# Patient Record
Sex: FEMALE | Race: WHITE | NOT HISPANIC OR LATINO | Employment: OTHER | URBAN - METROPOLITAN AREA
[De-identification: names, ages, dates, MRNs, and addresses within clinical notes are randomized per-mention and may not be internally consistent; named-entity substitution may affect disease eponyms.]

---

## 2019-04-15 ENCOUNTER — OFFICE VISIT (OUTPATIENT)
Dept: URGENT CARE | Facility: CLINIC | Age: 59
End: 2019-04-15

## 2019-04-15 ENCOUNTER — TELEPHONE (OUTPATIENT)
Dept: SCHEDULING | Facility: IMAGING CENTER | Age: 59
End: 2019-04-15

## 2019-04-15 VITALS
SYSTOLIC BLOOD PRESSURE: 120 MMHG | RESPIRATION RATE: 12 BRPM | BODY MASS INDEX: 37.83 KG/M2 | WEIGHT: 235.4 LBS | HEART RATE: 70 BPM | OXYGEN SATURATION: 94 % | TEMPERATURE: 98.6 F | DIASTOLIC BLOOD PRESSURE: 80 MMHG | HEIGHT: 66 IN

## 2019-04-15 DIAGNOSIS — J06.9 URI WITH COUGH AND CONGESTION: ICD-10-CM

## 2019-04-15 DIAGNOSIS — J40 BRONCHITIS: ICD-10-CM

## 2019-04-15 DIAGNOSIS — J01.40 ACUTE NON-RECURRENT PANSINUSITIS: Primary | ICD-10-CM

## 2019-04-15 PROCEDURE — 99204 OFFICE O/P NEW MOD 45 MIN: CPT | Performed by: PHYSICIAN ASSISTANT

## 2019-04-15 RX ORDER — DOXYCYCLINE HYCLATE 100 MG
100 TABLET ORAL 2 TIMES DAILY
Qty: 14 TAB | Refills: 0 | Status: SHIPPED | OUTPATIENT
Start: 2019-04-15 | End: 2019-04-15 | Stop reason: SDUPTHER

## 2019-04-15 RX ORDER — METHYLPREDNISOLONE 4 MG/1
TABLET ORAL
Qty: 21 TAB | Refills: 0 | Status: SHIPPED | OUTPATIENT
Start: 2019-04-15 | End: 2019-05-08

## 2019-04-15 RX ORDER — DOXYCYCLINE HYCLATE 100 MG
100 TABLET ORAL 2 TIMES DAILY
Qty: 14 TAB | Refills: 0 | Status: SHIPPED | OUTPATIENT
Start: 2019-04-15 | End: 2019-04-22

## 2019-04-15 RX ORDER — PROMETHAZINE HYDROCHLORIDE AND CODEINE PHOSPHATE 6.25; 1 MG/5ML; MG/5ML
5 SYRUP ORAL 4 TIMES DAILY PRN
Qty: 120 ML | Refills: 0 | Status: SHIPPED | OUTPATIENT
Start: 2019-04-15 | End: 2019-04-22

## 2019-04-15 RX ORDER — METHYLPREDNISOLONE 4 MG/1
TABLET ORAL
Qty: 21 TAB | Refills: 0 | Status: SHIPPED | OUTPATIENT
Start: 2019-04-15 | End: 2019-04-15 | Stop reason: SDUPTHER

## 2019-04-15 NOTE — PROGRESS NOTES
Subjective:      Pt is a 58 y.o. female who presents with Cough (X 6weeks )            HPI  This is a new problem. PT presents to  clinic today complaining of sore throat, fevers, pressure in ears, cough, fatigue, runny nose, wheezing and SOB. PT denies CP, NVD, abdominal pain, joint pain. PT states these symptoms began around 6 weeks ago. PT states the pain is a 6/10 with coughing fits, aching in nature and worse at night. Pt has not taken any RX medications for this condition. The pt's medication list, problem list, and allergies have been evaluated and reviewed during today's visit.    PMH:  Negative per pt.      PSH:  Negative per pt.      Fam Hx:  the patient's family history is not pertinent to their current complaint      Soc HX:  Social History     Social History   • Marital status: Single     Spouse name: N/A   • Number of children: N/A   • Years of education: N/A     Occupational History   • Not on file.     Social History Main Topics   • Smoking status: Not on file   • Smokeless tobacco: Not on file   • Alcohol use Not on file   • Drug use: Unknown   • Sexual activity: Not on file     Other Topics Concern   • Not on file     Social History Narrative   • No narrative on file         Medications:    Current Outpatient Prescriptions:   •  ceFAZolin 50 mg/ml (ANCEF), Place  in both eyes every 3 hours., Disp: , Rfl:   •  promethazine-codeine (PHENERGAN-CODEINE) 6.25-10 MG/5ML Syrup, Take 5 mL by mouth 4 times a day as needed for up to 7 days., Disp: 120 mL, Rfl: 0  •  doxycycline (VIBRAMYCIN) 100 MG Tab, Take 1 Tab by mouth 2 times a day for 7 days., Disp: 14 Tab, Rfl: 0  •  MethylPREDNISolone (MEDROL DOSEPAK) 4 MG Tablet Therapy Pack, Use as directed, Disp: 21 Tab, Rfl: 0      Allergies:  Patient has no allergy information on record.    ROS  Review of Systems   Constitutional: Positive for malaise/fatigue. Negative for fever and diaphoresis.   HENT: Positive for congestion and sore throat. Negative for ear  "discharge, hearing loss, nosebleeds and tinnitus.    Eyes: Negative for blurred vision, double vision and photophobia.   Respiratory: Positive for cough, sputum production, shortness of breath and wheezing. Negative for hemoptysis.    Cardiovascular: Negative for chest pain and palpitations.   Gastrointestinal: Negative for nausea, vomiting, abdominal pain, diarrhea and constipation.   Genitourinary: Negative for dysuria and flank pain.   Musculoskeletal: Negative for joint pain and myalgias.   Skin: Negative for itching and rash.   Neurological:  Negative for dizziness, tingling and weakness.   Endo/Heme/Allergies: Does not bruise/bleed easily.   Psychiatric/Behavioral: Negative for depression. The patient is not nervous/anxious.             Objective:     /80 (BP Location: Left arm, Patient Position: Sitting, BP Cuff Size: Adult)   Pulse 70   Temp 37 °C (98.6 °F) (Temporal)   Resp 12   Ht 1.676 m (5' 6\")   Wt 106.8 kg (235 lb 6.4 oz)   SpO2 94%   BMI 37.99 kg/m²      Physical Exam       Physical Exam   Constitutional: PT is oriented to person, place, and time. PT appears well-developed and well-nourished. No distress.   HENT:   Head: Normocephalic and atraumatic.   Right Ear: Hearing, tympanic membrane, external ear and ear canal normal.   Left Ear: Hearing, tympanic membrane, external ear and ear canal normal.   Nose: Mucosal edema, rhinorrhea and sinus tenderness present. Right sinus exhibits frontal sinus tenderness. Left sinus exhibits frontal sinus tenderness.   Mouth/Throat: Uvula is midline. Mucous membranes are pale. Posterior oropharyngeal edema and posterior oropharyngeal erythema present. No oropharyngeal exudate.   Eyes: Conjunctivae normal and EOM are normal. Pupils are equal, round, and reactive to light. Right eye exhibits no discharge. Left eye exhibits no discharge.   Neck: Normal range of motion. Neck supple. No thyromegaly present.   Cardiovascular: Normal rate, regular rhythm, " normal heart sounds and intact distal pulses.  Exam reveals no gallop and no friction rub.    No murmur heard.  Pulmonary/Chest: Effort normal. No respiratory distress. PT has wheezes. PT has no rales. PT exhibits tenderness.   Abdominal: Soft. Bowel sounds are normal. PT exhibits no distension and no mass. There is no tenderness. There is no rebound and no guarding.   Musculoskeletal: Normal range of motion. PT exhibits no edema and no tenderness.   Lymphadenopathy:     PT has no cervical adenopathy.   Neurological: Pt is alert and oriented to person, place, and time. Pt has normal reflexes. No cranial nerve deficit.   Skin: Skin is warm and dry. No rash noted. No erythema.   Psychiatric: PT has a normal mood and affect. Pt behavior is normal. Judgment and thought content normal.        Assessment/Plan:     1. Acute non-recurrent pansinusitis    - doxycycline (VIBRAMYCIN) 100 MG Tab; Take 1 Tab by mouth 2 times a day for 7 days.  Dispense: 14 Tab; Refill: 0  - MethylPREDNISolone (MEDROL DOSEPAK) 4 MG Tablet Therapy Pack; Use as directed  Dispense: 21 Tab; Refill: 0    2. Bronchitis    - doxycycline (VIBRAMYCIN) 100 MG Tab; Take 1 Tab by mouth 2 times a day for 7 days.  Dispense: 14 Tab; Refill: 0  - MethylPREDNISolone (MEDROL DOSEPAK) 4 MG Tablet Therapy Pack; Use as directed  Dispense: 21 Tab; Refill: 0    3. URI with cough and congestion    - promethazine-codeine (PHENERGAN-CODEINE) 6.25-10 MG/5ML Syrup; Take 5 mL by mouth 4 times a day as needed for up to 7 days.  Dispense: 120 mL; Refill: 0  - MethylPREDNISolone (MEDROL DOSEPAK) 4 MG Tablet Therapy Pack; Use as directed  Dispense: 21 Tab; Refill: 0    Rest, fluids encouraged.  OTC decongestant for congestion/cough  AVS with medical info given.  Pt was in full understanding and agreement with the plan.  Differential diagnosis, natural history, supportive care, and indications for immediate follow-up discussed. All questions answered. Patient agrees with the plan  of care.  Follow-up as needed if symptoms worsen or fail to improve.

## 2019-05-08 ENCOUNTER — OFFICE VISIT (OUTPATIENT)
Dept: MEDICAL GROUP | Facility: MEDICAL CENTER | Age: 59
End: 2019-05-08
Payer: COMMERCIAL

## 2019-05-08 VITALS
OXYGEN SATURATION: 96 % | TEMPERATURE: 98.8 F | HEIGHT: 66 IN | WEIGHT: 234 LBS | SYSTOLIC BLOOD PRESSURE: 128 MMHG | HEART RATE: 67 BPM | BODY MASS INDEX: 37.61 KG/M2 | DIASTOLIC BLOOD PRESSURE: 78 MMHG

## 2019-05-08 DIAGNOSIS — Z87.891 HISTORY OF TOBACCO USE: ICD-10-CM

## 2019-05-08 DIAGNOSIS — Z12.31 ENCOUNTER FOR SCREENING MAMMOGRAM FOR MALIGNANT NEOPLASM OF BREAST: ICD-10-CM

## 2019-05-08 DIAGNOSIS — K21.9 GASTROESOPHAGEAL REFLUX DISEASE WITHOUT ESOPHAGITIS: ICD-10-CM

## 2019-05-08 DIAGNOSIS — Z23 NEED FOR VACCINATION: ICD-10-CM

## 2019-05-08 DIAGNOSIS — Z78.0 MENOPAUSE: ICD-10-CM

## 2019-05-08 DIAGNOSIS — Z00.00 PREVENTATIVE HEALTH CARE: ICD-10-CM

## 2019-05-08 DIAGNOSIS — C80.1 ADENOID CYSTIC CARCINOMA (HCC): ICD-10-CM

## 2019-05-08 DIAGNOSIS — H40.50X0 GLAUCOMA SECONDARY TO OTHER EYE DISORDER, UNSPECIFIED GLAUCOMA STAGE, UNSPECIFIED LATERALITY: ICD-10-CM

## 2019-05-08 PROBLEM — H40.9 GLAUCOMA: Status: ACTIVE | Noted: 2019-05-08

## 2019-05-08 PROCEDURE — 99204 OFFICE O/P NEW MOD 45 MIN: CPT | Mod: 25 | Performed by: INTERNAL MEDICINE

## 2019-05-08 RX ORDER — TRAZODONE HYDROCHLORIDE 50 MG/1
50 TABLET ORAL NIGHTLY PRN
Refills: 3 | COMMUNITY
Start: 2019-04-14 | End: 2019-10-22 | Stop reason: SDUPTHER

## 2019-05-08 RX ORDER — LATANOPROST 50 UG/ML
1 SOLUTION/ DROPS OPHTHALMIC NIGHTLY
COMMUNITY
End: 2020-08-29

## 2019-05-08 ASSESSMENT — PATIENT HEALTH QUESTIONNAIRE - PHQ9: CLINICAL INTERPRETATION OF PHQ2 SCORE: 0

## 2019-05-08 NOTE — LETTER
[facility]  Valeria Childs M.D.  [address]  [place] [address]  Fax: [phone]   Authorization for Release/Disclosure of   Protected Health Information   Name: MARIA TERESA HAJI : 1960 SSN: [id number]   Address: [address] [place] [address]  [place] [address] Phone:    [phone] (home)    I authorize the entity listed below to release/disclose the PHI below to:   [facility]/Valeria Childs M.D. and Valeria Childs M.D.   Provider or Entity Name:     Address   City, State, [facility]   Phone:      Fax:     Reason for request: continuity of care   Information to be released:    [  ] LAST COLONOSCOPY,  including any PATH REPORT and follow-up  [  ] LAST FIT/COLOGUARD RESULT [  ] LAST DEXA  [  ] LAST MAMMOGRAM  [  ] LAST PAP  [  ] LAST LABS [  ] RETINA EXAM REPORT  [  ] IMMUNIZATION RECORDS  [  ] Release all info      [  ] Check here and initial the line next to each item to release ALL health information INCLUDING  _____ Care and treatment for drug and / or alcohol abuse  _____ HIV testing, infection status, or AIDS  _____ Genetic Testing    DATES OF SERVICE OR TIME PERIOD TO BE DISCLOSED: _____________  I understand and acknowledge that:  * This Authorization may be revoked at any time by you in writing, except if your health information has already been used or disclosed.  * Your health information that will be used or disclosed as a result of you signing this authorization could be re-disclosed by the recipient. If this occurs, your re-disclosed health information may no longer be protected by State or Federal laws.  * You may refuse to sign this Authorization. Your refusal will not affect your ability to obtain treatment.  * This Authorization becomes effective upon signing and will  on (date) __________.      If no date is indicated, this Authorization will  one (1) year from the signature date.    Name: Maria Teresa Haji    Signature:   Date:     2019       PLEASE FAX REQUESTED  RECORDS BACK TO: [phone]

## 2019-05-08 NOTE — PROGRESS NOTES
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    HISTORY OF THE PRESENT ILLNESS: Patient is a 58 y.o. female. This pleasant patient is here today here to establish care.    Her primary complaint is cough for the past few months started in February.  She recently moved here from [facility] in January.  She does not feel that allergies are to blame.  She says her symptoms consist of chronic cough with some clear phlegm.  Mild sore throat due to cough.  No symptoms of the nose such as congestion or drainage.  No symptoms of the ears, eyes, dysphasia, fever, chills, night sweats, unintentional weight loss, dyspnea, wheeze, palpitations, leg edema, chest pain or any other associated symptoms.  She says she got 2 rounds of steroids, 2 rounds of antibiotics both helped only for about 2 days.  The most help she has got is from omeprazole and when she uses this medication her symptoms are relieved.  She has not known that she has had any prior GERD.  She does feel that her symptoms are worse with tomato based products.  We did extensively discuss GERD triggers today.  Colonoscopy is up-to-date she says she had in 2016 in [place], [place] which was normal and recommended to repeat in 5 years.  Records will be requested.  No lower GI symptoms such as diarrhea, constipation, change in bowel movements, abdominal pain, rectal bleeding.    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  She says she followed up with oncology, had a negative PET scan, was  "told that she needed no further follow-up.  Family history of skin cancer in both parents.  She does wish to follow-up with dermatology.  She will follow-up with dentist on her own.    Says she has had start of glaucoma, family history glaucoma, wishes have ophthalmology referral.    Would like the shingles vaccine, she says when she was in her 30s she was diagnosed with viral meningitis, and due to the trauma of the lumbar punctures it was thought that she developed a shingles infection, with persistent rash and symptoms for almost 15 years.  This finally resolved, she had the old zoster vaccine and would like the new vaccine.        Allergies: Patient has no known allergies.    Current Outpatient Prescriptions Ordered in [facility]   Medication Sig Dispense Refill   • OMEPRAZOLE PO Take  by mouth.     • latanoprost (XALATAN) 0.005 % Solution Place 1 Drop in both eyes every evening.     • Zoster Vac Recomb Adjuvanted (SHINGRIX) 50 MCG/0.5ML Recon Susp 0.5 mL by Intramuscular route Once for 1 dose. 0.5 mL 1   • traZODone (DESYREL) 50 MG Tab Take 50 mg by mouth at bedtime as needed.  3     No current [facility]-ordered facility-administered medications on file.        Past Medical History:   Diagnosis Date   • Cancer (HCC)     posterior scalp pt states \"adenoid cystic carcinoma\"       Past Surgical History:   Procedure Laterality Date   • ABDOMINAL EXPLORATION     • APPENDECTOMY     • OTHER      breast lift, tummy tuck   • OTHER      removal of fallopian tube for a cyst per patient.   • OTHER ORTHOPEDIC SURGERY      b/l knee replacements 2014, 2016   • TONSILLECTOMY         Social History   Substance Use Topics   • Smoking status: Former Smoker     Packs/day: 2.00     Years: 16.00     Quit date: 1993   • Smokeless tobacco: Never Used   • Alcohol use Yes      Comment: 1 glass q 2mo       Social History     Social History Narrative   • No narrative on file       Family History   Problem Relation Age of Onset   • Arthritis Mother  " "  • Cancer Mother         skin   • Cancer Father         skin, prostate, esophageal   • Arthritis Brother        ROS:     - Constitutional: Negative for fever, chills, unexpected weight change, night sweats    - Eyes:   Negative for blurry vision, eye pain, discharge    - ENT:  Negative for hearing changes, ear pain, ear discharge, rhinorrhea, sinus congestion, sore throat     - Respiratory: Negative for  dyspnea, wheezing, and crackles.      - Cardiovascular: Negative for chest pain, palpitations, orthopnea, and bilateral lower extremity edema.     - Gastrointestinal: See HPI  - Genitourinary: Negative for dysuria    - Musculoskeletal: Negative for myalgias, back pain, and joint pain.     - Skin: Negative for rash, itching, cyanotic skin color change.     - Neurological: Negative for migraines, numbness, ataxia, tremors, vertigo    - Endo:Negative for polyuria    - Hem/lymphatic: Negative for swollen glands    -Allergic/immun: Negative for allergic rhinitis    - Psychiatric/Behavioral: Negative for depression, suicidal/homicidal ideation and memory loss.      Exam: /78 (BP Location: Left arm, Patient Position: Sitting, BP Cuff Size: Adult)   Pulse 67   Temp 37.1 °C (98.8 °F) (Temporal)   Ht 1.676 m (5' 6\")   Wt 106.1 kg (234 lb)   SpO2 96%  Body mass index is 37.77 kg/m².    General: Normal appearing. No distress.  EYES: Conjunctiva clear lids without ptosis, pupils equal  EARS: Normal shape and contour   NOSE, THROAT: nasal mucosa benign. oropharynx is without erythema, edema or exudates.   Neck: Supple without LAD. Thyroid is not enlarged.  Pulmonary: Clear to ausculation.  Normal effort. No rales or wheezing.  Cardiovascular: Regular rate and rhythm without significant murmur.   Abdomen: Soft, nontender, nondistended. Normal bowel sounds.  Neurologic: Cranial nerves grossly nonfocal  Lymph: No cervical, supraclavicular nodes palpable  Skin: Warm and dry.  No obvious lesions.  Musculoskeletal: Normal " gait. No extremity cyanosis, clubbing, or edema.  Psych: Normal mood and affect. Alert and oriented x3. Judgment and insight is normal.      Assessment/Plan  1. Adenoid cystic carcinoma (HCC)  This is a very rare malignancy, we briefly reviewed this together in clinic, seems like she has had an usual presentation of this.  Advised her to tell her dentist about her history of this cancer so she can have routine screening.  - REFERRAL TO GASTROENTEROLOGY  - REFERRAL TO DERMATOLOGY    2. Preventative health care  - REFERRAL TO DERMATOLOGY  - CBC WITH DIFFERENTIAL; Future  - Comp Metabolic Panel; Future  - Lipid Profile; Future  - VITAMIN D,25 HYDROXY; Future  - REFERRAL TO GYNECOLOGY    3. Menopause  Asymptomatic.  States she is due for Pap smear would like to follow-up with a gynecologist as she has in the past.  - VITAMIN D,25 HYDROXY; Future  - REFERRAL TO GYNECOLOGY    4. 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  - REFERRAL TO OPHTHALMOLOGY    5. Gastroesophageal reflux disease without esophagitis  New issue  Patient indicates symptoms are controlled with omeprazole, recommend daily use.  Given this is new onset GERD at age 58, also with her history of tobacco use, adenoid cystic carcinoma and family history of esophageal cancer, I feel it is best that she have gastroenterology evaluation to consider if she would benefit from upper endoscopy.  - REFERRAL TO GASTROENTEROLOGY  -prior colo record requested    6. History of tobacco use  Screening test.  Asymptomatic.  - URINALYSIS; Future    7. Encounter for screening mammogram for malignant neoplasm of breast  Asymptomatic, she states she is due around July - [place]-SCREENING MAMMO BILAT W/TOMOSYNTHESIS W/CAD; Future    8. Adult BMI 37.0-37.9 kg/sq m  Offered to help patient with weight management, she knows I am available to discuss this at any time.  - Patient  identified as having weight management issue.  Appropriate orders and counseling given.    9. Need for vaccination  Shingles vaccine prescription given to patient.      Return to clinic 3 months follow-up interval labs and consults        Please note that this dictation was created using voice recognition software. I have made every reasonable attempt to correct obvious errors, but I expect that there are errors of grammar and possibly content that I did not discover before finalizing the note.

## 2019-05-31 ENCOUNTER — HOSPITAL ENCOUNTER (OUTPATIENT)
Dept: LAB | Facility: MEDICAL CENTER | Age: 59
End: 2019-05-31
Attending: INTERNAL MEDICINE
Payer: COMMERCIAL

## 2019-05-31 DIAGNOSIS — Z87.891 HISTORY OF TOBACCO USE: ICD-10-CM

## 2019-05-31 DIAGNOSIS — Z78.0 MENOPAUSE: ICD-10-CM

## 2019-05-31 DIAGNOSIS — Z00.00 PREVENTATIVE HEALTH CARE: ICD-10-CM

## 2019-05-31 LAB
25(OH)D3 SERPL-MCNC: 47 NG/ML (ref 30–100)
ALBUMIN SERPL BCP-MCNC: 4.7 G/DL (ref 3.2–4.9)
ALBUMIN/GLOB SERPL: 1.7 G/DL
ALP SERPL-CCNC: 53 U/L (ref 30–99)
ALT SERPL-CCNC: 39 U/L (ref 2–50)
ANION GAP SERPL CALC-SCNC: 8 MMOL/L (ref 0–11.9)
APPEARANCE UR: ABNORMAL
AST SERPL-CCNC: 27 U/L (ref 12–45)
BACTERIA #/AREA URNS HPF: NEGATIVE /HPF
BASOPHILS # BLD AUTO: 0.2 % (ref 0–1.8)
BASOPHILS # BLD: 0.01 K/UL (ref 0–0.12)
BILIRUB SERPL-MCNC: 0.6 MG/DL (ref 0.1–1.5)
BILIRUB UR QL STRIP.AUTO: NEGATIVE
BUN SERPL-MCNC: 15 MG/DL (ref 8–22)
CALCIUM SERPL-MCNC: 9.8 MG/DL (ref 8.5–10.5)
CHLORIDE SERPL-SCNC: 107 MMOL/L (ref 96–112)
CHOLEST SERPL-MCNC: 167 MG/DL (ref 100–199)
CO2 SERPL-SCNC: 25 MMOL/L (ref 20–33)
COLOR UR: YELLOW
CREAT SERPL-MCNC: 1 MG/DL (ref 0.5–1.4)
EOSINOPHIL # BLD AUTO: 0.08 K/UL (ref 0–0.51)
EOSINOPHIL NFR BLD: 1.4 % (ref 0–6.9)
EPI CELLS #/AREA URNS HPF: NORMAL /HPF
ERYTHROCYTE [DISTWIDTH] IN BLOOD BY AUTOMATED COUNT: 41.8 FL (ref 35.9–50)
FASTING STATUS PATIENT QL REPORTED: NORMAL
GLOBULIN SER CALC-MCNC: 2.7 G/DL (ref 1.9–3.5)
GLUCOSE SERPL-MCNC: 88 MG/DL (ref 65–99)
GLUCOSE UR STRIP.AUTO-MCNC: NEGATIVE MG/DL
HCT VFR BLD AUTO: 44.6 % (ref 37–47)
HDLC SERPL-MCNC: 38 MG/DL
HGB BLD-MCNC: 15.1 G/DL (ref 12–16)
IMM GRANULOCYTES # BLD AUTO: 0.02 K/UL (ref 0–0.11)
IMM GRANULOCYTES NFR BLD AUTO: 0.4 % (ref 0–0.9)
KETONES UR STRIP.AUTO-MCNC: ABNORMAL MG/DL
LDLC SERPL CALC-MCNC: 107 MG/DL
LEUKOCYTE ESTERASE UR QL STRIP.AUTO: ABNORMAL
LYMPHOCYTES # BLD AUTO: 1.88 K/UL (ref 1–4.8)
LYMPHOCYTES NFR BLD: 33.6 % (ref 22–41)
MCH RBC QN AUTO: 31.3 PG (ref 27–33)
MCHC RBC AUTO-ENTMCNC: 33.9 G/DL (ref 33.6–35)
MCV RBC AUTO: 92.5 FL (ref 81.4–97.8)
MICRO URNS: ABNORMAL
MONOCYTES # BLD AUTO: 0.44 K/UL (ref 0–0.85)
MONOCYTES NFR BLD AUTO: 7.9 % (ref 0–13.4)
NEUTROPHILS # BLD AUTO: 3.16 K/UL (ref 2–7.15)
NEUTROPHILS NFR BLD: 56.5 % (ref 44–72)
NITRITE UR QL STRIP.AUTO: NEGATIVE
NRBC # BLD AUTO: 0 K/UL
NRBC BLD-RTO: 0 /100 WBC
PH UR STRIP.AUTO: 5.5 [PH]
PLATELET # BLD AUTO: 238 K/UL (ref 164–446)
PMV BLD AUTO: 9.6 FL (ref 9–12.9)
POTASSIUM SERPL-SCNC: 4.4 MMOL/L (ref 3.6–5.5)
PROT SERPL-MCNC: 7.4 G/DL (ref 6–8.2)
PROT UR QL STRIP: NEGATIVE MG/DL
RBC # BLD AUTO: 4.82 M/UL (ref 4.2–5.4)
RBC # URNS HPF: NORMAL /HPF
RBC UR QL AUTO: NEGATIVE
SODIUM SERPL-SCNC: 140 MMOL/L (ref 135–145)
SP GR UR STRIP.AUTO: 1.03
TRIGL SERPL-MCNC: 111 MG/DL (ref 0–149)
UROBILINOGEN UR STRIP.AUTO-MCNC: 0.2 MG/DL
WBC # BLD AUTO: 5.6 K/UL (ref 4.8–10.8)
WBC #/AREA URNS HPF: NORMAL /HPF

## 2019-05-31 PROCEDURE — 81001 URINALYSIS AUTO W/SCOPE: CPT

## 2019-05-31 PROCEDURE — 80053 COMPREHEN METABOLIC PANEL: CPT

## 2019-05-31 PROCEDURE — 85025 COMPLETE CBC W/AUTO DIFF WBC: CPT

## 2019-05-31 PROCEDURE — 82306 VITAMIN D 25 HYDROXY: CPT

## 2019-05-31 PROCEDURE — 80061 LIPID PANEL: CPT

## 2019-05-31 PROCEDURE — 36415 COLL VENOUS BLD VENIPUNCTURE: CPT

## 2019-06-13 ENCOUNTER — HOSPITAL ENCOUNTER (OUTPATIENT)
Facility: MEDICAL CENTER | Age: 59
End: 2019-06-13
Attending: OBSTETRICS & GYNECOLOGY
Payer: COMMERCIAL

## 2019-06-13 ENCOUNTER — GYNECOLOGY VISIT (OUTPATIENT)
Dept: OBGYN | Facility: MEDICAL CENTER | Age: 59
End: 2019-06-13
Payer: COMMERCIAL

## 2019-06-13 VITALS
WEIGHT: 235 LBS | BODY MASS INDEX: 37.77 KG/M2 | SYSTOLIC BLOOD PRESSURE: 110 MMHG | HEIGHT: 66 IN | DIASTOLIC BLOOD PRESSURE: 80 MMHG

## 2019-06-13 DIAGNOSIS — Z01.419 WELL WOMAN EXAM: ICD-10-CM

## 2019-06-13 DIAGNOSIS — Z78.0 MENOPAUSE: ICD-10-CM

## 2019-06-13 PROCEDURE — 88175 CYTOPATH C/V AUTO FLUID REDO: CPT

## 2019-06-13 PROCEDURE — 99386 PREV VISIT NEW AGE 40-64: CPT | Performed by: OBSTETRICS & GYNECOLOGY

## 2019-06-13 PROCEDURE — 87624 HPV HI-RISK TYP POOLED RSLT: CPT

## 2019-06-13 NOTE — PROGRESS NOTES
"Chief complaint: Annual exam    Maria Teresa Haji is a 58 y.o.  female who presents for her Annual Gynecologic Exam      [facility] Comments: Pt presents for her annual well woman exam.   Pt has no complaints.  No LMP recorded (lmp unknown). Patient is postmenopausal.  Has never taken hormonal placement therapy.  Reports last menstrual period in .  No vaginal bleeding since    Mammogram 1 year ago per patient  Dexa scan never  Colonoscopy 3 years ago per patient.  Has a history of polyps.    Review of Systems:   Pertinent positives documented in HPI and all other systems reviewed & are negative    PGYN Hx: History of ovarian cyst with left salpingo-oophorectomy    All PMH, PSH, allergies, social history and FH reviewed and updated today:  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\"   • Glaucoma    • Hemorrhoids    • Hives    • Meningitis    • Pneumonia    • Sinusitis      Past Surgical History:   Procedure Laterality Date   • ABDOMINAL EXPLORATION     • APPENDECTOMY     • GYN SURGERY      removed ovary   • LUMPECTOMY     • OTHER      breast lift, tummy tuck   • OTHER      removal of fallopian tube for a cyst per patient.   • OTHER ORTHOPEDIC SURGERY      b/l knee replacements ,    • TONSILLECTOMY       Medications:   Current Outpatient Prescriptions Ordered in [facility]   Medication Sig Dispense Refill   • latanoprost (XALATAN) 0.005 % Solution Place 1 Drop in both eyes every evening.     • OMEPRAZOLE PO Take  by mouth.     • traZODone (DESYREL) 50 MG Tab Take 50 mg by mouth at bedtime as needed.  3     No current [facility]-ordered facility-administered medications on file.      Patient has no known allergies.  Social History     Social History   • Marital status: Single     Spouse name: N/A   • Number of children: N/A   • Years of education: N/A     Social " "History Main Topics   • Smoking status: Former Smoker     Packs/day: 2.00     Years: 16.00     Quit date: 1993   • Smokeless tobacco: Never Used   • Alcohol use Yes      Comment: 1 glass q 2mo   • Drug use: No   • Sexual activity: Yes     Partners: Male     Other Topics Concern   • Not on file     Social History Narrative   • No narrative on file     Family History   Problem Relation Age of Onset   • Arthritis Mother    • Cancer Mother         skin   • Anemia Mother    • Hypertension Mother    • Obesity Mother    • Other Mother         ulcer   • Cancer Father         skin, prostate, esophageal   • Hypertension Father    • Obesity Father    • Arthritis Brother    • Cancer Maternal Aunt         breast]          Objective:   Vital measurements:  /80 (BP Location: Left arm, Patient Position: Sitting)   Ht 1.676 m (5' 6\")   Wt 106.6 kg (235 lb)   Body mass index is 37.93 kg/m². (Goal BM I>18 <25)    Physical Exam   Nursing note and vitals reviewed.  Constitutional: She is oriented to person, place, and time. She appears well-developed and well-nourished. No distress.     HEENT:   Head: Normocephalic and atraumatic.   Right Ear: External ear normal.   Left Ear: External ear normal.   Nose: Nose normal.   Eyes: Conjunctivae and EOM are normal. Pupils are equal, round, and reactive to light. No scleral icterus.     Neck: Normal range of motion. Neck supple. No tracheal deviation present. No thyromegaly present. No cervical or supraclavicular lymphadenopathy.    Pulmonary/Chest: Effort normal and breath sounds normal. No respiratory distress. She has no wheezes. She has no rales. She exhibits no tenderness.     Cardiovascular: Regular, rate and rhythm. No edema.    Breast:  Symmetrical, normal consistency without masses., No dimpling or skin changes, Normal nipples without discharge, no axillary lymphadenopathy    Abdominal: Soft. Bowel sounds are normal. She exhibits no distension and no mass. No tenderness. She has " no rebound and no guarding.     Genitourinary:  Pelvic exam was performed with patient supine.  External genitalia with no abnormal pigmentation, labial fusion, rash, tenderness, lesion or injury to the labia bilaterally.  BUS normal  Vagina is pink and moist with no lesions, foul discharge, erythema, tenderness or bleeding. No foreign body around the vagina or signs of injury.   Cervix exhibits no motion tenderness, no discharge and no friability, no lesions.   Uterus is 7 cm not deviated, not enlarged, not fixed and not tender.  Right adnexa displays no mass, no tenderness and no fullness.  Left adnexa displays no mass, no tenderness and no fullness.     Musculoskeletal: Normal range of motion. Non tender. She exhibits no edema and no tenderness.     Lymphadenopathy: She has no cervical or supraclavicular adenopathy.     Neurological: She is alert and oriented to person, place, and time. She exhibits normal muscle tone.     Skin: Skin is warm and dry. No rash noted. She is not diaphoretic. No erythema. No pallor.     Psychiatric: She has a normal mood and affect. Her behavior is normal. Judgment and thought content normal.        Assessment:     1. Well woman exam     2. Menopause           Plan:   Pap and physical exam performed.  HPV testing also ordered.  Will call patient with results  Self breast awareness discussed.  Encouraged exercise and proper diet.  Mammograms annually. Patient given order for screening rachel mammogram.  See medications and orders placed in encounter report.  Weight bearing exercise daily to strengthen bones    All questions answered

## 2019-06-14 DIAGNOSIS — Z01.419 WELL WOMAN EXAM: ICD-10-CM

## 2019-06-16 LAB
CYTOLOGY REG CYTOL: NORMAL
HPV HR 12 DNA CVX QL NAA+PROBE: NEGATIVE
HPV16 DNA SPEC QL NAA+PROBE: NEGATIVE
HPV18 DNA SPEC QL NAA+PROBE: NEGATIVE
SPECIMEN SOURCE: NORMAL

## 2019-07-15 ENCOUNTER — HOSPITAL ENCOUNTER (OUTPATIENT)
Dept: RADIOLOGY | Facility: MEDICAL CENTER | Age: 59
End: 2019-07-15
Attending: INTERNAL MEDICINE
Payer: COMMERCIAL

## 2019-07-15 DIAGNOSIS — Z12.31 ENCOUNTER FOR SCREENING MAMMOGRAM FOR MALIGNANT NEOPLASM OF BREAST: ICD-10-CM

## 2019-07-15 PROCEDURE — 77063 BREAST TOMOSYNTHESIS BI: CPT

## 2019-07-16 DIAGNOSIS — R92.8 ABNORMAL FINDING ON BREAST IMAGING: ICD-10-CM

## 2019-07-17 ENCOUNTER — HOSPITAL ENCOUNTER (OUTPATIENT)
Dept: RADIOLOGY | Facility: MEDICAL CENTER | Age: 59
End: 2019-07-17

## 2019-07-22 ENCOUNTER — TELEPHONE (OUTPATIENT)
Dept: MEDICAL GROUP | Facility: MEDICAL CENTER | Age: 59
End: 2019-07-22

## 2019-07-22 ENCOUNTER — HOSPITAL ENCOUNTER (OUTPATIENT)
Dept: RADIOLOGY | Facility: MEDICAL CENTER | Age: 59
End: 2019-07-22
Attending: INTERNAL MEDICINE
Payer: COMMERCIAL

## 2019-07-22 DIAGNOSIS — R92.8 ABNORMAL FINDING ON BREAST IMAGING: ICD-10-CM

## 2019-07-22 DIAGNOSIS — R92.8 ABNORMAL MAMMOGRAM: ICD-10-CM

## 2019-07-22 PROCEDURE — 76642 ULTRASOUND BREAST LIMITED: CPT | Mod: RT

## 2019-07-22 NOTE — TELEPHONE ENCOUNTER
1. Caller Name: Nila      Call Back Number: [phone]      Patient approves a detailed voicemail message: N\A    Tech called and asked if  could co-sign an order, please advise. Did not state what the order is for.

## 2019-08-06 ENCOUNTER — APPOINTMENT (OUTPATIENT)
Dept: RADIOLOGY | Facility: MEDICAL CENTER | Age: 59
DRG: 872 | End: 2019-08-06
Payer: COMMERCIAL

## 2019-08-06 ENCOUNTER — APPOINTMENT (OUTPATIENT)
Dept: RADIOLOGY | Facility: MEDICAL CENTER | Age: 59
DRG: 872 | End: 2019-08-06
Attending: EMERGENCY MEDICINE
Payer: COMMERCIAL

## 2019-08-06 ENCOUNTER — HOSPITAL ENCOUNTER (INPATIENT)
Facility: MEDICAL CENTER | Age: 59
LOS: 2 days | DRG: 872 | End: 2019-08-08
Attending: EMERGENCY MEDICINE | Admitting: INTERNAL MEDICINE
Payer: COMMERCIAL

## 2019-08-06 DIAGNOSIS — E86.0 DEHYDRATION: ICD-10-CM

## 2019-08-06 DIAGNOSIS — R82.71 BACTERIURIA: ICD-10-CM

## 2019-08-06 DIAGNOSIS — M25.559 ARTHRALGIA OF HIP, UNSPECIFIED LATERALITY: ICD-10-CM

## 2019-08-06 DIAGNOSIS — R26.2 INABILITY TO AMBULATE DUE TO MULTIPLE JOINTS: ICD-10-CM

## 2019-08-06 DIAGNOSIS — M25.562 ARTHRALGIA OF BOTH KNEES: ICD-10-CM

## 2019-08-06 DIAGNOSIS — M25.561 ARTHRALGIA OF BOTH KNEES: ICD-10-CM

## 2019-08-06 DIAGNOSIS — R65.10 SIRS (SYSTEMIC INFLAMMATORY RESPONSE SYNDROME) (HCC): ICD-10-CM

## 2019-08-06 PROBLEM — N17.9 ACUTE KIDNEY INJURY (HCC): Status: ACTIVE | Noted: 2019-08-06

## 2019-08-06 PROBLEM — A41.9 SEPSIS (HCC): Status: ACTIVE | Noted: 2019-08-06

## 2019-08-06 PROBLEM — M25.569 KNEE PAIN: Status: ACTIVE | Noted: 2019-08-06

## 2019-08-06 PROBLEM — R73.9 HYPERGLYCEMIA: Status: ACTIVE | Noted: 2019-08-06

## 2019-08-06 LAB
ALBUMIN SERPL BCP-MCNC: 4.4 G/DL (ref 3.2–4.9)
ALBUMIN/GLOB SERPL: 1.5 G/DL
ALP SERPL-CCNC: 68 U/L (ref 30–99)
ALT SERPL-CCNC: 49 U/L (ref 2–50)
ANION GAP SERPL CALC-SCNC: 11 MMOL/L (ref 0–11.9)
APPEARANCE UR: CLEAR
AST SERPL-CCNC: 33 U/L (ref 12–45)
BACTERIA #/AREA URNS HPF: ABNORMAL /HPF
BASOPHILS # BLD AUTO: 0.2 % (ref 0–1.8)
BASOPHILS # BLD: 0.02 K/UL (ref 0–0.12)
BILIRUB SERPL-MCNC: 0.8 MG/DL (ref 0.1–1.5)
BILIRUB UR QL STRIP.AUTO: NEGATIVE
BUN SERPL-MCNC: 13 MG/DL (ref 8–22)
CALCIUM SERPL-MCNC: 9.5 MG/DL (ref 8.4–10.2)
CHLORIDE SERPL-SCNC: 104 MMOL/L (ref 96–112)
CO2 SERPL-SCNC: 22 MMOL/L (ref 20–33)
COLOR UR: YELLOW
CREAT SERPL-MCNC: 0.96 MG/DL (ref 0.5–1.4)
CRP SERPL HS-MCNC: 1.16 MG/DL (ref 0–0.75)
EOSINOPHIL # BLD AUTO: 0.05 K/UL (ref 0–0.51)
EOSINOPHIL NFR BLD: 0.4 % (ref 0–6.9)
EPI CELLS #/AREA URNS HPF: ABNORMAL /HPF
ERYTHROCYTE [DISTWIDTH] IN BLOOD BY AUTOMATED COUNT: 40.2 FL (ref 35.9–50)
ERYTHROCYTE [SEDIMENTATION RATE] IN BLOOD BY WESTERGREN METHOD: 3 MM/HOUR (ref 0–30)
GLOBULIN SER CALC-MCNC: 2.9 G/DL (ref 1.9–3.5)
GLUCOSE SERPL-MCNC: 103 MG/DL (ref 65–99)
GLUCOSE UR STRIP.AUTO-MCNC: NEGATIVE MG/DL
HCT VFR BLD AUTO: 44.7 % (ref 37–47)
HGB BLD-MCNC: 15.2 G/DL (ref 12–16)
IMM GRANULOCYTES # BLD AUTO: 0.05 K/UL (ref 0–0.11)
IMM GRANULOCYTES NFR BLD AUTO: 0.4 % (ref 0–0.9)
KETONES UR STRIP.AUTO-MCNC: NEGATIVE MG/DL
LACTATE BLD-SCNC: 1.6 MMOL/L (ref 0.5–2)
LEUKOCYTE ESTERASE UR QL STRIP.AUTO: ABNORMAL
LYMPHOCYTES # BLD AUTO: 1.86 K/UL (ref 1–4.8)
LYMPHOCYTES NFR BLD: 15.3 % (ref 22–41)
MCH RBC QN AUTO: 30.8 PG (ref 27–33)
MCHC RBC AUTO-ENTMCNC: 34 G/DL (ref 33.6–35)
MCV RBC AUTO: 90.7 FL (ref 81.4–97.8)
MICRO URNS: ABNORMAL
MONOCYTES # BLD AUTO: 0.63 K/UL (ref 0–0.85)
MONOCYTES NFR BLD AUTO: 5.2 % (ref 0–13.4)
NEUTROPHILS # BLD AUTO: 9.54 K/UL (ref 2–7.15)
NEUTROPHILS NFR BLD: 78.5 % (ref 44–72)
NITRITE UR QL STRIP.AUTO: NEGATIVE
NRBC # BLD AUTO: 0 K/UL
NRBC BLD-RTO: 0 /100 WBC
PH UR STRIP.AUTO: 5.5 [PH] (ref 5–8)
PLATELET # BLD AUTO: 226 K/UL (ref 164–446)
PMV BLD AUTO: 8.8 FL (ref 9–12.9)
POTASSIUM SERPL-SCNC: 3.9 MMOL/L (ref 3.6–5.5)
PROT SERPL-MCNC: 7.3 G/DL (ref 6–8.2)
PROT UR QL STRIP: NEGATIVE MG/DL
RBC # BLD AUTO: 4.93 M/UL (ref 4.2–5.4)
RBC # URNS HPF: ABNORMAL /HPF
RBC UR QL AUTO: NEGATIVE
S PYO AG THROAT QL: NORMAL
SIGNIFICANT IND 70042: NORMAL
SITE SITE: NORMAL
SODIUM SERPL-SCNC: 137 MMOL/L (ref 135–145)
SOURCE SOURCE: NORMAL
SP GR UR STRIP.AUTO: 1.02
TRANS CELLS URNS QL MICRO: ABNORMAL /HPF
WBC # BLD AUTO: 12.2 K/UL (ref 4.8–10.8)
WBC #/AREA URNS HPF: ABNORMAL /HPF

## 2019-08-06 PROCEDURE — 96375 TX/PRO/DX INJ NEW DRUG ADDON: CPT

## 2019-08-06 PROCEDURE — 83605 ASSAY OF LACTIC ACID: CPT

## 2019-08-06 PROCEDURE — A9270 NON-COVERED ITEM OR SERVICE: HCPCS | Performed by: EMERGENCY MEDICINE

## 2019-08-06 PROCEDURE — 770020 HCHG ROOM/CARE - TELE (206)

## 2019-08-06 PROCEDURE — 86140 C-REACTIVE PROTEIN: CPT

## 2019-08-06 PROCEDURE — 80053 COMPREHEN METABOLIC PANEL: CPT

## 2019-08-06 PROCEDURE — 87040 BLOOD CULTURE FOR BACTERIA: CPT

## 2019-08-06 PROCEDURE — 700111 HCHG RX REV CODE 636 W/ 250 OVERRIDE (IP): Performed by: EMERGENCY MEDICINE

## 2019-08-06 PROCEDURE — 700105 HCHG RX REV CODE 258: Performed by: EMERGENCY MEDICINE

## 2019-08-06 PROCEDURE — 87081 CULTURE SCREEN ONLY: CPT

## 2019-08-06 PROCEDURE — 87880 STREP A ASSAY W/OPTIC: CPT

## 2019-08-06 PROCEDURE — 700117 HCHG RX CONTRAST REV CODE 255: Performed by: EMERGENCY MEDICINE

## 2019-08-06 PROCEDURE — 87086 URINE CULTURE/COLONY COUNT: CPT

## 2019-08-06 PROCEDURE — 36415 COLL VENOUS BLD VENIPUNCTURE: CPT

## 2019-08-06 PROCEDURE — 700102 HCHG RX REV CODE 250 W/ 637 OVERRIDE(OP): Performed by: EMERGENCY MEDICINE

## 2019-08-06 PROCEDURE — 96365 THER/PROPH/DIAG IV INF INIT: CPT

## 2019-08-06 PROCEDURE — 85025 COMPLETE CBC W/AUTO DIFF WBC: CPT

## 2019-08-06 PROCEDURE — 85652 RBC SED RATE AUTOMATED: CPT

## 2019-08-06 PROCEDURE — 71045 X-RAY EXAM CHEST 1 VIEW: CPT

## 2019-08-06 PROCEDURE — 81001 URINALYSIS AUTO W/SCOPE: CPT

## 2019-08-06 PROCEDURE — 99223 1ST HOSP IP/OBS HIGH 75: CPT | Performed by: INTERNAL MEDICINE

## 2019-08-06 PROCEDURE — 74177 CT ABD & PELVIS W/CONTRAST: CPT

## 2019-08-06 PROCEDURE — 99285 EMERGENCY DEPT VISIT HI MDM: CPT

## 2019-08-06 RX ORDER — OMEPRAZOLE 20 MG/1
20 CAPSULE, DELAYED RELEASE ORAL DAILY
Status: DISCONTINUED | OUTPATIENT
Start: 2019-08-07 | End: 2019-08-08 | Stop reason: HOSPADM

## 2019-08-06 RX ORDER — ACETAMINOPHEN 500 MG
1000 TABLET ORAL ONCE
Status: COMPLETED | OUTPATIENT
Start: 2019-08-06 | End: 2019-08-06

## 2019-08-06 RX ORDER — ONDANSETRON 2 MG/ML
4 INJECTION INTRAMUSCULAR; INTRAVENOUS EVERY 4 HOURS PRN
Status: DISCONTINUED | OUTPATIENT
Start: 2019-08-06 | End: 2019-08-08 | Stop reason: HOSPADM

## 2019-08-06 RX ORDER — SODIUM CHLORIDE 9 MG/ML
1000 INJECTION, SOLUTION INTRAVENOUS
Status: DISCONTINUED | OUTPATIENT
Start: 2019-08-06 | End: 2019-08-08 | Stop reason: HOSPADM

## 2019-08-06 RX ORDER — ACETAMINOPHEN 325 MG/1
650 TABLET ORAL EVERY 6 HOURS PRN
Status: DISCONTINUED | OUTPATIENT
Start: 2019-08-06 | End: 2019-08-08 | Stop reason: HOSPADM

## 2019-08-06 RX ORDER — ONDANSETRON 2 MG/ML
4 INJECTION INTRAMUSCULAR; INTRAVENOUS ONCE
Status: COMPLETED | OUTPATIENT
Start: 2019-08-06 | End: 2019-08-06

## 2019-08-06 RX ORDER — PROMETHAZINE HYDROCHLORIDE 25 MG/1
12.5-25 SUPPOSITORY RECTAL EVERY 4 HOURS PRN
Status: DISCONTINUED | OUTPATIENT
Start: 2019-08-06 | End: 2019-08-08 | Stop reason: HOSPADM

## 2019-08-06 RX ORDER — SODIUM CHLORIDE 9 MG/ML
30 INJECTION, SOLUTION INTRAVENOUS
Status: COMPLETED | OUTPATIENT
Start: 2019-08-06 | End: 2019-08-06

## 2019-08-06 RX ORDER — SODIUM CHLORIDE 9 MG/ML
INJECTION, SOLUTION INTRAVENOUS CONTINUOUS
Status: DISCONTINUED | OUTPATIENT
Start: 2019-08-07 | End: 2019-08-08 | Stop reason: HOSPADM

## 2019-08-06 RX ORDER — ENALAPRILAT 1.25 MG/ML
1.25 INJECTION INTRAVENOUS EVERY 6 HOURS PRN
Status: DISCONTINUED | OUTPATIENT
Start: 2019-08-06 | End: 2019-08-08 | Stop reason: HOSPADM

## 2019-08-06 RX ORDER — ONDANSETRON 4 MG/1
4 TABLET, ORALLY DISINTEGRATING ORAL EVERY 4 HOURS PRN
Status: DISCONTINUED | OUTPATIENT
Start: 2019-08-06 | End: 2019-08-08 | Stop reason: HOSPADM

## 2019-08-06 RX ORDER — POLYETHYLENE GLYCOL 3350 17 G/17G
1 POWDER, FOR SOLUTION ORAL
Status: DISCONTINUED | OUTPATIENT
Start: 2019-08-06 | End: 2019-08-08 | Stop reason: HOSPADM

## 2019-08-06 RX ORDER — BISACODYL 10 MG
10 SUPPOSITORY, RECTAL RECTAL
Status: DISCONTINUED | OUTPATIENT
Start: 2019-08-06 | End: 2019-08-08 | Stop reason: HOSPADM

## 2019-08-06 RX ORDER — KETOROLAC TROMETHAMINE 30 MG/ML
15 INJECTION, SOLUTION INTRAMUSCULAR; INTRAVENOUS ONCE
Status: COMPLETED | OUTPATIENT
Start: 2019-08-06 | End: 2019-08-06

## 2019-08-06 RX ORDER — TRAZODONE HYDROCHLORIDE 50 MG/1
50 TABLET ORAL NIGHTLY PRN
Status: DISCONTINUED | OUTPATIENT
Start: 2019-08-06 | End: 2019-08-08 | Stop reason: HOSPADM

## 2019-08-06 RX ORDER — AMOXICILLIN 250 MG
2 CAPSULE ORAL 2 TIMES DAILY
Status: DISCONTINUED | OUTPATIENT
Start: 2019-08-07 | End: 2019-08-08 | Stop reason: HOSPADM

## 2019-08-06 RX ORDER — PROMETHAZINE HYDROCHLORIDE 25 MG/1
12.5-25 TABLET ORAL EVERY 4 HOURS PRN
Status: DISCONTINUED | OUTPATIENT
Start: 2019-08-06 | End: 2019-08-08 | Stop reason: HOSPADM

## 2019-08-06 RX ORDER — SODIUM CHLORIDE 9 MG/ML
30 INJECTION, SOLUTION INTRAVENOUS
Status: DISCONTINUED | OUTPATIENT
Start: 2019-08-06 | End: 2019-08-08 | Stop reason: HOSPADM

## 2019-08-06 RX ADMIN — ONDANSETRON 4 MG: 2 INJECTION INTRAMUSCULAR; INTRAVENOUS at 22:41

## 2019-08-06 RX ADMIN — SODIUM CHLORIDE 3264 ML: 9 INJECTION, SOLUTION INTRAVENOUS at 22:40

## 2019-08-06 RX ADMIN — IOHEXOL 100 ML: 350 INJECTION, SOLUTION INTRAVENOUS at 23:11

## 2019-08-06 RX ADMIN — KETOROLAC TROMETHAMINE 15 MG: 30 INJECTION, SOLUTION INTRAMUSCULAR at 22:40

## 2019-08-06 RX ADMIN — CEFTRIAXONE SODIUM 2 G: 2 INJECTION, POWDER, FOR SOLUTION INTRAMUSCULAR; INTRAVENOUS at 22:40

## 2019-08-06 RX ADMIN — ACETAMINOPHEN 1000 MG: 500 TABLET, FILM COATED ORAL at 22:39

## 2019-08-06 ASSESSMENT — LIFESTYLE VARIABLES
DO YOU DRINK ALCOHOL: NO
TOTAL SCORE: 0
HAVE PEOPLE ANNOYED YOU BY CRITICIZING YOUR DRINKING: NO
EVER FELT BAD OR GUILTY ABOUT YOUR DRINKING: NO
TOTAL SCORE: 0
TOTAL SCORE: 0
EVER HAD A DRINK FIRST THING IN THE MORNING TO STEADY YOUR NERVES TO GET RID OF A HANGOVER: NO
DOES PATIENT WANT TO STOP DRINKING: NO
HAVE YOU EVER FELT YOU SHOULD CUT DOWN ON YOUR DRINKING: NO
CONSUMPTION TOTAL: INCOMPLETE

## 2019-08-06 ASSESSMENT — ENCOUNTER SYMPTOMS
VOMITING: 0
DIZZINESS: 0
HEADACHES: 0
COUGH: 1
LOSS OF CONSCIOUSNESS: 0
WEAKNESS: 0
PALPITATIONS: 0
CONSTIPATION: 0
TINGLING: 0
STRIDOR: 0
SPUTUM PRODUCTION: 0
NAUSEA: 1
FEVER: 1
FALLS: 0
ABDOMINAL PAIN: 0
DIARRHEA: 0
SORE THROAT: 1
SHORTNESS OF BREATH: 0
MYALGIAS: 1
CHILLS: 1
DEPRESSION: 0

## 2019-08-07 PROBLEM — R91.1 PULMONARY NODULE: Status: ACTIVE | Noted: 2019-08-07

## 2019-08-07 LAB
ANION GAP SERPL CALC-SCNC: 7 MMOL/L (ref 0–11.9)
APPEARANCE UR: CLEAR
APTT PPP: 33.2 SEC (ref 24.7–36)
BASOPHILS # BLD AUTO: 0.1 % (ref 0–1.8)
BASOPHILS # BLD: 0.01 K/UL (ref 0–0.12)
BILIRUB UR QL STRIP.AUTO: NEGATIVE
BUN SERPL-MCNC: 10 MG/DL (ref 8–22)
CALCIUM SERPL-MCNC: 8.1 MG/DL (ref 8.4–10.2)
CHLORIDE SERPL-SCNC: 110 MMOL/L (ref 96–112)
CO2 SERPL-SCNC: 22 MMOL/L (ref 20–33)
COLOR UR: YELLOW
CREAT SERPL-MCNC: 0.91 MG/DL (ref 0.5–1.4)
EOSINOPHIL # BLD AUTO: 0.03 K/UL (ref 0–0.51)
EOSINOPHIL NFR BLD: 0.3 % (ref 0–6.9)
ERYTHROCYTE [DISTWIDTH] IN BLOOD BY AUTOMATED COUNT: 41.5 FL (ref 35.9–50)
ERYTHROCYTE [SEDIMENTATION RATE] IN BLOOD BY WESTERGREN METHOD: 2 MM/HOUR (ref 0–30)
GLUCOSE SERPL-MCNC: 112 MG/DL (ref 65–99)
GLUCOSE UR STRIP.AUTO-MCNC: NEGATIVE MG/DL
HCT VFR BLD AUTO: 39.2 % (ref 37–47)
HGB BLD-MCNC: 13.1 G/DL (ref 12–16)
IMM GRANULOCYTES # BLD AUTO: 0.03 K/UL (ref 0–0.11)
IMM GRANULOCYTES NFR BLD AUTO: 0.3 % (ref 0–0.9)
INR PPP: 1.2 (ref 0.87–1.13)
KETONES UR STRIP.AUTO-MCNC: NEGATIVE MG/DL
LACTATE BLD-SCNC: 1 MMOL/L (ref 0.5–2)
LACTATE BLD-SCNC: 1 MMOL/L (ref 0.5–2)
LACTATE BLD-SCNC: 1.2 MMOL/L (ref 0.5–2)
LEUKOCYTE ESTERASE UR QL STRIP.AUTO: NEGATIVE
LYMPHOCYTES # BLD AUTO: 1.66 K/UL (ref 1–4.8)
LYMPHOCYTES NFR BLD: 17.7 % (ref 22–41)
MCH RBC QN AUTO: 30.8 PG (ref 27–33)
MCHC RBC AUTO-ENTMCNC: 33.4 G/DL (ref 33.6–35)
MCV RBC AUTO: 92 FL (ref 81.4–97.8)
MICRO URNS: NORMAL
MONOCYTES # BLD AUTO: 0.79 K/UL (ref 0–0.85)
MONOCYTES NFR BLD AUTO: 8.4 % (ref 0–13.4)
NEUTROPHILS # BLD AUTO: 6.87 K/UL (ref 2–7.15)
NEUTROPHILS NFR BLD: 73.2 % (ref 44–72)
NITRITE UR QL STRIP.AUTO: NEGATIVE
NRBC # BLD AUTO: 0 K/UL
NRBC BLD-RTO: 0 /100 WBC
PH UR STRIP.AUTO: 5.5 [PH] (ref 5–8)
PLATELET # BLD AUTO: 186 K/UL (ref 164–446)
PMV BLD AUTO: 9.1 FL (ref 9–12.9)
POTASSIUM SERPL-SCNC: 3.6 MMOL/L (ref 3.6–5.5)
PROT UR QL STRIP: NEGATIVE MG/DL
PROTHROMBIN TIME: 15.4 SEC (ref 12–14.6)
RBC # BLD AUTO: 4.26 M/UL (ref 4.2–5.4)
RBC UR QL AUTO: NEGATIVE
SODIUM SERPL-SCNC: 139 MMOL/L (ref 135–145)
SP GR UR STRIP.AUTO: <=1.005
WBC # BLD AUTO: 9.4 K/UL (ref 4.8–10.8)

## 2019-08-07 PROCEDURE — 97161 PT EVAL LOW COMPLEX 20 MIN: CPT

## 2019-08-07 PROCEDURE — 85610 PROTHROMBIN TIME: CPT

## 2019-08-07 PROCEDURE — 85025 COMPLETE CBC W/AUTO DIFF WBC: CPT

## 2019-08-07 PROCEDURE — 83605 ASSAY OF LACTIC ACID: CPT | Mod: 91

## 2019-08-07 PROCEDURE — 700102 HCHG RX REV CODE 250 W/ 637 OVERRIDE(OP): Performed by: INTERNAL MEDICINE

## 2019-08-07 PROCEDURE — A9270 NON-COVERED ITEM OR SERVICE: HCPCS | Performed by: INTERNAL MEDICINE

## 2019-08-07 PROCEDURE — 700101 HCHG RX REV CODE 250: Performed by: HOSPITALIST

## 2019-08-07 PROCEDURE — 700105 HCHG RX REV CODE 258: Performed by: INTERNAL MEDICINE

## 2019-08-07 PROCEDURE — 99233 SBSQ HOSP IP/OBS HIGH 50: CPT | Performed by: HOSPITALIST

## 2019-08-07 PROCEDURE — 81003 URINALYSIS AUTO W/O SCOPE: CPT

## 2019-08-07 PROCEDURE — 97165 OT EVAL LOW COMPLEX 30 MIN: CPT

## 2019-08-07 PROCEDURE — 85652 RBC SED RATE AUTOMATED: CPT

## 2019-08-07 PROCEDURE — 700111 HCHG RX REV CODE 636 W/ 250 OVERRIDE (IP): Performed by: INTERNAL MEDICINE

## 2019-08-07 PROCEDURE — 80048 BASIC METABOLIC PNL TOTAL CA: CPT

## 2019-08-07 PROCEDURE — 85730 THROMBOPLASTIN TIME PARTIAL: CPT

## 2019-08-07 PROCEDURE — 770006 HCHG ROOM/CARE - MED/SURG/GYN SEMI*

## 2019-08-07 RX ORDER — LIDOCAINE 50 MG/G
2 PATCH TOPICAL EVERY 24 HOURS
Status: DISCONTINUED | OUTPATIENT
Start: 2019-08-07 | End: 2019-08-08 | Stop reason: HOSPADM

## 2019-08-07 RX ORDER — M-VIT,TX,IRON,MINS/CALC/FOLIC 27MG-0.4MG
1 TABLET ORAL EVERY EVENING
COMMUNITY

## 2019-08-07 RX ADMIN — SENNOSIDES,DOCUSATE SODIUM 2 TABLET: 8.6; 5 TABLET, FILM COATED ORAL at 17:11

## 2019-08-07 RX ADMIN — OMEPRAZOLE 20 MG: 20 CAPSULE, DELAYED RELEASE ORAL at 05:06

## 2019-08-07 RX ADMIN — ACETAMINOPHEN 650 MG: 325 TABLET, FILM COATED ORAL at 08:15

## 2019-08-07 RX ADMIN — ACETAMINOPHEN 650 MG: 325 TABLET, FILM COATED ORAL at 14:21

## 2019-08-07 RX ADMIN — ENOXAPARIN SODIUM 40 MG: 100 INJECTION SUBCUTANEOUS at 05:06

## 2019-08-07 RX ADMIN — SODIUM CHLORIDE: 9 INJECTION, SOLUTION INTRAVENOUS at 21:49

## 2019-08-07 RX ADMIN — LIDOCAINE 2 PATCH: 50 PATCH TOPICAL at 12:54

## 2019-08-07 RX ADMIN — SODIUM CHLORIDE: 9 INJECTION, SOLUTION INTRAVENOUS at 02:19

## 2019-08-07 RX ADMIN — SENNOSIDES,DOCUSATE SODIUM 2 TABLET: 8.6; 5 TABLET, FILM COATED ORAL at 05:06

## 2019-08-07 SDOH — HEALTH STABILITY: MENTAL HEALTH: HOW MANY STANDARD DRINKS CONTAINING ALCOHOL DO YOU HAVE ON A TYPICAL DAY?: 1 OR 2

## 2019-08-07 SDOH — HEALTH STABILITY: MENTAL HEALTH: HOW OFTEN DO YOU HAVE A DRINK CONTAINING ALCOHOL?: MONTHLY OR LESS

## 2019-08-07 ASSESSMENT — LIFESTYLE VARIABLES
EVER HAD A DRINK FIRST THING IN THE MORNING TO STEADY YOUR NERVES TO GET RID OF A HANGOVER: NO
TOTAL SCORE: 0
HAVE PEOPLE ANNOYED YOU BY CRITICIZING YOUR DRINKING: NO
ALCOHOL_USE: YES
HAVE PEOPLE ANNOYED YOU BY CRITICIZING YOUR DRINKING: NO
HOW MANY TIMES IN THE PAST YEAR HAVE YOU HAD 5 OR MORE DRINKS IN A DAY: 0
TOTAL SCORE: 0
ALCOHOL_USE: YES
CONSUMPTION TOTAL: NEGATIVE
EVER FELT BAD OR GUILTY ABOUT YOUR DRINKING: NO
EVER HAD A DRINK FIRST THING IN THE MORNING TO STEADY YOUR NERVES TO GET RID OF A HANGOVER: NO
TOTAL SCORE: 0
HAVE YOU EVER FELT YOU SHOULD CUT DOWN ON YOUR DRINKING: NO
HAVE YOU EVER FELT YOU SHOULD CUT DOWN ON YOUR DRINKING: NO
ON A TYPICAL DAY WHEN YOU DRINK ALCOHOL HOW MANY DRINKS DO YOU HAVE: 0
AVERAGE NUMBER OF DAYS PER WEEK YOU HAVE A DRINK CONTAINING ALCOHOL: 0

## 2019-08-07 ASSESSMENT — ENCOUNTER SYMPTOMS
SORE THROAT: 0
CHILLS: 0
FEVER: 0
COUGH: 0
DIZZINESS: 0
NECK PAIN: 0
HEADACHES: 0
TINGLING: 0
BACK PAIN: 0
SHORTNESS OF BREATH: 0
ABDOMINAL PAIN: 0
VOMITING: 0
PALPITATIONS: 0
INSOMNIA: 0
NAUSEA: 0
EYE PAIN: 0
BLURRED VISION: 0
DEPRESSION: 0

## 2019-08-07 ASSESSMENT — COGNITIVE AND FUNCTIONAL STATUS - GENERAL
TOILETING: A LITTLE
SUGGESTED CMS G CODE MODIFIER MOBILITY: CH
CLIMB 3 TO 5 STEPS WITH RAILING: A LITTLE
DAILY ACTIVITIY SCORE: 21
DAILY ACTIVITIY SCORE: 24
HELP NEEDED FOR BATHING: A LITTLE
DRESSING REGULAR LOWER BODY CLOTHING: A LITTLE
MOVING FROM LYING ON BACK TO SITTING ON SIDE OF FLAT BED: A LITTLE
SUGGESTED CMS G CODE MODIFIER DAILY ACTIVITY: CH
WALKING IN HOSPITAL ROOM: A LITTLE
MOBILITY SCORE: 21
SUGGESTED CMS G CODE MODIFIER DAILY ACTIVITY: CJ
MOBILITY SCORE: 24
SUGGESTED CMS G CODE MODIFIER MOBILITY: CJ

## 2019-08-07 ASSESSMENT — COPD QUESTIONNAIRES
HAVE YOU SMOKED AT LEAST 100 CIGARETTES IN YOUR ENTIRE LIFE: NO/DON'T KNOW
DO YOU EVER COUGH UP ANY MUCUS OR PHLEGM?: NO/ONLY WITH OCCASIONAL COLDS OR INFECTIONS
COPD SCREENING SCORE: 2
DURING THE PAST 4 WEEKS HOW MUCH DID YOU FEEL SHORT OF BREATH: NONE/LITTLE OF THE TIME
IN THE PAST 12 MONTHS DO YOU DO LESS THAN YOU USED TO BECAUSE OF YOUR BREATHING PROBLEMS: DISAGREE/UNSURE

## 2019-08-07 ASSESSMENT — PATIENT HEALTH QUESTIONNAIRE - PHQ9
SUM OF ALL RESPONSES TO PHQ9 QUESTIONS 1 AND 2: 0
1. LITTLE INTEREST OR PLEASURE IN DOING THINGS: NOT AT ALL
2. FEELING DOWN, DEPRESSED, IRRITABLE, OR HOPELESS: NOT AT ALL

## 2019-08-07 ASSESSMENT — ACTIVITIES OF DAILY LIVING (ADL): TOILETING: INDEPENDENT

## 2019-08-07 ASSESSMENT — GAIT ASSESSMENTS
GAIT LEVEL OF ASSIST: MINIMAL ASSIST
DEVIATION: ANTALGIC;STEP TO;DECREASED BASE OF SUPPORT
DISTANCE (FEET): 150
ASSISTIVE DEVICE: FRONT WHEEL WALKER

## 2019-08-07 NOTE — ASSESSMENT & PLAN NOTE
-Acute onset, bilateral knees  No physical exam findings.   -Think this is likely due to her infection causing myalgias and knee pain  -It is however causing weakness, obtain physical therapy  Check esr.

## 2019-08-07 NOTE — ASSESSMENT & PLAN NOTE
-This is severe sepsis with the following associated acute organ dysfunction(s): acute kidney failure.   -Unknown source, I suspect this to be viral  -I did personally review the chest x-ray, noted no acute cardiopulmonary process  -Considering the abrupt onset,continue IV Rocephin  -Sepsis order set  - IV fluids  -pending culture results  -Trend lactic acid  ?inflammatory as second time?- check esr

## 2019-08-07 NOTE — PROGRESS NOTES
Pt is resting comfortably in bed,  at bedside. C/o bilateral knee pain, cold pack given. Bed in low/locked position, call light within reach, will continue to monitor

## 2019-08-07 NOTE — PROGRESS NOTES
Pt awake in bed resting comfortably. No complaints at this time. Bed in low/locked position, call light within reach, will continue to monitor.

## 2019-08-07 NOTE — DISCHARGE PLANNING
Pt reports being independent with ADLs prior to admit. Pt has FWW and SPC at home. Pt reports her S/O can provide support on d/c. No SW needs at this time. Plan is for pt to d/c home.     Care Transition Team Assessment    Information Source  Information Given By: Patient  Who is responsible for making decisions for patient? : Patient    Readmission Evaluation  Is this a readmission?: Yes - unplanned readmission    Elopement Risk  Legal Hold: No  Ambulatory or Self Mobile in Wheelchair: Yes  Disoriented: No  Psychiatric Symptoms: None  History of Wandering: No  Elopement this Admit: No  Vocalizing Wanting to Leave: No  Displays Behaviors, Body Language Wanting to Leave: No-Not at Risk for Elopement    Interdisciplinary Discharge Planning  Lives with - Patient's Self Care Capacity: Significant Other  Patient or legal guardian wants to designate a caregiver (see row info): No  Housing / Facility: [address] [facility] [place] [facility]""  Prior Services: None    Discharge Preparedness  What is your plan after discharge?: Home with help  What are your discharge supports?: Other (comment)(significant other)  Prior Functional Level: Ambulatory, Independent with Activities of Daily Living, Independent with Medication Management    Functional Assesment  Prior Functional Level: Ambulatory, Independent with Activities of Daily Living, Independent with Medication Management    Finances  Financial Barriers to Discharge: No  Prescription Coverage: Yes    Vision / Hearing Impairment  Vision Impairment : Yes  Right Eye Vision: Wears Glasses  Left Eye Vision: Wears Glasses  Hearing Impairment : Yes  Hearing Impairment: Hearing Device(s) Available  Does Pt Need Special Equipment for the Hearing Impaired?: No         Advance Directive  Advance Directive?: None    Domestic Abuse  Have you ever been the victim of abuse or violence?: No  Physical Abuse or Sexual Abuse: No  Verbal Abuse or Emotional Abuse: No  Possible Abuse Reported to:: Not  Applicable    Psychological Assessment  History of Substance Abuse: None  History of Psychiatric Problems: No  Non-compliant with Treatment: No  Newly Diagnosed Illness: No    Discharge Risks or Barriers  Discharge risks or barriers?: No    Anticipated Discharge Information  Anticipated discharge disposition: Home  Discharge Address: ([address] [place] [id number])  Discharge Contact Phone Number: ([phone])

## 2019-08-07 NOTE — H&P
Hospital Medicine History & Physical Note    Date of Service  8/6/2019    Primary Care Physician  Valeria Childs M.D.    Consultants  None    Code Status  Full    Chief Complaint  Fever    History of Presenting Illness  59 y.o. female who presented 8/6/2019 with fever.  Patient states she woke up this morning in her usual state of health, then suddenly this afternoon she began having stiff knees that were very painful.  She described the pain as constant and severe.  She states she has had similar episode in the past when she was diagnosed with a viral illness.  She states then she developed a fever, sore throat, nausea as well as a nonproductive cough.  She denies any dysuria or sick contacts.  She states because of her pain she is requiring help to get around and requiring the use of a cane.  I did discuss the case including labs and imaging with the ER physician.    Review of Systems  Review of Systems   Constitutional: Positive for chills, fever and malaise/fatigue.   HENT: Positive for sore throat. Negative for congestion.    Respiratory: Positive for cough. Negative for sputum production, shortness of breath and stridor.    Cardiovascular: Negative for chest pain, palpitations and leg swelling.   Gastrointestinal: Positive for nausea. Negative for abdominal pain, constipation, diarrhea and vomiting.   Genitourinary: Negative for dysuria and urgency.   Musculoskeletal: Positive for joint pain and myalgias. Negative for falls.   Neurological: Negative for dizziness, tingling, loss of consciousness, weakness and headaches.   Psychiatric/Behavioral: Negative for depression and suicidal ideas.   All other systems reviewed and are negative.      Past Medical History   has a past medical history of Arthritis, Back pain, Blood transfusion without reported diagnosis, Bronchitis, Cancer (HCC), Glaucoma, Hemorrhoids, Hives, Meningitis, Pneumonia, and Sinusitis. She also has no past medical history of  Allergy.    Surgical History   has a past surgical history that includes other orthopedic surgery; appendectomy; abdominal exploration; other; tonsillectomy; other; lumpectomy; and gyn surgery.     Family History  family history includes Anemia in her mother; Arthritis in her brother and mother; Cancer in her father, maternal aunt, and mother; Hypertension in her father and mother; Obesity in her father and mother; Other in her mother.     Social History   reports that she quit smoking about 26 years ago. She has a 32.00 pack-year smoking history. She has never used smokeless tobacco. She reports that she drinks alcohol. She reports that she does not use drugs.    Allergies  No Known Allergies    Medications  Prior to Admission Medications   Prescriptions Last Dose Informant Patient Reported? Taking?   OMEPRAZOLE PO 8/5/2019 at Unknown time  Yes No   Sig: Take  by mouth.   latanoprost (XALATAN) 0.005 % Solution 8/5/2019 at Unknown time  Yes No   Sig: Place 1 Drop in both eyes every evening.   traZODone (DESYREL) 50 MG Tab 8/5/2019 at Unknown time  Yes No   Sig: Take 50 mg by mouth at bedtime as needed.      Facility-Administered Medications: None       Physical Exam  Temp:  [38 °C (100.4 °F)] 38 °C (100.4 °F)  Pulse:  [87-99] 87  BP: (140)/(90) 140/90  SpO2:  [96 %] 96 %    Physical Exam   Constitutional: She is oriented to person, place, and time. She appears well-developed. She is cooperative. No distress.   HENT:   Head: Normocephalic and atraumatic. Not macrocephalic and not microcephalic. Head is without raccoon's eyes and without Castro's sign.   Right Ear: External ear normal.   Left Ear: External ear normal.   Mouth/Throat: Mucous membranes are dry. No oropharyngeal exudate.   Eyes: Conjunctivae are normal. Right eye exhibits no discharge. Left eye exhibits no discharge. No scleral icterus.   Neck: Neck supple. No tracheal deviation present.   Cardiovascular: Normal rate, regular rhythm and intact distal  pulses. Exam reveals no gallop, no distant heart sounds and no friction rub.   Murmur heard.  Pulmonary/Chest: Effort normal. No accessory muscle usage or stridor. No tachypnea and no bradypnea. No respiratory distress. She has no decreased breath sounds. She has no wheezes. She has no rhonchi. She has no rales. She exhibits no tenderness.   Abdominal: Soft. Bowel sounds are normal. She exhibits no distension. There is no hepatosplenomegaly, splenomegaly or hepatomegaly. There is no tenderness. There is no rebound and no guarding.   Musculoskeletal: Normal range of motion. She exhibits no edema or tenderness.   Lymphadenopathy:     She has no cervical adenopathy.   Neurological: She is alert and oriented to person, place, and time. No cranial nerve deficit. She displays no seizure activity.   Skin: Skin is warm, dry and intact. No rash noted. She is not diaphoretic. No erythema. No pallor.   Psychiatric: She has a normal mood and affect. Her speech is normal and behavior is normal. Judgment and thought content normal. Cognition and memory are normal.   Nursing note and vitals reviewed.      Laboratory:  Recent Labs     08/06/19 2008   WBC 12.2*   RBC 4.93   HEMOGLOBIN 15.2   HEMATOCRIT 44.7   MCV 90.7   MCH 30.8   MCHC 34.0   RDW 40.2   PLATELETCT 226   MPV 8.8*     Recent Labs     08/06/19 2008   SODIUM 137   POTASSIUM 3.9   CHLORIDE 104   CO2 22   GLUCOSE 103*   BUN 13   CREATININE 0.96   CALCIUM 9.5     Recent Labs     08/06/19 2008   ALTSGPT 49   ASTSGOT 33   ALKPHOSPHAT 68   TBILIRUBIN 0.8   GLUCOSE 103*         No results for input(s): NTPROBNP in the last 72 hours.      No results for input(s): TROPONINT in the last 72 hours.    Urinalysis:    Recent Labs     08/06/19  2113   SPECGRAVITY 1.020   GLUCOSEUR Negative   KETONES Negative   NITRITE Negative   LEUKESTERAS Trace*   WBCURINE 2-5   RBCURINE 0-2   BACTERIA Few*   EPITHELCELL Moderate*        Imaging:  CT-ABDOMEN-PELVIS WITH   Final Result         1.   Hepatic steatosis and hepatomegaly.   2.  Prior cholecystectomy.   3.  No acute inflammatory abnormality is otherwise seen.   4.  Nonspecific 5 mm left lower lobe pulmonary nodule. See follow-up recommendations below.         Low Risk: No routine follow-up      High Risk: Optional CT at 12 months      Comments: Nodules less than 6 mm do not require routine follow-up, but certain patients at high risk with suspicious nodule morphology, upper lobe location, or both may warrant 12-month follow-up.      Low Risk - Minimal or absent history of smoking and of other known risk factors.      High Risk - History of smoking or of other known risk factors.      Note: These recommendations do not apply to lung cancer screening, patients with immunosuppression, or patients with known primary cancer.      Fleischner Society 2017 Guidelines for Management of Incidentally Detected Pulmonary Nodules in Adults      DX-CHEST-PORTABLE (1 VIEW)   Final Result      No acute cardiopulmonary abnormality.            Assessment/Plan:  I anticipate this patient will require at least two midnights for appropriate medical management, necessitating inpatient admission.    * Sepsis (HCC)- (present on admission)  Assessment & Plan  -This is severe sepsis with the following associated acute organ dysfunction(s): acute kidney failure.   -Unknown source, possibly viral  -I did personally review the chest x-ray, noted no acute cardiopulmonary process  -Considering the abrupt onset, I do think IV antibiotics are required, start IV Rocephin  -Sepsis order set has been initiated, patient will receive significant IV fluids  -Await culture results  -Trend lactic acid  -Patient is currently hemodynamically stable    Pulmonary nodule  Assessment & Plan  -Noted on CT scan  -Outpatient follow-up    Knee pain- (present on admission)  Assessment & Plan  -Acute onset, bilateral knees  -I do not think she has septic joints  -Think this is likely due to her  infection causing myalgias and knee pain  -It is however causing weakness, obtain physical therapy    Acute kidney injury (HCC)- (present on admission)  Assessment & Plan  -Due to dehydration and sepsis  -Start IV fluids  -Repeat BMP in the morning    Hyperglycemia- (present on admission)  Assessment & Plan  -Mild, likely due to sepsis  -No need for coverage at this time  -Continue to monitor    Gastroesophageal reflux disease without esophagitis- (present on admission)  Assessment & Plan  -Continue home omeprazole      VTE prophylaxis: Lovenox

## 2019-08-07 NOTE — PROGRESS NOTES
[facility] Medicine Daily Progress Note    Date of Service  8/7/2019    Chief Complaint  59 y.o. female admitted 8/6/2019 with fevers sore throat and a cough associated with myalgias.     Hospital Course    She was admitted with evidence of sepsis suspected of viral origin.       Interval Problem Update  She has been doing a little better over night. Still with joint pains and fatigue. No fevers so far. We discussed management plans at the bedside. I reviewed her imaging below.     ct  1.  Hepatic steatosis and hepatomegaly.   2.  Prior cholecystectomy.   3.  No acute inflammatory abnormality is otherwise seen.   4.  Nonspecific 5 mm left lower lobe pulmonary nodule. See follow-up recommendations below.         No acute cardiopulmonary abnormality.      Consultants/Specialty  none    Code Status  full    Disposition  Home once improved.     Review of Systems  Review of Systems   Constitutional: Positive for malaise/fatigue. Negative for chills and fever.   HENT: Negative for sore throat.    Eyes: Negative for blurred vision and pain.   Respiratory: Negative for cough and shortness of breath.    Cardiovascular: Negative for chest pain and palpitations.   Gastrointestinal: Negative for abdominal pain, nausea and vomiting.   Genitourinary: Negative for dysuria and urgency.   Musculoskeletal: Positive for joint pain. Negative for back pain and neck pain.   Skin: Negative for itching and rash.   Neurological: Negative for dizziness, tingling and headaches.   Psychiatric/Behavioral: Negative for depression. The patient does not have insomnia.    All other systems reviewed and are negative.       Physical Exam  Temp:  [35.9 °C (96.6 °F)-38 °C (100.4 °F)] 36.8 °C (98.2 °F)  Pulse:  [66-99] 66  Resp:  [16-18] 18  BP: (103-140)/(48-90) 107/48  SpO2:  [93 %-98 %] 95 %    Physical Exam   Constitutional: She is oriented to person, place, and time. She appears well-developed and well-nourished. No distress.   Patient seen and  examined  Discussed plan with DELANO JOHNSTONT:   Right Ear: External ear normal.   Left Ear: External ear normal.   Nose: Nose normal.   Eyes: Conjunctivae are normal. Right eye exhibits no discharge. Left eye exhibits no discharge.   Neck: No JVD present.   Cardiovascular: Regular rhythm and normal heart sounds.   No murmur heard.  Cap refill 2sec  Pulses 2+ throughout     Pulmonary/Chest: Effort normal and breath sounds normal. No stridor. No respiratory distress. She has no rales.   Abdominal: Soft. Bowel sounds are normal. She exhibits no distension. There is no tenderness.   Musculoskeletal: She exhibits no edema or tenderness.   Neurological: She is alert and oriented to person, place, and time.   Skin: Skin is warm and dry. She is not diaphoretic. No erythema.   Normal skin  Color.    Psychiatric: She has a normal mood and affect. Her behavior is normal.   Nursing note and vitals reviewed.      Fluids    Intake/Output Summary (Last 24 hours) at 8/7/2019 0900  Last data filed at 8/7/2019 0300  Gross per 24 hour   Intake 120 ml   Output 450 ml   Net -330 ml       Laboratory  Recent Labs     08/06/19 2008 08/07/19  0104   WBC 12.2* 9.4   RBC 4.93 4.26   HEMOGLOBIN 15.2 13.1   HEMATOCRIT 44.7 39.2   MCV 90.7 92.0   MCH 30.8 30.8   MCHC 34.0 33.4*   RDW 40.2 41.5   PLATELETCT 226 186   MPV 8.8* 9.1     Recent Labs     08/06/19 2008 08/07/19 0104   SODIUM 137 139   POTASSIUM 3.9 3.6   CHLORIDE 104 110   CO2 22 22   GLUCOSE 103* 112*   BUN 13 10   CREATININE 0.96 0.91   CALCIUM 9.5 8.1*     Recent Labs     08/07/19 0104   APTT 33.2   INR 1.20*               Imaging  CT-ABDOMEN-PELVIS WITH   Final Result         1.  Hepatic steatosis and hepatomegaly.   2.  Prior cholecystectomy.   3.  No acute inflammatory abnormality is otherwise seen.   4.  Nonspecific 5 mm left lower lobe pulmonary nodule. See follow-up recommendations below.         Low Risk: No routine follow-up      High Risk: Optional CT at 12 months       Comments: Nodules less than 6 mm do not require routine follow-up, but certain patients at high risk with suspicious nodule morphology, upper lobe location, or both may warrant 12-month follow-up.      Low Risk - Minimal or absent history of smoking and of other known risk factors.      High Risk - History of smoking or of other known risk factors.      Note: These recommendations do not apply to lung cancer screening, patients with immunosuppression, or patients with known primary cancer.      Fleischner Society 2017 Guidelines for Management of Incidentally Detected Pulmonary Nodules in Adults      DX-CHEST-PORTABLE (1 VIEW)   Final Result      No acute cardiopulmonary abnormality.           Assessment/Plan  * Sepsis (HCC)- (present on admission)  Assessment & Plan  -This is severe sepsis with the following associated acute organ dysfunction(s): acute kidney failure.   -Unknown source, I suspect this to be viral  -I did personally review the chest x-ray, noted no acute cardiopulmonary process  -Considering the abrupt onset,continue IV Rocephin  -Sepsis order set  - IV fluids  -pending culture results  -Trend lactic acid  ?inflammatory as second time?- check esr      Pulmonary nodule  Assessment & Plan  -Noted on CT scan  -Outpatient follow-up    Knee pain- (present on admission)  Assessment & Plan  -Acute onset, bilateral knees  No physical exam findings.   -Think this is likely due to her infection causing myalgias and knee pain  -It is however causing weakness, obtain physical therapy  Check esr.     Acute kidney injury (HCC)- (present on admission)  Assessment & Plan  -Due to sepsis  -continue IV fluids      Hyperglycemia- (present on admission)  Assessment & Plan  Suspect reactive. Will trend .    Gastroesophageal reflux disease without esophagitis- (present on admission)  Assessment & Plan  omeprazole       VTE prophylaxis: scd

## 2019-08-07 NOTE — PROGRESS NOTES
Report received from Virginia WICK. Pt resting in bed. No complaints at this time. Bed in low/locked position, call light within reach, will continue to monitor

## 2019-08-07 NOTE — ED TRIAGE NOTES
"Chief Complaint   Patient presents with   • Joint Pain     Bilateral lower extremities all joints, started around 12 today   • Sore Throat   • Nausea   • Fever     got a tempature of 100.7 at home     /90   Pulse 99   Temp 38 °C (100.4 °F) (Temporal)   Ht 1.676 m (5' 6\")   Wt 108.8 kg (239 lb 13.8 oz)   LMP  (LMP Unknown)   BMI 38.71 kg/m²     "

## 2019-08-07 NOTE — PROGRESS NOTES
Pt arrived to floor via gurneys, ambulated to the bathroom SBA, fluid bolus running, pt denies worsening pain, bed at lowest setting, pt educated to use call light, vss. Will continue to monitor.

## 2019-08-07 NOTE — THERAPY
"Physical Therapy Evaluation completed.   Bed Mobility:  Supine to Sit: Supervised  Transfers: Sit to Stand: Minimal Assist  Gait: Level Of Assist: Minimal Assist with Front-Wheel Walker       Plan of Care: Will benefit from Physical Therapy 3 times per week  Discharge Recommendations: Equipment: Front-Wheel Walker.    See \"Rehab Therapy-Acute\" Patient Summary Report for complete documentation.     Pt was recently admitted for sepsis and myalgias and pain in bilateral knees. Pt presented with impaired balance, impaired gait, weakness, pain, and dec activity tolerance. Pt demonstrated with impaired ROM in B knees with poor joint integrity. Pt was able to demonstrate SPV for bed mobility, Min A for sit<>stands, and Min A for ambulation with FWW use. Pt reported of dec pain with ambulation and mobilization/PROM of B knees. Pt educated on perfroming heel slides and ambulating with [facility] staff to prevent further deconditioning and assist in pain managment. Pt will continue to benefit from skilled PT while in house, anticipate pt to d/c home with family support once pain has been medically managed.   "

## 2019-08-07 NOTE — THERAPY
"Occupational Therapy Evaluation completed.   Functional Status:  Pt is a 60 y/o female, admit with increasing Bilateral knee pain and fever. Pt lives with her significant other, who is retired and can assist with care after d/c. PLOF- I and activr. Pt presents with c/o pain with ADLS and functional mobility, is at the Supervised level for UB self care and functional transfers, Min A for LB , due to pain and difficulty with reaching feet. Pt will benefit from Acute OT services as she is not yet at baseline.   Plan of Care: Will benefit from Occupational Therapy 3 times per week  Discharge Recommendations:  Equipment: Will Continue to Assess for Equipment Needs. Post-acute therapy Discharge to home with outpatient or home health for additional skilled therapy services.    See \"Rehab Therapy-Acute\" Patient Summary Report for complete documentation.    "

## 2019-08-07 NOTE — CARE PLAN
Problem: Safety  Goal: Will remain free from injury  Outcome: PROGRESSING AS EXPECTED  Instructed patinet on use of call light, call for assistance when ambulating, frequent checks, offer toileting on a regular basis.    Problem: Knowledge Deficit  Goal: Knowledge of disease process/condition, treatment plan, diagnostic tests, and medications will improve  Outcome: PROGRESSING AS EXPECTED   Discussed plan of care with patient, answered questions best to my ability.

## 2019-08-08 ENCOUNTER — PATIENT OUTREACH (OUTPATIENT)
Dept: HEALTH INFORMATION MANAGEMENT | Facility: OTHER | Age: 59
End: 2019-08-08

## 2019-08-08 VITALS
OXYGEN SATURATION: 91 % | RESPIRATION RATE: 18 BRPM | HEART RATE: 61 BPM | HEIGHT: 66 IN | TEMPERATURE: 97.8 F | WEIGHT: 240.28 LBS | DIASTOLIC BLOOD PRESSURE: 53 MMHG | SYSTOLIC BLOOD PRESSURE: 119 MMHG | BODY MASS INDEX: 38.62 KG/M2

## 2019-08-08 LAB
ALBUMIN SERPL BCP-MCNC: 3.1 G/DL (ref 3.2–4.9)
ALBUMIN/GLOB SERPL: 1.3 G/DL
ALP SERPL-CCNC: 46 U/L (ref 30–99)
ALT SERPL-CCNC: 41 U/L (ref 2–50)
ANION GAP SERPL CALC-SCNC: 4 MMOL/L (ref 0–11.9)
AST SERPL-CCNC: 31 U/L (ref 12–45)
BASOPHILS # BLD AUTO: 0.2 % (ref 0–1.8)
BASOPHILS # BLD: 0.01 K/UL (ref 0–0.12)
BILIRUB SERPL-MCNC: 0.8 MG/DL (ref 0.1–1.5)
BUN SERPL-MCNC: 11 MG/DL (ref 8–22)
CALCIUM SERPL-MCNC: 8.5 MG/DL (ref 8.4–10.2)
CHLORIDE SERPL-SCNC: 111 MMOL/L (ref 96–112)
CO2 SERPL-SCNC: 23 MMOL/L (ref 20–33)
CREAT SERPL-MCNC: 0.9 MG/DL (ref 0.5–1.4)
EOSINOPHIL # BLD AUTO: 0.13 K/UL (ref 0–0.51)
EOSINOPHIL NFR BLD: 2.2 % (ref 0–6.9)
ERYTHROCYTE [DISTWIDTH] IN BLOOD BY AUTOMATED COUNT: 43.8 FL (ref 35.9–50)
GLOBULIN SER CALC-MCNC: 2.4 G/DL (ref 1.9–3.5)
GLUCOSE SERPL-MCNC: 103 MG/DL (ref 65–99)
HCT VFR BLD AUTO: 38.1 % (ref 37–47)
HGB BLD-MCNC: 12.4 G/DL (ref 12–16)
IMM GRANULOCYTES # BLD AUTO: 0.02 K/UL (ref 0–0.11)
IMM GRANULOCYTES NFR BLD AUTO: 0.3 % (ref 0–0.9)
LYMPHOCYTES # BLD AUTO: 1.57 K/UL (ref 1–4.8)
LYMPHOCYTES NFR BLD: 27.2 % (ref 22–41)
MCH RBC QN AUTO: 30.9 PG (ref 27–33)
MCHC RBC AUTO-ENTMCNC: 32.5 G/DL (ref 33.6–35)
MCV RBC AUTO: 95 FL (ref 81.4–97.8)
MONOCYTES # BLD AUTO: 0.49 K/UL (ref 0–0.85)
MONOCYTES NFR BLD AUTO: 8.5 % (ref 0–13.4)
NEUTROPHILS # BLD AUTO: 3.56 K/UL (ref 2–7.15)
NEUTROPHILS NFR BLD: 61.6 % (ref 44–72)
NRBC # BLD AUTO: 0 K/UL
NRBC BLD-RTO: 0 /100 WBC
PLATELET # BLD AUTO: 158 K/UL (ref 164–446)
PMV BLD AUTO: 9.3 FL (ref 9–12.9)
POTASSIUM SERPL-SCNC: 4.1 MMOL/L (ref 3.6–5.5)
PROT SERPL-MCNC: 5.5 G/DL (ref 6–8.2)
RBC # BLD AUTO: 4.01 M/UL (ref 4.2–5.4)
SODIUM SERPL-SCNC: 138 MMOL/L (ref 135–145)
WBC # BLD AUTO: 5.8 K/UL (ref 4.8–10.8)

## 2019-08-08 PROCEDURE — 80053 COMPREHEN METABOLIC PANEL: CPT

## 2019-08-08 PROCEDURE — 700111 HCHG RX REV CODE 636 W/ 250 OVERRIDE (IP): Performed by: INTERNAL MEDICINE

## 2019-08-08 PROCEDURE — 700102 HCHG RX REV CODE 250 W/ 637 OVERRIDE(OP): Performed by: INTERNAL MEDICINE

## 2019-08-08 PROCEDURE — A9270 NON-COVERED ITEM OR SERVICE: HCPCS | Performed by: INTERNAL MEDICINE

## 2019-08-08 PROCEDURE — 700105 HCHG RX REV CODE 258: Performed by: INTERNAL MEDICINE

## 2019-08-08 PROCEDURE — 99239 HOSP IP/OBS DSCHRG MGMT >30: CPT | Performed by: HOSPITALIST

## 2019-08-08 PROCEDURE — 85025 COMPLETE CBC W/AUTO DIFF WBC: CPT

## 2019-08-08 RX ADMIN — OMEPRAZOLE 20 MG: 20 CAPSULE, DELAYED RELEASE ORAL at 05:52

## 2019-08-08 RX ADMIN — SODIUM CHLORIDE: 9 INJECTION, SOLUTION INTRAVENOUS at 07:23

## 2019-08-08 RX ADMIN — ENOXAPARIN SODIUM 40 MG: 100 INJECTION SUBCUTANEOUS at 05:52

## 2019-08-08 NOTE — PROGRESS NOTES
"Assessment complete, medicated per MAR. Discussed POC and pain, allowed time to ask questions. Pt resting in bed, RR even and unlabored. Pt educated to call for assistance. Declines needs at this time. Safety precautions in place. Pt up to bathroom and back to bed, IV infusing.    2200 pt ambulated hallway 400 ft, SBA. Pt states that the \"stiffness\" in her knees is better while ambulating.    2345 pt resting in bed, eyes closed, RR even and unlabored.    0545 pt c/o urinary frequency and intermittent burning. States she thinks she may have a bladder infection. Pt back to bed after using the bathroom. Medicated per MAR. No needs at this time.  "

## 2019-08-08 NOTE — PROGRESS NOTES
Discharge order written. IV removed, patient tolerated well. Tele removed and returned to monitor tech. Belongings fathered. Pt stated that all personal belongings are in possession. AVS printed, reviewed and copy signed and placed on the chart. Patient has no further questions. Discharge in satisfactory condition home with . Pt off unit via walking, escorted by  and declined staff escort.

## 2019-08-08 NOTE — DISCHARGE SUMMARY
Discharge Summary    CHIEF COMPLAINT ON ADMISSION  Chief Complaint   Patient presents with   • Joint Pain     Bilateral lower extremities all joints, started around 12 today   • Sore Throat   • Nausea   • Fever     got a tempature of 100.7 at home       Reason for Admission  Knee Pain, Sore Throat, Fever     Admission Date  8/6/2019    CODE STATUS  Prior    HPI & HOSPITAL COURSE  This is a 59 y.o. female here with myalgias and joint pains.    She was admitted with evidence of sepsis suspected of viral origin.  She was treated with sepsis protocols, IV fluids and supportive care. She improved without complication. A sed rate was done to evaluate for possible rheumatologic etiologies as she had an episode of this in the past. This was normal at 3. She will be discharged home and follow up with her PCP. The remainder of her workup was normal.    Therefore, she is discharged in good and stable condition to home with close outpatient follow-up.    The patient met 2-midnight criteria for an inpatient stay at the time of discharge.    Discharge Date  8/8/2019    FOLLOW UP ITEMS POST DISCHARGE  none    DISCHARGE DIAGNOSES  Principal Problem:    Sepsis (HCC) POA: Yes  Active Problems:    Gastroesophageal reflux disease without esophagitis POA: Yes    Hyperglycemia POA: Yes    Acute kidney injury (HCC) POA: Yes    Knee pain POA: Yes    Pulmonary nodule POA: Unknown  Resolved Problems:    * No resolved hospital problems. *      FOLLOW UP  Future Appointments   Date Time Provider Department Center   8/13/2019  9:30 AM Zo Dhaliwal M.D. [facility]   8/15/2019 11:20 AM Valeria Childs M.D. [facility]     Valeria Childs M.D.  [address] [address]  [phone]            MEDICATIONS ON DISCHARGE     Medication List      CONTINUE taking these medications      Instructions   OMEPRAZOLE PO   Take  by mouth.     therapeutic multivitamin-minerals Tabs   Take 1 Tab by mouth every  day.  Dose:  1 Tab     traZODone 50 MG Tabs  Commonly known as:  DESYREL   Take 50 mg by mouth at bedtime as needed.  Dose:  50 mg     XALATAN 0.005 % Soln  Generic drug:  latanoprost   Place 1 Drop in both eyes every evening.  Dose:  1 Drop            Allergies  No Known Allergies    DIET  No orders of the defined types were placed in this encounter.      ACTIVITY  As tolerated.  Weight bearing as tolerated    CONSULTATIONS  none    PROCEDURES  none    LABORATORY  Lab Results   Component Value Date    SODIUM 138 08/08/2019    POTASSIUM 4.1 08/08/2019    CHLORIDE 111 08/08/2019    CO2 23 08/08/2019    GLUCOSE 103 (H) 08/08/2019    BUN 11 08/08/2019    CREATININE 0.90 08/08/2019        Lab Results   Component Value Date    WBC 5.8 08/08/2019    HEMOGLOBIN 12.4 08/08/2019    HEMATOCRIT 38.1 08/08/2019    PLATELETCT 158 (L) 08/08/2019        Total time of the discharge process exceeds 34 minutes.

## 2019-08-08 NOTE — PROGRESS NOTES
Report received from Paula WICK. Pt resting comfortably in bed, no complaints at this time. Bed low/locked/alarmed, call light within reach, will continue to monitor.

## 2019-08-08 NOTE — DISCHARGE INSTRUCTIONS
Discharge Instructions per Darrel Doyle M.D.    Please return to the ER with any concern or worsening symptoms.     DIET: regular    ACTIVITY: as tolerated    DIAGNOSIS: viral sepsis.     Return to ER if fevers or any other return of symptoms.     Discharge Instructions    Discharged to home by car with relative. Discharged via wheelchair, hospital escort: Refused.  Special equipment needed: Not Applicable    Be sure to schedule a follow-up appointment with your primary care doctor or any specialists as instructed.     Discharge Plan:   Influenza Vaccine Indication: Indicated: Not available from distributor/    I understand that a diet low in cholesterol, fat, and sodium is recommended for good health. Unless I have been given specific instructions below for another diet, I accept this instruction as my diet prescription.   Other diet: Advance as tolerated    Special Instructions: None    · Is patient discharged on Warfarin / Coumadin?   No     Depression / Suicide Risk    As you are discharged from this [facility] facility, it is important to learn how to keep safe from harming yourself.    Recognize the warning signs:  · Abrupt changes in personality, positive or negative- including increase in energy   · Giving away possessions  · Change in eating patterns- significant weight changes-  positive or negative  · Change in sleeping patterns- unable to sleep or sleeping all the time   · Unwillingness or inability to communicate  · Depression  · Unusual sadness, discouragement and loneliness  · Talk of wanting to die  · Neglect of personal appearance   · Rebelliousness- reckless behavior  · Withdrawal from people/activities they love  · Confusion- inability to concentrate     If you or a loved one observes any of these behaviors or has concerns about self-harm, here's what you can do:  · Talk about it- your feelings and reasons for harming yourself  · Remove any means that you might use to hurt  yourself (examples: pills, rope, extension cords, firearm)  · Get professional help from the community (Mental Health, Substance Abuse, psychological counseling)  · Do not be alone:Call your Safe Contact- someone whom you trust who will be there for you.  · Call your local CRISIS HOTLINE 870-8164 or 473-645-1966  · Call your local Children's Mobile Crisis Response Team [place] (873) 720-6376 or www.[employer]  · Call the toll free National Suicide Prevention Hotlines   · National Suicide Prevention Lifeline 358-057-JLFS (3905)  · [employer] Line Network 800-SUICIDE (946-0866)      Knee Pain  Knee pain is a very common symptom and can have many causes. Knee pain often goes away when you follow your health care provider's instructions for relieving pain and discomfort at home. However, knee pain can develop into a condition that needs treatment. Some conditions may include:  · Arthritis caused by wear and tear (osteoarthritis).  · Arthritis caused by swelling and irritation (rheumatoid arthritis or gout).  · A cyst or growth in your knee.  · An infection in your knee joint.  · An injury that will not heal.  · Damage, swelling, or irritation of the tissues that support your knee (torn ligaments or tendinitis).  If your knee pain continues, additional tests may be ordered to diagnose your condition. Tests may include X-rays or other imaging studies of your knee. You may also need to have fluid removed from your knee. Treatment for ongoing knee pain depends on the cause, but treatment may include:  · Medicines to relieve pain or swelling.  · Steroid injections in your knee.  · Physical therapy.  · Surgery.  HOME CARE INSTRUCTIONS  · Take medicines only as directed by your health care provider.  · Rest your knee and keep it raised (elevated) while you are resting.  · Do not do things that cause or worsen pain.  · Avoid high-impact activities or exercises, such as running, jumping rope, or doing jumping  jacks.  · Apply ice to the knee area:  ¨ Put ice in a plastic bag.  ¨ Place a towel between your skin and the bag.  ¨ Leave the ice on for 20 minutes, 2-3 times a day.  · Ask your health care provider if you should wear an elastic knee support.  · Keep a pillow under your knee when you sleep.  · Lose weight if you are overweight. Extra weight can put pressure on your knee.  · Do not use any tobacco products, including cigarettes, chewing tobacco, or electronic cigarettes. If you need help quitting, ask your health care provider. Smoking may slow the healing of any bone and joint problems that you may have.  SEEK MEDICAL CARE IF:  · Your knee pain continues, changes, or gets worse.  · You have a fever along with knee pain.  · Your knee frederic or locks up.  · Your knee becomes more swollen.  SEEK IMMEDIATE MEDICAL CARE IF:   · Your knee joint feels hot to the touch.  · You have chest pain or trouble breathing.  This information is not intended to replace advice given to you by your health care provider. Make sure you discuss any questions you have with your health care provider.  Document Released: 10/14/2008 Document Revised: 01/08/2016 Document Reviewed: 08/03/2015  [employer] Interactive Patient Education © 2017 [employer]    Sepsis, Adult  Sepsis is a serious infection of your blood or tissues that affects your whole body. The infection that causes sepsis may be bacterial, viral, fungal, or parasitic. Sepsis may be life threatening. Sepsis can cause your blood pressure to drop. This may result in shock. Shock causes your central nervous system and your organs to stop working correctly.  What increases the risk?  Sepsis can happen in anyone, but it is more likely to happen in people who have weakened immune systems.  What are the signs or symptoms?  Symptoms of sepsis can include:  · Fever or low body temperature (hypothermia).  · Rapid breathing (hyperventilation).  · Chills.  · Rapid heartbeat  (tachycardia).  · Confusion or light-headedness.  · Trouble breathing.  · Urinating much less than usual.  · Cool, clammy skin or red, flushed skin.  · Other problems with the heart, kidneys, or brain.  How is this diagnosed?  Your health care provider will likely do tests to look for an infection, to see if the infection has spread to your blood, and to see how serious your condition is. Tests can include:  · Blood tests, including cultures of your blood.  · Cultures of other fluids from your body, such as:  ¨ Urine.  ¨ Pus from wounds.  ¨ Mucus coughed up from your lungs.  · Urine tests other than cultures.  · X-ray exams or other imaging tests.  How is this treated?  Treatment will begin with elimination of the source of infection. If your sepsis is likely caused by a bacterial or fungal infection, you will be given antibiotic or antifungal medicines.  You may also receive:  · Oxygen.  · Fluids through an IV tube.  · Medicines to increase your blood pressure.  · A machine to clean your blood (dialysis) if your kidneys fail.  · A machine to help you breathe if your lungs fail.  Get help right away if:  You get an infection or develop any of the signs and symptoms of sepsis after surgery or a hospitalization.  This information is not intended to replace advice given to you by your health care provider. Make sure you discuss any questions you have with your health care provider.  Document Released: 09/15/2004 Document Revised: 05/25/2017 Document Reviewed: 08/25/2014  [employer] Interactive Patient Education © 2017 [employer]

## 2019-08-08 NOTE — CARE PLAN
Problem: Safety  Goal: Will remain free from falls  Outcome: PROGRESSING AS EXPECTED  Intervention: Implement fall precautions  Flowsheets (Taken 8/7/2019 2048)  Bed Alarm: Patient Educated Regarding Fall Risk and Need for Bed Alarm, Understands and Continues to Refuse  Environmental Precautions: Treaded Slipper Socks on Patient; Personal Belongings, Wastebasket, Call Bell etc. in Easy Reach; Transferred to Stronger Side; Report Given to Other Health Care Providers Regarding Fall Risk; Bed in Low Position; Communication Sign for Patients & Families; Mobility Assessed & Appropriate Sign Placed  IV Pole on Same Side of Bed as Bathroom: Yes  Bedrails: Bedrails Closest to Bathroom Down  Note:   Pt educated on fall risk. FWW at bedside for assistance ambulating.     Problem: Infection  Goal: Will remain free from infection  Outcome: PROGRESSING AS EXPECTED  Intervention: Implement standard precautions and perform hand washing before and after patient contact  Note:   Standard precautions used at every encounter.

## 2019-08-08 NOTE — CARE PLAN
Problem: Communication  Goal: The ability to communicate needs accurately and effectively will improve  Outcome: PROGRESSING AS EXPECTED  Pt aware of POC. Pt effectively communicates needs and understands importance of communication.     Problem: Venous Thromboembolism (VTW)/Deep Vein Thrombosis (DVT) Prevention:  Goal: Patient will participate in Venous Thrombosis (VTE)/Deep Vein Thrombosis (DVT)Prevention Measures  Outcome: PROGRESSING AS EXPECTED   Pt aware of POC. Pt effectively understands importance of VTE prophylaxis. Pt ambulates frequently with standby assist     Problem: Safety  Goal: Will remain free from injury  Outcome: PROGRESSING AS EXPECTED  Goal: Will remain free from falls  Outcome: PROGRESSING AS EXPECTED  Pt aware of POC. Pt understands importance of safety and fall prevention. Pt calls appropriately.

## 2019-08-09 LAB
BACTERIA UR CULT: NORMAL
S PYO SPEC QL CULT: NORMAL
SIGNIFICANT IND 70042: NORMAL
SIGNIFICANT IND 70042: NORMAL
SITE SITE: NORMAL
SITE SITE: NORMAL
SOURCE SOURCE: NORMAL
SOURCE SOURCE: NORMAL

## 2019-08-11 LAB
BACTERIA BLD CULT: NORMAL
BACTERIA BLD CULT: NORMAL
SIGNIFICANT IND 70042: NORMAL
SIGNIFICANT IND 70042: NORMAL
SITE SITE: NORMAL
SITE SITE: NORMAL
SOURCE SOURCE: NORMAL
SOURCE SOURCE: NORMAL

## 2019-08-13 ENCOUNTER — OFFICE VISIT (OUTPATIENT)
Dept: DERMATOLOGY | Facility: IMAGING CENTER | Age: 59
End: 2019-08-13
Payer: COMMERCIAL

## 2019-08-13 VITALS — BODY MASS INDEX: 36.16 KG/M2 | HEIGHT: 66 IN | WEIGHT: 225 LBS | TEMPERATURE: 97.4 F

## 2019-08-13 DIAGNOSIS — D22.9 MULTIPLE NEVI: ICD-10-CM

## 2019-08-13 DIAGNOSIS — L40.4 GUTTATE PSORIASIS: ICD-10-CM

## 2019-08-13 DIAGNOSIS — L82.1 SEBORRHEIC KERATOSES: ICD-10-CM

## 2019-08-13 DIAGNOSIS — Z85.828 HISTORY OF SKIN CANCER: ICD-10-CM

## 2019-08-13 PROCEDURE — 99203 OFFICE O/P NEW LOW 30 MIN: CPT | Performed by: DERMATOLOGY

## 2019-08-13 ASSESSMENT — ENCOUNTER SYMPTOMS
CHILLS: 0
FEVER: 0

## 2019-08-13 NOTE — PROGRESS NOTES
"Dermatology New Patient Visit    Chief Complaint   Patient presents with   • Establish Care       Subjective:     HPI:   Maria Teresa Haji is a 59 y.o. 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 no     History of skin cancer: Yes, Details: above - had PET done when was dx, no other findings  History of blistering/severe sunburns:Yes, Details: during youth  Family history of skin cancer:Yes, Details: mother , father Type unknown   Family history of atypical moles:No      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\"   • Glaucoma    • Hemorrhoids    • Hives    • Meningitis    • Pneumonia    • Sinusitis        Current Outpatient Medications on File Prior to Visit   Medication Sig Dispense Refill   • therapeutic multivitamin-minerals (THERAGRAN-M) Tab Take 1 Tab by mouth every day.     • OMEPRAZOLE PO Take  by mouth.     • latanoprost (XALATAN) 0.005 % Solution Place 1 Drop in both eyes every evening.     • traZODone (DESYREL) 50 MG Tab Take 50 mg by mouth at bedtime as needed.  3     No current facility-administered medications on file prior to visit.        No Known Allergies    Family History   Problem Relation Age of Onset   • Arthritis Mother    • Cancer Mother         skin   • Anemia Mother    • Hypertension Mother    • Obesity Mother    • Other Mother         ulcer   • Cancer Father         skin, prostate, esophageal   • Hypertension Father    • Obesity Father    • " Arthritis Brother    • Cancer Maternal Aunt         breast]       Social History     Socioeconomic History   • Marital status: Single     Spouse name: Not on file   • Number of children: Not on file   • Years of education: Not on file   • Highest education level: Not on file   Occupational History   • Not on file   Social Needs   • Financial resource strain: Not on file   • Food insecurity:     Worry: Not on file     Inability: Not on file   • Transportation needs:     Medical: Not on file     Non-medical: Not on file   Tobacco Use   • Smoking status: Former Smoker     Packs/day: 2.00     Years: 16.00     Pack years: 32.00     Last attempt to quit:      Years since quittin.6   • Smokeless tobacco: Never Used   Substance and Sexual Activity   • Alcohol use: Yes     Frequency: Monthly or less     Drinks per session: 1 or 2     Comment: 1 glass q 2mo   • Drug use: Yes     Types: Inhaled     Comment: Marijuana   • Sexual activity: Yes     Partners: Male   Lifestyle   • Physical activity:     Days per week: Not on file     Minutes per session: Not on file   • Stress: Not on file   Relationships   • Social connections:     Talks on phone: Not on file     Gets together: Not on file     Attends Muslim service: Not on file     Active member of club or organization: Not on file     Attends meetings of clubs or organizations: Not on file     Relationship status: Not on file   • Intimate partner violence:     Fear of current or ex partner: Not on file     Emotionally abused: Not on file     Physically abused: Not on file     Forced sexual activity: Not on file   Other Topics Concern   • Not on file   Social History Narrative   • Not on file       Review of Systems   Constitutional: Positive for malaise/fatigue. Negative for chills and fever.   Skin: Positive for itching and rash.        Objective:     A full mucocutaneous exam was completed including: scalp, hair, ears, face, eyelids, conjunctiva, lips,  "gums/tongue/oropharynx, neck, chest breasts, abdomen, back, left and right upper extremities (including hands/digits and fingernails), left and right lower extremities (including feet/toes, toenails), buttocks, excluding external genitalia (patient refusal) with the following pertinent findings listed below. Remaining above-listed examined areas within normal limits / negative for rashes or lesions.    Temp 36.3 °C (97.4 °F)   Ht 1.676 m (5' 6\")   Wt 102.1 kg (225 lb)     Physical Exam   Constitutional: She is oriented to person, place, and time and well-developed, well-nourished, and in no distress.   HENT:   Head: Normocephalic and atraumatic.       Right Ear: External ear normal.   Left Ear: External ear normal.   Nose: Nose normal.   Mouth/Throat: Oropharynx is clear and moist.   Eyes: Conjunctivae and lids are normal.   Neck: Normal range of motion. Neck supple.   Cardiovascular: Intact distal pulses.   Pulmonary/Chest: Effort normal.   Lymphadenopathy:        Head (right side): No submental, no submandibular, no preauricular, no posterior auricular and no occipital adenopathy present.        Head (left side): No submental, no submandibular, no preauricular, no posterior auricular and no occipital adenopathy present.        Right cervical: No superficial cervical adenopathy present.       Left cervical: No superficial cervical adenopathy present.     She has no axillary adenopathy.   Neurological: She is alert and oriented to person, place, and time.   Skin: Skin is warm and dry.        Psychiatric: Mood and affect normal.   Vitals reviewed.      DATA: none applicable to review    Assessment and Plan:     1. Multiple nevi  - Benign-appearing nature of lesions discussed. Advised to return to clinic for any new or concerning changes.  - ABCDE's of melanoma discussed    2. Seborrheic keratoses  - Benign-appearing nature of lesions discussed. Advised to return to clinic for any new or concerning changes.    3. " Guttate psoriasis - mild, <1% BSA, ?possibly triggered by recent hospitalization  - fluocinonide (LIDEX) 0.05 % Cream; Apply 1 Application to affected area(s) 2 times a day. To areas on the body (legs) as needed  Dispense: 30 g; Refill: 1  - side effects of topical steroids discussed, including minimal systemic absorption, skin thinning, appearance of stretch marks (striae), easy bruising, telangiectasias, and possible increased hair growth     4. History of adenoid cystic carcinoma  Skin cancer education  - discussed importance of sun protective clothing, eyewear  - discussed importance of daily use of broad spectrum sunscreen with SPF 30 or greater, as well as need for reapplication ~every 2 hours when exposed to UVR  - discussed importance of regular self-exams, ideally once per month, every 12 months exams in clinic  - MARIA A's of melanoma discussed  - patient to bring any new or concerning lesions to my attention      Followup: Return in about 1 year (around 8/13/2020).    Zo Dhaliwal M.D.

## 2019-08-15 ENCOUNTER — OFFICE VISIT (OUTPATIENT)
Dept: MEDICAL GROUP | Facility: MEDICAL CENTER | Age: 59
End: 2019-08-15
Payer: COMMERCIAL

## 2019-08-15 VITALS
SYSTOLIC BLOOD PRESSURE: 118 MMHG | BODY MASS INDEX: 37.28 KG/M2 | WEIGHT: 232 LBS | DIASTOLIC BLOOD PRESSURE: 74 MMHG | OXYGEN SATURATION: 94 % | TEMPERATURE: 98.5 F | HEART RATE: 67 BPM | RESPIRATION RATE: 14 BRPM | HEIGHT: 66 IN

## 2019-08-15 DIAGNOSIS — D69.6 THROMBOCYTOPENIA (HCC): ICD-10-CM

## 2019-08-15 DIAGNOSIS — M25.569 CHRONIC KNEE PAIN, UNSPECIFIED LATERALITY: ICD-10-CM

## 2019-08-15 DIAGNOSIS — G89.29 CHRONIC KNEE PAIN, UNSPECIFIED LATERALITY: ICD-10-CM

## 2019-08-15 DIAGNOSIS — F41.9 ANXIETY: ICD-10-CM

## 2019-08-15 PROBLEM — N17.9 ACUTE KIDNEY INJURY (HCC): Status: RESOLVED | Noted: 2019-08-06 | Resolved: 2019-08-15

## 2019-08-15 PROBLEM — A41.9 SEPSIS (HCC): Status: RESOLVED | Noted: 2019-08-06 | Resolved: 2019-08-15

## 2019-08-15 PROCEDURE — 99214 OFFICE O/P EST MOD 30 MIN: CPT | Performed by: INTERNAL MEDICINE

## 2019-08-15 RX ORDER — PHENTERMINE HYDROCHLORIDE 37.5 MG/1
TABLET ORAL
Refills: 1 | COMMUNITY
Start: 2019-06-13 | End: 2019-08-15

## 2019-08-15 RX ORDER — SUCRALFATE 1 G/1
TABLET ORAL
Refills: 2 | COMMUNITY
Start: 2019-06-17 | End: 2019-08-15

## 2019-08-15 RX ORDER — OMEPRAZOLE 40 MG/1
40 CAPSULE, DELAYED RELEASE ORAL PRN
Refills: 3 | COMMUNITY
Start: 2019-07-22 | End: 2020-12-08 | Stop reason: SDUPTHER

## 2019-08-15 RX ORDER — FLUOXETINE HYDROCHLORIDE 20 MG/1
20 CAPSULE ORAL DAILY
Qty: 30 CAP | Refills: 3 | Status: SHIPPED | OUTPATIENT
Start: 2019-08-15 | End: 2019-12-16 | Stop reason: SDUPTHER

## 2019-08-15 RX ORDER — PANTOPRAZOLE SODIUM 20 MG/1
TABLET, DELAYED RELEASE ORAL
Refills: 3 | COMMUNITY
Start: 2019-06-17 | End: 2019-08-15

## 2019-08-15 NOTE — PROGRESS NOTES
"CC:  Diagnoses of Chronic knee pain, unspecified laterality, Thrombocytopenia (HCC), and Anxiety were pertinent to this visit.    HISTORY OF THE PRESENT ILLNESS: Patient is a 59 y.o. female. This pleasant patient is here today to f/u.    Recently admitted earlier this month for knee pain, sore throat and fever.  Her symptoms were thought to be viral in origin per the discharge summary and she was treated with fluids and other supportive care.  Labs did show that her sed rate was normal around 2, CRP was minimally elevated at 1.16.  CBC significant for very mild thrombocytopenia 158.  CMP showed overall normal electrolytes, renal function, liver enzymes.      She indicates today to me that her symptoms came on very suddenly, end of the day she was in so much pain with the swelling in her joints that she had a use a cane.  It took a few days for the swelling to go down the resolved.  She has history of bilateral knee replacement in 2014 and 2017.  Hx living in [place].  She had similar episode when she had swelling and pain, suddenly last December but it was not associated fever at that time.  Usually the swelling and pain is in her knees and this time was also in her hips.      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  Patient has \"oil\" spots on nails or other concerning changes in her nails.  Occasionally her fingers have swelling and difficult to make a fist but no sausage digits.  Her paternal grandfather had some sort of rheumatologic disorder the family called rheumatoid, he was in a wheelchair starting at age 60.    Testing in the hospital, CAT scan did show signs of fatty liver disease and very small left lower lobe 5 mm nodule.  Patient would like interval testing in 1 year of the nodule for surveillance.  She will work on decreasing BMI to improve liver imaging.    Since last visit gynecology note briefly reviewed from June, she had normal Pap smear with no HPV.    She did see GI since last " "visit and indicates that they told her just to remain on omeprazole, this record was requested today.    Since last visit her cough did resolve.    She says she has generalized anxiety, wishes to be on a daily medication that is not addicting.  Her friends and family have told her that she gets to easily anxious and patient agrees.    Allergies: Patient has no known allergies.    Current Outpatient Medications Ordered in Epic   Medication Sig Dispense Refill   • omeprazole (PRILOSEC) 40 MG delayed-release capsule TK ONE C PO  D  3   • FLUoxetine (PROZAC) 20 MG Cap Take 1 Cap by mouth every day. 30 Cap 3   • fluocinonide (LIDEX) 0.05 % Cream Apply 1 Application to affected area(s) 2 times a day. To areas on the body (legs) as needed 30 g 1   • therapeutic multivitamin-minerals (THERAGRAN-M) Tab Take 1 Tab by mouth every day.     • latanoprost (XALATAN) 0.005 % Solution Place 1 Drop in both eyes every evening.     • traZODone (DESYREL) 50 MG Tab Take 50 mg by mouth at bedtime as needed.  3     No current [facility]-ordered facility-administered medications on file.        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   • OTHER ORTHOPEDIC SURGERY      b/l knee replacements 2014, 2016   • TONSILLECTOMY         Social History     Tobacco Use   • Smoking status: Former Smoker     Packs/day: 2.00     Years: 16.00     Pack years: 32.00     Last attempt to quit: 1993     Years " "since quittin.6   • Smokeless tobacco: Never Used   Substance Use Topics   • Alcohol use: Yes     Frequency: Monthly or less     Drinks per session: 1 or 2     Comment: 1 glass q 2mo   • Drug use: Yes     Types: Inhaled     Comment: Marijuana       Social History     Social History Narrative   • Not on file       Family History   Problem Relation Age of Onset   • Arthritis Mother    • Cancer Mother         skin   • Anemia Mother    • Hypertension Mother    • Obesity Mother    • Other Mother         ulcer   • Cancer Father         skin, prostate, esophageal   • Hypertension Father    • Obesity Father    • Arthritis Brother    • Cancer Maternal Aunt         breast]       ROS:     - Constitutional: Negative for current fever     - Eyes:   No acute vision change    - Respiratory: Negative for cough    - Cardiovascular: Negative for chest pain    - Gastrointestinal: Negative for heartburn, nausea, vomiting, abdominal pain, hematochezia, melena, diarrhea, constipation, and greasy/foul-smelling stools.     - Genitourinary: Negative for dysuria    - Musculoskeletal:see hpi    - Skin: see hpi    - Neurological: Negative for vertigo    - Endo:Negative for polyuria    - Hem/lymphatic: Negative for easy bruising, blood clots, lymphedema, swollen glands    -Allergic/immun: Negative for allergic rhinitis    - Psychiatric/Behavioral: see hpi     Exam: /74 (BP Location: Right arm, Patient Position: Sitting, BP Cuff Size: Adult)   Pulse 67   Temp 36.9 °C (98.5 °F) (Temporal)   Resp 14   Ht 1.676 m (5' 6\")   Wt 105.2 kg (232 lb)   SpO2 94%  Body mass index is 37.45 kg/m².    General: Normal appearing. No distress.  EYES: Conjunctiva clear lids without ptosis, pupils equal  EARS: Normal shape and contour   Pulmonary: Clear to ausculation.  Normal effort. No rales or wheezing.  Cardiovascular: Regular rate and rhythm without significant murmur.   Abdomen: Soft, nontender, nondistended. Normal bowel sounds.  Neurologic: " Cranial nerves grossly nonfocal  Skin: Warm and dry.  No obvious lesions.  Right upper lateral thigh about 1 cm macular rash with some scale, no erythema nodosum appreciated  Musculoskeletal: Normal gait. No extremity cyanosis, clubbing, or edema.  Psych: Normal mood and affect. Alert and oriented x3. Judgment and insight is normal.      Assessment/Plan  1. Chronic knee pain, unspecified laterality  Expand rheumatologic work-up.  Patient will find original orthopedic notes from her knee replacement, there is potential to do heavy metal testing as sometimes people can be allergic to the metal component which can cause pain/swelling and systemic symptoms including fever.  Potential to test the metal in her blood that her joint replacement is comprised of.  Alternatively, discussed w/the patient joints can certainly swell and cause pain with excess salt in diet, recommend 2 g salt limit per day and also other dietary measures to minimize inflammation.  - TSH WITH REFLEX TO FT4; Future  - MICHAEL REFLEXIVE PROFILE; Future  - CCP; Future  - RHEUMATOID ARTHRITIS FACTOR; Future  - URIC ACID; Future  - HLA-B27; Future    2. Thrombocytopenia (HCC)  Very minimal thrombocytopenia in the setting of possible viral illness, plan to recheck.  - CBC WITH DIFFERENTIAL; Future  - VITAMIN B12; Future  - TSH WITH REFLEX TO FT4; Future    3. Anxiety  New issue, patient prefers pharmacologic management  - FLUoxetine (PROZAC) 20 MG Cap; Take 1 Cap by mouth every day.  Dispense: 30 Cap; Refill: 3      4 months or sooner if needed    breast u/s 6mo jan 2020; ct lung 8/2020    Please note that this dictation was created using voice recognition software. I have made every reasonable attempt to correct obvious errors, but I expect that there are errors of grammar and possibly content that I did not discover before finalizing the note.

## 2019-08-15 NOTE — LETTER
[facility]  Valeria Childs M.D.  [address]  [place] [address]  Fax: [phone]   Authorization for Release/Disclosure of   Protected Health Information   Name: MARIA TERESA HAJI : 1960 SSN: [id number]   Address: [address] [place] [address]  [place] [address] Phone:    [phone] (home)    I authorize the entity listed below to release/disclose the PHI below to:   [facility]/Valeria Childs M.D. and Valeria Childs M.D.   Provider or Entity Name:     Address   City, State, [facility]   Phone:      Fax:     Reason for request: continuity of care   Information to be released:    [  ] LAST COLONOSCOPY,  including any PATH REPORT and follow-up  [  ] LAST FIT/COLOGUARD RESULT [  ] LAST DEXA  [  ] LAST MAMMOGRAM  [  ] LAST PAP  [  ] LAST LABS [  ] RETINA EXAM REPORT  [  ] IMMUNIZATION RECORDS  [  ] Release all info      [  ] Check here and initial the line next to each item to release ALL health information INCLUDING  _____ Care and treatment for drug and / or alcohol abuse  _____ HIV testing, infection status, or AIDS  _____ Genetic Testing    DATES OF SERVICE OR TIME PERIOD TO BE DISCLOSED: _____________  I understand and acknowledge that:  * This Authorization may be revoked at any time by you in writing, except if your health information has already been used or disclosed.  * Your health information that will be used or disclosed as a result of you signing this authorization could be re-disclosed by the recipient. If this occurs, your re-disclosed health information may no longer be protected by State or Federal laws.  * You may refuse to sign this Authorization. Your refusal will not affect your ability to obtain treatment.  * This Authorization becomes effective upon signing and will  on (date) __________.      If no date is indicated, this Authorization will  one (1) year from the signature date.    Name: Maria Teresa Haji    Signature:   Date:     8/15/2019       PLEASE FAX REQUESTED  RECORDS BACK TO: [phone]

## 2019-08-23 ENCOUNTER — HOSPITAL ENCOUNTER (OUTPATIENT)
Dept: LAB | Facility: MEDICAL CENTER | Age: 59
End: 2019-08-23
Attending: INTERNAL MEDICINE
Payer: COMMERCIAL

## 2019-08-23 DIAGNOSIS — G89.29 CHRONIC KNEE PAIN, UNSPECIFIED LATERALITY: ICD-10-CM

## 2019-08-23 DIAGNOSIS — M25.569 CHRONIC KNEE PAIN, UNSPECIFIED LATERALITY: ICD-10-CM

## 2019-08-23 DIAGNOSIS — D69.6 THROMBOCYTOPENIA (HCC): ICD-10-CM

## 2019-08-23 LAB
BASOPHILS # BLD AUTO: 0.2 % (ref 0–1.8)
BASOPHILS # BLD: 0.01 K/UL (ref 0–0.12)
EOSINOPHIL # BLD AUTO: 0.11 K/UL (ref 0–0.51)
EOSINOPHIL NFR BLD: 2.6 % (ref 0–6.9)
ERYTHROCYTE [DISTWIDTH] IN BLOOD BY AUTOMATED COUNT: 42.1 FL (ref 35.9–50)
HCT VFR BLD AUTO: 44.9 % (ref 37–47)
HGB BLD-MCNC: 14.4 G/DL (ref 12–16)
IMM GRANULOCYTES # BLD AUTO: 0.01 K/UL (ref 0–0.11)
IMM GRANULOCYTES NFR BLD AUTO: 0.2 % (ref 0–0.9)
LYMPHOCYTES # BLD AUTO: 1.38 K/UL (ref 1–4.8)
LYMPHOCYTES NFR BLD: 32.2 % (ref 22–41)
MCH RBC QN AUTO: 30.1 PG (ref 27–33)
MCHC RBC AUTO-ENTMCNC: 32.1 G/DL (ref 33.6–35)
MCV RBC AUTO: 93.7 FL (ref 81.4–97.8)
MONOCYTES # BLD AUTO: 0.34 K/UL (ref 0–0.85)
MONOCYTES NFR BLD AUTO: 7.9 % (ref 0–13.4)
NEUTROPHILS # BLD AUTO: 2.43 K/UL (ref 2–7.15)
NEUTROPHILS NFR BLD: 56.9 % (ref 44–72)
NRBC # BLD AUTO: 0 K/UL
NRBC BLD-RTO: 0 /100 WBC
PLATELET # BLD AUTO: 262 K/UL (ref 164–446)
PMV BLD AUTO: 9.3 FL (ref 9–12.9)
RBC # BLD AUTO: 4.79 M/UL (ref 4.2–5.4)
RHEUMATOID FACT SER IA-ACNC: <10 IU/ML (ref 0–14)
TSH SERPL DL<=0.005 MIU/L-ACNC: 1.2 UIU/ML (ref 0.38–5.33)
URATE SERPL-MCNC: 6.1 MG/DL (ref 1.9–8.2)
VIT B12 SERPL-MCNC: 1107 PG/ML (ref 211–911)
WBC # BLD AUTO: 4.3 K/UL (ref 4.8–10.8)

## 2019-08-23 PROCEDURE — 86200 CCP ANTIBODY: CPT

## 2019-08-23 PROCEDURE — 36415 COLL VENOUS BLD VENIPUNCTURE: CPT

## 2019-08-23 PROCEDURE — 84443 ASSAY THYROID STIM HORMONE: CPT

## 2019-08-23 PROCEDURE — 84550 ASSAY OF BLOOD/URIC ACID: CPT

## 2019-08-23 PROCEDURE — 85025 COMPLETE CBC W/AUTO DIFF WBC: CPT

## 2019-08-23 PROCEDURE — 82607 VITAMIN B-12: CPT

## 2019-08-23 PROCEDURE — 86431 RHEUMATOID FACTOR QUANT: CPT

## 2019-08-23 PROCEDURE — 86038 ANTINUCLEAR ANTIBODIES: CPT

## 2019-08-23 PROCEDURE — 86812 HLA TYPING A B OR C: CPT

## 2019-08-24 LAB
HLA-B27 QL FC: POSITIVE
NUCLEAR IGG SER QL IA: NORMAL

## 2019-08-28 DIAGNOSIS — Z15.89 HLA B27 (HLA B27 POSITIVE): ICD-10-CM

## 2019-08-29 LAB — CCP IGG SERPL-ACNC: 7 UNITS (ref 0–19)

## 2019-09-12 DIAGNOSIS — Z15.89 HLA B27 (HLA B27 POSITIVE): ICD-10-CM

## 2019-09-30 ENCOUNTER — TELEPHONE (OUTPATIENT)
Dept: RHEUMATOLOGY | Facility: MEDICAL CENTER | Age: 59
End: 2019-09-30

## 2019-09-30 NOTE — TELEPHONE ENCOUNTER
Received a call from patient's PCP requesting consult on this patient with possible psoriatic arthritis    Jay Jay or Kapil please schedule I believe I have some open slots the week of October 7 i.e. I think October 8 and October 10 have some new patient open slots.

## 2019-09-30 NOTE — TELEPHONE ENCOUNTER
L/M for pt to call me to schedule NP Consult. (Need 1hr for appt.) If 10/8 or 10/10 still available can schedule there per MD.LB

## 2019-10-08 ENCOUNTER — HOSPITAL ENCOUNTER (OUTPATIENT)
Dept: RADIOLOGY | Facility: MEDICAL CENTER | Age: 59
End: 2019-10-08
Attending: INTERNAL MEDICINE
Payer: COMMERCIAL

## 2019-10-08 ENCOUNTER — TELEPHONE (OUTPATIENT)
Dept: RHEUMATOLOGY | Facility: MEDICAL CENTER | Age: 59
End: 2019-10-08

## 2019-10-08 ENCOUNTER — OFFICE VISIT (OUTPATIENT)
Dept: RHEUMATOLOGY | Facility: MEDICAL CENTER | Age: 59
End: 2019-10-08
Payer: COMMERCIAL

## 2019-10-08 VITALS
HEIGHT: 66 IN | TEMPERATURE: 97.8 F | DIASTOLIC BLOOD PRESSURE: 80 MMHG | OXYGEN SATURATION: 94 % | HEART RATE: 72 BPM | RESPIRATION RATE: 12 BRPM | SYSTOLIC BLOOD PRESSURE: 120 MMHG | BODY MASS INDEX: 38.16 KG/M2 | WEIGHT: 237.44 LBS

## 2019-10-08 DIAGNOSIS — M54.50 CHRONIC BILATERAL LOW BACK PAIN, UNSPECIFIED WHETHER SCIATICA PRESENT: ICD-10-CM

## 2019-10-08 DIAGNOSIS — R73.9 HYPERGLYCEMIA: ICD-10-CM

## 2019-10-08 DIAGNOSIS — Z15.89 HLA B27 (HLA B27 POSITIVE): ICD-10-CM

## 2019-10-08 DIAGNOSIS — L40.9 PSORIASIS: ICD-10-CM

## 2019-10-08 DIAGNOSIS — G89.29 CHRONIC BILATERAL LOW BACK PAIN, UNSPECIFIED WHETHER SCIATICA PRESENT: ICD-10-CM

## 2019-10-08 DIAGNOSIS — M25.50 POLYARTHRALGIA: ICD-10-CM

## 2019-10-08 DIAGNOSIS — C80.1 ADENOID CYSTIC CARCINOMA (HCC): ICD-10-CM

## 2019-10-08 PROCEDURE — 72202 X-RAY EXAM SI JOINTS 3/> VWS: CPT

## 2019-10-08 PROCEDURE — 99205 OFFICE O/P NEW HI 60 MIN: CPT | Performed by: INTERNAL MEDICINE

## 2019-10-08 PROCEDURE — 73521 X-RAY EXAM HIPS BI 2 VIEWS: CPT

## 2019-10-08 PROCEDURE — 72100 X-RAY EXAM L-S SPINE 2/3 VWS: CPT

## 2019-10-08 RX ORDER — CHOLECALCIFEROL (VITAMIN D3) 125 MCG
500 CAPSULE ORAL DAILY
COMMUNITY
End: 2020-08-29

## 2019-10-08 NOTE — PROGRESS NOTES
Chief Complaint-joint pain    Chief Complaint   Patient presents with   • New Patient     Maria Teresa Haji is a 59 y.o. female here as a new Rheumatology Consult referred by Valeria Childs M.D.  HPI:   This is a 59 year old female here for evaluation of positive HLA B27, referred by Dr. Childs, patient states that was recently hospitalized for an episode where sudden onset pain in legs where couldn't walk, PCP did blood tests and found to have positive HLA B27. Patient states similar occurrence 2018 in [place] with sudden onset leg pain, was hospitalized, found to have elevated WBC given abx and improved after 48 hrs, then reoccurrence 2019 as stated above.  Per patient report no x-rays have been done.  Patient also with psoriasis since early s, no iritis/uveitis, no bladder inflammation.  Positive hx of achilles inflammation right since age 39 years old s/p eval by podiatrist treatd with orthotics and physical therapy.  No rashed on palms hands or soles feet.  Patient with PSA on thighs, face, scalp.    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     • omeprazole (PRILOSEC) 40 MG delayed-release capsule TK ONE C PO  D  3   • fluocinonide (LIDEX) 0.05 % Cream Apply 1 Application to affected area(s) 2 times a day. To areas on the body (legs) as needed 30 g 1   • therapeutic multivitamin-minerals (THERAGRAN-M) Tab Take 1 Tab by  mouth every day.     • latanoprost (XALATAN) 0.005 % Solution Place 1 Drop in both eyes every evening.     • traZODone (DESYREL) 50 MG Tab Take 50 mg by mouth at bedtime as needed.  3   • FLUoxetine (PROZAC) 20 MG Cap Take 1 Cap by mouth every day. (Patient not taking: Reported on 10/8/2019) 30 Cap 3     No current facility-administered medications for this visit.      She  has a past medical history of Arthritis, Back pain, Blood transfusion without reported diagnosis, Bronchitis, Cancer (HCC), Glaucoma, Hemorrhoids, Hives, Meningitis, Pneumonia, and Sinusitis. She also has no past medical history of Allergy.  She  has a past surgical history that includes other orthopedic surgery; appendectomy; abdominal exploration; other; tonsillectomy; other; lumpectomy; gyn surgery; and knee arthroplasty total (Bilateral, , ).  Family History   Problem Relation Age of Onset   • Arthritis Mother    • Cancer Mother         skin   • Anemia Mother    • Hypertension Mother    • Obesity Mother    • Other Mother         ulcer   • Cancer Father         skin, prostate, esophageal   • Hypertension Father    • Obesity Father    • Arthritis Brother    • Cancer Maternal Aunt         breast]     Family Status   Relation Name Status   • Mo  (Not Specified)   • Fa  (Not Specified)   • Bro  (Not Specified)   • MAunt  (Not Specified)     Social History     Tobacco Use   • Smoking status: Former Smoker     Packs/day: 2.00     Years: 16.00     Pack years: 32.00     Last attempt to quit:      Years since quittin.7   • Smokeless tobacco: Never Used   Substance Use Topics   • Alcohol use: Yes     Frequency: Monthly or less     Drinks per session: 1 or 2     Comment: 1 glass q 2mo   • Drug use: Yes     Types: Inhaled     Comment: Marijuana     Social History     Social History Narrative   • Not on file       ROS   Other than what is mentioned in HPI or physical exam, there is no history of headaches, double vision or blurred vision.  "No temporal tenderness or jaw claudication.  No trouble swallowing difficulties or sore throats.  No chest complaints including chest pain, cough or sputum production. No GI complaints including nausea, vomiting, change in bowel habits, or past peptic ulcer disease. No history of blood in the stools. No urinary complaints including dysuria or frequency. No history of alopecia, photosensitivity, oral ulcerations, Raynaud's phenomena.       Objective:     /80   Pulse 72   Temp 36.6 °C (97.8 °F) (Temporal)   Resp 12   Ht 1.676 m (5' 6\")   Wt 107.7 kg (237 lb 7 oz)   SpO2 94%  Body mass index is 38.32 kg/m².  Physical Exam:    Constitutional: Alert and oriented X3, no distress.Skin: Warm, dry, good turgor, patient with some psoriatic plaques on the lateral aspect bilateral thighs eye: Equal, round and reactive, conjunctiva clear, lids normal EOM intactENMT: Lips without lesions, good dentition, no oropharyngeal ulcers, moist buccal mucosa, pinna without deformityNeck: Trachea midline, no masses, no thyromegaly.Lymph:  No cervical lymphadenopathy, no axillary lymphadenopathy, no supraclavicular lymphadenopathyRespiratory: Unlabored respiratory effort, lungs clear to auscultation, no wheezes, no ronchi.Cardiovascular: Normal S1, S2, no murmur, no edema.Abdomen: Soft, non-tender, no masses, no hepatosplenomegaly, central obesity.Psych: Alert and oriented x3, normal affect and mood.Neuro Cranial nerves 2-12 are grossly intact, no loss of sensation LEExt:no joint laxity noted in bilateral arms, no joint laxity noted in bilateral legs, positive tenderness to palpation on the insertion site of the right Achilles onto the heel, hands without any ham dactylitis no sausage digits no flexion contractures no nodules no tophi, shoulders full range of motion without limitations, occiput to wall 0 cm, Schoebers 10 to 17 cm, gait without antalgia and without foot drop    Lab Results   Component Value Date/Time    SODIUM " 138 08/08/2019 02:40 AM    POTASSIUM 4.1 08/08/2019 02:40 AM    CHLORIDE 111 08/08/2019 02:40 AM    CO2 23 08/08/2019 02:40 AM    GLUCOSE 103 (H) 08/08/2019 02:40 AM    BUN 11 08/08/2019 02:40 AM    CREATININE 0.90 08/08/2019 02:40 AM      Lab Results   Component Value Date/Time    WBC 4.3 (L) 08/23/2019 10:48 AM    RBC 4.79 08/23/2019 10:48 AM    HEMOGLOBIN 14.4 08/23/2019 10:48 AM    HEMATOCRIT 44.9 08/23/2019 10:48 AM    MCV 93.7 08/23/2019 10:48 AM    MCH 30.1 08/23/2019 10:48 AM    MCHC 32.1 (L) 08/23/2019 10:48 AM    MPV 9.3 08/23/2019 10:48 AM    NEUTSPOLYS 56.90 08/23/2019 10:48 AM    LYMPHOCYTES 32.20 08/23/2019 10:48 AM    MONOCYTES 7.90 08/23/2019 10:48 AM    EOSINOPHILS 2.60 08/23/2019 10:48 AM    BASOPHILS 0.20 08/23/2019 10:48 AM      Lab Results   Component Value Date/Time    CALCIUM 8.5 08/08/2019 02:40 AM    ASTSGOT 31 08/08/2019 02:40 AM    ALTSGPT 41 08/08/2019 02:40 AM    ALKPHOSPHAT 46 08/08/2019 02:40 AM    TBILIRUBIN 0.8 08/08/2019 02:40 AM    ALBUMIN 3.1 (L) 08/08/2019 02:40 AM    TOTPROTEIN 5.5 (L) 08/08/2019 02:40 AM     Lab Results   Component Value Date/Time    URICACID 6.1 08/23/2019 10:48 AM    RHEUMFACTN <10 08/23/2019 10:48 AM    CCPANTIBODY 7 08/23/2019 10:48 AM    ANTINUCAB None Detected 08/23/2019 10:48 AM     Lab Results   Component Value Date/Time    SEDRATEWES 2 08/07/2019 01:04 AM    CREACTPROT 1.16 (H) 08/06/2019 08:08 PM     Lab Results   Component Value Date/Time    COLORURINE Yellow 08/07/2019 02:23 AM    SPECGRAVITY <=1.005 08/07/2019 02:23 AM    PHURINE 5.5 08/07/2019 02:23 AM    GLUCOSEUR Negative 08/07/2019 02:23 AM    KETONES Negative 08/07/2019 02:23 AM    PROTEINURIN Negative 08/07/2019 02:23 AM     Lab Results   Component Value Date/Time    UQUU31ZBDB Positive (A) 08/23/2019 10:48 AM     Lab Results   Component Value Date/Time    25HYDROXY 47 05/31/2019 02:02 PM     Lab Results   Component Value Date/Time    TSHULTRASEN 1.200 08/23/2019 10:48 AM       Assessment and  Plan:  1. Psoriasis  And probably psoriatic spondyloarthropathy with history in light of probable enthesitis in light of the Achilles tendon inflammation, we will do more extensive work-up specifically x-rays of sacroiliac joints, hips and low back.  If x-rays unhelpful then consider MRI of low back/sacroiliac joints    2. HLA B27 (HLA B27 positive)  Possibly indicative of psoriatic spondyloarthropathy, will do x-rays as stated above, NSAIDs for now, if indicated sacroiliitis then options would include sulfasalazine, possible biologic such as consent takes, or Humira.  - DX-LUMBAR SPINE-2 OR 3 VIEWS; Future  - DX-SACROILIAC JOINTS 3+; Future  - DX-HIP-BILATERAL-WITH PELVIS-2 VIEWS; Future    3. Polyarthralgia  Probably combination of DJD as well, will evaluate x-rays as stated above    4. Chronic bilateral low back pain, unspecified whether sciatica present    5. 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  Followup: Return in about 1 week (around 10/15/2019). or sooner prn after x-rays have been done  Patient was seen 80 minutes face-to-face (excluding time for procedures)  of which more than 50% the time was spent counseling the patient regarding  rheumatological conditions and care. Therapy was discussed in detail.  Thank you for this referral.    Please note that this dictation was created using voice recognition software. I have made every reasonable attempt to correct obvious errors, but I expect that there are errors of grammar and possibly content that I did not discover before finalizing the note.

## 2019-10-08 NOTE — ASSESSMENT & PLAN NOTE
59 year old female here for evaluation of positive HLA B27, patient states that had episode where sudden onset pain in legs where couldn't walk, PCP did blood tests and found to have positive HLA B27. [place], found to have elevated WBC given abx and improved after 48 hrs, then reoccurrence 2019.  Patient also with psoriasis since early 's, no iritis/uveitis, no bladder infglammation.  Positive hx of achilles inflammation right since age 39 years old s/p eval by podiatrist treatd with orthotics and physical therapy.  No rashed on palms hands or soles feet.  Patient with PSA on thighs, face, scalp.  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

## 2019-10-08 NOTE — TELEPHONE ENCOUNTER
Please notify patient that we received the results of her x-rays and it does not show evidence of that spondyloarthropathy arthritis, the x-rays only show wear and tear getting older arthritis so far, however I am going to order an MRI of her sacroiliac joints just to make sure    MRI ordered for patient

## 2019-10-15 ENCOUNTER — APPOINTMENT (OUTPATIENT)
Dept: RADIOLOGY | Facility: MEDICAL CENTER | Age: 59
End: 2019-10-15
Attending: INTERNAL MEDICINE
Payer: COMMERCIAL

## 2019-10-15 DIAGNOSIS — Z15.89 HLA B27 (HLA B27 POSITIVE): ICD-10-CM

## 2019-10-15 DIAGNOSIS — G89.29 CHRONIC BILATERAL LOW BACK PAIN, UNSPECIFIED WHETHER SCIATICA PRESENT: ICD-10-CM

## 2019-10-15 DIAGNOSIS — M54.50 CHRONIC BILATERAL LOW BACK PAIN, UNSPECIFIED WHETHER SCIATICA PRESENT: ICD-10-CM

## 2019-10-15 DIAGNOSIS — L40.9 PSORIASIS: ICD-10-CM

## 2019-10-15 PROCEDURE — 72195 MRI PELVIS W/O DYE: CPT

## 2019-10-16 ENCOUNTER — OFFICE VISIT (OUTPATIENT)
Dept: RHEUMATOLOGY | Facility: MEDICAL CENTER | Age: 59
End: 2019-10-16
Payer: COMMERCIAL

## 2019-10-16 VITALS
TEMPERATURE: 97.4 F | WEIGHT: 237.66 LBS | BODY MASS INDEX: 38.36 KG/M2 | SYSTOLIC BLOOD PRESSURE: 114 MMHG | OXYGEN SATURATION: 95 % | HEART RATE: 73 BPM | DIASTOLIC BLOOD PRESSURE: 72 MMHG | RESPIRATION RATE: 16 BRPM

## 2019-10-16 DIAGNOSIS — M54.50 BILATERAL LOW BACK PAIN WITHOUT SCIATICA, UNSPECIFIED CHRONICITY: ICD-10-CM

## 2019-10-16 DIAGNOSIS — Z15.89 HLA B27 (HLA B27 POSITIVE): ICD-10-CM

## 2019-10-16 DIAGNOSIS — L40.9 PSORIASIS: ICD-10-CM

## 2019-10-16 DIAGNOSIS — Z79.1 NSAID LONG-TERM USE: ICD-10-CM

## 2019-10-16 DIAGNOSIS — M81.0 OSTEOPOROSIS WITHOUT CURRENT PATHOLOGICAL FRACTURE, UNSPECIFIED OSTEOPOROSIS TYPE: ICD-10-CM

## 2019-10-16 PROCEDURE — 99214 OFFICE O/P EST MOD 30 MIN: CPT | Performed by: INTERNAL MEDICINE

## 2019-10-16 RX ORDER — CELECOXIB 200 MG/1
CAPSULE ORAL
Qty: 180 CAP | Refills: 0 | Status: SHIPPED | OUTPATIENT
Start: 2019-10-16 | End: 2019-10-21

## 2019-10-16 ASSESSMENT — PAIN SCALES - GENERAL: PAINLEVEL: 5=MODERATE PAIN

## 2019-10-16 NOTE — PROGRESS NOTES
Chief Complaint- joint pain     Subjective:   Maria Teresa Haji is a 59 y.o. female here today for follow up of rheumatological issues    This is the second visit for this patient who was referred to this clinic for evaluation of low back pain and a positive HLA B2 7.  Patient also with psoriasis not last visit we are wondering if patient had a psoriatic spondyloarthropathy.  Since last visit patient underwent a number of x-rays including an MRI of sacroiliac joints, patient is here to follow-up with her .  Patient has not had a flare of low back pain since last visit.  Patient does take NSAIDs as needed normally Aleve or Naprosyn but also has issues with gastritis with this particular NSAIDs.      Patient also with psoriasis since early , no iritis/uveitis, no bladder inflammation.  Positive hx of achilles inflammation right since age 39 years old s/p eval by podiatrist treatd with orthotics and physical therapy.  No rashed on palms hands or soles feet.  Patient with PSA on thighs, face, scalp.     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 , hyperglycemia, history of GERD     Mother-90 years old with HTN  Father-HTN, passed away from esophageal cancer and prostate cancer  Brother with back problems, s/p fusion     Soc Hx pos tob quit 30 years ago, ETOH wine q week; positive blood transusions with      Uric acid 6.1 nl 2019  RF neg 2019  CCP neg 2019  HLA B27 pos 2019  MICHAEL neg 2019  MRI pelvis 10/2019-no evidence of sacroiliitis  Hip/pelvic x-rays 10/2019-DJD, enthesophytes greater trochanteric proximal femur, calcifications adjacent to the greater trochanter  LS spine x-ray 10/2019-multilevel DJD and DDD, no evidence of longitudinal ligament calcifications    Addendum 10/30/2019  DEXA 10/30/2019 T scores 2.4, 0.2  FRAX 10/30/2019 major osteoporotic fracture risk 11.9%, hip fracture risk 0.2%    Current medicines (including changes today)  Current Outpatient  Medications   Medication Sig Dispense Refill   • celecoxib (CELEBREX) 200 MG Cap 1 tab po qday to bid prn joint pain 180 Cap 0   • cyanocobalamin (VITAMIN B-12) 500 MCG Tab Take 500 mcg by mouth every day.     • omeprazole (PRILOSEC) 40 MG delayed-release capsule TK ONE C PO  D  3   • fluocinonide (LIDEX) 0.05 % Cream Apply 1 Application to affected area(s) 2 times a day. To areas on the body (legs) as needed 30 g 1   • therapeutic multivitamin-minerals (THERAGRAN-M) Tab Take 1 Tab by mouth every day.     • latanoprost (XALATAN) 0.005 % Solution Place 1 Drop in both eyes every evening.     • traZODone (DESYREL) 50 MG Tab Take 50 mg by mouth at bedtime as needed.  3   • FLUoxetine (PROZAC) 20 MG Cap Take 1 Cap by mouth every day. (Patient not taking: Reported on 10/8/2019) 30 Cap 3     No current facility-administered medications for this visit.      She  has a past medical history of Arthritis, Back pain, Blood transfusion without reported diagnosis, Bronchitis, Cancer (HCC), Glaucoma, Hemorrhoids, Hives, Meningitis, Pneumonia, and Sinusitis. She also has no past medical history of Allergy.    ROS   Other than what is mentioned in HPI or physical exam, there is no history of headaches, double vision or blurred vision. No temporal tenderness or jaw claudication. No trouble swallowing difficulties or sore throats.  No chest complaints including chest pain, cough or sputum production. No GI complaints including nausea, vomiting, change in bowel habits, or past peptic ulcer disease. No history of blood in the stools. No urinary complaints including dysuria or frequency. No history of alopecia, photosensitivity, oral ulcerations, Raynaud's phenomena.       Objective:     /72   Pulse 73   Temp 36.3 °C (97.4 °F) (Temporal)   Resp 16   Wt 107.8 kg (237 lb 10.5 oz)   SpO2 95%  Body mass index is 38.36 kg/m².   Physical Exam:    Constitutional: Alert and oriented X3, patient is talkative with good eye contact.Skin:  Warm, dry, good turgor, no rashes in visible areas do not see any psoriatic lesions today.Eye: Equal, round and reactive, conjunctiva clear, lids normal EOM intactENMT: Lips without lesions, good dentition, no oropharyngeal ulcers, moist buccal mucosa, pinna without deformityNeck: Trachea midline, no masses, no thyromegaly.Lymph:  No cervical lymphadenopathy, no axillary lymphadenopathy, no supraclavicular lymphadenopathyRespiratory: Unlabored respiratory effort, lungs clear to auscultation, no wheezes, no ronchi.Cardiovascular: Normal S1, S2, no murmur, no edema.Abdomen: Soft, non-tender, no masses, no hepatosplenomegaly.Psych: Alert and oriented x3, normal affect and mood.Neuro: Cranial nerves 2-12 are grossly intact, no loss of sensation LEExt:no joint laxity noted in bilateral arms, no joint laxity noted in bilateral legs, hands without any dactylitis no sausage digits no flexion contractures no nodules no tophi, occiput to wall 0 cm Schoebers 10 to 17 cm, toes without splay toes without crossover toes, no Achilles inflammation noted today    Lab Results   Component Value Date/Time    SODIUM 138 08/08/2019 02:40 AM    POTASSIUM 4.1 08/08/2019 02:40 AM    CHLORIDE 111 08/08/2019 02:40 AM    CO2 23 08/08/2019 02:40 AM    GLUCOSE 103 (H) 08/08/2019 02:40 AM    BUN 11 08/08/2019 02:40 AM    CREATININE 0.90 08/08/2019 02:40 AM      Lab Results   Component Value Date/Time    WBC 4.3 (L) 08/23/2019 10:48 AM    RBC 4.79 08/23/2019 10:48 AM    HEMOGLOBIN 14.4 08/23/2019 10:48 AM    HEMATOCRIT 44.9 08/23/2019 10:48 AM    MCV 93.7 08/23/2019 10:48 AM    MCH 30.1 08/23/2019 10:48 AM    MCHC 32.1 (L) 08/23/2019 10:48 AM    MPV 9.3 08/23/2019 10:48 AM    NEUTSPOLYS 56.90 08/23/2019 10:48 AM    LYMPHOCYTES 32.20 08/23/2019 10:48 AM    MONOCYTES 7.90 08/23/2019 10:48 AM    EOSINOPHILS 2.60 08/23/2019 10:48 AM    BASOPHILS 0.20 08/23/2019 10:48 AM      Lab Results   Component Value Date/Time    CALCIUM 8.5 08/08/2019 02:40 AM     ASTSGOT 31 08/08/2019 02:40 AM    ALTSGPT 41 08/08/2019 02:40 AM    ALKPHOSPHAT 46 08/08/2019 02:40 AM    TBILIRUBIN 0.8 08/08/2019 02:40 AM    ALBUMIN 3.1 (L) 08/08/2019 02:40 AM    TOTPROTEIN 5.5 (L) 08/08/2019 02:40 AM     Lab Results   Component Value Date/Time    URICACID 6.1 08/23/2019 10:48 AM    RHEUMFACTN <10 08/23/2019 10:48 AM    CCPANTIBODY 7 08/23/2019 10:48 AM    ANTINUCAB None Detected 08/23/2019 10:48 AM     Lab Results   Component Value Date/Time    SEDRATEWES 2 08/07/2019 01:04 AM     Lab Results   Component Value Date/Time    [id number] Positive (A) 08/23/2019 10:48 AM     Results for orders placed during the hospital encounter of 10/08/19   DX-SACROILIAC JOINTS 3+    Impression Mildly sclerotic SI joints.     Assessment and Plan:     1. Psoriasis  Patient followed by dermatology currently has topical medication  - DS-BONE DENSITY STUDY (DEXA); Future  - celecoxib (CELEBREX) 200 MG Cap; 1 tab po qday to bid prn joint pain  Dispense: 180 Cap; Refill: 0  - CBC WITH DIFFERENTIAL; Future  - Comp Metabolic Panel; Future  - VITAMIN D,25 HYDROXY; Future    2. HLA B27 (HLA B27 positive)  With hip x-rays showing possible enthesytes, continue to monitor, will do Celebrex 200 mg p.o. twice daily PRN, no evidence of ham psoriatic spondyloarthropathy with negative SI joints on MRI  - DS-BONE DENSITY STUDY (DEXA); Future  - celecoxib (CELEBREX) 200 MG Cap; 1 tab po qday to bid prn joint pain  Dispense: 180 Cap; Refill: 0    3. Bilateral low back pain without sciatica, unspecified chronicity  Low back x-rays indicate DJD, recommend patient lose weight core muscle strengthening  - celecoxib (CELEBREX) 200 MG Cap; 1 tab po qday to bid prn joint pain  Dispense: 180 Cap; Refill: 0    4. Osteoporosis without current pathological fracture, unspecified osteoporosis type  Last DEXA 8 years ago schedule DEXA as patient is at risk for osteoporosis with a strong family history and her mother having osteoporosis    continue calcium citrate 1200 mg by mouth daily and vitamin D about 2000 units by mouth daily and magnesium 200 mg by mouth daily  - DS-BONE DENSITY STUDY (DEXA); Future  - celecoxib (CELEBREX) 200 MG Cap; 1 tab po qday to bid prn joint pain  Dispense: 180 Cap; Refill: 0    5. NSAID long-term use  Do trial of Celebrex 200 mg p.o. twice daily as needed  Patient will need monitoring labs every 6 months next labs due February 2020  - CBC WITH DIFFERENTIAL; Future  - Comp Metabolic Panel; Future  - VITAMIN D,25 HYDROXY; Future    Followup: Return in about 4 months (around 2/16/2020). or sooner prn follow-up labs and status of enthesopathies    Maria Teresa Haji  was seen 30 minutes face-to-face of which more than 50% of the time was spent counseling the patient (excluding time for procedures)  regarding  rheumatological condition and care. Therapy was discussed in detail.      Please note that this dictation was created using voice recognition software. I have made every reasonable attempt to correct obvious errors, but I expect that there are errors of grammar and possibly content that I did not discover before finalizing the note.

## 2019-10-21 ENCOUNTER — TELEPHONE (OUTPATIENT)
Dept: RHEUMATOLOGY | Facility: MEDICAL CENTER | Age: 59
End: 2019-10-21

## 2019-10-21 DIAGNOSIS — M54.50 BILATERAL LOW BACK PAIN WITHOUT SCIATICA, UNSPECIFIED CHRONICITY: ICD-10-CM

## 2019-10-21 RX ORDER — MELOXICAM 15 MG/1
TABLET ORAL
Qty: 90 TAB | Refills: 0 | Status: SHIPPED | OUTPATIENT
Start: 2019-10-21 | End: 2020-02-20 | Stop reason: SDUPTHER

## 2019-10-22 ENCOUNTER — OFFICE VISIT (OUTPATIENT)
Dept: MEDICAL GROUP | Facility: MEDICAL CENTER | Age: 59
End: 2019-10-22
Payer: COMMERCIAL

## 2019-10-22 VITALS
RESPIRATION RATE: 12 BRPM | BODY MASS INDEX: 38.09 KG/M2 | TEMPERATURE: 98.5 F | SYSTOLIC BLOOD PRESSURE: 100 MMHG | WEIGHT: 237 LBS | HEART RATE: 78 BPM | HEIGHT: 66 IN | DIASTOLIC BLOOD PRESSURE: 74 MMHG | OXYGEN SATURATION: 92 %

## 2019-10-22 DIAGNOSIS — G47.09 OTHER INSOMNIA: ICD-10-CM

## 2019-10-22 DIAGNOSIS — R09.81 NASAL CONGESTION: ICD-10-CM

## 2019-10-22 DIAGNOSIS — J01.00 ACUTE NON-RECURRENT MAXILLARY SINUSITIS: ICD-10-CM

## 2019-10-22 PROCEDURE — 99213 OFFICE O/P EST LOW 20 MIN: CPT | Performed by: INTERNAL MEDICINE

## 2019-10-22 RX ORDER — AZELASTINE 1 MG/ML
1 SPRAY, METERED NASAL 2 TIMES DAILY
Qty: 30 ML | Refills: 3 | Status: SHIPPED | OUTPATIENT
Start: 2019-10-22 | End: 2020-01-31

## 2019-10-22 RX ORDER — TRAZODONE HYDROCHLORIDE 50 MG/1
50 TABLET ORAL NIGHTLY PRN
Qty: 90 TAB | Refills: 3 | Status: SHIPPED | OUTPATIENT
Start: 2019-10-22 | End: 2020-08-29

## 2019-10-22 RX ORDER — AMOXICILLIN AND CLAVULANATE POTASSIUM 875; 125 MG/1; MG/1
1 TABLET, FILM COATED ORAL 2 TIMES DAILY
Qty: 10 TAB | Refills: 0 | Status: SHIPPED | OUTPATIENT
Start: 2019-10-22 | End: 2019-10-27

## 2019-10-22 RX ORDER — CETIRIZINE HYDROCHLORIDE 10 MG/1
10 TABLET ORAL DAILY
Qty: 30 TAB | Refills: 3 | Status: SHIPPED | OUTPATIENT
Start: 2019-10-22 | End: 2020-07-14

## 2019-10-22 NOTE — PROGRESS NOTES
CC:  Diagnoses of Acute non-recurrent maxillary sinusitis, Other insomnia, and Nasal congestion were pertinent to this visit.    HISTORY OF THE PRESENT ILLNESS: Patient is a 59 y.o. female. This pleasant patient is here today for acute concern.    Symptoms started last Saturday she developed left ear pressure and discomfort but not pain.  Does have very mild discomfort in her mastoid region.  No severe headache, no fever/chills/night sweats/vertigo.  Does have some decreased hearing described as an echo sensation when she speaks.  Some humming.  Since then has progressed to left maxillary sinus pain.  Symptoms progressed despite being on Benadryl and Afrin regularly.  She knows not to use Afrin for more than 3 days or she can have rebound nasal congestion.  With the nasal congestion, again she has pain in her left sinus maxillary region, no purulent drainage.  Some slight shortness of breath but no wheeze, does have a cough which is nonproductive.  No other associated symptoms.    Rheumatology note from 10/16/2019 briefly reviewed, seen for positive HLA-B27.  Have expanded imaging, prescribed NSAID and currently pending DEXA scan and follow-up.    She has not started the fluoxetine yet, she will let me know how she does on this medication in the future.  She had no SI or HI, she indicates her mood is overall stable.    Allergies: Patient has no known allergies.    Current Outpatient Medications Ordered in Epic   Medication Sig Dispense Refill   • amoxicillin-clavulanate (AUGMENTIN) 875-125 MG Tab Take 1 Tab by mouth 2 times a day for 5 days. 10 Tab 0   • traZODone (DESYREL) 50 MG Tab Take 1 Tab by mouth at bedtime as needed. 90 Tab 3   • azelastine (ASTELIN) 137 MCG/SPRAY nasal spray [place] 1 Spray in nose 2 times a day. 30 mL 3   • cetirizine (ZYRTEC) 10 MG Tab Take 1 Tab by mouth every day. 30 Tab 3   • meloxicam (MOBIC) 15 MG tablet 1 tab p.o. daily with food as needed joint pain 90 Tab 0   • cyanocobalamin  "(VITAMIN B-12) 500 MCG Tab Take 500 mcg by mouth every day.     • omeprazole (PRILOSEC) 40 MG delayed-release capsule TK ONE C PO  D  3   • FLUoxetine (PROZAC) 20 MG Cap Take 1 Cap by mouth every day. (Patient not taking: Reported on 10/8/2019) 30 Cap 3   • fluocinonide (LIDEX) 0.05 % Cream Apply 1 Application to affected area(s) 2 times a day. To areas on the body (legs) as needed 30 g 1   • therapeutic multivitamin-minerals (THERAGRAN-M) Tab Take 1 Tab by mouth every day.     • latanoprost (XALATAN) 0.005 % Solution Place 1 Drop in both eyes every evening.       No current Epic-ordered facility-administered medications on file.        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\"   • Glaucoma    • Hemorrhoids    • Hives    • Meningitis    • Pneumonia    • Sinusitis        Past Surgical History:   Procedure Laterality Date   • ABDOMINAL EXPLORATION     • APPENDECTOMY     • GYN SURGERY      removed ovary   • KNEE ARTHROPLASTY TOTAL Bilateral ,    • LUMPECTOMY     • OTHER      breast lift, tummy tuck   • OTHER      removal of fallopian tube for a cyst per patient.   • OTHER ORTHOPEDIC SURGERY      b/l knee replacements ,    • TONSILLECTOMY         Social History     Tobacco Use   • Smoking status: Former Smoker     Packs/day: 2.00     Years: 16.00     Pack years: 32.00     Last attempt to quit:      Years since quittin.8   • Smokeless tobacco: Never Used   Substance Use Topics   • Alcohol use: Yes     Frequency: Monthly or less     Drinks per session: 1 or 2     Comment: 1 glass q 2mo   • Drug use: Yes     Types: Inhaled     Comment: Marijuana       Social History     Social History Narrative   • Not on file       Family History   Problem Relation Age of Onset   • Arthritis Mother    • Cancer Mother         skin   • Anemia Mother  " "  • Hypertension Mother    • Obesity Mother    • Other Mother         ulcer   • Cancer Father         skin, prostate, esophageal   • Hypertension Father    • Obesity Father    • Arthritis Brother    • Cancer Maternal Aunt         breast]       ROS:   Negative except as per HPI    Exam: /74 (BP Location: Right arm, Patient Position: Sitting, BP Cuff Size: Adult)   Pulse 78   Temp 36.9 °C (98.5 °F) (Temporal)   Resp 12   Ht 1.676 m (5' 6\")   Wt 107.5 kg (237 lb)   SpO2 92%  Body mass index is 38.25 kg/m².    General: Normal appearing. No distress.  EYES: Conjunctiva clear lids without ptosis, pupils equal  EARS: Normal shape and contour.  The left tympanic membrane is minimally retracted but not appreciate any erythema or drainage.  Left maxillary sinuses tender to palpation but no swelling or erythema.  NOSE, THROAT: nasal mucosa benign no active drainage appreciated. oropharynx is without erythema, edema or exudates.   Neck: Supple without LAD. Thyroid is not enlarged.  Pulmonary: Clear to ausculation.  Normal effort. No rales or wheezing.  Cardiovascular: Regular rate and rhythm without significant murmur.   Abdomen: Soft, nontender, nondistended. Normal bowel sounds.  Neurologic: Cranial nerves grossly nonfocal  Lymph: No cervical, supraclavicular nodes palpable  Skin: Warm and dry.  No obvious lesions.  Musculoskeletal: Normal gait. No extremity cyanosis, clubbing, or edema.  Psych: Normal mood and affect. Alert and oriented x3. Judgment and insight is normal.        Assessment/Plan  1. Acute non-recurrent maxillary sinusitis  Since symptoms are progressing and now left maxillary sinus tenderness, prefer to prescribe antibiotics.  Patient to let me know if this does not resolve her symptoms.  - amoxicillin-clavulanate (AUGMENTIN) 875-125 MG Tab; Take 1 Tab by mouth 2 times a day for 5 days.  Dispense: 10 Tab; Refill: 0    2. Other insomnia  Chronic, stable condition, reasonable to continue " trazodone.  - traZODone (DESYREL) 50 MG Tab; Take 1 Tab by mouth at bedtime as needed.  Dispense: 90 Tab; Refill: 3    3. Nasal congestion  She will stop the Afrin.  Change Benadryl to Zyrtec as this will be less sedating during the day.  - azelastine (ASTELIN) 137 MCG/SPRAY nasal spray; Spray 1 Spray in nose 2 times a day.  Dispense: 30 mL; Refill: 3  - cetirizine (ZYRTEC) 10 MG Tab; Take 1 Tab by mouth every day.  Dispense: 30 Tab; Refill: 3      Return to clinic as planned        Please note that this dictation was created using voice recognition software. I have made every reasonable attempt to correct obvious errors, but I expect that there are errors of grammar and possibly content that I did not discover before finalizing the note.

## 2019-10-22 NOTE — TELEPHONE ENCOUNTER
Please notify patient that her insurance will not pay for Celebrex will do try meloxicam instead of meloxicam 15 mg p.o. daily called into patient's pharmacy [facility]

## 2019-10-30 ENCOUNTER — HOSPITAL ENCOUNTER (OUTPATIENT)
Dept: RADIOLOGY | Facility: MEDICAL CENTER | Age: 59
End: 2019-10-30
Attending: INTERNAL MEDICINE
Payer: COMMERCIAL

## 2019-10-30 DIAGNOSIS — M81.0 OSTEOPOROSIS WITHOUT CURRENT PATHOLOGICAL FRACTURE, UNSPECIFIED OSTEOPOROSIS TYPE: ICD-10-CM

## 2019-10-30 DIAGNOSIS — Z15.89 HLA B27 (HLA B27 POSITIVE): ICD-10-CM

## 2019-10-30 DIAGNOSIS — L40.9 PSORIASIS: ICD-10-CM

## 2019-10-30 PROCEDURE — 77080 DXA BONE DENSITY AXIAL: CPT

## 2019-12-16 ENCOUNTER — OFFICE VISIT (OUTPATIENT)
Dept: MEDICAL GROUP | Facility: MEDICAL CENTER | Age: 59
End: 2019-12-16
Payer: COMMERCIAL

## 2019-12-16 VITALS
BODY MASS INDEX: 38.15 KG/M2 | RESPIRATION RATE: 16 BRPM | SYSTOLIC BLOOD PRESSURE: 118 MMHG | HEART RATE: 66 BPM | TEMPERATURE: 98.2 F | OXYGEN SATURATION: 96 % | WEIGHT: 237.4 LBS | HEIGHT: 66 IN | DIASTOLIC BLOOD PRESSURE: 66 MMHG

## 2019-12-16 DIAGNOSIS — Z23 NEED FOR VACCINATION: ICD-10-CM

## 2019-12-16 DIAGNOSIS — C80.1 ADENOID CYSTIC CARCINOMA (HCC): ICD-10-CM

## 2019-12-16 DIAGNOSIS — Z11.59 ENCOUNTER FOR HEPATITIS C SCREENING TEST FOR LOW RISK PATIENT: ICD-10-CM

## 2019-12-16 DIAGNOSIS — R92.8 ABNORMAL MAMMOGRAM OF RIGHT BREAST: ICD-10-CM

## 2019-12-16 DIAGNOSIS — F41.9 ANXIETY: ICD-10-CM

## 2019-12-16 DIAGNOSIS — R16.0 HEPATOMEGALY: ICD-10-CM

## 2019-12-16 PROCEDURE — 99214 OFFICE O/P EST MOD 30 MIN: CPT | Mod: 25 | Performed by: INTERNAL MEDICINE

## 2019-12-16 PROCEDURE — 90715 TDAP VACCINE 7 YRS/> IM: CPT | Performed by: INTERNAL MEDICINE

## 2019-12-16 PROCEDURE — 90471 IMMUNIZATION ADMIN: CPT | Performed by: INTERNAL MEDICINE

## 2019-12-16 RX ORDER — FLUOXETINE HYDROCHLORIDE 20 MG/1
20 CAPSULE ORAL DAILY
Qty: 90 CAP | Refills: 3 | Status: SHIPPED | OUTPATIENT
Start: 2019-12-16 | End: 2020-06-17

## 2019-12-16 RX ORDER — SPIRONOLACTONE 50 MG/1
50 TABLET, FILM COATED ORAL DAILY
COMMUNITY
Start: 2019-11-25 | End: 2020-01-31

## 2019-12-16 NOTE — LETTER
Count includes the [facility]  Valeria Childs M.D.  [address]  [place] [address]  Fax: [phone]   Authorization for Release/Disclosure of   Protected Health Information   Name: MARIA TERESA HAJI : 1960 SSN: [id number]   Address: [address] [place] [address]  [place] [address] Phone:    [phone] (home)    I authorize the entity listed below to release/disclose the PHI below to:   Count includes the [facility]/Valeria Childs M.D. and Valeria Childs M.D.   Provider or Entity Name:     Address   City, State, [facility]   Phone:      Fax:     Reason for request: continuity of care   Information to be released:    [  ] LAST COLONOSCOPY,  including any PATH REPORT and follow-up  [  ] LAST FIT/COLOGUARD RESULT [  ] LAST DEXA  [  ] LAST MAMMOGRAM  [  ] LAST PAP  [  ] LAST LABS [  ] RETINA EXAM REPORT  [  ] IMMUNIZATION RECORDS  [  ] Release all info      [  ] Check here and initial the line next to each item to release ALL health information INCLUDING  _____ Care and treatment for drug and / or alcohol abuse  _____ HIV testing, infection status, or AIDS  _____ Genetic Testing    DATES OF SERVICE OR TIME PERIOD TO BE DISCLOSED: _____________  I understand and acknowledge that:  * This Authorization may be revoked at any time by you in writing, except if your health information has already been used or disclosed.  * Your health information that will be used or disclosed as a result of you signing this authorization could be re-disclosed by the recipient. If this occurs, your re-disclosed health information may no longer be protected by State or Federal laws.  * You may refuse to sign this Authorization. Your refusal will not affect your ability to obtain treatment.  * This Authorization becomes effective upon signing and will  on (date) __________.      If no date is indicated, this Authorization will  one (1) year from the signature date.    Name: Maria Teresa Haji    Signature:   Date:     2019       PLEASE FAX REQUESTED  RECORDS BACK TO: [phone]

## 2019-12-16 NOTE — PROGRESS NOTES
"CC:  Diagnoses of Need for vaccination, Anxiety, Abnormal mammogram of right breast, Encounter for hepatitis C screening test for low risk patient, Hepatomegaly, BMI 38.0-38.9,adult, and Adenoid cystic carcinoma (HCC) were pertinent to this visit.    HISTORY OF THE PRESENT ILLNESS: Patient is a 59 y.o. female. This pleasant patient is here today to f/u.    She indicates her only new concern is some pain in the left occipital region, seems to be in the bone, pain with pressure which has been ongoing for a couple years but now seems more constant and more severe.  She is particularly concerned because she has a history of adenoid cystic carcinoma that was diagnosed in 2013.  In 2013 she has small painful lump in her right parietal region, this was later biopsied which confirmed the diagnosis.  Excised x2 with clean margins and negative PET scan.  She was followed by oncology but because she had no signs of recurrence she was discharged.  She is primarily concerned that she may have had recurrence.    Breast ultrasound 7/22/2019 was again reviewed w/patient there was a finding of probable benign complex cyst with follow-up imaging recommended 6 months which is currently pending in January.  Patient denies any change in breast symptoms.    Since last appointment she has started the Prozac and she indicates this works \"great \"she feels that her mood is back to normal.  She rarely takes meloxicam for as needed pain.  She is aware that the combination of SSRI and NSAID can potentially increase risk of GI bleeding.  She denies any epigastric pain, tarry or bloody stools, or change in bowel movements.  She tells me her colonoscopy is definitely up-to-date, last done in [place]--requested today for review.    Since last appointment she had DEXA scan 10/30/2019 which overall looked fine.  Lumbar T score 2.4, Z score 2.5, the hip T score is 0.2 and Z score at 0.2.  She is advised to continue vitamins, exercise regularly, light " "weights.  BMI of 38 and CT scan 8/6/2019 showing hepatic steatosis and hepatomegaly she is interested in the weight loss clinic.  She admits to eating healthy but excess food.  She is aware that if the hepatic steatosis and hepatomegaly persist that this can over time turn into a end-stage liver disease.    Labs from 8/7/2019 did show normal potassium 4.1 spironolactone for hypertension, platelet minimally depressed 158.  She currently has these labs pending as ordered by her rheumatologist.    Allergies: Patient has no known allergies.    Current Outpatient Medications Ordered in Epic   Medication Sig Dispense Refill   • FLUoxetine (PROZAC) 20 MG Cap Take 1 Cap by mouth every day. 90 Cap 3   • traZODone (DESYREL) 50 MG Tab Take 1 Tab by mouth at bedtime as needed. 90 Tab 3   • azelastine (ASTELIN) 137 MCG/SPRAY nasal spray [place] 1 Spray in nose 2 times a day. 30 mL 3   • cetirizine (ZYRTEC) 10 MG Tab Take 1 Tab by mouth every day. 30 Tab 3   • meloxicam (MOBIC) 15 MG tablet 1 tab p.o. daily with food as needed joint pain 90 Tab 0   • cyanocobalamin (VITAMIN B-12) 500 MCG Tab Take 500 mcg by mouth every day.     • omeprazole (PRILOSEC) 40 MG delayed-release capsule TK ONE C PO  D  3   • fluocinonide (LIDEX) 0.05 % Cream Apply 1 Application to affected area(s) 2 times a day. To areas on the body (legs) as needed 30 g 1   • therapeutic multivitamin-minerals (THERAGRAN-M) Tab Take 1 Tab by mouth every day.     • latanoprost (XALATAN) 0.005 % Solution Place 1 Drop in both eyes every evening.     • spironolactone (ALDACTONE) 50 MG Tab Take 50 mg by mouth every day.       No current [facility]-ordered facility-administered medications on file.        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\"   • Glaucoma    • Hemorrhoids    • Hives    • Meningitis    • Pneumonia  "   • Sinusitis        Past Surgical History:   Procedure Laterality Date   • ABDOMINAL EXPLORATION     • APPENDECTOMY     • GYN SURGERY      removed ovary   • KNEE ARTHROPLASTY TOTAL Bilateral ,    • LUMPECTOMY     • OTHER      breast lift, tummy tuck   • OTHER      removal of fallopian tube for a cyst per patient.   • OTHER ORTHOPEDIC SURGERY      b/l knee replacements ,    • TONSILLECTOMY         Social History     Tobacco Use   • Smoking status: Former Smoker     Packs/day: 2.00     Years: 16.00     Pack years: 32.00     Last attempt to quit:      Years since quittin.9   • Smokeless tobacco: Never Used   Substance Use Topics   • Alcohol use: Yes     Frequency: Monthly or less     Drinks per session: 1 or 2     Comment: 1 glass q 2mo   • Drug use: Yes     Types: Inhaled     Comment: Marijuana       Social History     Patient does not qualify to have social determinant information on file (likely too young).   Social History Narrative   • Not on file       Family History   Problem Relation Age of Onset   • Arthritis Mother    • Cancer Mother         skin   • Anemia Mother    • Hypertension Mother    • Obesity Mother    • Other Mother         ulcer   • Cancer Father         skin, prostate, esophageal   • Hypertension Father    • Obesity Father    • Arthritis Brother    • Cancer Maternal Aunt         breast]       ROS:     - Constitutional: Negative for fever, chills    - Eyes:   Negative for blurry vision, eye pain, discharge    - ENT:  Negative for hearing changes, ear pain, ear discharge, rhinorrhea, sinus congestion, sore throat     - Respiratory: Negative for cough, sputum production, chest congestion, dyspnea, wheezing, and crackles.      - Cardiovascular: Negative for chest pain, palpitations, orthopnea, and bilateral lower extremity edema.     - Gastrointestinal: Negative for heartburn, nausea, vomiting, abdominal pain, hematochezia, melena, diarrhea, constipation, and  "greasy/foul-smelling stools.     - Genitourinary: Negative for dysuria    - Musculoskeletal: Pain near left posterior septal bone region    - Skin: Negative for rash, itching, cyanotic skin color change.     - Neurological: Negative for new headaches,, vertigo    - Endo:Negative for polyuria, heat/cold intolerance, excessive thirst    - Hem/lymphatic: Negative for     -Allergic/immun: Negative for allergic rhinitis    - Psychiatric/Behavioral: Negative for depression, suicidal/homicidal ideation and memory loss.      Exam: /66 (BP Location: Left arm, Patient Position: Sitting, BP Cuff Size: Adult)   Pulse 66   Temp 36.8 °C (98.2 °F) (Temporal)   Resp 16   Ht 1.676 m (5' 6\")   Wt 107.7 kg (237 lb 6.4 oz)   SpO2 96%  Body mass index is 38.32 kg/m².    General: Normal appearing. No distress.  EYES: Conjunctiva clear lids without ptosis, pupils equal  EARS: Normal shape and contour   Head: Exam of the region of her pain concern left occipital knob region there is a indentation in the bony region but no focal mass, examination of the scalp was normal as far as no rashes or papules.  Pulmonary: Clear to ausculation.  Normal effort. No rales or wheezing.  Cardiovascular: Regular rate and rhythm without significant murmur.   Abdomen: Soft, nontender, nondistended. Normal bowel sounds.  Neurologic: Cranial nerves grossly nonfocal  Skin: Warm and dry.  No obvious lesions.  Musculoskeletal: Normal gait. No extremity cyanosis, clubbing, or edema.  Psych: Normal mood and affect. Alert and oriented x3. Judgment and insight is normal.    Assessment/Plan  1. Need for vaccination  - TDAP VACCINE =>6YO IM    2. Anxiety  Mood is now controlled on fluoxetine, this is a stable chronic issue.  - FLUoxetine (PROZAC) 20 MG Cap; Take 1 Cap by mouth every day.  Dispense: 90 Cap; Refill: 3    3. Abnormal mammogram of right breast  Pending interval imaging to assess likely benign mass.  Patient reports no palpable change.  " Stable.  - US-BREAST LIMITED-RIGHT; Future    4. Encounter for hepatitis C screening test for low risk patient  Patient is agreeable as preventive health guideline for screening.  - HEP C VIRUS ANTIBODY; Future    5. Hepatomegaly  Recommended decrease BMI through diet and exercise.    6. BMI 38.0-38.9,adult  See #5 above  - REFERRAL TO [facility] IMPROVEMENT PROGRAMS (HIP) Services Requested: Physician Medical Weight Management Program; Reason for Referral? BMI>30; Reason for Visit: Medical Condition Requiring Nutrition Counseling; Other: hepatomegaly, etc    7. Adenoid cystic carcinoma (HCC)  Unclear if she would benefit from imaging, with her history of this very rare malignancy, despite full excision and negative PET scan, we both agree that she may benefit from oncology to evaluate and determine if imaging is required.  - REFERRAL TO HEMATOLOGY ONCOLOGY Referral to? [facility] Hem/Onc    [place] report requested    Return to clinic 4 months or sooner if needed        Please note that this dictation was created using voice recognition software. I have made every reasonable attempt to correct obvious errors, but I expect that there are errors of grammar and possibly content that I did not discover before finalizing the note.

## 2020-01-22 ENCOUNTER — HOSPITAL ENCOUNTER (OUTPATIENT)
Dept: RADIOLOGY | Facility: MEDICAL CENTER | Age: 60
End: 2020-01-22
Attending: INTERNAL MEDICINE
Payer: COMMERCIAL

## 2020-01-22 DIAGNOSIS — R92.8 ABNORMAL FINDING ON BREAST IMAGING: ICD-10-CM

## 2020-01-22 DIAGNOSIS — R92.8 ABNORMAL MAMMOGRAM OF RIGHT BREAST: ICD-10-CM

## 2020-01-22 PROCEDURE — 76642 ULTRASOUND BREAST LIMITED: CPT | Mod: RT

## 2020-01-31 ENCOUNTER — OFFICE VISIT (OUTPATIENT)
Dept: DERMATOLOGY | Facility: IMAGING CENTER | Age: 60
End: 2020-01-31
Payer: COMMERCIAL

## 2020-01-31 VITALS — WEIGHT: 180 LBS | TEMPERATURE: 97.9 F | BODY MASS INDEX: 28.93 KG/M2 | HEIGHT: 66 IN

## 2020-01-31 DIAGNOSIS — L81.4 LENTIGO: ICD-10-CM

## 2020-01-31 DIAGNOSIS — R20.8 DYSESTHESIA: ICD-10-CM

## 2020-01-31 DIAGNOSIS — L82.1 SEBORRHEIC KERATOSIS: ICD-10-CM

## 2020-01-31 DIAGNOSIS — Z85.828 HX OF NONMELANOMA SKIN CANCER: ICD-10-CM

## 2020-01-31 PROCEDURE — 99213 OFFICE O/P EST LOW 20 MIN: CPT | Performed by: DERMATOLOGY

## 2020-01-31 NOTE — PROGRESS NOTES
CC: Referral from [facility] w/ adenoid cystic carcinoma.    Subjective:  Previously seen patient here for eval of scalp.      HPI/location: scalp   Time present: 1 year, painful with touch - similar to previous adenoid cystic carcinoma  Painful lesion: Yes  Itching lesion: No  Enlarging lesion: Yes  Anything make it better or worse? no    History of skin cancer: Yes, Details: adenoid cyctic carcinoma 2013  History of precancers/actinic keratoses: no  History of biopsies:Yes, Details: back of scalp  History of blistering/severe sunburns:Yes, Details: teenage years  Family history of skin cancer:Yes, Details: parents  Family history of atypical moles:No    ROS: no fevers/chills. No itch.    DermPMH: no skin cancer/melanoma  No problem-specific Assessment & Plan notes found for this encounter.    Relevant PMH:mult med comorbidities  Social:    PE: Gen:WDWN female in NAD.  Skin: face/eyes/lips/scalp/neck examined with findings as noted:   -tender spot on left post occiput scalp without superficial skin findings   -tan macules and waxy papules on left lateral face    A/P: dysethesia, left post scalp:  -cannot exclude metastatic adenoid cystic carcinoma  -PET-CT scheduled 2/7/2020, will f/u Onc 4 days later  -scheduled for scalp  punch biopsy 2/20 vs 2/21    Hx of skin cancer:  -cont'd sunprotection and skin cancer surveillance  -Q 6mo-annual exam recommended; f/u suspicious lesions PRN    Lentigos/SKs, face:  -b/r  -counseled re: sunprotection    F/u [facility] fall 2020/PRN    I have reviewed medications relevant to my specialty.

## 2020-02-07 ENCOUNTER — HOSPITAL ENCOUNTER (OUTPATIENT)
Dept: RADIOLOGY | Facility: MEDICAL CENTER | Age: 60
End: 2020-02-07
Attending: INTERNAL MEDICINE
Payer: COMMERCIAL

## 2020-02-07 DIAGNOSIS — C07 MALIGNANT NEOPLASM OF PAROTID GLAND (HCC): ICD-10-CM

## 2020-02-07 PROCEDURE — A9552 F18 FDG: HCPCS

## 2020-02-14 ENCOUNTER — HOSPITAL ENCOUNTER (OUTPATIENT)
Dept: LAB | Facility: MEDICAL CENTER | Age: 60
End: 2020-02-14
Attending: INTERNAL MEDICINE
Payer: COMMERCIAL

## 2020-02-14 DIAGNOSIS — Z79.1 NSAID LONG-TERM USE: ICD-10-CM

## 2020-02-14 DIAGNOSIS — L40.9 PSORIASIS: ICD-10-CM

## 2020-02-14 LAB
25(OH)D3 SERPL-MCNC: 55 NG/ML (ref 30–100)
ALBUMIN SERPL BCP-MCNC: 4.6 G/DL (ref 3.2–4.9)
ALBUMIN/GLOB SERPL: 1.3 G/DL
ALP SERPL-CCNC: 52 U/L (ref 30–99)
ALT SERPL-CCNC: 37 U/L (ref 2–50)
ANION GAP SERPL CALC-SCNC: 8 MMOL/L (ref 0–11.9)
AST SERPL-CCNC: 28 U/L (ref 12–45)
BASOPHILS # BLD AUTO: 0.3 % (ref 0–1.8)
BASOPHILS # BLD: 0.01 K/UL (ref 0–0.12)
BILIRUB SERPL-MCNC: 0.6 MG/DL (ref 0.1–1.5)
BUN SERPL-MCNC: 21 MG/DL (ref 8–22)
CALCIUM SERPL-MCNC: 10 MG/DL (ref 8.5–10.5)
CHLORIDE SERPL-SCNC: 106 MMOL/L (ref 96–112)
CO2 SERPL-SCNC: 25 MMOL/L (ref 20–33)
CREAT SERPL-MCNC: 0.91 MG/DL (ref 0.5–1.4)
EOSINOPHIL # BLD AUTO: 0.12 K/UL (ref 0–0.51)
EOSINOPHIL NFR BLD: 3.1 % (ref 0–6.9)
ERYTHROCYTE [DISTWIDTH] IN BLOOD BY AUTOMATED COUNT: 42.8 FL (ref 35.9–50)
GLOBULIN SER CALC-MCNC: 3.5 G/DL (ref 1.9–3.5)
GLUCOSE SERPL-MCNC: 113 MG/DL (ref 65–99)
HCT VFR BLD AUTO: 46.3 % (ref 37–47)
HGB BLD-MCNC: 15.8 G/DL (ref 12–16)
IMM GRANULOCYTES # BLD AUTO: 0 K/UL (ref 0–0.11)
IMM GRANULOCYTES NFR BLD AUTO: 0 % (ref 0–0.9)
LYMPHOCYTES # BLD AUTO: 1.4 K/UL (ref 1–4.8)
LYMPHOCYTES NFR BLD: 35.7 % (ref 22–41)
MCH RBC QN AUTO: 31.8 PG (ref 27–33)
MCHC RBC AUTO-ENTMCNC: 34.1 G/DL (ref 33.6–35)
MCV RBC AUTO: 93.2 FL (ref 81.4–97.8)
MONOCYTES # BLD AUTO: 0.28 K/UL (ref 0–0.85)
MONOCYTES NFR BLD AUTO: 7.1 % (ref 0–13.4)
NEUTROPHILS # BLD AUTO: 2.11 K/UL (ref 2–7.15)
NEUTROPHILS NFR BLD: 53.8 % (ref 44–72)
NRBC # BLD AUTO: 0 K/UL
NRBC BLD-RTO: 0 /100 WBC
PLATELET # BLD AUTO: 225 K/UL (ref 164–446)
PMV BLD AUTO: 9.4 FL (ref 9–12.9)
POTASSIUM SERPL-SCNC: 4.2 MMOL/L (ref 3.6–5.5)
PROT SERPL-MCNC: 8.1 G/DL (ref 6–8.2)
RBC # BLD AUTO: 4.97 M/UL (ref 4.2–5.4)
SODIUM SERPL-SCNC: 139 MMOL/L (ref 135–145)
WBC # BLD AUTO: 3.9 K/UL (ref 4.8–10.8)

## 2020-02-14 PROCEDURE — 36415 COLL VENOUS BLD VENIPUNCTURE: CPT

## 2020-02-14 PROCEDURE — 80053 COMPREHEN METABOLIC PANEL: CPT

## 2020-02-14 PROCEDURE — 85025 COMPLETE CBC W/AUTO DIFF WBC: CPT

## 2020-02-14 PROCEDURE — 82306 VITAMIN D 25 HYDROXY: CPT

## 2020-02-20 ENCOUNTER — OFFICE VISIT (OUTPATIENT)
Dept: RHEUMATOLOGY | Facility: MEDICAL CENTER | Age: 60
End: 2020-02-20
Payer: COMMERCIAL

## 2020-02-20 VITALS
RESPIRATION RATE: 14 BRPM | BODY MASS INDEX: 37.61 KG/M2 | HEART RATE: 62 BPM | SYSTOLIC BLOOD PRESSURE: 140 MMHG | WEIGHT: 233 LBS | OXYGEN SATURATION: 96 % | DIASTOLIC BLOOD PRESSURE: 80 MMHG | TEMPERATURE: 98.2 F

## 2020-02-20 DIAGNOSIS — Z79.1 NSAID LONG-TERM USE: ICD-10-CM

## 2020-02-20 DIAGNOSIS — L40.9 PSORIASIS: ICD-10-CM

## 2020-02-20 DIAGNOSIS — Z15.89 HLA B27 (HLA B27 POSITIVE): ICD-10-CM

## 2020-02-20 DIAGNOSIS — M54.50 BILATERAL LOW BACK PAIN WITHOUT SCIATICA, UNSPECIFIED CHRONICITY: ICD-10-CM

## 2020-02-20 DIAGNOSIS — M77.12 LATERAL EPICONDYLITIS OF LEFT ELBOW: ICD-10-CM

## 2020-02-20 DIAGNOSIS — R73.9 HYPERGLYCEMIA: ICD-10-CM

## 2020-02-20 DIAGNOSIS — M77.9 ENTHESITIS: ICD-10-CM

## 2020-02-20 DIAGNOSIS — M85.80 OSTEOPENIA, UNSPECIFIED LOCATION: ICD-10-CM

## 2020-02-20 PROCEDURE — 20551 NJX 1 TENDON ORIGIN/INSJ: CPT | Mod: LT | Performed by: INTERNAL MEDICINE

## 2020-02-20 PROCEDURE — 99214 OFFICE O/P EST MOD 30 MIN: CPT | Mod: 25 | Performed by: INTERNAL MEDICINE

## 2020-02-20 RX ORDER — MELOXICAM 15 MG/1
TABLET ORAL
Qty: 90 TAB | Refills: 1 | Status: SHIPPED | OUTPATIENT
Start: 2020-02-20 | End: 2020-08-29

## 2020-02-20 RX ORDER — METHYLPREDNISOLONE ACETATE 40 MG/ML
10 INJECTION, SUSPENSION INTRA-ARTICULAR; INTRALESIONAL; INTRAMUSCULAR; SOFT TISSUE ONCE
Status: COMPLETED | OUTPATIENT
Start: 2020-02-20 | End: 2020-02-20

## 2020-02-20 RX ADMIN — METHYLPREDNISOLONE ACETATE 10 MG: 40 INJECTION, SUSPENSION INTRA-ARTICULAR; INTRALESIONAL; INTRAMUSCULAR; SOFT TISSUE at 11:38

## 2020-02-20 NOTE — PROGRESS NOTES
Chief Complaint- joint pain     Subjective:   Maria Teresa Haji is a 59 y.o. female here today for follow up of rheumatological issues    This is a follow-up visit for this patient who was referred to this clinic for evaluation of low back pain and positive HLA B2 7.  Patient also with psoriasis, work-up indicates enthesitis but without evidence of ankylosing spondylitis.  Patient currently on NSAID meloxicam with benefit, patient states she is doing well except for an exacerbation of her left lateral epicondylitis.  Patient wonders about getting a corticosteroid injection today.  Patient denies any rashes on palms of hands or soles of feet, there is a history of an Achilles inflammation on the right side since about age of 39, patient uses orthotics and physical therapy.  Patient denies any iritis uveitis or urethritis.    Patient also psoriasis uses topical ointment for such.  Patient also with intermittent hyperglycemia.     Bilateral TKA      Uric acid 6.1 nl 8/2019  RF neg 8/2019  CCP neg 8/2019  HLA B27 pos 8/2019  MICHAEL neg 8/2019  DEXA 10/30/2019 T scores 2.4, 0.2  FRAX 10/30/2019 major osteoporotic fracture risk 11.9%, hip fracture risk 0.2%   MRI pelvis 10/2019-no evidence of sacroiliitis  Hip/pelvic x-rays 10/2019-DJD, enthesophytes greater trochanteric proximal femur, calcifications adjacent to the greater trochanter  LS spine x-ray 10/2019-multilevel DJD and DDD, no evidence of longitudinal ligament calcifications       Current medicines (including changes today)  Current Outpatient Medications   Medication Sig Dispense Refill   • meloxicam (MOBIC) 15 MG tablet 1 tab p.o. daily with food as needed joint pain 90 Tab 1   • traZODone (DESYREL) 50 MG Tab Take 1 Tab by mouth at bedtime as needed. 90 Tab 3   • cetirizine (ZYRTEC) 10 MG Tab Take 1 Tab by mouth every day. 30 Tab 3   • cyanocobalamin (VITAMIN B-12) 500 MCG Tab Take 500 mcg by mouth every day.     • omeprazole (PRILOSEC) 40 MG delayed-release  capsule TK ONE C PO  D  3   • fluocinonide (LIDEX) 0.05 % Cream Apply 1 Application to affected area(s) 2 times a day. To areas on the body (legs) as needed 30 g 1   • therapeutic multivitamin-minerals (THERAGRAN-M) Tab Take 1 Tab by mouth every day.     • latanoprost (XALATAN) 0.005 % Solution Place 1 Drop in both eyes every evening.     • FLUoxetine (PROZAC) 20 MG Cap Take 1 Cap by mouth every day. (Patient not taking: Reported on 2/20/2020) 90 Cap 3     No current facility-administered medications for this visit.      She  has a past medical history of Arthritis, Back pain, Blood transfusion without reported diagnosis, Bronchitis, Cancer (HCC), Glaucoma, Hemorrhoids, Hives, Meningitis, Pneumonia, and Sinusitis. She also has no past medical history of Allergy.    ROS   Other than what is mentioned in HPI or physical exam, there is no history of headaches, double vision or blurred vision. No temporal tenderness or jaw claudication. No trouble swallowing difficulties or sore throats.  No chest complaints including chest pain, cough or sputum production. No GI complaints including nausea, vomiting, change in bowel habits, or past peptic ulcer disease. No history of blood in the stools. No urinary complaints including dysuria or frequency. No history of alopecia, photosensitivity, oral ulcerations, Raynaud's phenomena.       Objective:     /80   Pulse 62   Temp 36.8 °C (98.2 °F) (Temporal)   Resp 14   Wt 105.7 kg (233 lb)   SpO2 96%  Body mass index is 37.61 kg/m².   Physical Exam:    Constitutional: Alert and oriented X3, patient is talkative with good eye contact.Skin: Warm, dry, good turgor, no rashes in visible areas, no evidence of psoriasis today.Eye: Equal, round and reactive, conjunctiva clear, lids normal EOM intactENMT: Lips without lesions, good dentition, no oropharyngeal ulcers, moist buccal mucosa, pinna without deformityNeck: Trachea midline, no masses, no thyromegaly.Lymph:  No cervical  lymphadenopathy, no axillary lymphadenopathy, no supraclavicular lymphadenopathyRespiratory: Unlabored respiratory effort, lungs clear to auscultation, no wheezes, no ronchi.Cardiovascular: Normal S1, S2, no murmur, no edema.Abdomen: Soft, non-tender, no masses, no hepatosplenomegaly, positive central obesity.Psych: Alert and oriented x3, normal affect and mood.Neuro: Cranial nerves 2-12 are grossly intact, no loss of sensation LEExt:no joint laxity noted in bilateral arms, no joint laxity noted in bilateral legs, no sausage digits no dactylitis, there is tenderness palpation on the left lateral epicondyle but no joint effusions, no flexion contractures no nodules no tophi, no Achilles inflammation today, no crossover toes no splay toes    Lab Results   Component Value Date/Time    SODIUM 139 02/14/2020 08:15 AM    POTASSIUM 4.2 02/14/2020 08:15 AM    CHLORIDE 106 02/14/2020 08:15 AM    CO2 25 02/14/2020 08:15 AM    GLUCOSE 113 (H) 02/14/2020 08:15 AM    BUN 21 02/14/2020 08:15 AM    CREATININE 0.91 02/14/2020 08:15 AM      Lab Results   Component Value Date/Time    WBC 3.9 (L) 02/14/2020 08:15 AM    RBC 4.97 02/14/2020 08:15 AM    HEMOGLOBIN 15.8 02/14/2020 08:15 AM    HEMATOCRIT 46.3 02/14/2020 08:15 AM    MCV 93.2 02/14/2020 08:15 AM    MCH 31.8 02/14/2020 08:15 AM    MCHC 34.1 02/14/2020 08:15 AM    MPV 9.4 02/14/2020 08:15 AM    NEUTSPOLYS 53.80 02/14/2020 08:15 AM    LYMPHOCYTES 35.70 02/14/2020 08:15 AM    MONOCYTES 7.10 02/14/2020 08:15 AM    EOSINOPHILS 3.10 02/14/2020 08:15 AM    BASOPHILS 0.30 02/14/2020 08:15 AM      Lab Results   Component Value Date/Time    CALCIUM 10.0 02/14/2020 08:15 AM    ASTSGOT 28 02/14/2020 08:15 AM    ALTSGPT 37 02/14/2020 08:15 AM    ALKPHOSPHAT 52 02/14/2020 08:15 AM    TBILIRUBIN 0.6 02/14/2020 08:15 AM    ALBUMIN 4.6 02/14/2020 08:15 AM    TOTPROTEIN 8.1 02/14/2020 08:15 AM     Lab Results   Component Value Date/Time    URICACID 6.1 08/23/2019 10:48 AM    RHEUMFACTN <10  08/23/2019 10:48 AM    CCPANTIBODY 7 08/23/2019 10:48 AM    ANTINUCAB None Detected 08/23/2019 10:48 AM     Lab Results   Component Value Date/Time    SEDRATEWES 2 08/07/2019 01:04 AM     Lab Results   Component Value Date/Time    BRPI29TQIJ Positive (A) 08/23/2019 10:48 AM     Results for orders placed during the hospital encounter of 10/30/19   DS-BONE DENSITY STUDY (DEXA)    Impression According to the World Health Organization classification, bone mineral density of this patient is within normal limits in the lumbar spine and proximal left femur.        10-year Probability of Fracture:  Major Osteoporotic     11.9%  Hip     0.2%  Population      [place] ()    Based on left femur neck BMD        INTERPRETING LOCATION:  [address] [place] [address], [id number]     Results for orders placed during the hospital encounter of 10/08/19   DX-SACROILIAC JOINTS 3+    Impression Mildly sclerotic SI joints.     Assessment and Plan:     1. Enthesitis  Managed with NSAIDs meloxicam 15 mg p.o. daily as needed, today patient is having a bit of a flare in the lateral condyle-itis, today we will do a left elbow lateral epicondyle corticosteroid injection.  - Comp Metabolic Panel; Future  - CBC WITH DIFFERENTIAL; Future  - Sed Rate; Future  - meloxicam (MOBIC) 15 MG tablet; 1 tab p.o. daily with food as needed joint pain  Dispense: 90 Tab; Refill: 1  - methylPREDNISolone acetate (DEPO-MEDROL) injection 10 mg    2. Psoriasis  Managed with topical treatment with benefit    3. HLA B27 (HLA B27 positive)  Thus far no evidence of overt spondyloarthropathy, with negative MRI of SI joints, will continue to monitor    4. Lateral epicondylitis of left elbow  Today we will do a left elbow lateral epicondyle corticosteroid injection  - methylPREDNISolone acetate (DEPO-MEDROL) injection 10 mg    5. Osteopenia, unspecified location  Last DEXA October 2019 Next DEXA October 2021   continue calcium citrate 1200 mg by mouth daily and vitamin  D about 2000 units by mouth daily and magnesium 200 mg by mouth daily    6. NSAID long-term use  On meloxicam as needed, will need labs every 6 months next labs due August 2020, labs ordered for patient  - Comp Metabolic Panel; Future  - CBC WITH DIFFERENTIAL; Future  - Sed Rate; Future  - meloxicam (MOBIC) 15 MG tablet; 1 tab p.o. daily with food as needed joint pain  Dispense: 90 Tab; Refill: 1    7. Hyperglycemia  High blood sugar can exacerbate inflammatory state of patient's arthritis, discussed with patient the need to monitor and control blood sugars, patient states understanding    8. Bilateral low back pain without sciatica, unspecified chronicity  X-rays indicate multilevel DJD and DDD no evidence of longitudinal ligament calcifications  - meloxicam (MOBIC) 15 MG tablet; 1 tab p.o. daily with food as needed joint pain  Dispense: 90 Tab; Refill: 1    Procedure: Left elbow lateral epicondyle corticosteroid injection    The procedure was explained to the patient in detail including potential damage to area being injected including risk of avascular necrosis, all questions were answered, verbal consent to do procedure was obtained. Risks and benefits were reviewed with patient and patient states understanding including risks of steroid injections including bleeding, infections, subcutaneous lipolysis and/or hypopigmentation at site of injection, and risk of avascular necrosis. Verbal consent was again obtained. The patient was positioned appropriately. Anatomical landmarks were identified.    Left elbow lateral epicondyles was prepped with betadine x 3, local anesthetic with ethyl chloride and 1% Xylocaine lot #[id number], Exp April 2023 and using sterile technique,  injected 10 mg of Depomedrol Lot #[id number], Exp 12/2020 using lateral approach    EBL 0  The patient tolerated the procedure well, was observed in the office and there were no complications     Patient was asked to rest left elbow for 3 days, patient  states understanding and states will comply.        Followup: Return in about 6 months (around 8/20/2020). or sooner mahamed Haji  was seen 30 minutes face-to-face of which more than 50% of the time was spent counseling the patient (excluding time for procedures)  regarding  rheumatological condition and care. Therapy was discussed in detail.      Please note that this dictation was created using voice recognition software. I have made every reasonable attempt to correct obvious errors, but I expect that there are errors of grammar and possibly content that I did not discover before finalizing the note.

## 2020-02-21 ENCOUNTER — OFFICE VISIT (OUTPATIENT)
Dept: DERMATOLOGY | Facility: IMAGING CENTER | Age: 60
End: 2020-02-21
Payer: COMMERCIAL

## 2020-02-21 DIAGNOSIS — R20.8 DYSESTHESIA: ICD-10-CM

## 2020-02-21 PROCEDURE — 11104 PUNCH BX SKIN SINGLE LESION: CPT | Performed by: DERMATOLOGY

## 2020-02-21 NOTE — PROGRESS NOTES
CC: biopsy    Subjective:  Previously seen patient here for follow up, to discuss results PET skull and biopsy of scalp.    Reports site on back of left scalp - persistently symptomatic since 1 year ago.      History of skin cancer: Yes, Details: adenoid cyctic carcinoma 2013  History of precancers/actinic keratoses: no  History of biopsies:Yes, Details: back of scalp  History of blistering/severe sunburns:Yes, Details: teenage years  Family history of skin cancer:Yes, Details: parents  Family history of atypical moles:No    ROS: no fevers/chills. No itch.    DermPMH: no skin cancer/melanoma  No problem-specific Assessment & Plan notes found for this encounter.    Relevant PMH:mult med comorbidities  Social: former smoker    PE: Gen:WDWN female in NAD.  Skin: face/eyes/lips/scalp/neck examined with findings as noted:   -approx 05.-1cm? tender spot on left post occiput scalp without superficial skin findings     PETCT - reviewed and found to be without evidence of activity on posterior scalp site, per report    A/P: dysethesia, left post scalp:  -cannot exclude metastatic adenoid cystic carcinoma  Neoplasm NOS:   -consent for bx, including R/B/A. Cleaned with EtOH, anesthesia with lidocaine 1% + epinephrine,4mm punch bx, 4-0 Prolene to close  -vaseline/bandage and wound care reviewed  -s/r in 1-2 weeks    F/u [facility] fall 2020/PRN    I have reviewed medications relevant to my specialty.

## 2020-03-02 ENCOUNTER — NON-PROVIDER VISIT (OUTPATIENT)
Dept: DERMATOLOGY | Facility: IMAGING CENTER | Age: 60
End: 2020-03-02
Payer: COMMERCIAL

## 2020-03-02 NOTE — NON-PROVIDER
Maria Teresa Haji is a 59 y.o. female here for a Non-Provider Visit for Suture Removal.    Sutures were placed by Dr. Mauricio on date: 2/21/20  Skin is healed: Yes  Provider notified if skin is not healed, or if there is redness, heat, pain, or drainage from incision: N\A  Sutures removed.   Mastisol and steristips are placed: No    Advised to use emollient (vaseline, aquaphor, etc.) as needed, avoid peroxide and antibiotic ointment to reduce irritation.     Path report has been reviewed by provider.  Path report has reviewed with patient.

## 2020-04-14 ENCOUNTER — APPOINTMENT (OUTPATIENT)
Dept: MEDICAL GROUP | Facility: MEDICAL CENTER | Age: 60
End: 2020-04-14
Payer: COMMERCIAL

## 2020-06-17 ENCOUNTER — OFFICE VISIT (OUTPATIENT)
Dept: MEDICAL GROUP | Facility: MEDICAL CENTER | Age: 60
End: 2020-06-17
Payer: COMMERCIAL

## 2020-06-17 VITALS
BODY MASS INDEX: 37.63 KG/M2 | HEART RATE: 72 BPM | DIASTOLIC BLOOD PRESSURE: 72 MMHG | WEIGHT: 234.13 LBS | SYSTOLIC BLOOD PRESSURE: 130 MMHG | HEIGHT: 66 IN | TEMPERATURE: 98.6 F | OXYGEN SATURATION: 93 % | RESPIRATION RATE: 16 BRPM

## 2020-06-17 DIAGNOSIS — M43.6 NECK STIFFNESS: ICD-10-CM

## 2020-06-17 DIAGNOSIS — Z12.31 ENCOUNTER FOR SCREENING MAMMOGRAM FOR MALIGNANT NEOPLASM OF BREAST: ICD-10-CM

## 2020-06-17 DIAGNOSIS — R73.01 IMPAIRED FASTING GLUCOSE: ICD-10-CM

## 2020-06-17 DIAGNOSIS — Z11.59 ENCOUNTER FOR HEPATITIS C SCREENING TEST FOR LOW RISK PATIENT: ICD-10-CM

## 2020-06-17 DIAGNOSIS — Z00.00 PREVENTATIVE HEALTH CARE: ICD-10-CM

## 2020-06-17 DIAGNOSIS — C80.1 ADENOID CYSTIC CARCINOMA (HCC): ICD-10-CM

## 2020-06-17 DIAGNOSIS — R51.9 PERSISTENT HEADACHES: ICD-10-CM

## 2020-06-17 PROCEDURE — 99214 OFFICE O/P EST MOD 30 MIN: CPT | Performed by: INTERNAL MEDICINE

## 2020-06-17 RX ORDER — SPIRONOLACTONE 50 MG/1
50 TABLET, FILM COATED ORAL PRN
COMMUNITY
Start: 2020-05-18 | End: 2021-04-14

## 2020-06-17 RX ORDER — ACYCLOVIR 800 MG/1
TABLET ORAL
COMMUNITY
Start: 2020-06-12 | End: 2020-08-29

## 2020-06-17 RX ORDER — NITROFURANTOIN 25; 75 MG/1; MG/1
CAPSULE ORAL
COMMUNITY
Start: 2020-06-12 | End: 2020-08-29

## 2020-06-17 ASSESSMENT — PATIENT HEALTH QUESTIONNAIRE - PHQ9: CLINICAL INTERPRETATION OF PHQ2 SCORE: 0

## 2020-06-17 ASSESSMENT — FIBROSIS 4 INDEX: FIB4 SCORE: 1.23

## 2020-06-18 NOTE — PROGRESS NOTES
CC:  Diagnoses of Adenoid cystic carcinoma (HCC), Persistent headaches, Neck stiffness, Impaired fasting glucose, Preventative health care, Encounter for hepatitis C screening test for low risk patient, and Encounter for screening mammogram for malignant neoplasm of breast were pertinent to this visit.    HISTORY OF THE PRESENT ILLNESS: Patient is a 59 y.o. female. This pleasant patient is here today for follow-up.    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  Dermatology recently evaluated, did a biopsy 2/21/20 near her concern over the scalp left posterior region, and the microscopic description was benign epidermis.  She has also since followed up with oncology team, had a negative PET scan.  The patient states she still was concerned because over the area of left posterior scalp she has extreme pain 10 out of 10 to touch that is described as throbbing in quality and sometimes radiates down her neck and towards her left eye.  States she has chronic hearing loss and has hearing aids but has noticed that over the past year or so she has had progressive worsening of hearing loss in left ear.  Last week she tells me she was treated for urinary infection and also herpes simplex virus--these symptoms all resolved. She tells me her daughter, who is a practitioner, helped her with the treatment and because she had neck stiffness her dtr was concerned about meningitis.  The neck stiffness is primarily on the left side, and chronic/intermittent.  She does admit that possibly she has cervical musculoskeletal discomfort and she is willing to see physical therapy.  She can easily move her chin to her chest without any meningeal signs.   She denies any focal arm weakness.  No loss of bowel bladder, no gait changes, no confusion. Noted she is no longer on Prozac and she  "says her mood is \"good. \"     Allergies: Patient has no known allergies.    Current Outpatient Medications Ordered in Epic   Medication Sig Dispense Refill   • acyclovir (ZOVIRAX) 800 MG Tab TK 1 T PO  5 X A DAY FOR 7 DAYS     • nitrofurantoin (MACROBID) 100 MG Cap      • spironolactone (ALDACTONE) 50 MG Tab Take  by mouth. Take 1 tablet by mouth two times a day     • meloxicam (MOBIC) 15 MG tablet 1 tab p.o. daily with food as needed joint pain 90 Tab 1   • traZODone (DESYREL) 50 MG Tab Take 1 Tab by mouth at bedtime as needed. 90 Tab 3   • cetirizine (ZYRTEC) 10 MG Tab Take 1 Tab by mouth every day. 30 Tab 3   • cyanocobalamin (VITAMIN B-12) 500 MCG Tab Take 500 mcg by mouth every day.     • omeprazole (PRILOSEC) 40 MG delayed-release capsule TK ONE C PO  D  3   • fluocinonide (LIDEX) 0.05 % Cream Apply 1 Application to affected area(s) 2 times a day. To areas on the body (legs) as needed 30 g 1   • therapeutic multivitamin-minerals (THERAGRAN-M) Tab Take 1 Tab by mouth every day.     • latanoprost (XALATAN) 0.005 % Solution Place 1 Drop in both eyes every evening.       No current Epic-ordered facility-administered medications on file.        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   • OTHER ORTHOPEDIC SURGERY      b/l knee replacements 2014, 2016   • TONSILLECTOMY         Social History     Tobacco Use   • Smoking status: Former Smoker     Packs/day: 2.00 " "    Years: 16.00     Pack years: 32.00     Last attempt to quit:      Years since quittin.4   • Smokeless tobacco: Never Used   Substance Use Topics   • Alcohol use: Yes     Frequency: Monthly or less     Drinks per session: 1 or 2     Comment: 1 glass q 2mo   • Drug use: Yes     Types: Inhaled     Comment: Marijuana       Social History     Social History Narrative   • Not on file       Family History   Problem Relation Age of Onset   • Arthritis Mother    • Cancer Mother         skin   • Anemia Mother    • Hypertension Mother    • Obesity Mother    • Other Mother         ulcer   • Cancer Father         skin, prostate, esophageal   • Hypertension Father    • Obesity Father    • Arthritis Brother    • Cancer Maternal Aunt         breast]       ROS:     - Constitutional: Negative for fever, chills     - Eyes:   Negative for blurry vision, eye pain, discharge    - ENT:  Negative for  sore throat     - Respiratory: Negative for cough, sputum production, chest congestion, dyspnea, wheezing, and crackles.      - Cardiovascular: Negative for chest pain, palpitations, orthopnea, and bilateral lower extremity edema.     - Gastrointestinal: Negative for abdominal pain    - Genitourinary: Negative for dysuria    - Musculoskeletal: see hpi    - Skin: lump L posterior scalp just biopsied 20    - Neurological: Negative for  vertigo    - Endo:Negative for polyuria, heat/cold intolerance, excessive thirst    - Hem/lymphatic: Negative for swollen glands    -Allergic/immun: Negative for allergic rhinitis    - Psychiatric/Behavioral: Negative for depression, suicidal/homicidal ideation and memory loss.      Exam: /72 (BP Location: Right arm, Patient Position: Sitting, BP Cuff Size: Adult long)   Pulse 72   Temp 37 °C (98.6 °F) (Temporal)   Resp 16   Ht 1.676 m (5' 6\")   Wt 106.2 kg (234 lb 2.1 oz)   SpO2 93%  Body mass index is 37.79 kg/m².    General: Normal appearing. No distress.  EYES: Conjunctiva clear " lids without ptosis, pupils equal  EARS: Normal shape and contour   Pulmonary: Clear to ausculation.  Normal effort. No rales or wheezing.  Cardiovascular: Regular rate and rhythm without significant murmur.   Abdomen: Soft, nontender, nondistended. Normal bowel sounds.  Neurologic: Cranial nerves grossly nonfocal  Skin: Warm and dry.  No obvious lesions. Region of scalp concern, skin colored papule approx 3mm  Musculoskeletal: Normal gait. No extremity cyanosis, clubbing, or edema.  Psych: Normal mood and affect. Alert and oriented x3. Judgment and insight is normal.        Assessment/Plan  1. Adenoid cystic carcinoma (HCC)  No evidence of recurrence, recent negative biopsy 2/21/2020 and also negative PET scan 2/7/2020.  Unclear why she has severe pain in location left posterior scalp over a papule, that triggers pain down her neck into her eye.  Discussed with patient could treat with medication such as TCA drug class.  Patient feels that there is something potentially is undiagnosed, she wishes to see neurology and this is reasonable.  - REFERRAL TO NEUROLOGY    2. Persistent headaches  #1 above  - REFERRAL TO NEUROLOGY    3. Neck stiffness  Overall the neck stiffness sounds like it is a musculoskeletal discomfort we will start with physical therapy.  Meningitis is not suspected.  - REFERRAL TO PHYSICAL THERAPY Reason for Therapy: Eval/Treat/Report    4. Impaired fasting glucose  Chronic, stable plan to reassess.  - CBC WITH DIFFERENTIAL; Future  - Comp Metabolic Panel; Future  - HEMOGLOBIN A1C; Future    5. Preventative health care  - CBC WITH DIFFERENTIAL; Future  - Comp Metabolic Panel; Future  - HEMOGLOBIN A1C; Future  - Lipid Profile; Future    6. Encounter for hepatitis C screening test for low risk patient  Patient agreeable as preventive health guideline given birth cohort.  - HEP C VIRUS ANTIBODY; Future    7. Encounter for screening mammogram for malignant neoplasm of breast  Patient will follow-up with  screening.  - MA-SCREENING MAMMO BILAT W/TOMOSYNTHESIS W/CAD; Future      return to clinic in 3 months or sooner if needed        Please note that this dictation was created using voice recognition software. I have made every reasonable attempt to correct obvious errors, but I expect that there are errors of grammar and possibly content that I did not discover before finalizing the note.

## 2020-06-27 ENCOUNTER — HOSPITAL ENCOUNTER (OUTPATIENT)
Facility: MEDICAL CENTER | Age: 60
End: 2020-06-27
Attending: FAMILY MEDICINE
Payer: COMMERCIAL

## 2020-06-27 ENCOUNTER — OFFICE VISIT (OUTPATIENT)
Dept: URGENT CARE | Facility: CLINIC | Age: 60
End: 2020-06-27
Payer: COMMERCIAL

## 2020-06-27 VITALS
WEIGHT: 234 LBS | HEIGHT: 66 IN | RESPIRATION RATE: 16 BRPM | BODY MASS INDEX: 37.61 KG/M2 | DIASTOLIC BLOOD PRESSURE: 82 MMHG | SYSTOLIC BLOOD PRESSURE: 130 MMHG | HEART RATE: 86 BPM | TEMPERATURE: 97.2 F | OXYGEN SATURATION: 97 %

## 2020-06-27 DIAGNOSIS — N30.00 ACUTE CYSTITIS WITHOUT HEMATURIA: Primary | ICD-10-CM

## 2020-06-27 LAB
APPEARANCE UR: CLEAR
BILIRUB UR STRIP-MCNC: NEGATIVE MG/DL
COLOR UR AUTO: YELLOW
GLUCOSE UR STRIP.AUTO-MCNC: NEGATIVE MG/DL
KETONES UR STRIP.AUTO-MCNC: NEGATIVE MG/DL
LEUKOCYTE ESTERASE UR QL STRIP.AUTO: NORMAL
NITRITE UR QL STRIP.AUTO: NEGATIVE
PH UR STRIP.AUTO: 7 [PH] (ref 5–8)
PROT UR QL STRIP: NEGATIVE MG/DL
RBC UR QL AUTO: NORMAL
SP GR UR STRIP.AUTO: 1.01
UROBILINOGEN UR STRIP-MCNC: 0.2 MG/DL

## 2020-06-27 PROCEDURE — 87086 URINE CULTURE/COLONY COUNT: CPT

## 2020-06-27 PROCEDURE — 81002 URINALYSIS NONAUTO W/O SCOPE: CPT | Performed by: FAMILY MEDICINE

## 2020-06-27 PROCEDURE — 87077 CULTURE AEROBIC IDENTIFY: CPT

## 2020-06-27 PROCEDURE — 87186 SC STD MICRODIL/AGAR DIL: CPT

## 2020-06-27 PROCEDURE — 99213 OFFICE O/P EST LOW 20 MIN: CPT | Performed by: FAMILY MEDICINE

## 2020-06-27 RX ORDER — CEPHALEXIN 500 MG/1
500 CAPSULE ORAL 2 TIMES DAILY
Qty: 10 CAP | Refills: 0 | Status: SHIPPED | OUTPATIENT
Start: 2020-06-27 | End: 2020-08-29

## 2020-06-27 RX ORDER — PHENAZOPYRIDINE HYDROCHLORIDE 100 MG/1
100 TABLET, FILM COATED ORAL 3 TIMES DAILY PRN
Qty: 6 TAB | Refills: 0 | Status: SHIPPED | OUTPATIENT
Start: 2020-06-27 | End: 2020-08-29

## 2020-06-27 ASSESSMENT — ENCOUNTER SYMPTOMS
VOMITING: 0
SWEATS: 0
CHILLS: 0
FLANK PAIN: 0
NAUSEA: 0

## 2020-06-27 ASSESSMENT — FIBROSIS 4 INDEX: FIB4 SCORE: 1.23

## 2020-06-28 DIAGNOSIS — N30.00 ACUTE CYSTITIS WITHOUT HEMATURIA: ICD-10-CM

## 2020-06-28 NOTE — PATIENT INSTRUCTIONS
Urinary Frequency, Adult  Urinary frequency means urinating more often than usual. You may urinate every 1-2 hours even though you drink a normal amount of fluid and do not have a bladder infection or condition. Although you urinate more often than normal, the total amount of urine produced in a day is normal.  With urinary frequency, you may have an urgent need to urinate often. The stress and anxiety of needing to find a bathroom quickly can make this urge worse. This condition may go away on its own or you may need treatment at home. Home treatment may include bladder training, exercises, taking medicines, or making changes to your diet.  Follow these instructions at home:  Bladder health    · Keep a bladder diary if told by your health care provider. Keep track of:  ? What you eat and drink.  ? How often you urinate.  ? How much you urinate.  · Follow a bladder training program if told by your health care provider. This may include:  ? Learning to delay going to the bathroom.  ? Double urinating (voiding). This helps if you are not completely emptying your bladder.  ? Scheduled voiding.  · Do Kegel exercises as told by your health care provider. Kegel exercises strengthen the muscles that help control urination, which may help the condition.  Eating and drinking  · If told by your health care provider, make diet changes, such as:  ? Avoiding caffeine.  ? Drinking fewer fluids, especially alcohol.  ? Not drinking in the evening.  ? Avoiding foods or drinks that may irritate the bladder. These include coffee, tea, soda, artificial sweeteners, citrus, tomato-based foods, and chocolate.  ? Eating foods that help prevent or ease constipation. Constipation can make this condition worse. Your health care provider may recommend that you:  § Drink enough fluid to keep your urine pale yellow.  § Take over-the-counter or prescription medicines.  § Eat foods that are high in fiber, such as beans, whole grains, and fresh  fruits and vegetables.  § Limit foods that are high in fat and processed sugars, such as fried or sweet foods.  General instructions  · Take over-the-counter and prescription medicines only as told by your health care provider.  · Keep all follow-up visits as told by your health care provider. This is important.  Contact a health care provider if:  · You start urinating more often.  · You feel pain or irritation when you urinate.  · You notice blood in your urine.  · Your urine looks cloudy.  · You develop a fever.  · You begin vomiting.  Get help right away if:  · You are unable to urinate.  Summary  · Urinary frequency means urinating more often than usual. With urinary frequency, you may urinate every 1-2 hours even though you drink a normal amount of fluid and do not have a bladder infection or other bladder condition.  · Your health care provider may recommend that you keep a bladder diary, follow a bladder training program, or make dietary changes.  · If told by your health care provider, do Kegel exercises to strengthen the muscles that help control urination.  · Take over-the-counter and prescription medicines only as told by your health care provider.  · Contact a health care provider if your symptoms do not improve or get worse.  This information is not intended to replace advice given to you by your health care provider. Make sure you discuss any questions you have with your health care provider.  Document Released: 10/14/2010 Document Revised: 06/27/2019 Document Reviewed: 06/27/2019  Elsevier Patient Education © 2020 [employer]

## 2020-06-28 NOTE — PROGRESS NOTES
"Subjective:      Maria Teresa Haji is a 59 y.o. female who presents with UTI (x1 day, reocurring UTI)            Dysuria    This is a new problem. The current episode started yesterday. The problem occurs every urination. The problem has been unchanged. The quality of the pain is described as burning. The pain is at a severity of 1/10. The pain is mild. There has been no fever. She is not sexually active. There is no history of pyelonephritis. Associated symptoms include frequency and urgency. Pertinent negatives include no chills, discharge, flank pain, hematuria, hesitancy, nausea, possible pregnancy, sweats or vomiting. She has tried nothing for the symptoms. The treatment provided no relief. There is no history of catheterization, kidney stones, recurrent UTIs, a single kidney, urinary stasis or a urological procedure.       Review of Systems   Constitutional: Negative for chills.   HENT: Negative.    Gastrointestinal: Negative for nausea and vomiting.   Genitourinary: Positive for dysuria, frequency and urgency. Negative for flank pain, hematuria and hesitancy.   All other systems reviewed and are negative.         Objective:     /82 (Patient Position: Sitting)   Pulse 86   Temp 36.2 °C (97.2 °F) (Temporal)   Resp 16   Ht 1.676 m (5' 6\")   Wt 106.1 kg (234 lb)   LMP  (LMP Unknown)   SpO2 97%   BMI 37.77 kg/m²      Physical Exam  Vitals signs reviewed.   Constitutional:       General: She is not in acute distress.     Appearance: Normal appearance. She is normal weight. She is not ill-appearing, toxic-appearing or diaphoretic.   HENT:      Head: Normocephalic and atraumatic.      Right Ear: Tympanic membrane normal.      Left Ear: Tympanic membrane normal.      Nose: Nose normal.      Mouth/Throat:      Mouth: Mucous membranes are moist.      Pharynx: No oropharyngeal exudate or posterior oropharyngeal erythema.   Eyes:      Extraocular Movements: Extraocular movements intact.   Neck:      " Musculoskeletal: Normal range of motion.   Cardiovascular:      Rate and Rhythm: Normal rate.      Pulses: Normal pulses.   Pulmonary:      Effort: Pulmonary effort is normal.   Abdominal:      General: Abdomen is flat.      Tenderness: There is no right CVA tenderness, left CVA tenderness or rebound.   Musculoskeletal: Normal range of motion.   Skin:     General: Skin is warm.      Capillary Refill: Capillary refill takes less than 2 seconds.      Coloration: Skin is not jaundiced.   Neurological:      General: No focal deficit present.      Mental Status: She is alert.   Psychiatric:         Mood and Affect: Mood normal.                 Assessment/Plan:       1. Acute cystitis without hematuria  1. Acute cystitis without hematuria  POCT Urinalysis       - POCT Urinalysis: showed + LE   -We will have the patient use Keflex since 2 weeks ago she was using Macrobid and the medication helped however she still has the same symptoms including dysuria, urgency and frequency  -Since patient had UTI 2 weeks ago we will send the urine for culture.   -I also advised patient to have another official urine done since she had hematuria after her symptoms are resolved and she can follow-up with that with primary care

## 2020-06-30 LAB
BACTERIA UR CULT: ABNORMAL
BACTERIA UR CULT: ABNORMAL
SIGNIFICANT IND 70042: ABNORMAL
SITE SITE: ABNORMAL
SOURCE SOURCE: ABNORMAL

## 2020-07-06 ENCOUNTER — OFFICE VISIT (OUTPATIENT)
Dept: MEDICAL GROUP | Facility: MEDICAL CENTER | Age: 60
End: 2020-07-06
Payer: COMMERCIAL

## 2020-07-06 ENCOUNTER — HOSPITAL ENCOUNTER (OUTPATIENT)
Dept: LAB | Facility: MEDICAL CENTER | Age: 60
End: 2020-07-06
Attending: INTERNAL MEDICINE
Payer: COMMERCIAL

## 2020-07-06 ENCOUNTER — HOSPITAL ENCOUNTER (OUTPATIENT)
Facility: MEDICAL CENTER | Age: 60
End: 2020-07-06
Attending: INTERNAL MEDICINE
Payer: COMMERCIAL

## 2020-07-06 VITALS
BODY MASS INDEX: 37.63 KG/M2 | HEART RATE: 71 BPM | RESPIRATION RATE: 16 BRPM | DIASTOLIC BLOOD PRESSURE: 82 MMHG | TEMPERATURE: 98.1 F | SYSTOLIC BLOOD PRESSURE: 116 MMHG | OXYGEN SATURATION: 95 % | HEIGHT: 66 IN | WEIGHT: 234.13 LBS

## 2020-07-06 DIAGNOSIS — R73.01 IMPAIRED FASTING GLUCOSE: ICD-10-CM

## 2020-07-06 DIAGNOSIS — R30.0 DYSURIA: ICD-10-CM

## 2020-07-06 DIAGNOSIS — M25.522 CHRONIC ELBOW PAIN, LEFT: ICD-10-CM

## 2020-07-06 DIAGNOSIS — G89.29 CHRONIC ELBOW PAIN, LEFT: ICD-10-CM

## 2020-07-06 DIAGNOSIS — Z11.59 ENCOUNTER FOR HEPATITIS C SCREENING TEST FOR LOW RISK PATIENT: ICD-10-CM

## 2020-07-06 DIAGNOSIS — R91.1 PULMONARY NODULE: ICD-10-CM

## 2020-07-06 DIAGNOSIS — M54.2 NECK PAIN: ICD-10-CM

## 2020-07-06 DIAGNOSIS — Z00.00 PREVENTATIVE HEALTH CARE: ICD-10-CM

## 2020-07-06 LAB
ALBUMIN SERPL BCP-MCNC: 4.7 G/DL (ref 3.2–4.9)
ALBUMIN/GLOB SERPL: 1.5 G/DL
ALP SERPL-CCNC: 62 U/L (ref 30–99)
ALT SERPL-CCNC: 42 U/L (ref 2–50)
ANION GAP SERPL CALC-SCNC: 14 MMOL/L (ref 7–16)
APPEARANCE UR: CLEAR
AST SERPL-CCNC: 35 U/L (ref 12–45)
BASOPHILS # BLD AUTO: 0.3 % (ref 0–1.8)
BASOPHILS # BLD: 0.02 K/UL (ref 0–0.12)
BILIRUB SERPL-MCNC: 0.3 MG/DL (ref 0.1–1.5)
BILIRUB UR STRIP-MCNC: NORMAL MG/DL
BUN SERPL-MCNC: 14 MG/DL (ref 8–22)
CALCIUM SERPL-MCNC: 9.9 MG/DL (ref 8.4–10.2)
CHLORIDE SERPL-SCNC: 102 MMOL/L (ref 96–112)
CHOLEST SERPL-MCNC: 180 MG/DL (ref 100–199)
CO2 SERPL-SCNC: 22 MMOL/L (ref 20–33)
COLOR UR AUTO: YELLOW
CREAT SERPL-MCNC: 0.84 MG/DL (ref 0.5–1.4)
EOSINOPHIL # BLD AUTO: 0.14 K/UL (ref 0–0.51)
EOSINOPHIL NFR BLD: 2.2 % (ref 0–6.9)
ERYTHROCYTE [DISTWIDTH] IN BLOOD BY AUTOMATED COUNT: 40.7 FL (ref 35.9–50)
EST. AVERAGE GLUCOSE BLD GHB EST-MCNC: 117 MG/DL
FASTING STATUS PATIENT QL REPORTED: NORMAL
GLOBULIN SER CALC-MCNC: 3.2 G/DL (ref 1.9–3.5)
GLUCOSE SERPL-MCNC: 91 MG/DL (ref 65–99)
GLUCOSE UR STRIP.AUTO-MCNC: NORMAL MG/DL
HBA1C MFR BLD: 5.7 % (ref 0–5.6)
HCT VFR BLD AUTO: 46.2 % (ref 37–47)
HDLC SERPL-MCNC: 36 MG/DL
HGB BLD-MCNC: 15.6 G/DL (ref 12–16)
IMM GRANULOCYTES # BLD AUTO: 0.03 K/UL (ref 0–0.11)
IMM GRANULOCYTES NFR BLD AUTO: 0.5 % (ref 0–0.9)
KETONES UR STRIP.AUTO-MCNC: NORMAL MG/DL
LDLC SERPL CALC-MCNC: 106 MG/DL
LEUKOCYTE ESTERASE UR QL STRIP.AUTO: NORMAL
LYMPHOCYTES # BLD AUTO: 1.99 K/UL (ref 1–4.8)
LYMPHOCYTES NFR BLD: 31.8 % (ref 22–41)
MCH RBC QN AUTO: 31 PG (ref 27–33)
MCHC RBC AUTO-ENTMCNC: 33.8 G/DL (ref 33.6–35)
MCV RBC AUTO: 91.8 FL (ref 81.4–97.8)
MONOCYTES # BLD AUTO: 0.39 K/UL (ref 0–0.85)
MONOCYTES NFR BLD AUTO: 6.2 % (ref 0–13.4)
NEUTROPHILS # BLD AUTO: 3.69 K/UL (ref 2–7.15)
NEUTROPHILS NFR BLD: 59 % (ref 44–72)
NITRITE UR QL STRIP.AUTO: NORMAL
NRBC # BLD AUTO: 0 K/UL
NRBC BLD-RTO: 0 /100 WBC
PH UR STRIP.AUTO: 7 [PH] (ref 5–8)
PLATELET # BLD AUTO: 254 K/UL (ref 164–446)
PMV BLD AUTO: 9.2 FL (ref 9–12.9)
POTASSIUM SERPL-SCNC: 4.6 MMOL/L (ref 3.6–5.5)
PROT SERPL-MCNC: 7.9 G/DL (ref 6–8.2)
PROT UR QL STRIP: NORMAL MG/DL
RBC # BLD AUTO: 5.03 M/UL (ref 4.2–5.4)
RBC UR QL AUTO: NORMAL
SODIUM SERPL-SCNC: 138 MMOL/L (ref 135–145)
SP GR UR STRIP.AUTO: 1.02
TRIGL SERPL-MCNC: 190 MG/DL (ref 0–149)
UROBILINOGEN UR STRIP-MCNC: 0.2 MG/DL
WBC # BLD AUTO: 6.3 K/UL (ref 4.8–10.8)

## 2020-07-06 PROCEDURE — 81002 URINALYSIS NONAUTO W/O SCOPE: CPT | Performed by: INTERNAL MEDICINE

## 2020-07-06 PROCEDURE — 99214 OFFICE O/P EST MOD 30 MIN: CPT | Performed by: INTERNAL MEDICINE

## 2020-07-06 PROCEDURE — 86803 HEPATITIS C AB TEST: CPT

## 2020-07-06 PROCEDURE — 85025 COMPLETE CBC W/AUTO DIFF WBC: CPT

## 2020-07-06 PROCEDURE — 80053 COMPREHEN METABOLIC PANEL: CPT

## 2020-07-06 PROCEDURE — 83036 HEMOGLOBIN GLYCOSYLATED A1C: CPT

## 2020-07-06 PROCEDURE — 80061 LIPID PANEL: CPT

## 2020-07-06 PROCEDURE — 87086 URINE CULTURE/COLONY COUNT: CPT

## 2020-07-06 PROCEDURE — 36415 COLL VENOUS BLD VENIPUNCTURE: CPT

## 2020-07-06 ASSESSMENT — FIBROSIS 4 INDEX: FIB4 SCORE: 1.23

## 2020-07-06 NOTE — PROGRESS NOTES
"CC:  Diagnoses of Dysuria and Chronic elbow pain, left were pertinent to this visit.    HISTORY OF THE PRESENT ILLNESS: Patient is a 59 y.o. female. This pleasant patient is here today for routine follow-up.    One of her main concerns is recurrent urinary infections this year.  She had one last August and 2 this past month.  This past month she was treated nitrofurantoin.  For second UTI this month Keflex which was later changed to ciprofloxacin.  She completed 3 days of ciprofloxacin last Sunday.  Does have good water intake.  Good genital hygiene.  Does say that she has developed some dysuria again but no other associated symptoms such as fever, flank pain, hematuria, etc.  She is not sure if she is developing another UTI but would like urinalysis.  She does admit to vaginal dryness and does require K-Y jelly for sexual intimacy--no recent intercourse for months.    The physical therapy with [facility] is working really well, her neck is starting to feel a lot better.  She no longer feels she needs a neurology referral.    History of recurrent left tennis elbow.  No weakness.  She did physical therapy many years ago and this resolved her symptoms for a long time.  Roughly 5 months ago she had a cortisone injection to this region by her rheumatologist and symptoms did not return.  She wants to restart physical therapy.    History of pulmonary nodule noted incidentally on abdominal imaging from 8/2019 described as \"nonspecific 5 mm left lower lobe pulmonary nodule.  \"Radiologist indicated that even for high risk patients optional CT at 12 months.  She subsequently did have a CT PET scan ordered by her oncologist and report from 2/7/2020 indicates at this nodule was not seen and no FDG uptake.  Patient denies any pulmonary symptoms or constitutional symptoms.  Together we decided that we did not need interval CT lung to follow-up on this very small nodule.    Allergies: Patient has no known allergies.    Current " "Outpatient Medications Ordered in Epic   Medication Sig Dispense Refill   • cephALEXin (KEFLEX) 500 MG Cap Take 1 Cap by mouth 2 times a day. 10 Cap 0   • phenazopyridine (PYRIDIUM) 100 MG Tab Take 1 Tab by mouth 3 times a day as needed for Severe Pain. 6 Tab 0   • acyclovir (ZOVIRAX) 800 MG Tab TK 1 T PO  5 X A DAY FOR 7 DAYS     • nitrofurantoin (MACROBID) 100 MG Cap      • spironolactone (ALDACTONE) 50 MG Tab Take  by mouth. Take 1 tablet by mouth two times a day     • meloxicam (MOBIC) 15 MG tablet 1 tab p.o. daily with food as needed joint pain 90 Tab 1   • traZODone (DESYREL) 50 MG Tab Take 1 Tab by mouth at bedtime as needed. 90 Tab 3   • cyanocobalamin (VITAMIN B-12) 500 MCG Tab Take 500 mcg by mouth every day.     • omeprazole (PRILOSEC) 40 MG delayed-release capsule TK ONE C PO  D  3   • fluocinonide (LIDEX) 0.05 % Cream Apply 1 Application to affected area(s) 2 times a day. To areas on the body (legs) as needed 30 g 1   • therapeutic multivitamin-minerals (THERAGRAN-M) Tab Take 1 Tab by mouth every day.     • latanoprost (XALATAN) 0.005 % Solution Place 1 Drop in both eyes every evening.     • cetirizine (ZYRTEC) 10 MG Tab Take 1 Tab by mouth every day. (Patient not taking: Reported on 6/27/2020) 30 Tab 3     No current Epic-ordered facility-administered medications on file.        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   • OTHER ORTHOPEDIC SURGERY  " "    b/l knee replacements ,    • TONSILLECTOMY         Social History     Tobacco Use   • Smoking status: Former Smoker     Packs/day: 2.00     Years: 16.00     Pack years: 32.00     Last attempt to quit:      Years since quittin.5   • Smokeless tobacco: Never Used   Substance Use Topics   • Alcohol use: Yes     Frequency: Monthly or less     Drinks per session: 1 or 2     Comment: 1 glass q 2mo   • Drug use: Yes     Types: Inhaled     Comment: Marijuana       Social History     Social History Narrative   • Not on file       Family History   Problem Relation Age of Onset   • Arthritis Mother    • Cancer Mother         skin   • Anemia Mother    • Hypertension Mother    • Obesity Mother    • Other Mother         ulcer   • Cancer Father         skin, prostate, esophageal   • Hypertension Father    • Obesity Father    • Arthritis Brother    • Cancer Maternal Aunt         breast]       ROS:     - Constitutional: Negative for fever, chills     - Eyes:   Negative for eye pain, discharge    - ENT:  Negative for sore throat     - Respiratory: Negative for cough, sputum production, chest congestion, dyspnea, wheezing, and crackles.      - Cardiovascular: Negative for chest pain, palpitations, orthopnea, and bilateral lower extremity edema.     - Gastrointestinal: Negative for abdominal pain    - Genitourinary: see hpi    - Musculoskeletal: see hpi    - Skin: Negative for rash, itching, cyanotic skin color change.     - Neurological: Negative for  vertigo    - Endo:Negative for polyuria, heat/cold intolerance, excessive thirst    - Hem/lymphatic: Negative for swollen glands    -Allergic/immun: Negative for allergic rhinitis    - Psychiatric/Behavioral: Negative for depression, suicidal/homicidal ideation and memory loss.      Exam: /82 (BP Location: Right arm, Patient Position: Sitting, BP Cuff Size: Adult)   Pulse 71   Temp 36.7 °C (98.1 °F) (Temporal)   Resp 16   Ht 1.676 m (5' 6\")   Wt 106.2 kg " (234 lb 2.1 oz)   SpO2 95%  Body mass index is 37.79 kg/m².    General: Normal appearing. No distress.  EYES: Conjunctiva clear lids without ptosis, pupils equal  EARS: Normal shape and contour   Pulmonary: Clear to ausculation.  Normal effort. No rales or wheezing.  Cardiovascular: Regular rate and rhythm without significant murmur.   Abdomen: Soft, nontender, nondistended. Normal bowel sounds.  Neurologic: Cranial nerves grossly nonfocal  Skin: Warm and dry.  No obvious lesions.  Musculoskeletal: Normal gait. No extremity cyanosis, clubbing, or edema.  Left arm she does have pain in her lateral elbow with wrist flexion.  Psych: Normal mood and affect. Alert and oriented x3. Judgment and insight is normal.        Assessment/Plan  1. Dysuria  Reasonable to recheck for urinary infection, she would like to wait for culture before any empiric treatment.  Advised if she continues to have frequent UTIs, that she may benefit from vaginal estrogen treatment.  Otherwise recommend conservative management this time, consider supplements with d-mannose, cranberry and vitamin C, continue to hydrate, etc.  - POCT Urinalysis  - URINE CULTURE(NEW); Future    2. Chronic elbow pain, left  New issue to me, very reasonable to start physical therapy as may be a mechanical contribution to her recurrent epicondylitis.  - REFERRAL TO PHYSICAL THERAPY Reason for Therapy: Eval/Treat/Report    3. Pulmonary nodule  Together we decided that since she had negative 6-month interval PET/CT from her [facility] pulmonology will that likely additional imaging is not warranted.  We will monitor for any symptomatic change.  Stable at this time.    4. Neck pain  Chronic, stable and significantly improved after undergoing physical therapy [facility] which she is continuing.  She no longer feels she needs to see neurology.      Return to clinic 3 months or sooner if needed          Please note that this dictation was created using voice recognition  software. I have made every reasonable attempt to correct obvious errors, but I expect that there are errors of grammar and possibly content that I did not discover before finalizing the note.

## 2020-07-07 DIAGNOSIS — R30.0 DYSURIA: ICD-10-CM

## 2020-07-07 LAB — HCV AB SER QL: NORMAL

## 2020-07-10 ENCOUNTER — HOSPITAL ENCOUNTER (OUTPATIENT)
Dept: RADIOLOGY | Facility: MEDICAL CENTER | Age: 60
End: 2020-07-10
Attending: INTERNAL MEDICINE
Payer: COMMERCIAL

## 2020-07-10 DIAGNOSIS — R92.8 ABNORMAL FINDINGS ON DIAGNOSTIC IMAGING OF BREAST: ICD-10-CM

## 2020-07-10 LAB
BACTERIA UR CULT: NORMAL
SIGNIFICANT IND 70042: NORMAL
SITE SITE: NORMAL
SOURCE SOURCE: NORMAL

## 2020-07-10 PROCEDURE — 76642 ULTRASOUND BREAST LIMITED: CPT | Mod: RT

## 2020-07-10 PROCEDURE — G0279 TOMOSYNTHESIS, MAMMO: HCPCS

## 2020-07-14 ENCOUNTER — OFFICE VISIT (OUTPATIENT)
Dept: NEUROLOGY | Facility: MEDICAL CENTER | Age: 60
End: 2020-07-14
Payer: COMMERCIAL

## 2020-07-14 VITALS
RESPIRATION RATE: 16 BRPM | BODY MASS INDEX: 37.84 KG/M2 | OXYGEN SATURATION: 94 % | WEIGHT: 235.45 LBS | DIASTOLIC BLOOD PRESSURE: 78 MMHG | HEART RATE: 85 BPM | HEIGHT: 66 IN | SYSTOLIC BLOOD PRESSURE: 120 MMHG | TEMPERATURE: 97.8 F

## 2020-07-14 DIAGNOSIS — C80.1 ADENOID CYSTIC CARCINOMA (HCC): ICD-10-CM

## 2020-07-14 DIAGNOSIS — R51.9 HEADACHE DISORDER: ICD-10-CM

## 2020-07-14 PROCEDURE — 99203 OFFICE O/P NEW LOW 30 MIN: CPT | Performed by: NURSE PRACTITIONER

## 2020-07-14 ASSESSMENT — ENCOUNTER SYMPTOMS
BLURRED VISION: 0
NECK PAIN: 1
FEVER: 0
CHILLS: 0
MYALGIAS: 1
DOUBLE VISION: 0
HEADACHES: 1
SENSORY CHANGE: 1
NAUSEA: 0
FOCAL WEAKNESS: 1
HEARTBURN: 0
DEPRESSION: 0
NERVOUS/ANXIOUS: 0
BRUISES/BLEEDS EASILY: 0
COUGH: 0

## 2020-07-14 ASSESSMENT — FIBROSIS 4 INDEX: FIB4 SCORE: 1.28

## 2020-07-14 NOTE — PROGRESS NOTES
"  Subjective:    HPI  Maria Teresa Haji is a 59 y.o. 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  Had PET scan and Bx recently, no recurrence.       She describes the pain as starting at the base of her skull on the left and radiating down the left arm.   She feels numbness in her fingers.  She is taking Celebrex and started physical therapy about 1 week ago.      Review of Systems   Constitutional: Negative for chills and fever.   HENT: Positive for hearing loss.    Eyes: Negative for blurred vision and double vision.   Respiratory: Negative for cough.    Cardiovascular: Negative for chest pain.   Gastrointestinal: Negative for heartburn and nausea.   Genitourinary: Negative for dysuria.   Musculoskeletal: Positive for myalgias and neck pain.   Skin: Negative for rash.   Neurological: Positive for sensory change, focal weakness and headaches.   Endo/Heme/Allergies: Does not bruise/bleed easily.   Psychiatric/Behavioral: Negative for depression. The patient is not nervous/anxious.        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\"   • Glaucoma    • Hemorrhoids    • Hives    • Meningitis    • Pneumonia    • Sinusitis      Current Outpatient Medications on File Prior to Visit   Medication Sig Dispense Refill   • phenazopyridine (PYRIDIUM) 100 MG Tab Take 1 Tab by mouth 3 times a day as needed for Severe Pain. 6 Tab 0   • acyclovir (ZOVIRAX) 800 MG Tab TK 1 T PO  5 X A DAY FOR 7 DAYS     • nitrofurantoin (MACROBID) 100 MG Cap      • spironolactone (ALDACTONE) 50 MG Tab Take  by mouth. Take 1 tablet by mouth two times a day     • meloxicam (MOBIC) 15 MG tablet 1 tab p.o. daily with food as needed joint pain 90 Tab 1   • traZODone (DESYREL) 50 MG Tab Take 1 Tab by mouth at bedtime as needed. 90 Tab 3   • " "cyanocobalamin (VITAMIN B-12) 500 MCG Tab Take 500 mcg by mouth every day.     • omeprazole (PRILOSEC) 40 MG delayed-release capsule TK ONE C PO  D  3   • fluocinonide (LIDEX) 0.05 % Cream Apply 1 Application to affected area(s) 2 times a day. To areas on the body (legs) as needed 30 g 1   • therapeutic multivitamin-minerals (THERAGRAN-M) Tab Take 1 Tab by mouth every day.     • latanoprost (XALATAN) 0.005 % Solution Place 1 Drop in both eyes every evening.     • cephALEXin (KEFLEX) 500 MG Cap Take 1 Cap by mouth 2 times a day. (Patient not taking: Reported on 7/14/2020) 10 Cap 0     No current facility-administered medications on file prior to visit.           Objective:     Encounter Vitals  Standard Vitals  Vitals  Blood Pressure: 120/78  Temperature: 36.6 °C (97.8 °F)  Temp src: Temporal  Pulse: 85  Respiration: 16  Pulse Oximetry: 94 %  Height: 167.6 cm (5' 6\")  Weight: 106.8 kg (235 lb 7.2 oz)  Encounter Vitals  Temperature: 36.6 °C (97.8 °F)  Temp src: Temporal  Blood Pressure: 120/78  Pulse: 85  Respiration: 16  Pulse Oximetry: 94 %  Weight: 106.8 kg (235 lb 7.2 oz)  Height: 167.6 cm (5' 6\")  BMI (Calculated): 38                Physical Exam      General:  Alert, no apparent distress,  Psych:   mood and affect WNL  Eyes:      No papilledema noted on exam  Neuro:   Oriented X 4, speech fluent, naming and memory intact                 cranial nerves II-XII intact                 Strength 5/5 BUE/BLE,  Except for abductors of left digits, 4/5                  Decreased sensation to PP left 3rd, 4th and 5th digits palmar surface                 No limb ataxia with finger to nose and heel to shin                 Ambulates with steady gait.                Biceps,brachioradialis, tricep, patellar and ankle reflex all 2+    Cardiovascular:    S1S2, no abnormal rhythm auscultated, no peripheral edema  Neck:                     No carotid bruits noted, tenderness noted along left trapezius, left rhomboid, tenderness " at base of left skull.  Pulmonary:            Respirations easy, lungs clear to auscultation all fields.    Skin:                     No obvious rashes.         Assessment/Plan:       1. Adenoid cystic carcinoma (HCC)    No recurrence.    2. Headache disorder     Likely musculoskeletal,continue therapy for at least another 3 weeks, if no relief, MRI of C-spine, if unrevealing, will consider MRI of the brain.    Follow up if pain is not relieved by physical therapy

## 2020-08-18 ENCOUNTER — APPOINTMENT (OUTPATIENT)
Dept: RHEUMATOLOGY | Facility: MEDICAL CENTER | Age: 60
End: 2020-08-18
Payer: COMMERCIAL

## 2020-08-28 ENCOUNTER — OFFICE VISIT (OUTPATIENT)
Dept: URGENT CARE | Facility: PHYSICIAN GROUP | Age: 60
End: 2020-08-28
Payer: COMMERCIAL

## 2020-08-28 ENCOUNTER — HOSPITAL ENCOUNTER (OUTPATIENT)
Facility: MEDICAL CENTER | Age: 60
End: 2020-08-28
Attending: NURSE PRACTITIONER
Payer: COMMERCIAL

## 2020-08-28 VITALS
RESPIRATION RATE: 18 BRPM | WEIGHT: 230 LBS | HEART RATE: 71 BPM | BODY MASS INDEX: 36.96 KG/M2 | HEIGHT: 66 IN | TEMPERATURE: 98 F | OXYGEN SATURATION: 97 % | SYSTOLIC BLOOD PRESSURE: 132 MMHG | DIASTOLIC BLOOD PRESSURE: 84 MMHG

## 2020-08-28 DIAGNOSIS — R30.0 DYSURIA: ICD-10-CM

## 2020-08-28 DIAGNOSIS — R51.9 HEADACHE, UNSPECIFIED HEADACHE TYPE: ICD-10-CM

## 2020-08-28 LAB
APPEARANCE UR: CLEAR
BILIRUB UR STRIP-MCNC: NORMAL MG/DL
COLOR UR AUTO: NORMAL
GLUCOSE UR STRIP.AUTO-MCNC: NORMAL MG/DL
KETONES UR STRIP.AUTO-MCNC: NORMAL MG/DL
LEUKOCYTE ESTERASE UR QL STRIP.AUTO: NORMAL
NITRITE UR QL STRIP.AUTO: NORMAL
PH UR STRIP.AUTO: 6.5 [PH] (ref 5–8)
PROT UR QL STRIP: NORMAL MG/DL
RBC UR QL AUTO: NORMAL
SP GR UR STRIP.AUTO: 1.01
UROBILINOGEN UR STRIP-MCNC: NORMAL MG/DL

## 2020-08-28 PROCEDURE — 87086 URINE CULTURE/COLONY COUNT: CPT

## 2020-08-28 PROCEDURE — 81002 URINALYSIS NONAUTO W/O SCOPE: CPT | Performed by: NURSE PRACTITIONER

## 2020-08-28 PROCEDURE — 99214 OFFICE O/P EST MOD 30 MIN: CPT | Performed by: NURSE PRACTITIONER

## 2020-08-28 RX ORDER — SULFAMETHOXAZOLE AND TRIMETHOPRIM 800; 160 MG/1; MG/1
1 TABLET ORAL 2 TIMES DAILY
Qty: 10 TAB | Refills: 0 | Status: SHIPPED | OUTPATIENT
Start: 2020-08-28 | End: 2020-08-29

## 2020-08-28 RX ORDER — KETOROLAC TROMETHAMINE 30 MG/ML
60 INJECTION, SOLUTION INTRAMUSCULAR; INTRAVENOUS ONCE
Status: COMPLETED | OUTPATIENT
Start: 2020-08-28 | End: 2020-08-28

## 2020-08-28 RX ADMIN — KETOROLAC TROMETHAMINE 60 MG: 30 INJECTION, SOLUTION INTRAMUSCULAR; INTRAVENOUS at 17:52

## 2020-08-28 ASSESSMENT — ENCOUNTER SYMPTOMS
FEVER: 0
FLANK PAIN: 0
CHILLS: 0

## 2020-08-28 ASSESSMENT — FIBROSIS 4 INDEX: FIB4 SCORE: 1.3

## 2020-08-29 ENCOUNTER — APPOINTMENT (OUTPATIENT)
Dept: RADIOLOGY | Facility: MEDICAL CENTER | Age: 60
DRG: 097 | End: 2020-08-29
Attending: EMERGENCY MEDICINE
Payer: COMMERCIAL

## 2020-08-29 ENCOUNTER — APPOINTMENT (OUTPATIENT)
Dept: RADIOLOGY | Facility: MEDICAL CENTER | Age: 60
DRG: 097 | End: 2020-08-29
Attending: HOSPITALIST
Payer: COMMERCIAL

## 2020-08-29 ENCOUNTER — HOSPITAL ENCOUNTER (INPATIENT)
Facility: MEDICAL CENTER | Age: 60
LOS: 2 days | DRG: 097 | End: 2020-08-31
Attending: EMERGENCY MEDICINE | Admitting: HOSPITALIST
Payer: COMMERCIAL

## 2020-08-29 DIAGNOSIS — R29.1 NUCHAL RIGIDITY: ICD-10-CM

## 2020-08-29 DIAGNOSIS — R09.02 HYPOXIA: ICD-10-CM

## 2020-08-29 PROBLEM — N39.0 UTI (URINARY TRACT INFECTION): Status: ACTIVE | Noted: 2020-08-29

## 2020-08-29 PROBLEM — J96.01 ACUTE RESPIRATORY FAILURE WITH HYPOXIA (HCC): Status: ACTIVE | Noted: 2020-08-29

## 2020-08-29 PROBLEM — D69.6 THROMBOCYTOPENIA (HCC): Status: RESOLVED | Noted: 2019-08-15 | Resolved: 2020-08-29

## 2020-08-29 LAB
ALBUMIN SERPL BCP-MCNC: 4.6 G/DL (ref 3.2–4.9)
ALBUMIN/GLOB SERPL: 1.5 G/DL
ALP SERPL-CCNC: 56 U/L (ref 30–99)
ALT SERPL-CCNC: 46 U/L (ref 2–50)
ANION GAP SERPL CALC-SCNC: 11 MMOL/L (ref 7–16)
APPEARANCE UR: CLEAR
AST SERPL-CCNC: 30 U/L (ref 12–45)
BASOPHILS # BLD AUTO: 0.1 % (ref 0–1.8)
BASOPHILS # BLD: 0.01 K/UL (ref 0–0.12)
BILIRUB SERPL-MCNC: 0.4 MG/DL (ref 0.1–1.5)
BILIRUB UR QL STRIP.AUTO: NEGATIVE
BUN SERPL-MCNC: 16 MG/DL (ref 8–22)
BURR CELLS/RBC NFR CSF MANUAL: 0 %
CALCIUM SERPL-MCNC: 9.4 MG/DL (ref 8.4–10.2)
CHLORIDE SERPL-SCNC: 103 MMOL/L (ref 96–112)
CLARITY CSF: CLEAR
CO2 SERPL-SCNC: 20 MMOL/L (ref 20–33)
COLOR CSF: COLORLESS
COLOR SPUN CSF: COLORLESS
COLOR UR: YELLOW
COVID ORDER STATUS COVID19: NORMAL
CREAT SERPL-MCNC: 1.11 MG/DL (ref 0.5–1.4)
EKG IMPRESSION: NORMAL
EOSINOPHIL # BLD AUTO: 0.1 K/UL (ref 0–0.51)
EOSINOPHIL NFR BLD: 1.4 % (ref 0–6.9)
ERYTHROCYTE [DISTWIDTH] IN BLOOD BY AUTOMATED COUNT: 40.4 FL (ref 35.9–50)
GLOBULIN SER CALC-MCNC: 3.1 G/DL (ref 1.9–3.5)
GLUCOSE CSF-MCNC: 54 MG/DL (ref 40–80)
GLUCOSE SERPL-MCNC: 105 MG/DL (ref 65–99)
GLUCOSE UR STRIP.AUTO-MCNC: NEGATIVE MG/DL
GRAM STN SPEC: NORMAL
HCT VFR BLD AUTO: 44.6 % (ref 37–47)
HGB BLD-MCNC: 15.4 G/DL (ref 12–16)
IMM GRANULOCYTES # BLD AUTO: 0.02 K/UL (ref 0–0.11)
IMM GRANULOCYTES NFR BLD AUTO: 0.3 % (ref 0–0.9)
KETONES UR STRIP.AUTO-MCNC: NEGATIVE MG/DL
LEUKOCYTE ESTERASE UR QL STRIP.AUTO: NEGATIVE
LIPASE SERPL-CCNC: 48 U/L (ref 7–58)
LYMPHOCYTES # BLD AUTO: 1.46 K/UL (ref 1–4.8)
LYMPHOCYTES NFR BLD: 20.5 % (ref 22–41)
LYMPHOCYTES NFR CSF: 73 %
MCH RBC QN AUTO: 31.3 PG (ref 27–33)
MCHC RBC AUTO-ENTMCNC: 34.5 G/DL (ref 33.6–35)
MCV RBC AUTO: 90.7 FL (ref 81.4–97.8)
MICRO URNS: NORMAL
MONOCYTES # BLD AUTO: 0.42 K/UL (ref 0–0.85)
MONOCYTES NFR BLD AUTO: 5.9 % (ref 0–13.4)
MONONUC CELLS NFR CSF: 23 %
NEUTROPHILS # BLD AUTO: 5.1 K/UL (ref 2–7.15)
NEUTROPHILS NFR BLD: 71.8 % (ref 44–72)
NEUTROPHILS NFR CSF: 4 %
NITRITE UR QL STRIP.AUTO: NEGATIVE
NRBC # BLD AUTO: 0 K/UL
NRBC BLD-RTO: 0 /100 WBC
PH UR STRIP.AUTO: 6 [PH] (ref 5–8)
PLATELET # BLD AUTO: 232 K/UL (ref 164–446)
PMV BLD AUTO: 8.9 FL (ref 9–12.9)
POTASSIUM SERPL-SCNC: 4.5 MMOL/L (ref 3.6–5.5)
PROT CSF-MCNC: 87 MG/DL (ref 15–45)
PROT SERPL-MCNC: 7.7 G/DL (ref 6–8.2)
PROT UR QL STRIP: NEGATIVE MG/DL
RBC # BLD AUTO: 4.92 M/UL (ref 4.2–5.4)
RBC # CSF: 7 CELLS/UL
RBC UR QL AUTO: NEGATIVE
SARS-COV-2 RNA RESP QL NAA+PROBE: NOTDETECTED
SIGNIFICANT IND 70042: NORMAL
SITE SITE: NORMAL
SODIUM SERPL-SCNC: 134 MMOL/L (ref 135–145)
SOURCE SOURCE: NORMAL
SP GR UR STRIP.AUTO: 1.01
SPECIMEN SOURCE: NORMAL
SPECIMEN VOL CSF: 9.5 ML
TROPONIN T SERPL-MCNC: 7 NG/L (ref 6–19)
TROPONIN T SERPL-MCNC: <6 NG/L (ref 6–19)
TUBE # CSF: 4
TUBE # CSF: 4
WBC # BLD AUTO: 7.1 K/UL (ref 4.8–10.8)
WBC # CSF: 167 CELLS/UL (ref 0–10)

## 2020-08-29 PROCEDURE — 83605 ASSAY OF LACTIC ACID: CPT

## 2020-08-29 PROCEDURE — 87205 SMEAR GRAM STAIN: CPT

## 2020-08-29 PROCEDURE — 87086 URINE CULTURE/COLONY COUNT: CPT

## 2020-08-29 PROCEDURE — 85025 COMPLETE CBC W/AUTO DIFF WBC: CPT

## 2020-08-29 PROCEDURE — 700117 HCHG RX CONTRAST REV CODE 255: Performed by: EMERGENCY MEDICINE

## 2020-08-29 PROCEDURE — 96376 TX/PRO/DX INJ SAME DRUG ADON: CPT

## 2020-08-29 PROCEDURE — 700105 HCHG RX REV CODE 258: Performed by: HOSPITALIST

## 2020-08-29 PROCEDURE — 99223 1ST HOSP IP/OBS HIGH 75: CPT | Performed by: HOSPITALIST

## 2020-08-29 PROCEDURE — 96375 TX/PRO/DX INJ NEW DRUG ADDON: CPT

## 2020-08-29 PROCEDURE — 80053 COMPREHEN METABOLIC PANEL: CPT

## 2020-08-29 PROCEDURE — 83690 ASSAY OF LIPASE: CPT

## 2020-08-29 PROCEDURE — 82438 ASSAY OTHER FLUID CHLORIDES: CPT

## 2020-08-29 PROCEDURE — 81003 URINALYSIS AUTO W/O SCOPE: CPT

## 2020-08-29 PROCEDURE — 96374 THER/PROPH/DIAG INJ IV PUSH: CPT

## 2020-08-29 PROCEDURE — 71275 CT ANGIOGRAPHY CHEST: CPT

## 2020-08-29 PROCEDURE — 70450 CT HEAD/BRAIN W/O DYE: CPT

## 2020-08-29 PROCEDURE — 87040 BLOOD CULTURE FOR BACTERIA: CPT

## 2020-08-29 PROCEDURE — 009U3ZX DRAINAGE OF SPINAL CANAL, PERCUTANEOUS APPROACH, DIAGNOSTIC: ICD-10-PCS | Performed by: HOSPITALIST

## 2020-08-29 PROCEDURE — 99285 EMERGENCY DEPT VISIT HI MDM: CPT

## 2020-08-29 PROCEDURE — 82945 GLUCOSE OTHER FLUID: CPT

## 2020-08-29 PROCEDURE — 84157 ASSAY OF PROTEIN OTHER: CPT

## 2020-08-29 PROCEDURE — C9803 HOPD COVID-19 SPEC COLLECT: HCPCS | Performed by: EMERGENCY MEDICINE

## 2020-08-29 PROCEDURE — 84484 ASSAY OF TROPONIN QUANT: CPT

## 2020-08-29 PROCEDURE — 87070 CULTURE OTHR SPECIMN AEROBIC: CPT

## 2020-08-29 PROCEDURE — 700111 HCHG RX REV CODE 636 W/ 250 OVERRIDE (IP): Performed by: HOSPITALIST

## 2020-08-29 PROCEDURE — 62270 DX LMBR SPI PNXR: CPT

## 2020-08-29 PROCEDURE — 93005 ELECTROCARDIOGRAM TRACING: CPT | Performed by: EMERGENCY MEDICINE

## 2020-08-29 PROCEDURE — 770020 HCHG ROOM/CARE - TELE (206)

## 2020-08-29 PROCEDURE — 89051 BODY FLUID CELL COUNT: CPT

## 2020-08-29 PROCEDURE — 700111 HCHG RX REV CODE 636 W/ 250 OVERRIDE (IP): Performed by: EMERGENCY MEDICINE

## 2020-08-29 PROCEDURE — 700111 HCHG RX REV CODE 636 W/ 250 OVERRIDE (IP)

## 2020-08-29 PROCEDURE — U0003 INFECTIOUS AGENT DETECTION BY NUCLEIC ACID (DNA OR RNA); SEVERE ACUTE RESPIRATORY SYNDROME CORONAVIRUS 2 (SARS-COV-2) (CORONAVIRUS DISEASE [COVID-19]), AMPLIFIED PROBE TECHNIQUE, MAKING USE OF HIGH THROUGHPUT TECHNOLOGIES AS DESCRIBED BY CMS-2020-01-R: HCPCS

## 2020-08-29 RX ORDER — OXYCODONE HYDROCHLORIDE 5 MG/1
2.5 TABLET ORAL
Status: DISCONTINUED | OUTPATIENT
Start: 2020-08-29 | End: 2020-08-31 | Stop reason: HOSPADM

## 2020-08-29 RX ORDER — OXYCODONE HYDROCHLORIDE 5 MG/1
5 TABLET ORAL
Status: DISCONTINUED | OUTPATIENT
Start: 2020-08-29 | End: 2020-08-31 | Stop reason: HOSPADM

## 2020-08-29 RX ORDER — MELOXICAM 7.5 MG/1
15 TABLET ORAL
Status: DISCONTINUED | OUTPATIENT
Start: 2020-08-30 | End: 2020-08-31 | Stop reason: HOSPADM

## 2020-08-29 RX ORDER — LABETALOL HYDROCHLORIDE 5 MG/ML
10 INJECTION, SOLUTION INTRAVENOUS EVERY 4 HOURS PRN
Status: DISCONTINUED | OUTPATIENT
Start: 2020-08-29 | End: 2020-08-31 | Stop reason: HOSPADM

## 2020-08-29 RX ORDER — PROMETHAZINE HYDROCHLORIDE 25 MG/1
12.5-25 TABLET ORAL EVERY 4 HOURS PRN
Status: DISCONTINUED | OUTPATIENT
Start: 2020-08-29 | End: 2020-08-31 | Stop reason: HOSPADM

## 2020-08-29 RX ORDER — MORPHINE SULFATE 4 MG/ML
4 INJECTION, SOLUTION INTRAMUSCULAR; INTRAVENOUS ONCE
Status: COMPLETED | OUTPATIENT
Start: 2020-08-29 | End: 2020-08-29

## 2020-08-29 RX ORDER — LIDOCAINE HYDROCHLORIDE 20 MG/ML
INJECTION, SOLUTION INFILTRATION; PERINEURAL
Status: COMPLETED
Start: 2020-08-29 | End: 2020-08-29

## 2020-08-29 RX ORDER — MORPHINE SULFATE 4 MG/ML
2 INJECTION, SOLUTION INTRAMUSCULAR; INTRAVENOUS
Status: DISCONTINUED | OUTPATIENT
Start: 2020-08-29 | End: 2020-08-31 | Stop reason: HOSPADM

## 2020-08-29 RX ORDER — ONDANSETRON 4 MG/1
4 TABLET, ORALLY DISINTEGRATING ORAL EVERY 4 HOURS PRN
Status: DISCONTINUED | OUTPATIENT
Start: 2020-08-29 | End: 2020-08-31 | Stop reason: HOSPADM

## 2020-08-29 RX ORDER — SODIUM CHLORIDE 9 MG/ML
INJECTION, SOLUTION INTRAVENOUS CONTINUOUS
Status: ACTIVE | OUTPATIENT
Start: 2020-08-29 | End: 2020-08-30

## 2020-08-29 RX ORDER — ONDANSETRON 2 MG/ML
4 INJECTION INTRAMUSCULAR; INTRAVENOUS ONCE
Status: COMPLETED | OUTPATIENT
Start: 2020-08-29 | End: 2020-08-29

## 2020-08-29 RX ORDER — PROCHLORPERAZINE EDISYLATE 5 MG/ML
5-10 INJECTION INTRAMUSCULAR; INTRAVENOUS EVERY 4 HOURS PRN
Status: DISCONTINUED | OUTPATIENT
Start: 2020-08-29 | End: 2020-08-31 | Stop reason: HOSPADM

## 2020-08-29 RX ORDER — SULFAMETHOXAZOLE AND TRIMETHOPRIM 800; 160 MG/1; MG/1
1 TABLET ORAL 2 TIMES DAILY
Status: ON HOLD | COMMUNITY
Start: 2020-08-28 | End: 2020-08-31

## 2020-08-29 RX ORDER — ACETAMINOPHEN 325 MG/1
650 TABLET ORAL EVERY 6 HOURS PRN
Status: DISCONTINUED | OUTPATIENT
Start: 2020-08-29 | End: 2020-08-31 | Stop reason: HOSPADM

## 2020-08-29 RX ORDER — TRAZODONE HYDROCHLORIDE 50 MG/1
50 TABLET ORAL NIGHTLY PRN
COMMUNITY
End: 2020-09-27

## 2020-08-29 RX ORDER — AMOXICILLIN 250 MG
2 CAPSULE ORAL 2 TIMES DAILY
Status: DISCONTINUED | OUTPATIENT
Start: 2020-08-29 | End: 2020-08-31 | Stop reason: HOSPADM

## 2020-08-29 RX ORDER — ACETAMINOPHEN 500 MG
1000 TABLET ORAL EVERY 6 HOURS PRN
COMMUNITY

## 2020-08-29 RX ORDER — MELOXICAM 15 MG/1
15 TABLET ORAL PRN
Status: ON HOLD | COMMUNITY
End: 2020-08-31

## 2020-08-29 RX ORDER — BISACODYL 10 MG
10 SUPPOSITORY, RECTAL RECTAL
Status: DISCONTINUED | OUTPATIENT
Start: 2020-08-29 | End: 2020-08-31 | Stop reason: HOSPADM

## 2020-08-29 RX ORDER — ONDANSETRON 2 MG/ML
4 INJECTION INTRAMUSCULAR; INTRAVENOUS EVERY 4 HOURS PRN
Status: DISCONTINUED | OUTPATIENT
Start: 2020-08-29 | End: 2020-08-31 | Stop reason: HOSPADM

## 2020-08-29 RX ORDER — POLYETHYLENE GLYCOL 3350 17 G/17G
1 POWDER, FOR SOLUTION ORAL
Status: DISCONTINUED | OUTPATIENT
Start: 2020-08-29 | End: 2020-08-31 | Stop reason: HOSPADM

## 2020-08-29 RX ORDER — TRAZODONE HYDROCHLORIDE 50 MG/1
50 TABLET ORAL NIGHTLY PRN
Status: DISCONTINUED | OUTPATIENT
Start: 2020-08-29 | End: 2020-08-31 | Stop reason: HOSPADM

## 2020-08-29 RX ORDER — PROMETHAZINE HYDROCHLORIDE 25 MG/1
12.5-25 SUPPOSITORY RECTAL EVERY 4 HOURS PRN
Status: DISCONTINUED | OUTPATIENT
Start: 2020-08-29 | End: 2020-08-31 | Stop reason: HOSPADM

## 2020-08-29 RX ADMIN — IOHEXOL 85 ML: 350 INJECTION, SOLUTION INTRAVENOUS at 14:35

## 2020-08-29 RX ADMIN — MORPHINE SULFATE 4 MG: 4 INJECTION INTRAVENOUS at 16:06

## 2020-08-29 RX ADMIN — SODIUM CHLORIDE: 9 INJECTION, SOLUTION INTRAVENOUS at 20:06

## 2020-08-29 RX ADMIN — PROCHLORPERAZINE EDISYLATE 10 MG: 5 INJECTION INTRAMUSCULAR; INTRAVENOUS at 20:26

## 2020-08-29 RX ADMIN — MORPHINE SULFATE 4 MG: 4 INJECTION INTRAVENOUS at 12:26

## 2020-08-29 RX ADMIN — ONDANSETRON 4 MG: 2 INJECTION INTRAMUSCULAR; INTRAVENOUS at 16:23

## 2020-08-29 RX ADMIN — CEFTRIAXONE SODIUM 2 G: 2 INJECTION, POWDER, FOR SOLUTION INTRAMUSCULAR; INTRAVENOUS at 20:07

## 2020-08-29 RX ADMIN — ACYCLOVIR SODIUM 593 MG: 500 INJECTION, POWDER, LYOPHILIZED, FOR SOLUTION INTRAVENOUS at 22:15

## 2020-08-29 RX ADMIN — ONDANSETRON HYDROCHLORIDE 4 MG: 2 SOLUTION INTRAMUSCULAR; INTRAVENOUS at 13:18

## 2020-08-29 RX ADMIN — ONDANSETRON 4 MG: 2 INJECTION INTRAMUSCULAR; INTRAVENOUS at 12:26

## 2020-08-29 ASSESSMENT — ENCOUNTER SYMPTOMS
VOMITING: 0
ABDOMINAL PAIN: 0
GASTROINTESTINAL NEGATIVE: 1
COUGH: 0
DOUBLE VISION: 0
WHEEZING: 0
PSYCHIATRIC NEGATIVE: 1
NERVOUS/ANXIOUS: 0
HEADACHES: 1
BACK PAIN: 1
LOSS OF CONSCIOUSNESS: 0
CHILLS: 0
FEVER: 0
PHOTOPHOBIA: 1
HEMOPTYSIS: 0
BLOOD IN STOOL: 0
HEARTBURN: 0
SHORTNESS OF BREATH: 1
BRUISES/BLEEDS EASILY: 0
NECK PAIN: 1
FOCAL WEAKNESS: 0
DIZZINESS: 0
CARDIOVASCULAR NEGATIVE: 1
DIARRHEA: 0
NAUSEA: 0
DIAPHORESIS: 0
SEIZURES: 0
CONSTIPATION: 0
PALPITATIONS: 0

## 2020-08-29 ASSESSMENT — LIFESTYLE VARIABLES
EVER_SMOKED: YES
HAVE YOU EVER FELT YOU SHOULD CUT DOWN ON YOUR DRINKING: NO
HOW MANY TIMES IN THE PAST YEAR HAVE YOU HAD 5 OR MORE DRINKS IN A DAY: 0
AVERAGE NUMBER OF DAYS PER WEEK YOU HAVE A DRINK CONTAINING ALCOHOL: 0
EVER_SMOKED: YES
ALCOHOL_USE: NO
CONSUMPTION TOTAL: NEGATIVE
EVER HAD A DRINK FIRST THING IN THE MORNING TO STEADY YOUR NERVES TO GET RID OF A HANGOVER: NO
ON A TYPICAL DAY WHEN YOU DRINK ALCOHOL HOW MANY DRINKS DO YOU HAVE: 0
TOTAL SCORE: 0
HAVE PEOPLE ANNOYED YOU BY CRITICIZING YOUR DRINKING: NO
TOTAL SCORE: 0
TOTAL SCORE: 0
EVER FELT BAD OR GUILTY ABOUT YOUR DRINKING: NO

## 2020-08-29 ASSESSMENT — COPD QUESTIONNAIRES
DO YOU EVER COUGH UP ANY MUCUS OR PHLEGM?: NO/ONLY WITH OCCASIONAL COLDS OR INFECTIONS
IN THE PAST 12 MONTHS DO YOU DO LESS THAN YOU USED TO BECAUSE OF YOUR BREATHING PROBLEMS: DISAGREE/UNSURE
HAVE YOU SMOKED AT LEAST 100 CIGARETTES IN YOUR ENTIRE LIFE: YES
DURING THE PAST 4 WEEKS HOW MUCH DID YOU FEEL SHORT OF BREATH: NONE/LITTLE OF THE TIME
COPD SCREENING SCORE: 4
HAVE YOU SMOKED AT LEAST 100 CIGARETTES IN YOUR ENTIRE LIFE: YES
DO YOU EVER COUGH UP ANY MUCUS OR PHLEGM?: NO/ONLY WITH OCCASIONAL COLDS OR INFECTIONS
COPD SCREENING SCORE: 4
DURING THE PAST 4 WEEKS HOW MUCH DID YOU FEEL SHORT OF BREATH: NONE/LITTLE OF THE TIME

## 2020-08-29 ASSESSMENT — COGNITIVE AND FUNCTIONAL STATUS - GENERAL
DAILY ACTIVITIY SCORE: 24
SUGGESTED CMS G CODE MODIFIER DAILY ACTIVITY: CH
MOBILITY SCORE: 24
SUGGESTED CMS G CODE MODIFIER MOBILITY: CH

## 2020-08-29 ASSESSMENT — PAIN DESCRIPTION - DESCRIPTORS: DESCRIPTORS: ACHING

## 2020-08-29 ASSESSMENT — FIBROSIS 4 INDEX
FIB4 SCORE: 1.16
FIB4 SCORE: 1.3
FIB4 SCORE: 1.16

## 2020-08-29 NOTE — ASSESSMENT & PLAN NOTE
S/p spinal tap  Spinal fluid with elevated protein and wbc  Acyclovir  Rocephin  Vancomycin  Csf culture result is pending  Gram stain is pending  Most likely viral  If viral only then vancomycin and rocephin can be dced  Head ct is negative

## 2020-08-29 NOTE — ED TRIAGE NOTES
"Pt was seen at [facility] yesterday.  She was evaluated for complaints of dysuria, urgency, and frequency.  She returns today complaining of escalating symptomatology.  Chief Complaint   Patient presents with   • Flank Pain   • Headache   • Neck Pain     /66   Pulse 80   Temp 36.4 °C (97.6 °F) (Temporal)   Resp 18   Ht 1.676 m (5' 6\")   Wt 107 kg (235 lb 14.3 oz)   LMP  (LMP Unknown)   SpO2 94%   BMI 38.07 kg/m²     "

## 2020-08-29 NOTE — PROGRESS NOTES
"Subjective:      Maria Teresa Haji is a 60 y.o. female who presents with Dysuria (x 2 days, 2 recent UTI's)    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\"   • Glaucoma    • Hemorrhoids    • Hives    • Meningitis    • Pneumonia    • Sinusitis      Social History     Socioeconomic History   • Marital status: Single     Spouse name: Not on file   • Number of children: Not on file   • Years of education: Not on file   • Highest education level: Not on file   Occupational History   • Not on file   Social Needs   • Financial resource strain: Not on file   • Food insecurity     Worry: Not on file     Inability: Not on file   • Transportation needs     Medical: Not on file     Non-medical: Not on file   Tobacco Use   • Smoking status: Former Smoker     Packs/day: 2.00     Years: 16.00     Pack years: 32.00     Quit date:      Years since quittin.6   • Smokeless tobacco: Never Used   Substance and Sexual Activity   • Alcohol use: Yes     Frequency: Monthly or less     Drinks per session: 1 or 2     Comment: 1 glass q 2mo   • Drug use: Yes     Types: Inhaled     Comment: Marijuana   • Sexual activity: Yes     Partners: Male   Lifestyle   • Physical activity     Days per week: Not on file     Minutes per session: Not on file   • Stress: Not on file   Relationships   • Social connections     Talks on phone: Not on file     Gets together: Not on file     Attends Evangelical service: Not on file     Active member of club or organization: Not on file     Attends meetings of clubs or organizations: Not on file     Relationship status: Not on file   • Intimate partner violence     Fear of current or ex partner: Not on file     Emotionally abused: Not on file     Physically abused: Not on file     Forced sexual activity: Not on file   Other Topics Concern   • Not " "on file   Social History Narrative   • Not on file     Family History   Problem Relation Age of Onset   • Arthritis Mother    • Cancer Mother         skin   • Anemia Mother    • Hypertension Mother    • Obesity Mother    • Other Mother         ulcer   • Cancer Father         skin, prostate, esophageal   • Hypertension Father    • Obesity Father    • Arthritis Brother    • Cancer Maternal Aunt         breast]       Allergies: Patient has no known allergies.    Patient is a 60-year-old female who presents today with complaint of dysuria, urgency, and frequency.  Symptoms started over the last 2 days.  States she has had 2 urinary tract infections previous to this very recently.  Patient states with onset of those UTIs she felt fatigue and malaise with headache.  States that she is having the symptoms again today, however she notes that her headache today is much worse than it had been previously.  Vital signs were reviewed, all vital signs are within normal limits.  Patient denies any history of migraines.          Dysuria   This is a new problem. The current episode started in the past 7 days. The problem occurs every urination. The problem has been unchanged. The quality of the pain is described as aching. The pain is moderate. There has been no fever. Associated symptoms include frequency and urgency. Pertinent negatives include no chills, flank pain or hematuria. She has tried nothing for the symptoms. The treatment provided no relief.       Review of Systems   Constitutional: Negative for chills, fever and malaise/fatigue.   Genitourinary: Positive for dysuria, frequency and urgency. Negative for flank pain and hematuria.   All other systems reviewed and are negative.         Objective:     /84   Pulse 71   Temp 36.7 °C (98 °F)   Resp 18   Ht 1.676 m (5' 6\")   Wt 104.3 kg (230 lb)   LMP  (LMP Unknown)   SpO2 97%   BMI 37.12 kg/m²      Physical Exam  Vitals signs reviewed.   Constitutional:       " Appearance: Normal appearance.   HENT:      Head: Normocephalic and atraumatic.      Right Ear: Tympanic membrane, ear canal and external ear normal.      Left Ear: Tympanic membrane, ear canal and external ear normal.      Nose: Nose normal.      Mouth/Throat:      Mouth: Mucous membranes are moist.   Eyes:      General: No visual field deficit or scleral icterus.     Extraocular Movements: Extraocular movements intact.      Conjunctiva/sclera: Conjunctivae normal.      Pupils: Pupils are equal, round, and reactive to light.   Neck:      Musculoskeletal: Normal range of motion and neck supple.   Cardiovascular:      Rate and Rhythm: Normal rate and regular rhythm.      Heart sounds: Normal heart sounds.   Pulmonary:      Effort: Pulmonary effort is normal.      Breath sounds: Normal breath sounds.   Abdominal:      General: Abdomen is flat.      Tenderness: There is abdominal tenderness. There is no right CVA tenderness, left CVA tenderness, guarding or rebound.   Musculoskeletal: Normal range of motion.   Skin:     General: Skin is warm and dry.   Neurological:      General: No focal deficit present.      Mental Status: She is alert and oriented to person, place, and time.      GCS: GCS eye subscore is 4. GCS verbal subscore is 5. GCS motor subscore is 6.      Cranial Nerves: No cranial nerve deficit, dysarthria or facial asymmetry.      Sensory: Sensation is intact. No sensory deficit.      Motor: Motor function is intact. No weakness, tremor, atrophy, abnormal muscle tone, seizure activity or pronator drift.      Coordination: Coordination is intact. Romberg sign negative. Coordination normal. Finger-Nose-Finger Test and Heel to Shin Test normal. Rapid alternating movements normal.      Gait: Gait is intact. Gait normal.      Deep Tendon Reflexes: Reflexes normal.      Comments: Cranial nerves II through XII intact.  Cerebellar function intact.  Motor coordination intact.  Pupils equally round and reactive, EOMs  intact.  Facial features symmetric with equal movement.  Speech is clear and logical.  Shoulder shrug equal.   5/5 and equal in the upper extremities.  Strength 5/5 and equal in lower extremities.  Proprioception intact.  Sensation intact.  Gait is even and steady.  Patient is awake, alert, and oriented x3.   Psychiatric:         Mood and Affect: Mood normal.         Behavior: Behavior normal.         Thought Content: Thought content normal.         Judgment: Judgment normal.       UA: Small amount of leukocytes    Patient reports moderate subjective relief with Toradol injection.          Assessment/Plan:   Cystitis  Headache, unspecified    Toradol IM  Bactrim ds  Push fluids  Urine culture  Referral given to urology at patient's request  ER precautions for flank pain, fever greater than 100.5, body aches/chills or flulike symptoms, or increasing headache     There are no diagnoses linked to this encounter.

## 2020-08-29 NOTE — ED PROVIDER NOTES
ED Provider Note    CHIEF COMPLAINT  Chief Complaint   Patient presents with   • Flank Pain   • Headache   • Neck Pain       HPI  Maria Teresa Haji is a 60 y.o. female here for evaluation of dysuria, urgency frequency, lower back pain, as of breath, and headache.  Patient states that this been ongoing over the last 2 to 3 days.  And she states is not getting better.  She went to see urgent care who diagnosed her with a urinary tract infection placed on Bactrim.  She has had 2 doses prior to today.  Patient states that she still has the symptoms, and they are not getting any better.  She was also given some Toradol yesterday at the urgent care for headache, and states that this did relieve her symptoms initially but the headache did return.  She has no fever or chills, she has no strange rashes, she does have some shortness of breath, and some mild  chest heaviness.        ROS  See HPI for further details, o/w negative.     PAST MEDICAL HISTORY   has a past medical history of Arthritis, Back pain, Blood transfusion without reported diagnosis, Bronchitis, Cancer (HCC), Glaucoma, Hemorrhoids, Hives, Meningitis, Pneumonia, and Sinusitis.    SOCIAL HISTORY  Social History     Tobacco Use   • Smoking status: Former Smoker     Packs/day: 2.00     Years: 16.00     Pack years: 32.00     Quit date:      Years since quittin.6   • Smokeless tobacco: Never Used   Substance and Sexual Activity   • Alcohol use: Yes     Frequency: Monthly or less     Drinks per session: 1 or 2     Comment: rARELY   • Drug use: Yes     Types: Inhaled     Comment: Marijuana   • Sexual activity: Yes     Partners: Male       Family History  No bleeding disorders     SURGICAL HISTORY   has a past surgical history that includes other orthopedic surgery; appendectomy; abdominal exploration; other; tonsillectomy; other; lumpectomy; gyn surgery; and knee arthroplasty total (Bilateral, 2015, 2013).    CURRENT MEDICATIONS  Home Medications    **Home  medications have not yet been reviewed for this encounter**         ALLERGIES  No Known Allergies    REVIEW OF SYSTEMS  See HPI for further details. Review of systems as above, otherwise all other systems are negative.     PHYSICAL EXAM  Constitutional: Well developed, well nourished. No acute distress.  HEENT: Normocephalic, atraumatic. Posterior pharynx clear and moist.  Eyes:  EOMI. Normal sclera.  Neck: Supple, Full range of motion, nontender.  Chest/Pulmonary: clear to ausculation. Symmetrical expansion.   Cardio: Regular rate and rhythm with no murmur.   Abdomen: Soft, nontender. No peritoneal signs. No guarding. No palpable masses.  Back: No CVA tenderness, nontender midline, no step offs.  Musculoskeletal: No deformity, no edema, neurovascular intact.   Neuro: Clear speech, appropriate, cooperative, cranial nerves II-XII grossly intact.  Psych: Normal mood and affect      Results for orders placed or performed during the hospital encounter of 08/29/20   URINALYSIS,CULTURE IF INDICATED    Specimen: Urine   Result Value Ref Range    Color Yellow     Character Clear     Specific Gravity 1.010 <1.035    Ph 6.0 5.0 - 8.0    Glucose Negative Negative mg/dL    Ketones Negative Negative mg/dL    Protein Negative Negative mg/dL    Bilirubin Negative Negative    Nitrite Negative Negative    Leukocyte Esterase Negative Negative    Occult Blood Negative Negative    Micro Urine Req see below    CBC WITH DIFFERENTIAL   Result Value Ref Range    WBC 7.1 4.8 - 10.8 K/uL    RBC 4.92 4.20 - 5.40 M/uL    Hemoglobin 15.4 12.0 - 16.0 g/dL    Hematocrit 44.6 37.0 - 47.0 %    MCV 90.7 81.4 - 97.8 fL    MCH 31.3 27.0 - 33.0 pg    MCHC 34.5 33.6 - 35.0 g/dL    RDW 40.4 35.9 - 50.0 fL    Platelet Count 232 164 - 446 K/uL    MPV 8.9 (L) 9.0 - 12.9 fL    Neutrophils-Polys 71.80 44.00 - 72.00 %    Lymphocytes 20.50 (L) 22.00 - 41.00 %    Monocytes 5.90 0.00 - 13.40 %    Eosinophils 1.40 0.00 - 6.90 %    Basophils 0.10 0.00 - 1.80 %     Immature Granulocytes 0.30 0.00 - 0.90 %    Nucleated RBC 0.00 /100 WBC    Neutrophils (Absolute) 5.10 2.00 - 7.15 K/uL    Lymphs (Absolute) 1.46 1.00 - 4.80 K/uL    Monos (Absolute) 0.42 0.00 - 0.85 K/uL    Eos (Absolute) 0.10 0.00 - 0.51 K/uL    Baso (Absolute) 0.01 0.00 - 0.12 K/uL    Immature Granulocytes (abs) 0.02 0.00 - 0.11 K/uL    NRBC (Absolute) 0.00 K/uL   COMP METABOLIC PANEL   Result Value Ref Range    Sodium 134 (L) 135 - 145 mmol/L    Potassium 4.5 3.6 - 5.5 mmol/L    Chloride 103 96 - 112 mmol/L    Co2 20 20 - 33 mmol/L    Anion Gap 11.0 7.0 - 16.0    Glucose 105 (H) 65 - 99 mg/dL    Bun 16 8 - 22 mg/dL    Creatinine 1.11 0.50 - 1.40 mg/dL    Calcium 9.4 8.4 - 10.2 mg/dL    AST(SGOT) 30 12 - 45 U/L    ALT(SGPT) 46 2 - 50 U/L    Alkaline Phosphatase 56 30 - 99 U/L    Total Bilirubin 0.4 0.1 - 1.5 mg/dL    Albumin 4.6 3.2 - 4.9 g/dL    Total Protein 7.7 6.0 - 8.2 g/dL    Globulin 3.1 1.9 - 3.5 g/dL    A-G Ratio 1.5 g/dL   LIPASE   Result Value Ref Range    Lipase 48 7 - 58 U/L   TROPONIN   Result Value Ref Range    Troponin T 7 6 - 19 ng/L   COVID/SARS CoV-2 PCR    Specimen: Nasopharyngeal; Respirate   Result Value Ref Range    COVID Order Status Received    ESTIMATED GFR   Result Value Ref Range    GFR If African American >60 >60 mL/min/1.73 m 2    GFR If Non African American 50 (A) >60 mL/min/1.73 m 2   SARS-CoV-2, PCR (In-House)   Result Value Ref Range    SARS-CoV-2 Source NP Swab    EKG   Result Value Ref Range    Report       [facility] Emergency Dept.    Test Date:  2020  Pt Name:    EDD BREWER                  Department: [facility]  MRN:        [id number]                      [facility]  Gender:     Female                       Technician: FANNY  :        [id number]                   Requested By:ANNIKA ROSSI  Order #:    [id number]                    Reading MD:    Measurements  Intervals                                Axis  Rate:       82                   "         P:          9  NV:         134                          QRS:        9  QRSD:       103                          T:          6  QT:         395  QTc:        462    Interpretive Statements  Sinus rhythm  Ventricular trigeminy  Low voltage, precordial leads  RSR' in V1 or V2, right VCD or RVH  No previous ECG available for comparison       CT-CTA CHEST PULMONARY ARTERY W/ RECONS   Final Result      1.  There is no CT evidence of acute pulmonary embolism.   2.  There are bilateral peripheral lung nodules, the largest in the left lower lobe which is unchanged compared with the prior CT of 8/6/2019, probably postinflammatory.   3.  There is no evidence of pneumonia or pleural effusion.              Ekg;  nsr 82.  Trigeminy. No st elevation, no st depression, qtc 462.  Pr 134.     PROCEDURES     MEDICAL RECORD  I have reviewed patient's medical record and pertinent results are listed.    COURSE & MEDICAL DECISION MAKING  I have reviewed any medical record information, laboratory studies and radiographic results as noted above.    3:56 PM  Dr. Mabry will admit the pt. for further evaluation, the patient states that she is feeling better.  She does feel better with oxygen on as well.  She states that she has some mild headache still, and we discussed her headache and back pain.  She has a history of viral meningitis in the past, but states that at that time it was \"10 times worse than her symptoms today.\"  I explained her the only way to really tell whether something would be bacterial viable would be to do a lumbar puncture.  She has declined at this time, and states that she would further wait and see how she does.  She has not had any fevers over the last couple of days, and no petechial rashes.  Will let the hospitalist know.    FINAL IMPRESSION  Hypoxia  Headache       Electronically signed by: Darrell Bernard D.O., 8/29/2020 12:06 PM        "

## 2020-08-29 NOTE — H&P
Hospital Medicine History & Physical Note    Date of Service  8/29/2020    Primary Care Physician  Valeria Childs M.D.    Consultants  None    Code Status  Full Code    Chief Complaint  Chief Complaint   Patient presents with   • Flank Pain   • Headache   • Neck Pain       History of Presenting Illness  60 y.o. female who presented 8/29/2020 with a conglomerate of symptoms that began on Wednesday.  Initially this patient states that she started to have a headache with mild fevers.  She just did not feel good lost her appetite started to have generalized body aches.  She thought she could weather it out but by Friday she got so severe she went to the urgent care where they put her on Bactrim for a urinary tract infection.  This morning when she woke up she expected to be better but as in the past she has had multiple urinary tract infections and every time she improved relatively quickly with antibiotics.  This morning however she felt worse.  Her headache is much worse she is also having some neck stiffness and photophobia, she also has back pain that goes all the way down to her tailbone.  The patient also is has some nuchal rigidity.  At this point a CT of the head will be obtained to make sure she does not have meningitis.  I have also offered to do a lumbar puncture on the patient she so far has refused.  The patient is complaining of shortness of breath with clear chest x-ray and no history of heart disease no history of COPD the patient does not at this point have severe respiratory failure but is requiring oxygen to support her which she has never required in the past.  She did smoke for about 20 years but quit 25 years ago.  The patient at this point is also concerning for possible acute coronary syndrome thus we will run serial troponins as well as get an echocardiogram.  She does have obesity and with her shortness of breath this potentially she may have an underlying cardiac condition.  Her EKG is  normal sinus rhythm without any Q waves or signs of ischemia.  With conglomerate of symptoms patient will be admitted to the hospital for at least a 2 nights to evaluate her condition and treat her urinary tract infection with IV Rocephin since meningitis is a potential possibility I will also put her on Rocephin 2 g every 12 hours.  The patient will be given as well pain management for her headache as well as general body aches.  We will also give her Tylenol to try to control her pain.    Review of Systems  Review of Systems   Constitutional: Positive for malaise/fatigue. Negative for chills, diaphoresis and fever.   HENT: Negative.    Eyes: Positive for photophobia. Negative for double vision.   Respiratory: Positive for shortness of breath. Negative for cough, hemoptysis and wheezing.    Cardiovascular: Negative.  Negative for chest pain, palpitations and leg swelling.   Gastrointestinal: Negative.  Negative for abdominal pain, blood in stool, constipation, diarrhea, heartburn, nausea and vomiting.   Genitourinary: Negative.  Negative for frequency, hematuria and urgency.   Musculoskeletal: Positive for back pain and neck pain. Negative for joint pain.   Skin: Negative.  Negative for itching and rash.   Neurological: Positive for headaches. Negative for dizziness, focal weakness, seizures and loss of consciousness.   Endo/Heme/Allergies: Negative.  Does not bruise/bleed easily.   Psychiatric/Behavioral: Negative.  Negative for suicidal ideas. The patient is not nervous/anxious.        Past Medical History   has a past medical history of Arthritis, Back pain, Blood transfusion without reported diagnosis, Bronchitis, Cancer (HCC), Glaucoma, Hemorrhoids, Hives, Meningitis, Pneumonia, and Sinusitis.    Surgical History   has a past surgical history that includes other orthopedic surgery; appendectomy; abdominal exploration; other; tonsillectomy; other; lumpectomy; gyn surgery; and knee arthroplasty total (Bilateral,  2015, 2013).     Family History  family history includes Anemia in her mother; Arthritis in her brother and mother; Cancer in her father, maternal aunt, and mother; Hypertension in her father and mother; Obesity in her father and mother; Other in her mother.     Social History   reports that she quit smoking about 27 years ago. She has a 32.00 pack-year smoking history. She has never used smokeless tobacco. She reports current alcohol use. She reports current drug use. Drug: Inhaled.    Allergies  No Known Allergies    Medications  Prior to Admission Medications   Prescriptions Last Dose Informant Patient Reported? Taking?   Cyanocobalamin (B-12 PO) > 3 days at AM Patient Yes Yes   Sig: Take 1 Tab by mouth every day.   acetaminophen (TYLENOL) 500 MG Tab 8/28/2020 at 0900 Patient Yes Yes   Sig: Take 1,000 mg by mouth every 6 hours as needed for Moderate Pain.   meloxicam (MOBIC) 15 MG tablet 8/29/2020 at 0400 Patient Yes Yes   Sig: Take 15 mg by mouth as needed (joint pain).   omeprazole (PRILOSEC) 40 MG delayed-release capsule > 2 days at Unknown Patient Yes No   Sig: Take 40 mg by mouth as needed. Indications: Heartburn   spironolactone (ALDACTONE) 50 MG Tab > 3 days at AM Patient Yes No   Sig: Take 50 mg by mouth as needed (For swelling).   sulfamethoxazole-trimethoprim (BACTRIM DS) 800-160 MG tablet 8/29/2020 at 0400 Patient Yes Yes   Sig: Take 1 Tab by mouth 2 times a day. Pt started on 8/28/2020 for 5 day course.   therapeutic multivitamin-minerals (THERAGRAN-M) Tab > 3 days at AM Patient Yes No   Sig: Take 1 Tab by mouth every day.   traZODone (DESYREL) 50 MG Tab 8/29/2020 at 0400 Patient Yes Yes   Sig: Take 50 mg by mouth at bedtime as needed for Sleep.      Facility-Administered Medications: None       Physical Exam  Temp:  [36.4 °C (97.6 °F)] 36.4 °C (97.6 °F)  Pulse:  [69-91] 73  Resp:  [15-19] 19  BP: (109-128)/(58-78) 114/58  SpO2:  [81 %-98 %] 98 %    Physical Exam  Vitals signs and nursing note  reviewed. Exam conducted with a chaperone present.   Constitutional:       Appearance: She is well-developed. She is obese. She is ill-appearing and diaphoretic.   HENT:      Head: Normocephalic and atraumatic.      Right Ear: External ear normal.      Left Ear: External ear normal.      Nose: Nose normal.      Mouth/Throat:      Mouth: Mucous membranes are dry.      Pharynx: Oropharynx is clear.   Eyes:      Extraocular Movements: Extraocular movements intact.      Conjunctiva/sclera: Conjunctivae normal.      Pupils: Pupils are equal, round, and reactive to light.   Neck:      Musculoskeletal: Normal range of motion. Neck rigidity and muscular tenderness present.      Thyroid: No thyromegaly.      Vascular: No carotid bruit or JVD.   Cardiovascular:      Rate and Rhythm: Normal rate and regular rhythm.      Pulses: Normal pulses.      Heart sounds: Normal heart sounds.   Pulmonary:      Effort: Pulmonary effort is normal.      Breath sounds: Normal breath sounds.   Chest:      Chest wall: No tenderness.   Abdominal:      General: Abdomen is flat. Bowel sounds are normal. There is no distension.      Palpations: Abdomen is soft. There is no mass.      Tenderness: There is no abdominal tenderness. There is no guarding or rebound.   Musculoskeletal: Normal range of motion.      Right lower leg: No edema.      Left lower leg: No edema.   Lymphadenopathy:      Cervical: No cervical adenopathy.   Skin:     General: Skin is warm.      Capillary Refill: Capillary refill takes 2 to 3 seconds.      Findings: No rash.   Neurological:      General: No focal deficit present.      Mental Status: She is alert and oriented to person, place, and time. Mental status is at baseline.      GCS: GCS eye subscore is 4. GCS verbal subscore is 5. GCS motor subscore is 6.      Cranial Nerves: No cranial nerve deficit.      Sensory: Sensation is intact.      Motor: Motor function is intact.      Coordination: Coordination is intact.       Deep Tendon Reflexes:      Reflex Scores:       Patellar reflexes are 0 on the right side and 0 on the left side.       Achilles reflexes are 2+ on the right side and 2+ on the left side.     Comments: Has had previous bilateral knee replacement dose reflexes are not apparent on her patellar reflexes   Psychiatric:         Attention and Perception: Attention normal.         Mood and Affect: Mood is anxious.         Speech: Speech normal.         Behavior: Behavior is slowed.         Thought Content: Thought content normal.         Cognition and Memory: Cognition and memory normal.         Judgment: Judgment normal.         Laboratory:  Recent Labs     08/29/20  1210   WBC 7.1   RBC 4.92   HEMOGLOBIN 15.4   HEMATOCRIT 44.6   MCV 90.7   MCH 31.3   MCHC 34.5   RDW 40.4   PLATELETCT 232   MPV 8.9*     Recent Labs     08/29/20  1210   SODIUM 134*   POTASSIUM 4.5   CHLORIDE 103   CO2 20   GLUCOSE 105*   BUN 16   CREATININE 1.11   CALCIUM 9.4     Recent Labs     08/29/20  1210   ALTSGPT 46   ASTSGOT 30   ALKPHOSPHAT 56   TBILIRUBIN 0.4   LIPASE 48   GLUCOSE 105*         No results for input(s): NTPROBNP in the last 72 hours.      Recent Labs     08/29/20  1210   TROPONINT 7       Imaging:  CT-CTA CHEST PULMONARY ARTERY W/ RECONS   Final Result      1.  There is no CT evidence of acute pulmonary embolism.   2.  There are bilateral peripheral lung nodules, the largest in the left lower lobe which is unchanged compared with the prior CT of 8/6/2019, probably postinflammatory.   3.  There is no evidence of pneumonia or pleural effusion.            CT-HEAD W/O    (Results Pending)         Assessment/Plan:  I anticipate this patient will require at least two midnights for appropriate medical management, necessitating inpatient admission.    Nuchal rigidity  Assessment & Plan  Patient is complaining of nuchal rigidity with a severe headache since Wednesday.  Apparently the patient has had a viral meningitis about 30 years ago.   The patient does have photophobia as well as nausea.  The patient at this point will need a stat CT of the head to evaluate for possible meningitis.  Patient would benefit from a lumbar puncture however she is refusing.  Patient will be placed on IV Rocephin.    Acute respiratory failure with hypoxia (HCC)  Assessment & Plan  Patient comes in with shortness of breath and she is found to be only 81% on room air.  The patient at this point will be placed on oxygen to keep oxygen saturations above 90%.  RT protocol  Nebulizers as needed.  CT of the chest was done which is negative for pulmonary embolism  No discernible infiltrates noted.  The patient did smoke 25 years ago for about 20 years.  She has no discernible COPD.  Patient does have pulmonary nodules which have not changed in nature since the last exam.  Get an echocardiogram and serial troponins as it may be secondary to cardiac event, EKG is normal sinus rhythm normal rate and no ischemia or heart attack    UTI (urinary tract infection)  Assessment & Plan  Patient has had a urinary tract infection since Wednesday.  She was finally started on Bactrim yesterday apparently the Bactrim just made her feel worse.  At this point we will start her on IV Rocephin blood cultures and urine cultures have been sent    Pulmonary nodule- (present on admission)  Assessment & Plan  History of pulmonary nodule continue to follow as an outpatient    BMI 38.0-38.9,adult- (present on admission)  Assessment & Plan  Body mass index is 38.07 kg/m².  Outpatient weight loss management program    History of tobacco use- (present on admission)  Assessment & Plan  History of tobacco use 25 years ago she smoked for about 20 years.  Has not relapsed since    Gastroesophageal reflux disease without esophagitis- (present on admission)  Assessment & Plan  Usually uses omeprazole as needed    Glaucoma- (present on admission)  Assessment & Plan  History of glaucoma currently does not use any  eyedrops for

## 2020-08-29 NOTE — ED NOTES
Pt. Placed on 2L NC at this time. States she does not wear CPAP at night or home O2. Denies worsening of SOB at this time. Dr. Bernard aware and orders for CTA.

## 2020-08-29 NOTE — ED NOTES
Med rec updated and complete  Allergies reviewed  Interviewed pt with partner at bedside with permission from pt.

## 2020-08-29 NOTE — ASSESSMENT & PLAN NOTE
Patient comes in with shortness of breath and she is found to be only 81% on room air.  The patient at this point will be placed on oxygen to keep oxygen saturations above 90%.  RT protocol  Nebulizers as needed.  CT of the chest was done which is negative for pulmonary embolism  No discernible infiltrates noted.  The patient did smoke 25 years ago for about 20 years.  She has no discernible COPD.  Patient does have pulmonary nodules which have not changed in nature since the last exam.   echocardiogram is pending  trops are negative  Most likely 2nd to underlying lung dz  Has improved

## 2020-08-30 ENCOUNTER — APPOINTMENT (OUTPATIENT)
Dept: CARDIOLOGY | Facility: MEDICAL CENTER | Age: 60
DRG: 097 | End: 2020-08-30
Attending: HOSPITALIST
Payer: COMMERCIAL

## 2020-08-30 LAB
ANION GAP SERPL CALC-SCNC: 10 MMOL/L (ref 7–16)
BUN SERPL-MCNC: 13 MG/DL (ref 8–22)
CALCIUM SERPL-MCNC: 8.5 MG/DL (ref 8.4–10.2)
CHLORIDE SERPL-SCNC: 101 MMOL/L (ref 96–112)
CO2 SERPL-SCNC: 21 MMOL/L (ref 20–33)
CREAT SERPL-MCNC: 0.85 MG/DL (ref 0.5–1.4)
ERYTHROCYTE [DISTWIDTH] IN BLOOD BY AUTOMATED COUNT: 42.5 FL (ref 35.9–50)
GLUCOSE SERPL-MCNC: 116 MG/DL (ref 65–99)
HCT VFR BLD AUTO: 41 % (ref 37–47)
HGB BLD-MCNC: 13.8 G/DL (ref 12–16)
LV EJECT FRACT  99904: 65
LV EJECT FRACT MOD 2C 99903: 59.85
LV EJECT FRACT MOD 4C 99902: 74.47
LV EJECT FRACT MOD BP 99901: 68.86
MCH RBC QN AUTO: 31.2 PG (ref 27–33)
MCHC RBC AUTO-ENTMCNC: 33.7 G/DL (ref 33.6–35)
MCV RBC AUTO: 92.8 FL (ref 81.4–97.8)
PLATELET # BLD AUTO: 200 K/UL (ref 164–446)
PMV BLD AUTO: 9.3 FL (ref 9–12.9)
POTASSIUM SERPL-SCNC: 4.2 MMOL/L (ref 3.6–5.5)
RBC # BLD AUTO: 4.42 M/UL (ref 4.2–5.4)
SODIUM SERPL-SCNC: 132 MMOL/L (ref 135–145)
TROPONIN T SERPL-MCNC: 7 NG/L (ref 6–19)
TROPONIN T SERPL-MCNC: 8 NG/L (ref 6–19)
WBC # BLD AUTO: 7.2 K/UL (ref 4.8–10.8)

## 2020-08-30 PROCEDURE — 700105 HCHG RX REV CODE 258: Performed by: HOSPITALIST

## 2020-08-30 PROCEDURE — 84484 ASSAY OF TROPONIN QUANT: CPT

## 2020-08-30 PROCEDURE — A9270 NON-COVERED ITEM OR SERVICE: HCPCS | Performed by: HOSPITALIST

## 2020-08-30 PROCEDURE — 700111 HCHG RX REV CODE 636 W/ 250 OVERRIDE (IP): Performed by: FAMILY MEDICINE

## 2020-08-30 PROCEDURE — 85027 COMPLETE CBC AUTOMATED: CPT

## 2020-08-30 PROCEDURE — 93306 TTE W/DOPPLER COMPLETE: CPT

## 2020-08-30 PROCEDURE — 93306 TTE W/DOPPLER COMPLETE: CPT | Mod: 26 | Performed by: INTERNAL MEDICINE

## 2020-08-30 PROCEDURE — 700105 HCHG RX REV CODE 258: Performed by: FAMILY MEDICINE

## 2020-08-30 PROCEDURE — 80048 BASIC METABOLIC PNL TOTAL CA: CPT

## 2020-08-30 PROCEDURE — 99232 SBSQ HOSP IP/OBS MODERATE 35: CPT | Performed by: FAMILY MEDICINE

## 2020-08-30 PROCEDURE — 770021 HCHG ROOM/CARE - ISO PRIVATE

## 2020-08-30 PROCEDURE — 700102 HCHG RX REV CODE 250 W/ 637 OVERRIDE(OP): Performed by: HOSPITALIST

## 2020-08-30 PROCEDURE — 700111 HCHG RX REV CODE 636 W/ 250 OVERRIDE (IP): Performed by: HOSPITALIST

## 2020-08-30 PROCEDURE — 87040 BLOOD CULTURE FOR BACTERIA: CPT

## 2020-08-30 RX ORDER — DEXAMETHASONE SODIUM PHOSPHATE 4 MG/ML
4 INJECTION, SOLUTION INTRA-ARTICULAR; INTRALESIONAL; INTRAMUSCULAR; INTRAVENOUS; SOFT TISSUE ONCE
Status: COMPLETED | OUTPATIENT
Start: 2020-08-30 | End: 2020-08-30

## 2020-08-30 RX ADMIN — ACYCLOVIR SODIUM 600 MG: 500 INJECTION, SOLUTION INTRAVENOUS at 07:05

## 2020-08-30 RX ADMIN — ACYCLOVIR SODIUM 600 MG: 500 INJECTION, SOLUTION INTRAVENOUS at 15:00

## 2020-08-30 RX ADMIN — SENNOSIDES-DOCUSATE SODIUM TAB 8.6-50 MG 2 TABLET: 8.6-5 TAB at 17:22

## 2020-08-30 RX ADMIN — ONDANSETRON 4 MG: 2 INJECTION INTRAMUSCULAR; INTRAVENOUS at 08:50

## 2020-08-30 RX ADMIN — CEFTRIAXONE SODIUM 2 G: 2 INJECTION, POWDER, FOR SOLUTION INTRAMUSCULAR; INTRAVENOUS at 17:23

## 2020-08-30 RX ADMIN — ONDANSETRON 4 MG: 2 INJECTION INTRAMUSCULAR; INTRAVENOUS at 16:37

## 2020-08-30 RX ADMIN — CEFTRIAXONE SODIUM 2 G: 2 INJECTION, POWDER, FOR SOLUTION INTRAMUSCULAR; INTRAVENOUS at 06:27

## 2020-08-30 RX ADMIN — ENOXAPARIN SODIUM 40 MG: 40 INJECTION SUBCUTANEOUS at 12:17

## 2020-08-30 RX ADMIN — VANCOMYCIN HYDROCHLORIDE 2750 MG: 500 INJECTION, POWDER, LYOPHILIZED, FOR SOLUTION INTRAVENOUS at 12:17

## 2020-08-30 RX ADMIN — ACYCLOVIR SODIUM 600 MG: 500 INJECTION, SOLUTION INTRAVENOUS at 21:00

## 2020-08-30 RX ADMIN — DEXAMETHASONE SODIUM PHOSPHATE 4 MG: 4 INJECTION, SOLUTION INTRA-ARTICULAR; INTRALESIONAL; INTRAMUSCULAR; INTRAVENOUS; SOFT TISSUE at 17:22

## 2020-08-30 RX ADMIN — OXYCODONE HYDROCHLORIDE 5 MG: 5 TABLET ORAL at 16:37

## 2020-08-30 RX ADMIN — OXYCODONE HYDROCHLORIDE 5 MG: 5 TABLET ORAL at 08:50

## 2020-08-30 ASSESSMENT — ENCOUNTER SYMPTOMS
BLURRED VISION: 0
NAUSEA: 0
WEIGHT LOSS: 0
BACK PAIN: 0
CHILLS: 0
HEARTBURN: 0
DIZZINESS: 0
HEADACHES: 0
NECK PAIN: 0
ORTHOPNEA: 0
DOUBLE VISION: 0
PALPITATIONS: 0
FEVER: 0
VOMITING: 0
MYALGIAS: 0
COUGH: 0
PHOTOPHOBIA: 0
TINGLING: 0
HEMOPTYSIS: 0
SPUTUM PRODUCTION: 0

## 2020-08-30 NOTE — CARE PLAN
Problem: Safety  Goal: Will remain free from injury  Outcome: PROGRESSING AS EXPECTED  Note: Remind patient to use call light and provide assistance. Bed in low position, bed locked, and appropriate alarms set. Patient wearing non-slip socks. Call light and personal belongings are within reach.     Problem: Knowledge Deficit  Goal: Knowledge of disease process/condition, treatment plan, diagnostic tests, and medications will improve  Outcome: PROGRESSING AS EXPECTED  Note: Encourage patient to ask questions and be involved in plan of care. Provide education on treatment plan, diagnostic testing, and medications; have patient verbalize understanding.

## 2020-08-30 NOTE — PROGRESS NOTES
Called to room by the pt. She reports feeling flushed and her face hot. Her face is pink, denies any respiratory symptoms, no rashes on body or hives. ate slowed, she states mild improvement in symptoms.  Called pharmacy who contacted Hospitalist. Vanco stopped. Steroid ordered.

## 2020-08-30 NOTE — PROGRESS NOTES
IV infiltrated. Right AC red and itchy and painful. IV removed. Pt reports pain has stopped but still itchy. Pt hard stick. PICC RN to help with US placed IV.

## 2020-08-30 NOTE — PROGRESS NOTES
[facility] Medicine Daily Progress Note    Date of Service  8/30/2020    Chief Complaint  60 y.o. female admitted 8/29/2020 with nuchal rigidness    Hospital Course    60 y.o. female who presented 8/29/2020 with a conglomerate of symptoms that began on Wednesday.  Initially this patient states that she started to have a headache with mild fevers.  She just did not feel good lost her appetite started to have generalized body aches.  She thought she could weather it out but by Friday she got so severe she went to the urgent care where they put her on Bactrim for a urinary tract infection.  This morning when she woke up she expected to be better but as in the past she has had multiple urinary tract infections and every time she improved relatively quickly with antibiotics.  This morning however she felt worse.  Her headache is much worse she is also having some neck stiffness and photophobia, she also has back pain that goes all the way down to her tailbone.  The patient also is has some nuchal rigidity.  At this point a CT of the head will be obtained to make sure she does not have meningitis.  I have also offered to do a lumbar puncture on the patient she so far has refused.  The patient is complaining of shortness of breath with clear chest x-ray and no history of heart disease no history of COPD the patient does not at this point have severe respiratory failure but is requiring oxygen to support her which she has never required in the past.  She did smoke for about 20 years but quit 25 years ago.  The patient at this point is also concerning for possible acute coronary syndrome thus we will run serial troponins as well as get an echocardiogram.  She does have obesity and with her shortness of breath this potentially she may have an underlying cardiac condition.  Her EKG is normal sinus rhythm without any Q waves or signs of ischemia.  With conglomerate of symptoms patient will be admitted to the hospital for at least  a 2 nights to evaluate her condition and treat her urinary tract infection with IV Rocephin since meningitis is a potential possibility I will also put her on Rocephin 2 g every 12 hours.  The patient will be given as well pain management for her headache as well as general body aches.  We will also give her Tylenol to try to control her pain.    Interval Problem Update  s/p spinal tap    Consultants/Specialty  none    Code Status  Full Code    Disposition  pending    Review of Systems  Review of Systems   Constitutional: Negative for chills, fever and weight loss.   HENT: Negative for ear discharge, ear pain and tinnitus.    Eyes: Negative for blurred vision, double vision and photophobia.   Respiratory: Negative for cough, hemoptysis and sputum production.    Cardiovascular: Negative for chest pain, palpitations and orthopnea.   Gastrointestinal: Negative for heartburn, nausea and vomiting.   Genitourinary: Negative for dysuria, frequency and urgency.   Musculoskeletal: Negative for back pain, myalgias and neck pain.   Skin: Negative for itching and rash.   Neurological: Negative for dizziness, tingling and headaches.        Physical Exam  Temp:  [36.4 °C (97.6 °F)-37.4 °C (99.3 °F)] 36.9 °C (98.4 °F)  Pulse:  [65-91] 73  Resp:  [13-19] 16  BP: (107-159)/(58-88) 107/61  SpO2:  [81 %-100 %] 96 %    Physical Exam  Constitutional:       Appearance: She is obese.   HENT:      Head: Normocephalic and atraumatic.      Nose: Nose normal.      Mouth/Throat:      Mouth: Mucous membranes are dry.   Eyes:      Extraocular Movements: Extraocular movements intact.      Pupils: Pupils are equal, round, and reactive to light.   Neck:      Musculoskeletal: Normal range of motion and neck supple.   Cardiovascular:      Rate and Rhythm: Normal rate and regular rhythm.      Pulses: Normal pulses.      Heart sounds: Normal heart sounds.   Pulmonary:      Effort: Pulmonary effort is normal.      Breath sounds: Normal breath sounds.    Abdominal:      Palpations: Abdomen is soft.      Comments: obese   Musculoskeletal: Normal range of motion.   Skin:     General: Skin is warm and dry.   Neurological:      General: No focal deficit present.      Mental Status: She is alert and oriented to person, place, and time.         Fluids    Intake/Output Summary (Last 24 hours) at 8/30/2020 1011  Last data filed at 8/30/2020 0725  Gross per 24 hour   Intake 300 ml   Output --   Net 300 ml       Laboratory  Recent Labs     08/29/20  1210 08/30/20  0455   WBC 7.1 7.2   RBC 4.92 4.42   HEMOGLOBIN 15.4 13.8   HEMATOCRIT 44.6 41.0   MCV 90.7 92.8   MCH 31.3 31.2   MCHC 34.5 33.7   RDW 40.4 42.5   PLATELETCT 232 200   MPV 8.9* 9.3     Recent Labs     08/29/20  1210 08/30/20  0455   SODIUM 134* 132*   POTASSIUM 4.5 4.2   CHLORIDE 103 101   CO2 20 21   GLUCOSE 105* 116*   BUN 16 13   CREATININE 1.11 0.85   CALCIUM 9.4 8.5                   Imaging       Assessment/Plan  Nuchal rigidity  Assessment & Plan  S/p spinal tap  Spinal fluid with elevated protein and wbc  Acyclovir  Rocephin  Vancomycin  Csf culture result is pending  Gram stain is pending  Most likely viral  If viral only then vancomycin and rocephin can be dced  Head ct is negative    Acute respiratory failure with hypoxia (HCC)  Assessment & Plan  Patient comes in with shortness of breath and she is found to be only 81% on room air.  The patient at this point will be placed on oxygen to keep oxygen saturations above 90%.  RT protocol  Nebulizers as needed.  CT of the chest was done which is negative for pulmonary embolism  No discernible infiltrates noted.  The patient did smoke 25 years ago for about 20 years.  She has no discernible COPD.  Patient does have pulmonary nodules which have not changed in nature since the last exam.   echocardiogram is pending  trops are negative  Most likely 2nd to underlying lung dz  Has improved    UTI (urinary tract infection)  Assessment & Plan  Is ruled out      Pulmonary nodule- (present on admission)  Assessment & Plan  History of pulmonary nodule continue to follow as an outpatient    BMI 38.0-38.9,adult- (present on admission)  Assessment & Plan  Body mass index is 38.07 kg/m².  Outpatient weight loss management program    History of tobacco use- (present on admission)  Assessment & Plan  History of tobacco use 25 years ago she smoked for about 20 years.  Has not relapsed since    Gastroesophageal reflux disease without esophagitis- (present on admission)  Assessment & Plan  Usually uses omeprazole as needed    Glaucoma- (present on admission)  Assessment & Plan  History of glaucoma currently does not use any eyedrops for       VTE prophylaxis: lovenox

## 2020-08-30 NOTE — PROCEDURES
Procedure Lumbar Puncture    Date/Time: 8/29/2020 6:10 PM  Performed by: Kim Mabry M.D.  Authorized by: Kim Mabry M.D.     Consent:     Consent obtained:  Written    Consent given by:  Patient    Risks discussed:  Bleeding, infection, pain, headache, nerve damage and repeat procedure    Alternatives discussed:  No treatment, delayed treatment, observation and alternative treatment  Sedation:     Sedation type:  None  Anesthesia:     Anesthesia method:  Local infiltration  Procedure details:     Lumbar space:  L4-L5 interspace    Patient position:  Sitting    Needle gauge:  22    Needle type:  Spinal needle - Quincke tip    Needle length (in):  3.5    Number of attempts:  3    Fluid appearance:  Clear    Tubes of fluid:  4    Total volume (ml):  8  Post-procedure:     Puncture site:  Adhesive bandage applied    Patient tolerance of procedure:  Tolerated well, no immediate complications

## 2020-08-30 NOTE — PROGRESS NOTES
Received report from DELANO Ng. Plan of care discussed. Safety precautions in place. Call light and personal belongings within reach. Patient has no needs at this time.

## 2020-08-30 NOTE — CARE PLAN
Problem: Communication  Goal: The ability to communicate needs accurately and effectively will improve  Outcome: MET     Problem: Safety  Goal: Will remain free from injury  Outcome: PROGRESSING AS EXPECTED     Problem: Knowledge Deficit  Goal: Knowledge of disease process/condition, treatment plan, diagnostic tests, and medications will improve  Outcome: PROGRESSING AS EXPECTED

## 2020-08-30 NOTE — PROGRESS NOTES
Telemetry Shift Summary    Rhythm SR  HR Range 68-82  Ectopy r-oPVC, rTrig  Measurements 0.14/0.10/0.36        Normal Values  Rhythm SR  HR Range    Measurements 0.12-0.20 / 0.06-0.10  / 0.30-0.52

## 2020-08-30 NOTE — PROGRESS NOTES
Pt resting in bed. VSS. No signs of distress noted. Ate breakfast. No exacerbation of symptoms. Room kept calm and dark . CLIP. Will monitor.

## 2020-08-31 VITALS
SYSTOLIC BLOOD PRESSURE: 125 MMHG | WEIGHT: 246.25 LBS | HEIGHT: 66 IN | BODY MASS INDEX: 39.58 KG/M2 | RESPIRATION RATE: 16 BRPM | OXYGEN SATURATION: 95 % | HEART RATE: 63 BPM | TEMPERATURE: 98.2 F | DIASTOLIC BLOOD PRESSURE: 64 MMHG

## 2020-08-31 LAB
ALBUMIN SERPL BCP-MCNC: 4.1 G/DL (ref 3.2–4.9)
ALBUMIN/GLOB SERPL: 1.5 G/DL
ALP SERPL-CCNC: 43 U/L (ref 30–99)
ALT SERPL-CCNC: 32 U/L (ref 2–50)
ANION GAP SERPL CALC-SCNC: 12 MMOL/L (ref 7–16)
AST SERPL-CCNC: 20 U/L (ref 12–45)
BACTERIA UR CULT: NORMAL
BASOPHILS # BLD AUTO: 0.1 % (ref 0–1.8)
BASOPHILS # BLD: 0.01 K/UL (ref 0–0.12)
BILIRUB SERPL-MCNC: 0.5 MG/DL (ref 0.1–1.5)
BUN SERPL-MCNC: 11 MG/DL (ref 8–22)
CALCIUM SERPL-MCNC: 8.8 MG/DL (ref 8.4–10.2)
CHLORIDE SERPL-SCNC: 104 MMOL/L (ref 96–112)
CO2 SERPL-SCNC: 21 MMOL/L (ref 20–33)
CREAT SERPL-MCNC: 0.74 MG/DL (ref 0.5–1.4)
EOSINOPHIL # BLD AUTO: 0.01 K/UL (ref 0–0.51)
EOSINOPHIL NFR BLD: 0.1 % (ref 0–6.9)
ERYTHROCYTE [DISTWIDTH] IN BLOOD BY AUTOMATED COUNT: 40.8 FL (ref 35.9–50)
GLOBULIN SER CALC-MCNC: 2.7 G/DL (ref 1.9–3.5)
GLUCOSE SERPL-MCNC: 116 MG/DL (ref 65–99)
HCT VFR BLD AUTO: 42.2 % (ref 37–47)
HGB BLD-MCNC: 14.2 G/DL (ref 12–16)
IMM GRANULOCYTES # BLD AUTO: 0.03 K/UL (ref 0–0.11)
IMM GRANULOCYTES NFR BLD AUTO: 0.4 % (ref 0–0.9)
LACTATE CSF-SCNC: 3.1 MMOL/L (ref 1.2–2.6)
LYMPHOCYTES # BLD AUTO: 1.13 K/UL (ref 1–4.8)
LYMPHOCYTES NFR BLD: 14.4 % (ref 22–41)
MCH RBC QN AUTO: 31.4 PG (ref 27–33)
MCHC RBC AUTO-ENTMCNC: 33.6 G/DL (ref 33.6–35)
MCV RBC AUTO: 93.4 FL (ref 81.4–97.8)
MONOCYTES # BLD AUTO: 0.46 K/UL (ref 0–0.85)
MONOCYTES NFR BLD AUTO: 5.9 % (ref 0–13.4)
NEUTROPHILS # BLD AUTO: 6.19 K/UL (ref 2–7.15)
NEUTROPHILS NFR BLD: 79.1 % (ref 44–72)
NRBC # BLD AUTO: 0 K/UL
NRBC BLD-RTO: 0 /100 WBC
PLATELET # BLD AUTO: 188 K/UL (ref 164–446)
PMV BLD AUTO: 9.1 FL (ref 9–12.9)
POTASSIUM SERPL-SCNC: 4.1 MMOL/L (ref 3.6–5.5)
PROT SERPL-MCNC: 6.8 G/DL (ref 6–8.2)
RBC # BLD AUTO: 4.52 M/UL (ref 4.2–5.4)
SIGNIFICANT IND 70042: NORMAL
SITE SITE: NORMAL
SODIUM SERPL-SCNC: 137 MMOL/L (ref 135–145)
SOURCE SOURCE: NORMAL
WBC # BLD AUTO: 7.8 K/UL (ref 4.8–10.8)

## 2020-08-31 PROCEDURE — 80053 COMPREHEN METABOLIC PANEL: CPT

## 2020-08-31 PROCEDURE — 700102 HCHG RX REV CODE 250 W/ 637 OVERRIDE(OP): Performed by: HOSPITALIST

## 2020-08-31 PROCEDURE — A9270 NON-COVERED ITEM OR SERVICE: HCPCS | Performed by: HOSPITALIST

## 2020-08-31 PROCEDURE — 700111 HCHG RX REV CODE 636 W/ 250 OVERRIDE (IP): Performed by: FAMILY MEDICINE

## 2020-08-31 PROCEDURE — 700111 HCHG RX REV CODE 636 W/ 250 OVERRIDE (IP): Performed by: HOSPITALIST

## 2020-08-31 PROCEDURE — 700105 HCHG RX REV CODE 258: Performed by: HOSPITALIST

## 2020-08-31 PROCEDURE — 99239 HOSP IP/OBS DSCHRG MGMT >30: CPT | Performed by: HOSPITALIST

## 2020-08-31 PROCEDURE — 85025 COMPLETE CBC W/AUTO DIFF WBC: CPT

## 2020-08-31 PROCEDURE — 700105 HCHG RX REV CODE 258: Performed by: FAMILY MEDICINE

## 2020-08-31 RX ORDER — ACYCLOVIR 400 MG/1
400 TABLET ORAL 2 TIMES DAILY
Qty: 60 TAB | Refills: 0 | Status: SHIPPED | OUTPATIENT
Start: 2020-08-31 | End: 2020-09-08 | Stop reason: SDUPTHER

## 2020-08-31 RX ADMIN — SENNOSIDES-DOCUSATE SODIUM TAB 8.6-50 MG 2 TABLET: 8.6-5 TAB at 06:38

## 2020-08-31 RX ADMIN — CEFTRIAXONE SODIUM 2 G: 2 INJECTION, POWDER, FOR SOLUTION INTRAMUSCULAR; INTRAVENOUS at 06:38

## 2020-08-31 RX ADMIN — ACYCLOVIR SODIUM 600 MG: 500 INJECTION, SOLUTION INTRAVENOUS at 07:19

## 2020-08-31 RX ADMIN — ENOXAPARIN SODIUM 40 MG: 40 INJECTION SUBCUTANEOUS at 06:41

## 2020-08-31 ASSESSMENT — PATIENT HEALTH QUESTIONNAIRE - PHQ9
SUM OF ALL RESPONSES TO PHQ9 QUESTIONS 1 AND 2: 0
2. FEELING DOWN, DEPRESSED, IRRITABLE, OR HOPELESS: NOT AT ALL

## 2020-08-31 NOTE — CARE PLAN
Problem: Safety  Goal: Will remain free from injury  Outcome: PROGRESSING AS EXPECTED  Educated pt to use the call light when getting out of bed. Educated pt about using the bed alarm at night to prevent falls. Pt verbalized an understanding of teaching.       Problem: Discharge Barriers/Planning  Goal: Patient's continuum of care needs will be met  Outcome: PROGRESSING AS EXPECTED   Provided education about barriers to discharge and discharge planning. Pt verbalized understanding of teaching.

## 2020-08-31 NOTE — RESPIRATORY CARE
COPD EDUCATION by COPD CLINICAL EDUCATOR  8/31/2020 at 8:20 AM by Magalis Govea RRT     Patient reviewed by COPD education team. Patient does not have a history or diagnosis of COPD and is a  former smoker, therefore does not qualify for the COPD program.

## 2020-08-31 NOTE — PROGRESS NOTES
Received shift handoff report from DELANO Man. Pt is in bed and stable. Bed locked and in lowest position, upper side rails up, call light in reach, whiteboard updated. POC discussed and pt verbalizes understanding. Will continue to monitor.

## 2020-08-31 NOTE — DISCHARGE INSTRUCTIONS
Discharge Instructions    Discharged to home by car with relative. Discharged via wheelchair, hospital escort: Yes.  Special equipment needed: Not Applicable    Be sure to schedule a follow-up appointment with your primary care doctor or any specialists as instructed.     Discharge Plan:     I understand that a diet low in cholesterol, fat, and sodium is recommended for good health. Unless I have been given specific instructions below for another diet, I accept this instruction as my diet prescription.   Other diet:     Special Instructions:    Suspected viral vs asceptic meningitis - please hold on using NSAIDs (ibuprofen, mobic, etc) for now and I will give 1 month of acyclovir prophylaxis, please follow up with your PCP to discuss this as well as any additional labs that return from the lumbar puncture      Viral Meningitis, Adult  Viral meningitis is an infection of the tissues (meninges) that cover the brain and spinal cord. Many common viruses can cause viral meningitis.  Most people with viral meningitis get better without treatment in about 10 days. However, it is important to be evaluated by your health care provider to make sure you do not have bacterial meningitis. Bacterial meningitis has similar symptoms, but it is much more dangerous and must be treated quickly with antibiotics.  What are the causes?  Many common viruses can cause viral meningitis, including:  · Enteroviruses. These types of viruses are the most common cause of viral meningitis.  · Herpes.  · HIV (human immunodeficiency virus).  · Measles.  · Mumps.  · Chicken pox (varicella-zoster).  · Flu (influenza) viruses.  These viruses can be spread in different ways, such as through contact with:  · Stool. This means that you could get sick by touching something that has been contaminated with infected stool and then touching your eyes, nose, or mouth.  · Respiratory secretions. This means that you could get sick from coughs or sneezes of an  infected person, similar to the spreading of the common cold.  · Infected blood or infected bodily fluids.  · Rodents.  · Mosquito bites or tick bites.  When a virus enters your system, it can spread through the blood to reach the brain and spinal cord.  What increases the risk?  You may be at higher risk for meningitis if you have a weakened disease-fighting system (immune system).  What are the signs or symptoms?  Symptoms of viral meningitis may be similar to symptoms of a cold or flu. Signs and symptoms may include:  · Fever.  · Headache.  · Stiff neck.  · Muscle aches.  · Nausea and vomiting.  · Sensitivity to light.  · Tiredness.  · Cough.  How is this diagnosed?  This condition may be diagnosed based on your symptoms, your medical history, and a physical exam. You may be asked to touch your chin to your neck to see if this causes pain. You may have tests, such as:  · Lumbar puncture. In this procedure, also called a spinal tap, a small amount of fluid from your spinal canal (cerebrospinal fluid) is removed and analyzed.  · Blood tests.  · Other fluid or tissue samples.  · CT scan.  · MRI.  How is this treated?  Most types of viral meningitis go away without treatment. You may be given antibiotic medicine through an IV tube until your health care provider is sure that you do not have bacterial meningitis. The antibiotic will be stopped as soon as viral meningitis is diagnosed.  Depending on the type of virus that caused your meningitis, you may be given:  · Antiretroviral medicine.  · Antiviral medicine.  · Medicines that reduce fever and pain.  · Medicines that reduce swelling (steroids).  Follow these instructions at home:  · Take over-the-counter and prescription medicines only as told by your health care provider.  · If you are taking a medicine for viral meningitis, do not stop taking the medicine even if you start to feel better.  · Drink enough fluid to keep your urine clear or pale yellow.  · Rest at  home until you feel better. Return to your normal activities as told by your health care provider.  · Keep all follow-up visits as told by your health care provider. This is important.  How is this prevented?  · Get a flu shot (influenza vaccination) every year. This will help prevent meningitis that is caused by flu viruses.  · Wash your hands often with soap and water. If soap and water are not available, use hand .  · Avoid touching your hands to your face when you have not washed your hands recently.  · Avoid close contact with people who are sick.  · Disinfect counters and other surfaces if someone in your home is sick.  · Stay home while you are sick, and try to stay away from others as much as possible to avoid spreading the infection.  · Cover your nose and mouth when you sneeze or cough.  · Use insect repellent to prevent mosquito bites.  Contact a health care provider if:  · Your symptoms do not improve after 7-10 days.  · You have a fever that does not get better with medicine.  Get help right away if:  · Your symptoms get worse.  · You become confused.  · You become very sleepy.  This information is not intended to replace advice given to you by your health care provider. Make sure you discuss any questions you have with your health care provider.  Document Released: 04/10/2017 Document Revised: 11/30/2018 Document Reviewed: 02/20/2017  [employer] Patient Education © 2020 [employer]      Bacterial Meningitis, Adult    Bacterial meningitis is a serious infection that affects the membranes that line the brain and spinal cord (meninges). Bacterial meningitis must be treated as soon as possible. The infection can get worse quickly and cause permanent brain damage and long-term problems such as seizures and hearing loss.  What are the causes?  This condition is caused by bacteria. You can get infected if fluid from an infected person's nose, mouth, or throat:  · Gets into your brain through a wound  in your head.  · Gets into your body and travels to your brain. The bacteria may enter your body:  ? If you breathe in droplets from an infected person's cough or sneeze.  ? If you touch something that has been exposed to the bacteria (has been contaminated) and then touch your mouth, nose, eyes, or any open wounds or cuts.  ? If you eat food that has been exposed to the bacteria (has been contaminated) by an infected person who did not properly wash his or her hands before preparing the food.  What increases the risk?  This condition is more likely to develop in people who:  · Live with or have close contact with someone who is infected with the bacteria that cause this infection.  · Have an infection in the head and neck area.  · Have a weakened disease-fighting (immune) system.  · Have diabetes.  · Have traveled to [place] or to [place].  · Live close to others, such as in college dorms.  · Work in health care or  facilities.  · Have a cerebral shunt, a cochlear implant, or a similar device.  · Are of a certain age, depending on the type (strain) of bacteria that caused the infection. Infection with listeria or pneumococcus bacteria is more common in older adults, and meningococcus infection is more common in adolescents and young adults.  What are the signs or symptoms?  Symptoms of this condition usually start suddenly, and they may include:  · High fever.  · Headache.  · Stiff neck.  · Irritability.  · Nausea and vomiting.  · Decreased appetite.  · Fatigue, low energy, or sleepiness (lethargy).  · Trouble walking.  · A change in thinking, feeling, and behavior (altered mental status).  · Confusion.  · Discomfort when exposed to light or loud noises.  · Loss of consciousness.  · Seizures.  · A rash that spreads quickly. The rash looks like a lot of small, irregularly shaped purple or red spots (petechiae) on the torso, legs, feet, eyes, and mucous membranes (such as in the mouth and nose). The  rash may also affect the palms of the hands or soles of the feet.  How is this diagnosed?  This condition is diagnosed based on your symptoms, your medical history, a physical exam, and tests. Tests may include:  · Lumbar puncture (also called a spinal tap). This is a procedure to remove and test a sample of the fluid that surrounds your brain and spinal cord (cerebrospinal fluid, CSF). This is the most important test for diagnosing bacterial meningitis.  · Blood tests.  · Imaging tests to check for changes in your brain that have been caused by infection. These tests may include:  ? CT scan.  ? MRI.  How is this treated?  This condition is usually treated at the hospital with IV antibiotic medicines, fluids, and nutrition. Sometimes, steroid medicine is also given to limit brain swelling. Treatment usually starts as soon as your health care provider thinks that you might have bacterial meningitis, which may be before the diagnosis is confirmed with tests.  When treatment starts, you may get a combination of antibiotics to kill the bacteria that usually cause meningitis. After your test results show which bacteria are causing your infection, you may be given different antibiotics that are effective against those specific bacteria.  Follow these instructions at home:  Medicines  · Take your antibiotic as told by your health care provider. Do not stop taking the antibiotic even if you start to feel better.  · Take other over-the-counter and prescription medicines only as told by your health care provider.  General instructions  · Drink enough fluid to keep your urine pale yellow.  · Try to prevent spreading the infection to others. Be sure to:  ? Avoid close contact with others until your health care provider says that you do not have a risk of spreading the disease to others anymore (you are not contagious).  ? Stay home from work or school for as long as directed.  · Rest as needed. Ask your health care provider  what activities are safe for you.  · Wash your hands with soap and water often, especially after you cough or sneeze. If soap and water are not available, use hand .  · Ask people with whom you have close contact (such as people who live with you) to talk with a health care provider about preventing meningitis infection. These people may need to:  ? Start taking antibiotics.  ? Get vaccinated.  · Stay up to date on all of your vaccinations.  · Keep all follow-up visits as told by your health care provider. This is important.  Get help right away if:  · Your symptoms get worse instead of better.  · You develop any of these:  ? A high fever.  ? A stiff neck.  ? Confusion.  ? A rash.  ? The inability to eat or drink without vomiting.  · You have a headache that becomes severe or does not get better with pain medicine.  · You have a fast heartbeat.  · You have trouble breathing.  · You have a seizure.  · You feel dizzy.  Summary  · Bacterial meningitis is a serious infection that affects the membranes that line the brain and spinal cord (meninges). This condition is caused by bacteria.  · Bacterial meningitis must be treated as soon as possible.  · The most important test for diagnosing bacterial meningitis is a lumbar puncture (also called a spinal tap). This is a procedure to remove and test a sample of the fluid that surrounds your brain and spinal cord (cerebrospinal fluid, CSF).  · This condition is usually treated at the hospital with IV antibiotic medicines, fluids, and nutrition.  This information is not intended to replace advice given to you by your health care provider. Make sure you discuss any questions you have with your health care provider.  Document Released: 02/26/2003 Document Revised: 01/02/2019 Document Reviewed: 08/27/2018  Elsevier Patient Education © 2020 [employer]    Acyclovir Buccal Tablet  What is this medicine?  ACYCLOVIR (ay SYE kloe veer) is an antiviral medicine. It is used to  treat infections caused by herpes virus, such as cold sores. This medicine will not cure herpes.  This medicine may be used for other purposes; ask your health care provider or pharmacist if you have questions.  COMMON BRAND NAME(S): Ean  What should I tell my health care provider before I take this medicine?  They need to know if you have any of these conditions:  · immune system problems  · an unusual or allergic reaction to acyclovir, milk, other medicines, foods, dyes, or preservatives  · pregnant or trying to get pregnant  · breast-feeding  How should I use this medicine?  Take this medicine by mouth. Follow the directions on the prescription label. Leave the tablet in the sealed blister pack until you are ready to take it. With dry hands, open the blister and gently remove the tablet. If the tablet breaks or crumbles, throw it away and take a new tablet out of the blister pack. Place the tablet to the upper gum of the mouth on the same side as cold sore symptoms and allow it to dissolve. Do not crush, chew, suck or swallow the tablet. You do not need water to take this medicine. If adhesion does not occur or tablet falls off within the first 6 hours, immediately reposition the same tablet. If the tablet cannot be repositioned, a new tablet should be applied. If tablet is swallowed, within the first 6 hours, drink a glass of water and apply a new tablet. If tablet falls or is swallowed after the first 6 hours, reapplication is not needed. Do not take your medicine more often than directed.  Talk to your pediatrician regarding the use of this medicine in children. Special care may be needed.  Overdosage: If you think you have taken too much of this medicine contact a poison control center or emergency room at once.  NOTE: This medicine is only for you. Do not share this medicine with others.  What if I miss a dose?  This does not apply; this medicine is not for regular use.  What may interact with this  medicine?  Do not take this medicine with any of the following medications:  · cidofovir  This medicine may also interact with the following medications:  · adefovir  · amphotericin B  · certain antibiotics like amikacin, gentamicin, tobramycin, vancomycin  · cimetidine  · cisplatin  · colistin  · cyclosporine  · foscarnet  · lithium  · methotrexate  · probenecid  · tacrolimus  This list may not describe all possible interactions. Give your health care provider a list of all the medicines, herbs, non-prescription drugs, or dietary supplements you use. Also tell them if you smoke, drink alcohol, or use illegal drugs. Some items may interact with your medicine.  What should I watch for while using this medicine?  Tell your doctor or health care professional if your symptoms do not improve. This medicine works best when started very early in the course of an infection. Begin treatment at the first signs of infection.  Avoid activities that may prevent medicine from sticking to your gum such as touching or pressing the medicine, wearing upper dentures, chewing gum, or brushing your teeth during the treatment day.  You can still pass herpes to another person even while you are taking this medicine. Avoid contact with others as directed. Talk to your doctor about how to stop the spread of infection.  What side effects may I notice from receiving this medicine?  Side effects that you should report to your doctor or health care professional as soon as possible:  · allergic reactions like skin rash, itching or hives, swelling of the face, lips, or tongue  Side effects that usually do not require medical attention (report to your doctor or health care professional if they continue or are bothersome):  · dizziness  · erythema  · headache  · pain at application site  · weakness or tired  This list may not describe all possible side effects. Call your doctor for medical advice about side effects. You may report side effects to  FDA at 7-166-NZE-6143.  Where should I keep my medicine?  Keep out of the reach of children.  Store at room temperature between 20 and 25 degrees C (68 and 77 degrees F). Keep this medicine in the original container. Throw away any unused medicine after the expiration date.  NOTE: This sheet is a summary. It may not cover all possible information. If you have questions about this medicine, talk to your doctor, pharmacist, or health care provider.  © 2020 Elsevier/Gold Standard (2020-01-14 12:33:02)             · Is patient discharged on Warfarin / Coumadin?   No     Depression / Suicide Risk    As you are discharged from this [facility] facility, it is important to learn how to keep safe from harming yourself.    Recognize the warning signs:  · Abrupt changes in personality, positive or negative- including increase in energy   · Giving away possessions  · Change in eating patterns- significant weight changes-  positive or negative  · Change in sleeping patterns- unable to sleep or sleeping all the time   · Unwillingness or inability to communicate  · Depression  · Unusual sadness, discouragement and loneliness  · Talk of wanting to die  · Neglect of personal appearance   · Rebelliousness- reckless behavior  · Withdrawal from people/activities they love  · Confusion- inability to concentrate     If you or a loved one observes any of these behaviors or has concerns about self-harm, here's what you can do:  · Talk about it- your feelings and reasons for harming yourself  · Remove any means that you might use to hurt yourself (examples: pills, rope, extension cords, firearm)  · Get professional help from the community (Mental Health, Substance Abuse, psychological counseling)  · Do not be alone:Call your Safe Contact- someone whom you trust who will be there for you.  · Call your local CRISIS HOTLINE 546-7550 or 684-645-6655  · Call your local Children's Mobile Crisis Response Team [place] (411) 089-0446 or  www.[employer]  · Call the toll free National Suicide Prevention Hotlines   · National Suicide Prevention Lifeline 137-132-HVRI (6161)  · National Hope Line Network 800-SUICIDE (499-5436)

## 2020-08-31 NOTE — PROGRESS NOTES
Telemetry Shift Summary    Rhythm SR  HR Range 66-78  Ectopy Frequent PVC, Rare Trig  Measurements 0.14/0.10/0.40        Normal Values  Rhythm SR  HR Range    Measurements 0.12-0.20 / 0.06-0.10  / 0.30-0.52

## 2020-08-31 NOTE — DISCHARGE SUMMARY
Discharge Summary    CHIEF COMPLAINT ON ADMISSION  Chief Complaint   Patient presents with   • Flank Pain   • Headache   • Neck Pain       Reason for Admission  Headache;Back Pain;Nausea     Admission Date  8/29/2020    CODE STATUS  Full Code    HPI & HOSPITAL COURSE  This is a 60 y.o. female here with nuchal rigidity, concern for meningitis. LP showed      167 WBC 73% lymphs 23% PMN 87 protein, concern for viral over aceptic meningitis. She reports having many outbreaks of zoster but nothing recently. Unclear if lab received PCR requests, I have added PCR for VZV and others, low suspicion for HSV however patient clinically looks well. No HA no vision changes, no cough and no hypoxia. Will send home on PPLX doses of Acyclovir but asked patient to follow up with PCP to discuss, also follow up PCR results.    Noticed on home list she uses Mobic, however she denies excessive use. Asked her to hold off on using this for now.        Therefore, she is discharged in good and stable condition to home with close outpatient follow-up.    The patient met 2-midnight criteria for an inpatient stay at the time of discharge.    Discharge Date  8/31/2020    FOLLOW UP ITEMS POST DISCHARGE  Fu PCP    DISCHARGE DIAGNOSES  Active Problems:    UTI (urinary tract infection) POA: Unknown    Acute respiratory failure with hypoxia (HCC) POA: Unknown    Nuchal rigidity POA: Unknown    Pulmonary nodule POA: Yes    Glaucoma POA: Yes    Gastroesophageal reflux disease without esophagitis POA: Yes    History of tobacco use POA: Yes    BMI 38.0-38.9,adult POA: Yes  Resolved Problems:    * No resolved hospital problems. *      FOLLOW UP  Future Appointments   Date Time Provider Department Center   10/6/2020 10:00 AM [facility]     Valeria Childs M.D.  [address] [facility] [address]  [facility] [address]  [phone]    Schedule an appointment as soon as possible for a visit        MEDICATIONS ON DISCHARGE     Medication  List      START taking these medications      Instructions   acyclovir 400 MG tablet  Commonly known as: ZOVIRAX   Take 1 Tab by mouth 2 times a day.  Dose: 400 mg        CONTINUE taking these medications      Instructions   acetaminophen 500 MG Tabs  Commonly known as: TYLENOL   Take 1,000 mg by mouth every 6 hours as needed for Moderate Pain.  Dose: 1,000 mg     B-12 PO   Take 1 Tab by mouth every day.  Dose: 1 Tab     omeprazole 40 MG delayed-release capsule  Commonly known as: PRILOSEC   Take 40 mg by mouth as needed. Indications: Heartburn  Dose: 40 mg     spironolactone 50 MG Tabs  Commonly known as: ALDACTONE   Take 50 mg by mouth as needed (For swelling).  Dose: 50 mg     therapeutic multivitamin-minerals Tabs   Take 1 Tab by mouth every day.  Dose: 1 Tab     traZODone 50 MG Tabs  Commonly known as: DESYREL   Take 50 mg by mouth at bedtime as needed for Sleep.  Dose: 50 mg        STOP taking these medications    meloxicam 15 MG tablet  Commonly known as: MOBIC     sulfamethoxazole-trimethoprim 800-160 MG tablet  Commonly known as: BACTRIM DS            Allergies  No Known Allergies    DIET  Orders Placed This Encounter   Procedures   • Diet Order     Standing Status:   Standing     Number of Occurrences:   1     Order Specific Question:   Diet:     Answer:   Regular [1]       ACTIVITY  As tolerated.  Weight bearing as tolerated    CONSULTATIONS  None    PROCEDURES  LP    ECHO wnl EF    LABORATORY  Lab Results   Component Value Date    SODIUM 137 08/31/2020    POTASSIUM 4.1 08/31/2020    CHLORIDE 104 08/31/2020    CO2 21 08/31/2020    GLUCOSE 116 (H) 08/31/2020    BUN 11 08/31/2020    CREATININE 0.74 08/31/2020        Lab Results   Component Value Date    WBC 7.8 08/31/2020    HEMOGLOBIN 14.2 08/31/2020    HEMATOCRIT 42.2 08/31/2020    PLATELETCT 188 08/31/2020        Total time of the discharge process exceeds 40 minutes.

## 2020-08-31 NOTE — PROGRESS NOTES
Provided discharge instructions to pt about medications and follow-up appointments and answered all questions.  Pt verbalized understanding of teaching. Removed IV. Called transport. Awaiting transports arrival.

## 2020-09-01 LAB
BACTERIA CSF CULT: NORMAL
BACTERIA UR CULT: NORMAL
CHLORIDE FLD-SCNC: 120 MMOL/L
GRAM STN SPEC: NORMAL
SIGNIFICANT IND 70042: NORMAL
SIGNIFICANT IND 70042: NORMAL
SITE SITE: NORMAL
SITE SITE: NORMAL
SOURCE SOURCE: NORMAL
SOURCE SOURCE: NORMAL
SPECIMEN SOURCE: NORMAL

## 2020-09-03 LAB
BACTERIA BLD CULT: NORMAL
SIGNIFICANT IND 70042: NORMAL
SITE SITE: NORMAL
SOURCE SOURCE: NORMAL

## 2020-09-04 LAB
BACTERIA BLD CULT: NORMAL
SIGNIFICANT IND 70042: NORMAL
SITE SITE: NORMAL
SOURCE SOURCE: NORMAL

## 2020-09-08 ENCOUNTER — OFFICE VISIT (OUTPATIENT)
Dept: MEDICAL GROUP | Facility: MEDICAL CENTER | Age: 60
End: 2020-09-08
Payer: COMMERCIAL

## 2020-09-08 VITALS
OXYGEN SATURATION: 95 % | SYSTOLIC BLOOD PRESSURE: 130 MMHG | BODY MASS INDEX: 37.91 KG/M2 | WEIGHT: 235.89 LBS | DIASTOLIC BLOOD PRESSURE: 60 MMHG | HEART RATE: 70 BPM | HEIGHT: 66 IN | TEMPERATURE: 97.5 F

## 2020-09-08 DIAGNOSIS — Z86.61 HISTORY OF VIRAL MENINGITIS: ICD-10-CM

## 2020-09-08 DIAGNOSIS — Z86.19 HISTORY OF SHINGLES: ICD-10-CM

## 2020-09-08 DIAGNOSIS — I35.1 NONRHEUMATIC AORTIC VALVE INSUFFICIENCY: ICD-10-CM

## 2020-09-08 DIAGNOSIS — C80.1 ADENOID CYSTIC CARCINOMA (HCC): ICD-10-CM

## 2020-09-08 DIAGNOSIS — B00.9 HERPES SIMPLEX: ICD-10-CM

## 2020-09-08 DIAGNOSIS — Z09 HOSPITAL DISCHARGE FOLLOW-UP: ICD-10-CM

## 2020-09-08 DIAGNOSIS — R91.1 PULMONARY NODULE: ICD-10-CM

## 2020-09-08 PROBLEM — J96.01 ACUTE RESPIRATORY FAILURE WITH HYPOXIA (HCC): Status: RESOLVED | Noted: 2020-08-29 | Resolved: 2020-09-08

## 2020-09-08 PROBLEM — N39.0 UTI (URINARY TRACT INFECTION): Status: RESOLVED | Noted: 2020-08-29 | Resolved: 2020-09-08

## 2020-09-08 PROBLEM — R29.1 NUCHAL RIGIDITY: Status: RESOLVED | Noted: 2020-08-29 | Resolved: 2020-09-08

## 2020-09-08 PROCEDURE — 99214 OFFICE O/P EST MOD 30 MIN: CPT | Performed by: INTERNAL MEDICINE

## 2020-09-08 RX ORDER — ACYCLOVIR 400 MG/1
400 TABLET ORAL 2 TIMES DAILY
Qty: 180 TAB | Refills: 1 | Status: SHIPPED | OUTPATIENT
Start: 2020-09-08 | End: 2020-12-08

## 2020-09-08 ASSESSMENT — FIBROSIS 4 INDEX: FIB4 SCORE: 1.15

## 2020-09-08 NOTE — PROGRESS NOTES
CC:  Diagnoses of Hospital discharge follow-up and Pulmonary nodule were pertinent to this visit.    HISTORY OF THE PRESENT ILLNESS: Patient is a 60 y.o. female. This pleasant patient is here today for follow-up after hospital discharge.      Admitted 8/29/2020 headache, neck pain, nuchal rigidity concern for meningitis. Ultimately thought to have viral versus aseptic meningitis per discharge summary.  Admission note says that she had preceding mild fevers as well and generalized body aches.  Initially patient tells me she thought she had urinary infection she went to urgent care prior to admission on 8/28/2020 empirically prescribed Bactrim, also given Toradol for her neck pain/headache.  Urine culture subsequently returned negative.  While she is hospitalized she also had negative blood cultures and her cerebral spinal fluid had no growth.  Lumbar puncture results also showed glucose 54, protein minimally elevated 87, lactic level 3.1.  Viral studies were ordered but there are no results.  CT head without contrast showed no acute findings.  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 Since discharge no f/c, nuchal rigidity. Still feels exhausted, mild L temporal HA, and she feels she has delayed word recall at times.  Additional history she says that she was diagnosed with suspected viral meningitis in her 30s and early 40s.  She had many recurrent outbreaks of shingles over her left leg throughout her 40s and was on acyclovir many years d/t this. She was discharged on Acyclovir.     Initially on hospital presentation she was reported to be short of breath requiring oxygen but clear chest x-ray.  Remote smoking 20-year history.  CT thorax with contrast showed no pulmonary embolus 8/29/2020 and the previously noted pulmonary nodules were unchanged.  Of note prior PET scan showed no activity in these  "nodules and she is followed by her oncologist.  Troponins were cycled and were not elevated.  Echocardiogram 8/29/2020 incidentally showed moderate aortic regurgitation w/o AS w/nl LV size w/moderate LVH, EF 65%, moderate RVE and also note of TOVA and rvsp 35mmHg.  Additionally her EKG was reportedly normal sinus rhythm without any acute signs of ischemia. Currently she has no pulmonary complaint and normal oxygen on room air.     Allergies: Patient has no known allergies.    Current Outpatient Medications Ordered in Epic   Medication Sig Dispense Refill   • acyclovir (ZOVIRAX) 400 MG tablet Take 1 Tab by mouth 2 times a day. 60 Tab 0   • Cyanocobalamin (B-12 PO) Take 1 Tab by mouth every day.     • traZODone (DESYREL) 50 MG Tab Take 50 mg by mouth at bedtime as needed for Sleep.     • acetaminophen (TYLENOL) 500 MG Tab Take 1,000 mg by mouth every 6 hours as needed for Moderate Pain.     • omeprazole (PRILOSEC) 40 MG delayed-release capsule Take 40 mg by mouth as needed. Indications: Heartburn  3   • therapeutic multivitamin-minerals (THERAGRAN-M) Tab Take 1 Tab by mouth every day.     • spironolactone (ALDACTONE) 50 MG Tab Take 50 mg by mouth as needed (For swelling).       No current [facility]-ordered facility-administered medications on file.        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   • OTHER ORTHOPEDIC SURGERY      b/l knee replacements 2014, 2016   • " TONSILLECTOMY         Social History     Tobacco Use   • Smoking status: Former Smoker     Packs/day: 2.00     Years: 16.00     Pack years: 32.00     Quit date:      Years since quittin.7   • Smokeless tobacco: Never Used   Substance Use Topics   • Alcohol use: Yes     Frequency: Monthly or less     Drinks per session: 1 or 2     Comment: rARELY   • Drug use: Yes     Types: Inhaled     Comment: Marijuana       Social History     Social History Narrative   • Not on file       Family History   Problem Relation Age of Onset   • Arthritis Mother    • Cancer Mother         skin   • Anemia Mother    • Hypertension Mother    • Obesity Mother    • Other Mother         ulcer   • Cancer Father         skin, prostate, esophageal   • Hypertension Father    • Obesity Father    • Arthritis Brother    • Cancer Maternal Aunt         breast]       ROS:     - Constitutional: Negative for fever, chills     - Eyes:   Negative foreye pain, discharge    - ENT:  Negative for sore throat     - Respiratory: Negative for cough, sputum production, chest congestion, dyspnea, wheezing, and crackles.      - Cardiovascular: Negative for angina    - Gastrointestinal: Negative for  nausea, vomiting, abdominal pain, hematochezia, melena    - Genitourinary: Negative for dysuria    - Musculoskeletal: Negative for new myalgias, back pain, and joint pain.  States after physical therapy for left elbow she saw Ortho in the planning [facility] this Friday to better define her symptoms and see if her elbow is related to her shoulder and neck symptoms.    - Skin: Negative for rash, itching, cyanotic skin color change.     - Neurological: Negative for vertigo    - Endo:Negative for polyuria, heat/cold intolerance, excessive thirst    - Hem/lymphatic: Negative for swollen glands    -Allergic/immun: Negative for allergic rhinitis    - Psychiatric/Behavioral: Negative for depression, suicidal/homicidal ideation and memory loss.      Exam: /60   Pulse 70  "  Temp 36.4 °C (97.5 °F) (Temporal)   Ht 1.676 m (5' 6\")   Wt 107 kg (235 lb 14.3 oz)   SpO2 95%  Body mass index is 38.07 kg/m².    General: Normal appearing. No distress.  EYES: Conjunctiva clear lids without ptosis, pupils equal  EARS: Normal shape and contour   Neck: Supple without LAD. Thyroid is not enlarged.  Pulmonary: Clear to ausculation.  Normal effort. No rales or wheezing.  Cardiovascular: Regular rate and rhythm without significant murmur.   Abdomen: Soft, nontender, nondistended. Normal bowel sounds.  Neurologic: Cranial nerves grossly nonfocal  Lymph: No cervical, supraclavicular nodes palpable  Skin: Warm and dry.  No obvious lesions.  Musculoskeletal: Normal gait. No extremity cyanosis, clubbing, or edema.  Psych: Normal mood and affect. Alert and oriented x3. Judgment and insight is normal.      Assessment/Plan  1. Hospital discharge follow-up  Viral CSF panel ordered but not reported, cancelled lab.  I called lab, was informed that the was not enough csf sample to run a viral screen.  With her history of reported multiple episodes of viral meningitis, patient would like to see the infectious disease specialist for consultation. Continue Acyclovir for now.     2. Pulmonary nodule  On imaging stable and chronic, no uptake on PET scan, she is followed by oncology.  Remote history of smoking.    3. Adenoid cystic carcinoma (HCC)  Specialty notes indicate no recurrence.  Stable, will continue to monitor.    4. Nonrheumatic aortic valve insufficiency  New issue, asymptomatic, will monitor.  Additionally with some of the right-sided heart changes with RVE/TOVA will investigate further patient as she may have any underlying occult sleep apnea.    5. Herpes simplex/history of recurrent shingles  She has had history of recurrent shingles, also likely history of herpes simplex, with recent admission for suspected meningitis was discharged on acyclovir would be reasonable for her to continue this at this " point until specialist can help determine duration.  - acyclovir (ZOVIRAX) 400 MG tablet; Take 1 Tab by mouth 2 times a day.  Dispense: 180 Tab; Refill: 1      rtc 3m          Please note that this dictation was created using voice recognition software. I have made every reasonable attempt to correct obvious errors, but I expect that there are errors of grammar and possibly content that I did not discover before finalizing the note.

## 2020-09-14 DIAGNOSIS — M54.12 CERVICAL RADICULOPATHY: ICD-10-CM

## 2020-09-14 DIAGNOSIS — L98.9 SCALP LESION: ICD-10-CM

## 2020-09-14 DIAGNOSIS — G44.309 POST-TRAUMATIC HEADACHE, NOT INTRACTABLE, UNSPECIFIED CHRONICITY PATTERN: ICD-10-CM

## 2020-09-14 DIAGNOSIS — R93.0 ABNORMAL CT OF THE HEAD: ICD-10-CM

## 2020-09-14 DIAGNOSIS — G44.89 OTHER HEADACHE SYNDROME: ICD-10-CM

## 2020-09-21 ENCOUNTER — IMMUNIZATION (OUTPATIENT)
Dept: SOCIAL WORK | Facility: CLINIC | Age: 60
End: 2020-09-21
Payer: COMMERCIAL

## 2020-09-21 DIAGNOSIS — Z23 NEED FOR VACCINATION: ICD-10-CM

## 2020-09-21 PROCEDURE — 90471 IMMUNIZATION ADMIN: CPT | Performed by: REGISTERED NURSE

## 2020-09-21 PROCEDURE — 90686 IIV4 VACC NO PRSV 0.5 ML IM: CPT | Performed by: REGISTERED NURSE

## 2020-09-25 NOTE — TELEPHONE ENCOUNTER
Received request via: Pharmacy    Was the patient seen in the last year in this department? Yes    Does the patient have an active prescription (recently filled or refills available) for medication(s) requested? Unclear, last written 3 weeks ago by another provider. During ED visit.

## 2020-09-27 RX ORDER — TRAZODONE HYDROCHLORIDE 50 MG/1
TABLET ORAL
Qty: 90 TAB | Refills: 1 | Status: SHIPPED | OUTPATIENT
Start: 2020-09-27 | End: 2021-04-05

## 2020-10-13 ENCOUNTER — HOSPITAL ENCOUNTER (OUTPATIENT)
Dept: RADIOLOGY | Facility: MEDICAL CENTER | Age: 60
End: 2020-10-13
Attending: NURSE PRACTITIONER
Payer: COMMERCIAL

## 2020-10-13 PROCEDURE — A9576 INJ PROHANCE MULTIPACK: HCPCS | Performed by: NURSE PRACTITIONER

## 2020-10-13 PROCEDURE — 70553 MRI BRAIN STEM W/O & W/DYE: CPT

## 2020-10-13 PROCEDURE — 72141 MRI NECK SPINE W/O DYE: CPT

## 2020-10-13 PROCEDURE — 700117 HCHG RX CONTRAST REV CODE 255: Performed by: NURSE PRACTITIONER

## 2020-10-13 RX ADMIN — GADOTERIDOL 20 ML: 279.3 INJECTION, SOLUTION INTRAVENOUS at 14:39

## 2020-10-13 RX ADMIN — GADOTERIDOL 20 ML: 279.3 INJECTION, SOLUTION INTRAVENOUS at 14:35

## 2020-10-21 ENCOUNTER — OFFICE VISIT (OUTPATIENT)
Dept: INFECTIOUS DISEASES | Facility: MEDICAL CENTER | Age: 60
End: 2020-10-21
Payer: COMMERCIAL

## 2020-10-21 VITALS
DIASTOLIC BLOOD PRESSURE: 80 MMHG | RESPIRATION RATE: 16 BRPM | TEMPERATURE: 97.5 F | SYSTOLIC BLOOD PRESSURE: 110 MMHG | HEART RATE: 75 BPM | OXYGEN SATURATION: 98 % | BODY MASS INDEX: 38.41 KG/M2 | WEIGHT: 238 LBS

## 2020-10-21 DIAGNOSIS — B02.8 HERPES ZOSTER WITH COMPLICATION: ICD-10-CM

## 2020-10-21 DIAGNOSIS — B02.29 OTHER POSTHERPETIC NERVOUS SYSTEM INVOLVEMENT: ICD-10-CM

## 2020-10-21 PROCEDURE — 99204 OFFICE O/P NEW MOD 45 MIN: CPT | Performed by: INTERNAL MEDICINE

## 2020-10-21 RX ORDER — MELOXICAM 7.5 MG/1
7.5 TABLET ORAL DAILY
COMMUNITY
End: 2020-12-08 | Stop reason: SDUPTHER

## 2020-10-21 ASSESSMENT — FIBROSIS 4 INDEX: FIB4 SCORE: 1.15

## 2020-10-21 NOTE — PROGRESS NOTES
"Chief Complaint   Patient presents with   • New Patient     Viral Menengitis X3 years already          HISTORY OF THE PRESENT ILLNESS: Patient is a 60 y.o. female. This pleasant patient is here today recurrent herpes zoster.  She had episodes of viral meningitis in 1992 and 1999. Around the same time she started having shingles-left upper thigh, multiuple episodes. After getting Zostavax in 2014 she has had only one additional episode realted to surtgical procedure.  She does get intermittent episodes of tingling, brief affecting the left upper thigh.  In August of this year she had another episode of aseptic meningitis related to a UTI-no viral studies sent. No Enterovirus sent.  Currently no sxs. Was given acyclovir for prophylaxis after Aug hospitalization  (ID was not consulted)          Allergies: Patient has no known allergies.    Current Outpatient Medications Ordered in Epic   Medication Sig Dispense Refill   • meloxicam (MOBIC) 7.5 MG Tab Take 7.5 mg by mouth every day.     • traZODone (DESYREL) 50 MG Tab TAKE 1 TABLET BY MOUTH AT BEDTIME AS NEEDED 90 Tab 1   • acyclovir (ZOVIRAX) 400 MG tablet Take 1 Tab by mouth 2 times a day. 180 Tab 1   • Cyanocobalamin (B-12 PO) Take 1 Tab by mouth every day.     • acetaminophen (TYLENOL) 500 MG Tab Take 1,000 mg by mouth every 6 hours as needed for Moderate Pain.     • omeprazole (PRILOSEC) 40 MG delayed-release capsule Take 40 mg by mouth as needed. Indications: Heartburn  3   • therapeutic multivitamin-minerals (THERAGRAN-M) Tab Take 1 Tab by mouth every day.     • spironolactone (ALDACTONE) 50 MG Tab Take 50 mg by mouth as needed (For swelling).       No current [facility]-ordered facility-administered medications on file.        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\"   • Glaucoma    • Hemorrhoids    • " Hives    • Meningitis    • Pneumonia    • Sinusitis    Psoriasis  [id number]    Past Surgical History:   Procedure Laterality Date   • ABDOMINAL EXPLORATION     • APPENDECTOMY     • GYN SURGERY      removed ovary   • KNEE ARTHROPLASTY TOTAL Bilateral ,    • LUMPECTOMY     • OTHER      breast lift, tummy tuck   • OTHER      removal of fallopian tube for a cyst per patient.   • OTHER ORTHOPEDIC SURGERY      b/l knee replacements ,    • TONSILLECTOMY         Social History     Tobacco Use   • Smoking status: Former Smoker     Packs/day: 2.00     Years: 16.00     Pack years: 32.00     Quit date:      Years since quittin.8   • Smokeless tobacco: Never Used   Substance Use Topics   • Alcohol use: Yes     Frequency: Monthly or less     Drinks per session: 1 or 2     Comment: rARELY   • Drug use: Yes     Types: Inhaled     Comment: Marijuana       Family History   Problem Relation Age of Onset   • Arthritis Mother    • Cancer Mother         skin   • Anemia Mother    • Hypertension Mother    • Obesity Mother    • Other Mother         ulcer   • Cancer Father         skin, prostate, esophageal   • Hypertension Father    • Obesity Father    • Arthritis Brother    • Cancer Maternal Aunt         breast]       Review of Systems   Constitutional: Negative for fever, chills, weight loss and malaise/fatigue.   HENT: Negative for ear pain, nosebleeds, congestion, sore throat and neck pain.    Eyes: Negative for blurred vision.   Respiratory: Negative for cough, sputum production, shortness of breath and wheezing.    Cardiovascular: Negative for chest pain, palpitations, orthopnea and leg swelling.   Gastrointestinal: Negative for heartburn, nausea, vomiting and abdominal pain.   Genitourinary: Negative for dysuria, urgency and frequency.   Musculoskeletal: + for myalgias, back pain and joint pain.   Skin: Negative for rash and itching.   Neurological: Negative for dizziness. + tingling,   No focal weakness       All other systems reviewed and are negative except as in HPI.      Exam: /80 (BP Location: Left arm, Patient Position: Sitting, BP Cuff Size: Large adult)   Pulse 75   Temp 36.4 °C (97.5 °F) (Temporal)   Resp 16   Wt 108 kg (238 lb)   SpO2 98%   General: Normal appearing. No distress.  HEENT: NCAT. PERRLA, EOMI, conjunctiva clear  Neck: Supple without JVD or bruit.No LAD  Pulmonary: Clear to ausculation.  No rales, rhonchi, or wheezing. Unlabored  Cardiovascular: Regular rate and rhythm without murmur. No ectopy  Abdomen: Soft, nontender, nondistended.   Extremities: No cyanosis, clubbing, or edema  Neurologic: Alert and oriented X3.  No tremor. Speech normal without dysarthria.  CN intact HALL.  Lymph: No cervical or supraclavicular lymph nodes are palpable  Skin: Warm and dry.  No active shingles lesions.   Musculoskeletal: Normal gait.     Psych: Normal mood and affect.  Judgment and insight is normal.      Assessment/Plan  1. Other postherpetic nervous system involvement     2. Herpes zoster with complication     Multiple episodes of zoster-significant decrease after Zostavax. This vaccine had very limited durability.   Needs Shingrix as soon as feasible.   Recommend stop acyclovir prophylaxis  If has prodrome of shingles, should get valacyclovir or famvir due to higher oral bioavailability  Tingling likely post-herpetic neuralgia  FU prn

## 2020-12-08 ENCOUNTER — OFFICE VISIT (OUTPATIENT)
Dept: MEDICAL GROUP | Facility: MEDICAL CENTER | Age: 60
End: 2020-12-08
Payer: COMMERCIAL

## 2020-12-08 VITALS
HEIGHT: 66 IN | SYSTOLIC BLOOD PRESSURE: 104 MMHG | BODY MASS INDEX: 37.73 KG/M2 | RESPIRATION RATE: 16 BRPM | WEIGHT: 234.79 LBS | OXYGEN SATURATION: 96 % | DIASTOLIC BLOOD PRESSURE: 76 MMHG | TEMPERATURE: 97.7 F | HEART RATE: 71 BPM

## 2020-12-08 DIAGNOSIS — Z23 NEED FOR VACCINATION: ICD-10-CM

## 2020-12-08 DIAGNOSIS — Z00.00 PREVENTATIVE HEALTH CARE: ICD-10-CM

## 2020-12-08 DIAGNOSIS — B02.8 HERPES ZOSTER WITH COMPLICATION: ICD-10-CM

## 2020-12-08 DIAGNOSIS — R73.9 HYPERGLYCEMIA: ICD-10-CM

## 2020-12-08 DIAGNOSIS — R29.818 SUSPECTED SLEEP APNEA: ICD-10-CM

## 2020-12-08 DIAGNOSIS — R16.0 HEPATOMEGALY: ICD-10-CM

## 2020-12-08 DIAGNOSIS — M54.2 NECK PAIN: ICD-10-CM

## 2020-12-08 DIAGNOSIS — K21.9 GASTROESOPHAGEAL REFLUX DISEASE WITHOUT ESOPHAGITIS: ICD-10-CM

## 2020-12-08 DIAGNOSIS — Z96.659 HISTORY OF KNEE REPLACEMENT, UNSPECIFIED LATERALITY: ICD-10-CM

## 2020-12-08 PROCEDURE — 99214 OFFICE O/P EST MOD 30 MIN: CPT | Performed by: INTERNAL MEDICINE

## 2020-12-08 RX ORDER — OMEPRAZOLE 40 MG/1
40 CAPSULE, DELAYED RELEASE ORAL PRN
Qty: 90 CAP | Refills: 3 | Status: SHIPPED | OUTPATIENT
Start: 2020-12-08 | End: 2021-12-23 | Stop reason: SDUPTHER

## 2020-12-08 RX ORDER — MELOXICAM 7.5 MG/1
7.5 TABLET ORAL DAILY
Qty: 30 TAB | Refills: 6 | Status: SHIPPED | OUTPATIENT
Start: 2020-12-08 | End: 2021-04-13

## 2020-12-08 ASSESSMENT — FIBROSIS 4 INDEX: FIB4 SCORE: 1.15

## 2020-12-08 NOTE — PROGRESS NOTES
CC:  Diagnoses of Hyperglycemia, Hepatomegaly, Preventative health care, Herpes zoster with complication, Need for vaccination, Gastroesophageal reflux disease without esophagitis, Neck pain, History of knee replacement, unspecified laterality, and Suspected sleep apnea were pertinent to this visit.    HISTORY OF THE PRESENT ILLNESS: Patient is a 60 y.o. female. This pleasant patient is here today for routine follow-up.    With right heart changes on prior echocardiogram, she does not feel she has sleep apnea but she is agreeable to have this ruled out.    Since last appointment she saw infectious disease note was reviewed 10/21/2020 regarding her history of recurrent herpes zoster, viral meningitis and most recent aseptic meningitis this past summer.  They recommended stop acyclovir prophylaxis.  Update shingles vaccine.  Brain MRI 9/14/2020 subsequently no concerning findings, there was signs of minimal chronic microvascular ischemic disease.  Cervical spine showed multilevel DDD facet arthropathy.  She has already done some physical therapy and does not feel she needs to continue.    Besides the chronic neck pain also has had history of chronic knee pain and knee replacement.  Uses meloxicam approximately every other day.  She is advised that long-term use meloxicam especially when used frequently can have some potential negative effects on the heart, kidneys, etc.  Advised try topicals instead.  She will let me know she ever wishes to restart physical therapy.  For the intermittent leg swelling seems to happen after high salt meal, she uses spironolactone as needed approximately once per month.  No current cardiopulmonary or strokelike symptoms.    GERD is well controlled on omeprazole she wishes to continue.  No dysphagia, no abdominal pain, no change in bowel movements, no GI symptoms.    Allergies: Patient has no known allergies.    Current Outpatient Medications Ordered in Epic   Medication Sig Dispense  "Refill   • omeprazole (PRILOSEC) 40 MG delayed-release capsule Take 1 Cap by mouth as needed. Indications: Heartburn 90 Cap 3   • meloxicam (MOBIC) 7.5 MG Tab Take 1 Tab by mouth every day. 30 Tab 6   • traZODone (DESYREL) 50 MG Tab TAKE 1 TABLET BY MOUTH AT BEDTIME AS NEEDED 90 Tab 1   • Cyanocobalamin (B-12 PO) Take 1 Tab by mouth every day.     • acetaminophen (TYLENOL) 500 MG Tab Take 1,000 mg by mouth every 6 hours as needed for Moderate Pain.     • spironolactone (ALDACTONE) 50 MG Tab Take 50 mg by mouth as needed (For swelling).     • therapeutic multivitamin-minerals (THERAGRAN-M) Tab Take 1 Tab by mouth every day.       No current [facility]-ordered facility-administered medications on file.        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\"   • Glaucoma    • Hemorrhoids    • Hives    • Meningitis    • Pneumonia    • Sinusitis        Past Surgical History:   Procedure Laterality Date   • ABDOMINAL EXPLORATION     • APPENDECTOMY     • GYN SURGERY      removed ovary   • KNEE ARTHROPLASTY TOTAL Bilateral 2013   • LUMPECTOMY     • OTHER      breast lift, tummy tuck   • OTHER      removal of fallopian tube for a cyst per patient.   • OTHER ORTHOPEDIC SURGERY      b/l knee replacements ,    • TONSILLECTOMY         Social History     Tobacco Use   • Smoking status: Former Smoker     Packs/day: 2.00     Years: 16.00     Pack years: 32.00     Quit date:      Years since quittin.9   • Smokeless tobacco: Never Used   Substance Use Topics   • Alcohol use: Yes     Frequency: Monthly or less     Drinks per session: 1 or 2     Comment: rARELY   • Drug use: Yes     Types: Inhaled     Comment: Marijuana       Social History     Social History Narrative   • Not on file       Family History   Problem Relation Age of Onset   • Arthritis Mother    • " "Cancer Mother         skin   • Anemia Mother    • Hypertension Mother    • Obesity Mother    • Other Mother         ulcer   • Cancer Father         skin, prostate, esophageal   • Hypertension Father    • Obesity Father    • Arthritis Brother    • Cancer Maternal Aunt         breast]       ROS:     - Constitutional: Negative for fever, chills    - Eyes:   Negative for eye pain, discharge    - ENT:  Negative for sore throat     - Respiratory: Negative for cough    - Cardiovascular: Negative for chest pain, palpitations, orthopnea    - Gastrointestinal: Negative for abdominal pain, hematochezia, melena, diarrhea, constipation, and greasy/foul-smelling stools.     - Genitourinary: Negative for dysuria    - Musculoskeletal: see hpi    - Skin: Negative for rash, itching, cyanotic skin color change.     - Neurological: Negative for vertigo    - Endo:Negative for polyuria, heat/cold intolerance, excessive thirst    - Hem/lymphatic: Negative for  swollen glands    -Allergic/immun: Negative for allergic rhinitis    - Psychiatric/Behavioral: Negative for depression, suicidal/homicidal ideation and memory loss.      Exam: /76 (BP Location: Right arm, Patient Position: Sitting, BP Cuff Size: Adult long)   Pulse 71   Temp 36.5 °C (97.7 °F) (Temporal)   Resp 16   Ht 1.676 m (5' 6\")   Wt 106.5 kg (234 lb 12.6 oz)   SpO2 96%  Body mass index is 37.9 kg/m².    General: Normal appearing. No distress.  EYES: Conjunctiva clear lids without ptosis, pupils equal  EARS: Normal shape and contour   Pulmonary: Clear to ausculation.  Normal effort. No rales or wheezing.  Cardiovascular: Regular rate and rhythm without significant murmur.   Abdomen: Soft, nontender, nondistended. Normal bowel sounds.  Neurologic: Cranial nerves grossly nonfocal  Skin: Warm and dry.  No obvious lesions.  Musculoskeletal: Normal gait. No extremity cyanosis, clubbing, or edema.  Psych: Normal mood and affect. Alert and oriented x3. Judgment and insight " is normal.      Assessment/Plan  1. Hyperglycemia  Patient will work on diet and exercise has able, plan to reassess status of this chronic, stable condition.  - CBC WITHOUT DIFFERENTIAL; Future  - Comp Metabolic Panel; Future  - HEMOGLOBIN A1C; Future    2. Hepatomegaly  Encourage diet and exercise habits, long-term reduce BMI.  Plan to reassess liver enzymes.  - Comp Metabolic Panel; Future  - Lipid Profile; Future    3. Preventative health care  - CBC WITHOUT DIFFERENTIAL; Future  - Comp Metabolic Panel; Future  - Lipid Profile; Future  - HEMOGLOBIN A1C; Future    4. Herpes zoster with complication  Plan to use valacyclovir as needed for needed outbreaks.  Update shingles vaccine, unfortunately clinic ran out today.    5. Need for vaccination  See #4 above    6. Gastroesophageal reflux disease without esophagitis  Stable, chronic and controlled continue omeprazole.  Long-term would be beneficial to work on reducing BMI and any dietary/behavioral changes to reduce chronicity of PPI usage.  - omeprazole (PRILOSEC) 40 MG delayed-release capsule; Take 1 Cap by mouth as needed. Indications: Heartburn  Dispense: 90 Cap; Refill: 3    7. Neck pain  Physical therapy as needed.  Try to limit overall oral NSAIDs.  Consider topicals instead discussed with patient.  - meloxicam (MOBIC) 7.5 MG Tab; Take 1 Tab by mouth every day.  Dispense: 30 Tab; Refill: 6    8. History of knee replacement, unspecified laterality  See #7 above  - meloxicam (MOBIC) 7.5 MG Tab; Take 1 Tab by mouth every day.  Dispense: 30 Tab; Refill: 6    9. Suspected sleep apnea  Minimal right heart changes, she does not feel she has symptoms of sleep apnea but she is agreeable to have this ruled out.  - REFERRAL TO PULMONARY AND SLEEP MEDICINE        rtc 6m      Please note that this dictation was created using voice recognition software. I have made every reasonable attempt to correct obvious errors, but I expect that there are errors of grammar and  possibly content that I did not discover before finalizing the note.

## 2020-12-15 NOTE — PROGRESS NOTES
2RN skin check complete with DELANO Alcantar.    Devices in place N/A    Skin assessed under deceives N/A    Confirmed pressures ulcers found on N/A    New potential pressure ulcers noted on N/A. Would consult placed and wound reported. The following interventions in place pt encouraged to reposition self.      Medical Necessity Statement: Based on my medical judgement, Mohs surgery is the most appropriate treatment for this cancer compared to other treatments.

## 2020-12-18 ENCOUNTER — TELEMEDICINE (OUTPATIENT)
Dept: SLEEP MEDICINE | Facility: MEDICAL CENTER | Age: 60
End: 2020-12-18
Payer: COMMERCIAL

## 2020-12-18 VITALS
HEART RATE: 71 BPM | DIASTOLIC BLOOD PRESSURE: 78 MMHG | BODY MASS INDEX: 36.57 KG/M2 | SYSTOLIC BLOOD PRESSURE: 124 MMHG | WEIGHT: 233 LBS | HEIGHT: 67 IN

## 2020-12-18 DIAGNOSIS — G47.30 SLEEP DISORDER BREATHING: ICD-10-CM

## 2020-12-18 DIAGNOSIS — F51.04 CHRONIC INSOMNIA: ICD-10-CM

## 2020-12-18 PROCEDURE — 99204 OFFICE O/P NEW MOD 45 MIN: CPT | Performed by: FAMILY MEDICINE

## 2020-12-18 ASSESSMENT — FIBROSIS 4 INDEX: FIB4 SCORE: 1.15

## 2020-12-18 NOTE — PATIENT INSTRUCTIONS
Sleep hygiene (ref: UpToDate)  ?Sleep as long as necessary to feel rested (usually seven to eight hours for adults) and then get out of bed  ?Maintain a regular sleep schedule, particularly a regular wake-up time in the morning  ?Try not to force sleep  ?Avoid caffeinated beverages after lunch  ?Avoid alcohol near bedtime (eg, late afternoon and evening)  ?Avoid smoking or other nicotine intake, particularly during the evening  ?Adjust the bedroom environment as needed to decrease stimuli (eg, reduce ambient light, turn off the television or radio)  ?Avoid use of light-emitting screens  (laptops, tablets, smartphones, AwayFindooks) 2 hours before bedtime   ?Resolve concerns or worries before bedtime  ?Exercise regularly for at least 20 minutes, preferably more than four to five hours prior to bedtime.  ?Avoid daytime naps, especially if they are longer than 20 to 30 minutes or occur late in the day    Stimulus control (Ref: UpToDate)    Patients with insomnia may associate their bed and bedroom with the fear of not sleeping or other arousing events, rather than the more pleasurable anticipation of sleep. The longer one stays in bed trying to sleep, the stronger the association becomes. This perpetuates the difficulty falling asleep.Stimulus control therapy is a strategy whose purpose is to disrupt this association by enhancing the likelihood of sleep . Patients should not go to bed until they are sleepy and should use the bed primarily for sleep (and not for reading, watching television, eating, or worrying). They should not spend more than 20 minutes in bed awake. If they are awake after 20 minutes, they should leave the bedroom and engage in a relaxing activity, such as reading or listening to soothing music. Patients should not engage in activities that stimulate them or reward them for being awake in the middle of the night, such as eating or watching television. In addition, they should not return to bed until they  "are tired and feel ready to sleep. If they return to bed and still cannot sleep within 20 minutes, the process should be repeated. An alarm should be set to wake the patient at the same time every morning, including weekends. Daytime naps are not allowed.      CPAP and BPAP Information  CPAP and BPAP are methods of helping a person breathe with the use of air pressure. CPAP stands for \"continuous positive airway pressure.\" BPAP stands for \"bi-level positive airway pressure.\" In both methods, air is blown through your nose or mouth and into your air passages to help you breathe well.  CPAP and BPAP use different amounts of pressure to blow air. With CPAP, the amount of pressure stays the same while you breathe in and out. With BPAP, the amount of pressure is increased when you breathe in (inhale) so that you can take larger breaths. Your health care provider will recommend whether CPAP or BPAP would be more helpful for you.  Why are CPAP and BPAP treatments used?  CPAP or BPAP can be helpful if you have:  · Sleep apnea.  · Chronic obstructive pulmonary disease (COPD).  · Heart failure.  · Medical conditions that weaken the muscles of the chest including muscular dystrophy, or neurological diseases such as amyotrophic lateral sclerosis (ALS).  · Other problems that cause breathing to be weak, abnormal, or difficult.  CPAP is most commonly used for obstructive sleep apnea (NIRALI) to keep the airways from collapsing when the muscles relax during sleep.  How is CPAP or BPAP administered?  Both CPAP and BPAP are provided by a small machine with a flexible plastic tube that attaches to a plastic mask. You wear the mask. Air is blown through the mask into your nose or mouth. The amount of pressure that is used to blow the air can be adjusted on the machine. Your health care provider will determine the pressure setting that should be used based on your individual needs.  When should CPAP or BPAP be used?  In most cases, the " mask only needs to be worn during sleep. Generally, the mask needs to be worn throughout the night and during any daytime naps. People with certain medical conditions may also need to wear the mask at other times when they are awake. Follow instructions from your health care provider about when to use the machine.  What are some tips for using the mask?    · Because the mask needs to be snug, some people feel trapped or closed-in (claustrophobic) when first using the mask. If you feel this way, you may need to get used to the mask. One way to do this is by holding the mask loosely over your nose or mouth and then gradually applying the mask more snugly. You can also gradually increase the amount of time that you use the mask.  · Masks are available in various types and sizes. Some fit over your mouth and nose while others fit over just your nose. If your mask does not fit well, talk with your health care provider about getting a different one.  · If you are using a mask that fits over your nose and you tend to breathe through your mouth, a chin strap may be applied to help keep your mouth closed.  · The CPAP and BPAP machines have alarms that may sound if the mask comes off or develops a leak.  · If you have trouble with the mask, it is very important that you talk with your health care provider about finding a way to make the mask easier to tolerate. Do not stop using the mask. Stopping the use of the mask could have a negative impact on your health.  What are some tips for using the machine?  · Place your CPAP or BPAP machine on a secure table or stand near an electrical outlet.  · Know where the on/off switch is located on the machine.  · Follow instructions from your health care provider about how to set the pressure on your machine and when you should use it.  · Do not eat or drink while the CPAP or BPAP machine is on. Food or fluids could get pushed into your lungs by the pressure of the CPAP or BPAP.  · Do  not smoke. Tobacco smoke residue can damage the machine.  · For home use, CPAP and BPAP machines can be rented or purchased through home health care companies. Many different brands of machines are available. Renting a machine before purchasing may help you find out which particular machine works well for you.  · Keep the CPAP or BPAP machine and attachments clean. Ask your health care provider for specific instructions.  Get help right away if:  · You have redness or open areas around your nose or mouth where the mask fits.  · You have trouble using the CPAP or BPAP machine.  · You cannot tolerate wearing the CPAP or BPAP mask.  · You have pain, discomfort, and bloating in your abdomen.  Summary  · CPAP and BPAP are methods of helping a person breathe with the use of air pressure.  · Both CPAP and BPAP are provided by a small machine with a flexible plastic tube that attaches to a plastic mask.  · If you have trouble with the mask, it is very important that you talk with your health care provider about finding a way to make the mask easier to tolerate.  This information is not intended to replace advice given to you by your health care provider. Make sure you discuss any questions you have with your health care provider.  Document Released: 09/15/2005 Document Revised: 04/08/2020 Document Reviewed: 11/06/2017  Elsevier Patient Education © 2020 [employer]

## 2020-12-28 ENCOUNTER — NON-PROVIDER VISIT (OUTPATIENT)
Dept: MEDICAL GROUP | Facility: MEDICAL CENTER | Age: 60
End: 2020-12-28
Payer: COMMERCIAL

## 2020-12-28 DIAGNOSIS — Z23 NEED FOR VACCINATION: ICD-10-CM

## 2020-12-29 PROCEDURE — 90750 HZV VACC RECOMBINANT IM: CPT | Performed by: INTERNAL MEDICINE

## 2020-12-29 PROCEDURE — 90471 IMMUNIZATION ADMIN: CPT | Performed by: INTERNAL MEDICINE

## 2020-12-29 NOTE — PROGRESS NOTES
"Maria Teresa Haji is a 60 y.o. female here for a non-provider visit for:   SHINGRIX (Shingles)    Reason for immunization: continue or complete series started at the office  Immunization records indicate need for vaccine: Yes, confirmed with Epic  Minimum interval has been met for this vaccine: Yes  ABN completed: Not Indicated    Order and dose verified by: CAROL  VIS Dated  [id number] was given to patient: Yes  All IAC Questionnaire questions were answered \"No.\"    Patient tolerated injection and no adverse effects were observed or reported: Yes    Pt scheduled for next dose in series: Not Indicated  "

## 2021-01-11 ENCOUNTER — SLEEP STUDY (OUTPATIENT)
Dept: SLEEP MEDICINE | Facility: MEDICAL CENTER | Age: 61
End: 2021-01-11
Attending: FAMILY MEDICINE
Payer: COMMERCIAL

## 2021-01-11 DIAGNOSIS — G47.30 SLEEP DISORDER BREATHING: ICD-10-CM

## 2021-01-11 DIAGNOSIS — F51.04 CHRONIC INSOMNIA: ICD-10-CM

## 2021-01-13 PROCEDURE — 95811 POLYSOM 6/>YRS CPAP 4/> PARM: CPT | Performed by: FAMILY MEDICINE

## 2021-01-13 NOTE — PROCEDURES
Technical summary: The patient underwent a split-night polysomnogram. This was a 16 channel montage study to include a 6 channel EEG, a 2 channel EOG, and chin EMG, left and right leg EMG, a snore channel, a nasal pressure transducer, and a nasal oral airflow  thermistor and a CFLOW pressure transducer.   Respiratory effort was assessed with the use of a thoracic and abdominal monitor and overnight oximetry was obtained. Audio and video recordings were reviewed. This was a fully attended study and sleep stage scoring was performed. The test was technically adequate.    Scoring Criteria: A modification of the the AASM Manual for the Scoring of Sleep and Associated Events  Obstructive apnea was scored by cessation of airflow for at least 10 seconds with continuing respiratory effort.  Central apnea was scored by cessation of airflow for at least 10 seconds with no effort.  Hypopnea was scored by a 30% or more reduction in airflow for at least 10 seconds accompanied by an arterial oxygen desaturation of 3% or more.  (For Medicare patients, hypopneas were scored by a 30% or more reduction in airflow for at least 10 seconds accompanied by an arterial oxygen saturation of 4% or more, as required by their insurance, CMS.    Interpretation:  Study start time was 10:28:31 PM. Total recording time was 3h 42.0m (222 minutes) with a total sleep time of 2h 12.0m (132 minutes) resulting in a sleep efficiency of 59.46%.  Sleep latency from the start fo the study was 84 minutes minutes and REM latency from sleep onset was 100 minutes minutes.    Respiratory:   There were 5 apneas in total consisting of 5 obstructive apneas, 0 mixed apneas, and 0 central apneas. There were 60 hypopneas in total.  The apnea index was 2.27 per hour and the hypopnea index was 27.27 per hour.  The overall AHI was 29.5, with a REM AHI of 55.71, and a supine AHI of 40.00.    Limb Movements:  There were a total of 0 periodic leg movements, of which 0  were PLMS arousals. This resulted in a PLMS index of 0.0 and a PLMS arousal index of 0.0    Oximetry:  The mean SaO2 was 90.0% for the diagnostic portion of the study, with a minimum SaO2 of 79.0%.   Treatment:  Interpretation:  Treatment recording time was 3h 45.0m (225 minutes) with a total sleep time of 2h 35.0m (155 minutes) resulting in a sleep efficiency of 68.9%.   Sleep latency from the start of treatment was 62 minutes minutes and REM latency from sleep onset was 0h 45.0m minutes.   The patient had 39 arousals in total for an arousal index of 15.1.    Respiratory:   There were 4 apneas in total consisting of 1 obstructive apneas, 3 central apneas, and 0 mixed apneas for an apnea index of 1.55.   The patient had 28 hypopneas in total, which resulted in a hypopnea index of 10.84.   The overall AHI was 12.39, with a REM AHI of 42.96, and a supine AHI of 12.39.     Limb Movements:  There were a total of 0 periodic leg movements, of which 0 were PLMS arousals. This resulted in a PLMS index of 0.0 and a PLMS arousal index of 0.0.    Oximetry:  The mean SaO2 during treatment was 92.0%, with a minimum oxygen saturation of 79.0%.    CPAP was tried from 5 to 10cm H2O.      CPAP Titration:  Due to the significant number of obstructive respiratory events observed during the diagnostic portion of the study a CPAP titration trial was performed during the second half of the night. The CPAP pressure was initiated at 5 cm of water and the pressure was increased in an attempt to eliminate all sleep disordered breathing and snoring. The CPAP pressure was increased to 10 cm water and at this final pressure the patient was observed in the supine position and in the REM sleep stage. The apnea hypopnea index improved to 1.55/hr with improved O2 titi of  87%.She spent 31 % of sleep time below 90% O2 saturation Snoring was resolved.  The patient utilized medium eson 2 mask with heated humidification. The CPAP was well-tolerated  and there was minimal air leaks.    Impression:  1.  Moderate obstructive sleep apnea with AHI of 29.5/hr and O2 titi 79 %. Due to severity of the disease she met the split study protocol. The titration started with CPAP 5 cm and the best tolerated was CPAP 10 cm. The AHI improved to 1.55/hr with improved O2 titi of 87% and average O2 saturation of 92 %.   2. Sleep related hypoxia    Recommendations:  I recommend CPAP 10 cm with eson 2 mask. Consider supplemental O2 bleed in and f/u with OPO on the recommended pressure due to residual sleep hypoxia. I also recommend 30 day compliance download to assess the efficacy to the recommended pressure, measure leak, apnea hypopnea index and compliance for further outpatient monitoring and management of CPAP therapy. In some cases alternative treatment options may prove effective in resolving sleep apnea and these options include upper airway surgery, the use of a dental orthotic or weight loss and positional therapy. Clinical correlation is required. In general patients with sleep apnea are advised to avoid alcohol and sedatives and to not operate a motor vehicle while drowsy and are at a greater risk for cardiovascular disease.

## 2021-01-27 ENCOUNTER — TELEMEDICINE (OUTPATIENT)
Dept: SLEEP MEDICINE | Facility: MEDICAL CENTER | Age: 61
End: 2021-01-27
Payer: COMMERCIAL

## 2021-01-27 VITALS
BODY MASS INDEX: 36.96 KG/M2 | SYSTOLIC BLOOD PRESSURE: 125 MMHG | WEIGHT: 230 LBS | HEIGHT: 66 IN | DIASTOLIC BLOOD PRESSURE: 72 MMHG

## 2021-01-27 DIAGNOSIS — G47.33 OSA (OBSTRUCTIVE SLEEP APNEA): ICD-10-CM

## 2021-01-27 DIAGNOSIS — F51.04 CHRONIC INSOMNIA: ICD-10-CM

## 2021-01-27 PROCEDURE — 99214 OFFICE O/P EST MOD 30 MIN: CPT | Mod: 95,CR | Performed by: NURSE PRACTITIONER

## 2021-01-27 ASSESSMENT — FIBROSIS 4 INDEX: FIB4 SCORE: 1.15

## 2021-01-27 NOTE — PROGRESS NOTES
Virtual Visit: Established Patient   This visit was conducted via Zoom using secure and encrypted videoconferencing technology. The patient was in a private location in the state [facility].    The patient's identity was confirmed and verbal consent was obtained for this virtual visit.  Given the importance of social distancing and other strategies recommended to reduce the risk of COVID-19 transmission, I am providing medical care to this patient via audio/video visit in place of an in person visit at the request of the patient.  Subjective:   CC:   Chief Complaint   Patient presents with   • Follow-Up   • Results     SS       Maria Teresa Haji is a 60 y.o. female presenting for evaluation and management of NIRALI and sleep study results. PMH includes pulmonary nodule, adenoid cystic carcinoma, anxiety, insomnia, hepatomegaly, nonrheumatic aortic valve insufficiency, glaucoma, GERD, history of tobacco use, overweight, tonsillectomy.    PSG split night study from 1/11/21 indicated moderate obstructive sleep apnea with AHI of 29.5/hr and O2 titi 79 %. Due to severity of the disease she met the split study protocol. The titration started with CPAP 5 cm and the best tolerated was CPAP 10 cm. The AHI improved to 1.55/hr with improved O2 titi of 87% and average O2 saturation of 92 %. Sleep related hypoxia.     She goes to bed at 11 pm and wakes up at 6 am.  She will often wake up around 4 AM and will have trouble falling back asleep.  She is using trazodone 50 mg nightly which is prescribed by her PCP.  She reports morning headaches with mild snoring. Overall, she does find her sleep refreshing. In terms of  excessive daytime sleepiness, she denies of sleepiness while  at work, while reading or watching TV or while driving. She does not take regular naps. She drinks about 3 caffeinated beverages per day.  She will try to incorporate an exercise regimen consume a healthy diet to help with weight loss.  She denies any new  health problems or medications.      ROS   Denies any recent fevers or chills. No nausea or vomiting. No chest pains or shortness of breath.     No Known Allergies    Current medicines (including changes today)  Current Outpatient Medications   Medication Sig Dispense Refill   • omeprazole (PRILOSEC) 40 MG delayed-release capsule Take 1 Cap by mouth as needed. Indications: Heartburn 90 Cap 3   • meloxicam (MOBIC) 7.5 MG Tab Take 1 Tab by mouth every day. 30 Tab 6   • traZODone (DESYREL) 50 MG Tab TAKE 1 TABLET BY MOUTH AT BEDTIME AS NEEDED 90 Tab 1   • Cyanocobalamin (B-12 PO) Take 1 Tab by mouth every day.     • acetaminophen (TYLENOL) 500 MG Tab Take 1,000 mg by mouth every 6 hours as needed for Moderate Pain.     • spironolactone (ALDACTONE) 50 MG Tab Take 50 mg by mouth as needed (For swelling).     • therapeutic multivitamin-minerals (THERAGRAN-M) Tab Take 1 Tab by mouth every day.       No current facility-administered medications for this visit.        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   • Herpes zoster with complication 10/21/2020   • Nonrheumatic aortic valve insufficiency 09/08/2020   • Hepatomegaly 12/16/2019   • Other insomnia 10/22/2019   • Anxiety 08/15/2019   • Hyperglycemia 08/06/2019   • Knee pain 08/06/2019   • Adenoid cystic carcinoma (HCC) 05/08/2019   • Preventative health care 05/08/2019   • Menopause 05/08/2019       Family History   Problem Relation Age of Onset   • Arthritis Mother    • Cancer Mother         skin   • Anemia Mother    • Hypertension Mother    • Obesity Mother    • Other  "Mother         ulcer   • Cancer Father         skin, prostate, esophageal   • Hypertension Father    • Obesity Father    • Arthritis Brother    • Cancer Maternal Aunt         breast]   • Sleep Apnea Neg Hx        She  has a past medical history of Arthritis, Back pain, Blood transfusion without reported diagnosis, Bronchitis, Cancer (HCC), Chickenpox, Glaucoma, Hemorrhoids, Hives, Meningitis, Pneumonia, and Sinusitis. She also has no past medical history of Allergy.  She  has a past surgical history that includes other orthopedic surgery; appendectomy; abdominal exploration; other; tonsillectomy; other; lumpectomy; gyn surgery; and knee arthroplasty total (Bilateral, 2015, 2013).       Objective:   /72   Ht 1.676 m (5' 6\")   Wt 104.3 kg (230 lb)   LMP  (LMP Unknown)   BMI 37.12 kg/m²     Physical Exam:  Constitutional: Alert, no distress, well-groomed.  Skin: No rashes in visible areas.  Eye: Round. Conjunctiva clear, lids normal. No icterus.   ENMT: Lips pink without lesions, good dentition, moist mucous membranes. Phonation normal.  Neck: Moves freely without pain.  Respiratory: Unlabored respiratory effort, no cough or audible wheeze  Psych: Alert and oriented x3, normal affect and mood.       Assessment and Plan:   The following treatment plan was discussed:     1. NIRALI (obstructive sleep apnea)  - DME CPAP    2. Chronic insomnia    3. BMI 37.0-37.9, adult      Discussed the cardiovascular and neuropsychiatric risks of untreated NIRALI; including but not limited to: HTN, DM, MI, ASCVD, CVA, CHF, traffic accidents.     1.  PSG split night study from 1/11/21 indicated moderate obstructive sleep apnea with AHI of 29.5/hr and O2 titi 79 %. Due to severity of the disease she met the split study protocol. The titration started with CPAP 5 cm and the best tolerated was CPAP 10 cm. The AHI improved to 1.55/hr with improved O2 titi of 87% and average O2 saturation of 92 %. Sleep related hypoxia. "   Recommendation:  Recommend CPAP 10 cm with eson 2 mask. Consider supplemental O2 bleed in and f/u with OPO on the recommended pressure due to residual sleep hypoxia.  In some cases alternative treatment options may prove effective in resolving sleep apnea and these options include upper airway surgery, the use of a dental orthotic or weight loss and positional therapy.   In general patients with sleep apnea are advised to avoid alcohol and sedatives and to not operate a motor vehicle while drowsy and are at a greater risk for cardiovascular disease.    2.  Patient is amenable to CPAP therapy.  DME order ([facility]) for CPAP 10 cmH2O with 2L O2 bleed in, mask (medium Eson 2 mask or MOC) and supplies was provided today. Clean mask and supplies weekly, and change supplies per insurance guidelines. Advised patient to use the CPAP every night for more than four hours for optimal health benefit and to meet the health insurance 70% compliance guideline. Advised patient he may change the mask out if needed within the first 30 days after he receives his equipment.   3. Order OPO on  CPAP 10 cmH2O and 2L O2 bleed in at follow-up visit if AHI is in the normal range to rule out nocturnal hypoxia.  4. Continue to stay active.   5. Follow up with the appropriate healthcare practitioners for all other medical problems and issues.  6. Sleep hygiene discussed. Recommend keeping a set sleep/wake schedule. Logging enough hours of sleep. Limiting/Avoiding naps. No caffeine after noon and no heavy meals in the evening.   Chronic insomnia: currently on trazodone 50 mg qhs prn prescribed by PCP. Consider referral to Dr. Mccormack for CBT-I.       Follow-up: Return in about 3 months (around 4/27/2021).    ARGENTINA Still.    This dictation was created using voice recognition software. The accuracy of the dictation is limited to the abilities of the software. I expect there may be some errors of grammar and possibly content.

## 2021-02-17 ENCOUNTER — PATIENT MESSAGE (OUTPATIENT)
Dept: SLEEP MEDICINE | Facility: MEDICAL CENTER | Age: 61
End: 2021-02-17

## 2021-02-17 NOTE — TELEPHONE ENCOUNTER
From: Maria Teresa Haji  To: Physician Rickie Baltazar  Sent: 2021 2:36 PM PST  Subject: Procedure Question    ,  I completed the sleep study in January and was diagnosed with severe sleep apnea. I am working with [facility] for the CPAP machine and oxygen.     They have been waiting for additional information from your office in order for my insurance company to approve the equipment.    I appreciate your assistance.    Thank you,  Maria Teresa Haji  [phone]   59

## 2021-02-17 NOTE — PATIENT COMMUNICATION
Abeba has any from [facility] reach out to wanting additional information

## 2021-02-24 ENCOUNTER — PATIENT MESSAGE (OUTPATIENT)
Dept: SLEEP MEDICINE | Facility: MEDICAL CENTER | Age: 61
End: 2021-02-24

## 2021-03-01 DIAGNOSIS — L40.4 GUTTATE PSORIASIS: ICD-10-CM

## 2021-03-02 ENCOUNTER — NON-PROVIDER VISIT (OUTPATIENT)
Dept: MEDICAL GROUP | Facility: MEDICAL CENTER | Age: 61
End: 2021-03-02
Payer: COMMERCIAL

## 2021-03-02 DIAGNOSIS — Z23 NEED FOR VACCINATION: ICD-10-CM

## 2021-03-02 PROCEDURE — 90750 HZV VACC RECOMBINANT IM: CPT | Performed by: INTERNAL MEDICINE

## 2021-03-02 PROCEDURE — 90471 IMMUNIZATION ADMIN: CPT | Performed by: INTERNAL MEDICINE

## 2021-03-02 NOTE — NON-PROVIDER
"Maria Teresa Haji is a 60 y.o. female here for a non-provider visit for:   SHINGRIX (Shingles)    Reason for immunization: continue or complete series started at the office  Immunization records indicate need for vaccine: Yes, confirmed with Epic  Minimum interval has been met for this vaccine: Yes  ABN completed: Yes    Order and dose verified by: Joppel  VIS Dated  10/30/19 was given to patient: Yes  All IAC Questionnaire questions were answered \"No.\"    Patient tolerated injection and no adverse effects were observed or reported: Yes    Pt scheduled for next dose in series: No  "

## 2021-03-04 ENCOUNTER — PATIENT MESSAGE (OUTPATIENT)
Dept: SLEEP MEDICINE | Facility: MEDICAL CENTER | Age: 61
End: 2021-03-04

## 2021-03-09 NOTE — PATIENT COMMUNICATION
Auth is currently pending there Katarina.  By having [employer] remove the auth and sending her somewhere else, this will delay the process.  I did verify with [employer] that they are unclear by Katarina keeps kicking back the auths as both times, they have EVERYTHING attached but Katarina claimed there was missing information.    Called and advised pt via  her best interest was to wait it out with [employer] and left me direct line for a call back  Will follow along and make pt is set up

## 2021-03-12 ENCOUNTER — OFFICE VISIT (OUTPATIENT)
Dept: DERMATOLOGY | Facility: IMAGING CENTER | Age: 61
End: 2021-03-12
Payer: COMMERCIAL

## 2021-03-12 DIAGNOSIS — D49.2 NEOPLASM OF SKIN: ICD-10-CM

## 2021-03-12 PROCEDURE — 11102 TANGNTL BX SKIN SINGLE LES: CPT | Performed by: DERMATOLOGY

## 2021-03-12 RX ORDER — ACYCLOVIR 400 MG/1
400 TABLET ORAL 2 TIMES DAILY
COMMUNITY
Start: 2021-02-11 | End: 2021-04-20

## 2021-03-12 NOTE — PROGRESS NOTES
CC: Lesion on right forearm    Subjective:  Previously seen patient for lesion on right forearm.  Persists and not behaving as other more typical PSO lesions do.     HPI/location: right forearm  Time present: 3 months  Painful lesion: No  Itching lesion: Yes  Enlarging lesion: Yes  Anything make it better or worse? No    History of skin cancer: Yes, Details: adenoid cyctic carcinoma 2013  History of precancers/actinic keratoses: no  History of biopsies:Yes, Details: back of scalp  History of blistering/severe sunburns:Yes, Details: teenage years  Family history of skin cancer:Yes, Details: parents  Family history of atypical moles:No    ROS: no fevers/chills. No itch. No cough    No problem-specific Assessment & Plan notes found for this encounter.    Relevant PMH:mult med comorbidities  Social: former smoker    PE: Gen:WDWN female in NAD.  Skin:focal exam: approx 0.8cm right arm - tan/pink papule with ill-defined characteristics    A/P: Neoplasm NOS: consider LPLK vs AK/PSO vs other  Right arm  -consent for bx, including R/B/A. Cleaned with EtOH, anesthesia with lidocaine 1% + epinephrine, shave bx, AlCl3 for hemostasis  -vaseline/bandage and wound care reviewed    I have reviewed medications relevant to my specialty.

## 2021-03-15 DIAGNOSIS — Z23 NEED FOR VACCINATION: ICD-10-CM

## 2021-03-17 ENCOUNTER — IMMUNIZATION (OUTPATIENT)
Dept: FAMILY PLANNING/WOMEN'S HEALTH CLINIC | Facility: IMMUNIZATION CENTER | Age: 61
End: 2021-03-17
Attending: INTERNAL MEDICINE
Payer: COMMERCIAL

## 2021-03-17 DIAGNOSIS — Z23 ENCOUNTER FOR VACCINATION: Primary | ICD-10-CM

## 2021-03-17 DIAGNOSIS — Z23 NEED FOR VACCINATION: ICD-10-CM

## 2021-03-17 PROCEDURE — 91300 PFIZER SARS-COV-2 VACCINE: CPT

## 2021-03-17 PROCEDURE — 0001A PFIZER SARS-COV-2 VACCINE: CPT

## 2021-03-19 ENCOUNTER — TELEPHONE (OUTPATIENT)
Dept: DERMATOLOGY | Facility: IMAGING CENTER | Age: 61
End: 2021-03-19

## 2021-03-30 ENCOUNTER — OFFICE VISIT (OUTPATIENT)
Dept: SLEEP MEDICINE | Facility: MEDICAL CENTER | Age: 61
End: 2021-03-30
Payer: COMMERCIAL

## 2021-03-30 VITALS
SYSTOLIC BLOOD PRESSURE: 124 MMHG | BODY MASS INDEX: 37.77 KG/M2 | DIASTOLIC BLOOD PRESSURE: 82 MMHG | HEART RATE: 71 BPM | HEIGHT: 66 IN | OXYGEN SATURATION: 95 % | RESPIRATION RATE: 16 BRPM | WEIGHT: 235 LBS

## 2021-03-30 DIAGNOSIS — R91.8 PULMONARY NODULES: ICD-10-CM

## 2021-03-30 PROCEDURE — 99213 OFFICE O/P EST LOW 20 MIN: CPT | Performed by: FAMILY MEDICINE

## 2021-03-30 ASSESSMENT — FIBROSIS 4 INDEX: FIB4 SCORE: 1.15

## 2021-03-30 NOTE — PROGRESS NOTES
[facility] Follow Up Note     Date: 3/30/2021 / Time: 1:07 PM    Patient ID:   Name:             Maria Teresa Haji   YOB: 1960  Age:                 60 y.o.  female   MRN:               [id number]      Thank you for requesting a sleep medicine consultation on Maria Teresa Haji at the sleep center. She presents today with the chief complaints of OAS follow up. PMH includes pulmonary nodule, adenoid cystic carcinoma, anxiety, insomnia, hepatomegaly, nonrheumatic aortic valve insufficiency, glaucoma, GERD, history of tobacco use, overweight, tonsillectomy    HISTORY OF PRESENT ILLNESS:       Pt is currently on CPAP 10 cmH2O with 2L O2 bleed in, mask medium Eson 2 mask . She is getting about 7 hrs of sleep on a good night. The bad nights are rare no days.She is using trazodone 50 mg nightly which is prescribed by her PCP.Overall,  She does  finds her sleep refreshing since she has been on the CPAP. She does not take regular naps any more.  She denies any symptoms of RLS, narcolepsy or any symptoms to suggest parasomnias such as nightmares, sleep walking or acting out of dreams.      She received her PAP in 3/11/21. She is using CPAP most days of the week. Pt reports 6 hrs of average nightly use of CPAP. Pt denies snoring, gasping,choking.Pt also denies significant mask leak that is interfering with sleep. The 30 day compliance was downloaded which shows inadequate compliance with more that 4 hr usage about 63%. The AHI is has improved to 0.7/hr. The mask leak is normal The symptoms of excessive daytime, snoring and gasping has improved with the therapy.     She quit 30 years ago. She has hx 30 pack year hx.       SLEEP HISTORY   PSG split night study from 1/11/21 indicated moderate obstructive sleep apnea with AHI of 29.5/hr and O2 titi 79 %. Due to severity of the disease she met the split study protocol. The titration started with CPAP 5 cm and the best tolerated was CPAP 10 cm. The AHI  improved to 1.55/hr with improved O2 titi of 87% and average O2 saturation of 92 %. Sleep related hypoxia      REVIEW OF SYSTEMS:       Constitutional: Denies fevers, Denies weight changes  Eyes: Denies changes in vision, no eye pain  Ears/Nose/Throat/Mouth: Denies nasal congestion or sore throat   Cardiovascular: Denies chest pain or palpitations   Respiratory: Denies shortness of breath , Denies cough  Gastrointestinal/Hepatic: Denies abdominal pain, nausea, vomiting, diarrhea, constipation or GI bleeding   Genitourinary: Deniesdysuria or frequency  Musculoskeletal/Rheum: Denies  joint pain and swelling   Skin/Breast: Denies rash,   Neurological: Denies headache, confusion, memory loss or focal weakness/parasthesias  Psychiatric: denies mood disorder   Sleep: denies snoring     Comprehensive review of systems form is reviewed with the patient and is attached in the EMR.     PMH:  has a past medical history of Arthritis, Back pain, Blood transfusion without reported diagnosis, Bronchitis, Cancer (HCC), Chickenpox, Glaucoma, Hemorrhoids, Hives, Meningitis, Pneumonia, and Sinusitis. She also has no past medical history of Allergy.  MEDS:   Current Outpatient Medications:   •  fluocinonide (LIDEX) 0.05 % Cream, Apply 1 Application topically 2 times a day. To areas on the body (legs) as needed, Disp: 30 g, Rfl: 1  •  omeprazole (PRILOSEC) 40 MG delayed-release capsule, Take 1 Cap by mouth as needed. Indications: Heartburn, Disp: 90 Cap, Rfl: 3  •  meloxicam (MOBIC) 7.5 MG Tab, Take 1 Tab by mouth every day., Disp: 30 Tab, Rfl: 6  •  traZODone (DESYREL) 50 MG Tab, TAKE 1 TABLET BY MOUTH AT BEDTIME AS NEEDED, Disp: 90 Tab, Rfl: 1  •  Cyanocobalamin (B-12 PO), Take 1 Tab by mouth every day., Disp: , Rfl:   •  acetaminophen (TYLENOL) 500 MG Tab, Take 1,000 mg by mouth every 6 hours as needed for Moderate Pain., Disp: , Rfl:   •  spironolactone (ALDACTONE) 50 MG Tab, Take 50 mg by mouth as needed (For swelling)., Disp: ,  "Rfl:   •  therapeutic multivitamin-minerals (THERAGRAN-M) Tab, Take 1 Tab by mouth every day., Disp: , Rfl:   •  acyclovir (ZOVIRAX) 400 MG tablet, Take 400 mg by mouth., Disp: , Rfl:   ALLERGIES: No Known Allergies  SURGHX:   Past Surgical History:   Procedure Laterality Date   • ABDOMINAL EXPLORATION     • APPENDECTOMY     • GYN SURGERY      removed ovary   • KNEE ARTHROPLASTY TOTAL Bilateral 2015, 2013   • LUMPECTOMY     • OTHER      breast lift, tummy tuck   • OTHER      removal of fallopian tube for a cyst per patient.   • OTHER ORTHOPEDIC SURGERY      b/l knee replacements 2014, 2016   • TONSILLECTOMY       SOCHX:  reports that she quit smoking about 28 years ago. She has a 32.00 pack-year smoking history. She has never used smokeless tobacco. She reports current alcohol use. She reports previous drug use. Drug: Inhaled..  FH:   Family History   Problem Relation Age of Onset   • Arthritis Mother    • Cancer Mother         skin   • Anemia Mother    • Hypertension Mother    • Obesity Mother    • Other Mother         ulcer   • Cancer Father         skin, prostate, esophageal   • Hypertension Father    • Obesity Father    • Arthritis Brother    • Cancer Maternal Aunt         breast]   • Sleep Apnea Neg Hx          Physical Exam:  Vitals/ General Appearance:   Weight/BMI: Body mass index is 37.93 kg/m².  /82 (BP Location: Right arm, Patient Position: Sitting, BP Cuff Size: Adult)   Pulse 71   Resp 16   Ht 1.676 m (5' 6\")   Wt 107 kg (235 lb)   SpO2 95%   Vitals:    03/30/21 1259   BP: 124/82   BP Location: Right arm   Patient Position: Sitting   BP Cuff Size: Adult   Pulse: 71   Resp: 16   SpO2: 95%   Weight: 107 kg (235 lb)   Height: 1.676 m (5' 6\")       Pt. is alert and oriented to time, place and person. Cooperative and in no apparent distress.       Constitutional: Alert, no distress, well-groomed.  Skin: No rashes in visible areas.  Eye: Round. Conjunctiva clear, lids normal. No icterus.   ENMT: " Lips pink without lesions, good dentition, moist mucous membranes. Phonation normal.  Neck: No masses, no thyromegaly. Moves freely without pain.  CV: Pulse as reported by patient  Respiratory: Unlabored respiratory effort, no cough or audible wheeze  Psych: Alert and oriented x3, normal affect and mood.     ASSESSMENT AND PLAN     1. Sleep Apnea .     She is urged to avoid supine sleep, weight gain and alcoholic beverages since all of these can worsen sleep apnea. She is cautioned against drowsy driving. If She feels sleepy while driving, She must pull over for a break/nap, rather than persist on the road, in the interest of She own safety and that of others on the road.   Plan   - Continue CPAP 10 cm with nasal eson 2 mask    - compliance download was reviewed and discussed with the pt   - compliance was reinforced     2.Chronic insomnia    - stimulus control and sleep hygiene reinforced    - reassess after treatment of NIRALI  - recommended to decrease the usage/ wean off of the trazodone if possible since her sleep has improved with the usage of the CPAP

## 2021-04-05 RX ORDER — TRAZODONE HYDROCHLORIDE 50 MG/1
TABLET ORAL
Qty: 90 TABLET | Refills: 1 | Status: SHIPPED | OUTPATIENT
Start: 2021-04-05 | End: 2021-12-23 | Stop reason: SDUPTHER

## 2021-04-08 ENCOUNTER — OFFICE VISIT (OUTPATIENT)
Dept: DERMATOLOGY | Facility: IMAGING CENTER | Age: 61
End: 2021-04-08
Payer: COMMERCIAL

## 2021-04-08 ENCOUNTER — IMMUNIZATION (OUTPATIENT)
Dept: FAMILY PLANNING/WOMEN'S HEALTH CLINIC | Facility: IMMUNIZATION CENTER | Age: 61
End: 2021-04-08
Attending: INTERNAL MEDICINE
Payer: COMMERCIAL

## 2021-04-08 DIAGNOSIS — L90.8 SKIN AGING: ICD-10-CM

## 2021-04-08 DIAGNOSIS — L82.1 SEBORRHEIC KERATOSIS: ICD-10-CM

## 2021-04-08 DIAGNOSIS — L40.9 PSORIASIS: ICD-10-CM

## 2021-04-08 DIAGNOSIS — Z12.83 SKIN CANCER SCREENING: ICD-10-CM

## 2021-04-08 DIAGNOSIS — D22.9 NEVUS: ICD-10-CM

## 2021-04-08 DIAGNOSIS — Z23 ENCOUNTER FOR VACCINATION: Primary | ICD-10-CM

## 2021-04-08 DIAGNOSIS — L81.4 LENTIGO: ICD-10-CM

## 2021-04-08 DIAGNOSIS — L71.9 ROSACEA: ICD-10-CM

## 2021-04-08 PROCEDURE — 91300 PFIZER SARS-COV-2 VACCINE: CPT

## 2021-04-08 PROCEDURE — 99213 OFFICE O/P EST LOW 20 MIN: CPT | Performed by: DERMATOLOGY

## 2021-04-08 PROCEDURE — 0002A PFIZER SARS-COV-2 VACCINE: CPT

## 2021-04-08 RX ORDER — BACLOFEN 10 MG/1
10 TABLET ORAL
COMMUNITY
Start: 2021-03-13 | End: 2021-04-14

## 2021-04-08 NOTE — PROGRESS NOTES
CC: Full Skin Exam    Subjective:  Previously seen patient here for skin check.     Reports site on right buttocks - had been broken out and burning for a few days. Now healing, on its own.  Didn't feel well at onset, but now feeling okay. Hx of shingles in limited sites. - past on l thigh.  Denies hx of herpes.    Redness of face noted on occasion by friends/relatives. No known triggers.    bx site healing on right arm - LPLK    History of skin cancer: Yes, Details: adenoid cyctic carcinoma 2013, scalp  History of precancers/actinic keratoses: no  History of biopsies:Yes, Details: back of scalp  History of blistering/severe sunburns:Yes, Details: teenage years  Family history of skin cancer:Yes, Details: parents  Family history of atypical moles:No    ROS: no fevers/chills. No itch. No cough    No problem-specific Assessment & Plan notes found for this encounter.    Relevant PMH:mult med comorbidities  Social: former smoker    PE: Gen:WDWN female in NAD.  Skin: Scalp/face/eyes/lips/neck/chest/back/arms/legs/hands/feet/buttocks - without suspicious lesions noted.  Genitals exam declined  -midface pinking  -healing biopsy site on right arm, little crusting present.   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

## 2021-04-13 ENCOUNTER — HOSPITAL ENCOUNTER (OUTPATIENT)
Facility: MEDICAL CENTER | Age: 61
End: 2021-04-13
Attending: PHYSICIAN ASSISTANT
Payer: COMMERCIAL

## 2021-04-13 ENCOUNTER — OFFICE VISIT (OUTPATIENT)
Dept: URGENT CARE | Facility: CLINIC | Age: 61
End: 2021-04-13
Payer: COMMERCIAL

## 2021-04-13 VITALS
DIASTOLIC BLOOD PRESSURE: 70 MMHG | RESPIRATION RATE: 20 BRPM | TEMPERATURE: 97.5 F | HEART RATE: 74 BPM | SYSTOLIC BLOOD PRESSURE: 128 MMHG | WEIGHT: 235 LBS | HEIGHT: 66 IN | OXYGEN SATURATION: 94 % | BODY MASS INDEX: 37.77 KG/M2

## 2021-04-13 DIAGNOSIS — R30.0 DYSURIA: ICD-10-CM

## 2021-04-13 DIAGNOSIS — J98.8 VIRAL RESPIRATORY ILLNESS: ICD-10-CM

## 2021-04-13 DIAGNOSIS — R05.9 COUGH: ICD-10-CM

## 2021-04-13 DIAGNOSIS — B97.89 VIRAL RESPIRATORY ILLNESS: ICD-10-CM

## 2021-04-13 LAB
APPEARANCE UR: NORMAL
BILIRUB UR STRIP-MCNC: NORMAL MG/DL
COLOR UR AUTO: NORMAL
COVID ORDER STATUS COVID19: NORMAL
GLUCOSE UR STRIP.AUTO-MCNC: NORMAL MG/DL
KETONES UR STRIP.AUTO-MCNC: NORMAL MG/DL
LEUKOCYTE ESTERASE UR QL STRIP.AUTO: NORMAL
NITRITE UR QL STRIP.AUTO: NORMAL
PH UR STRIP.AUTO: 6.5 [PH] (ref 5–8)
PROT UR QL STRIP: NORMAL MG/DL
RBC UR QL AUTO: NORMAL
SP GR UR STRIP.AUTO: 1.01
UROBILINOGEN UR STRIP-MCNC: 0.2 MG/DL

## 2021-04-13 PROCEDURE — 99000 SPECIMEN HANDLING OFFICE-LAB: CPT | Performed by: PHYSICIAN ASSISTANT

## 2021-04-13 PROCEDURE — 87086 URINE CULTURE/COLONY COUNT: CPT

## 2021-04-13 PROCEDURE — 99214 OFFICE O/P EST MOD 30 MIN: CPT | Performed by: PHYSICIAN ASSISTANT

## 2021-04-13 PROCEDURE — U0003 INFECTIOUS AGENT DETECTION BY NUCLEIC ACID (DNA OR RNA); SEVERE ACUTE RESPIRATORY SYNDROME CORONAVIRUS 2 (SARS-COV-2) (CORONAVIRUS DISEASE [COVID-19]), AMPLIFIED PROBE TECHNIQUE, MAKING USE OF HIGH THROUGHPUT TECHNOLOGIES AS DESCRIBED BY CMS-2020-01-R: HCPCS

## 2021-04-13 PROCEDURE — 81002 URINALYSIS NONAUTO W/O SCOPE: CPT | Performed by: PHYSICIAN ASSISTANT

## 2021-04-13 PROCEDURE — 87077 CULTURE AEROBIC IDENTIFY: CPT

## 2021-04-13 PROCEDURE — 87186 SC STD MICRODIL/AGAR DIL: CPT

## 2021-04-13 PROCEDURE — U0005 INFEC AGEN DETEC AMPLI PROBE: HCPCS

## 2021-04-13 RX ORDER — DEXTROMETHORPHAN HYDROBROMIDE AND PROMETHAZINE HYDROCHLORIDE 15; 6.25 MG/5ML; MG/5ML
5 SYRUP ORAL 4 TIMES DAILY PRN
Qty: 100 ML | Refills: 0 | Status: SHIPPED | OUTPATIENT
Start: 2021-04-13 | End: 2021-04-18

## 2021-04-13 RX ORDER — BENZONATATE 100 MG/1
200 CAPSULE ORAL 3 TIMES DAILY PRN
Qty: 60 CAPSULE | Refills: 0 | Status: SHIPPED | OUTPATIENT
Start: 2021-04-13 | End: 2021-06-29

## 2021-04-13 RX ORDER — IBUPROFEN 200 MG
400 TABLET ORAL EVERY 6 HOURS PRN
COMMUNITY
End: 2021-12-23

## 2021-04-13 RX ORDER — SULFAMETHOXAZOLE AND TRIMETHOPRIM 800; 160 MG/1; MG/1
1 TABLET ORAL EVERY 12 HOURS
Qty: 6 TABLET | Refills: 0 | Status: SHIPPED | OUTPATIENT
Start: 2021-04-13 | End: 2021-04-16

## 2021-04-13 ASSESSMENT — ENCOUNTER SYMPTOMS
COUGH: 1
BACK PAIN: 1
FLANK PAIN: 0
SHORTNESS OF BREATH: 1
CHILLS: 1
FEVER: 1

## 2021-04-13 ASSESSMENT — FIBROSIS 4 INDEX: FIB4 SCORE: 1.15

## 2021-04-13 NOTE — PATIENT INSTRUCTIONS
INSTRUCTIONS FOR COVID-19 OR ANY OTHER INFECTIOUS RESPIRATORY ILLNESSES    The Centers for Disease Control and Prevention (CDC) states that early indications for COVID-19 include cough, shortness of breath, difficulty breathing, or at least two of the following symptoms: chills, shaking with chills, muscle pain, headache, sore throat, and loss of taste or smell. Symptoms can range from mild to severe and may appear up to two weeks after exposure to the virus.    The practice of self-isolation and quarantine helps protect the public and your family by  preventing exposure to people who have or may have a contagious disease. Please follow the prevention steps below as based on CDC guidelines:    WHEN TO STOP ISOLATION: Persons with COVID-19 or any other infectious respiratory illness who have symptoms and were advised to care for themselves at home may discontinue home isolation under the following conditions:  · At least 24 hours have passed since recovery defined as resolution of fever without the use of fever-reducing medications; AND,  · Improvement in respiratory symptoms (e.g., cough, shortness of breath); AND,  · At least 10 days have passed since symptoms first appeared and have had no subsequent illness.    MONITOR YOUR SYMPTOMS: If your illness is worsening, seek prompt medical attention. If you have a medical emergency and need to call 911, notify the dispatch personnel that you have, or are being evaluated for confirmed or suspected COVID-19 or another infectious respiratory illness. Wear a facemask if possible.    ACTIVITY RESTRICTION: restrict activities outside your home, except for getting medical care. Do not go to work, school, or public areas. Avoid using public transportation, ride-sharing, or taxis.    SCHEDULED MEDICAL APPOINTMENTS: Notify your provider that you have, or are being evaluated for, confirmed or suspected COVID-19 or another infectious respiratory. This will help the healthcare  provider’s office safely take care of you and keep other people from getting exposed or infected.    FACEMASKS, when to wear: Anytime you are away from your home or around other people or pets. If you are unable to wear one, maintain a minimum of 6 feet distancing from others.    LIVING ENVIRONMENT: Stay in a separate room from other people and pets. If possible, use a separate bathroom, have someone else care for your pets and avoid sharing household items. Any items used should be washed thoroughly with soap and water. Clean all “high-touch” surfaces every day. Use a household cleaning spray or wipe, according to the label instructions. High touch surfaces include (but are not limited to) counters, tabletops, doorknobs, bathroom fixtures, toilets, phones, keyboards, tablets, and bedside tables.     HAND WASHING: Frequently wash hands with soap and water for at least 20 seconds,  especially after blowing your nose, coughing, or sneezing; going to the bathroom; before and after interacting with pets; and before and after eating or preparing food. If hands are visibly dirty use soap and water. If soap and water are not available, use an alcohol-based hand  with at least 60% alcohol. Avoid touching your eyes, nose, and mouth with unwashed hands. Cover your coughs and sneezes with a tissue. Throw used tissues in a lined trash can. Immediately wash your hands.    ACTIVE/FACILITATED SELF-MONITORING: Follow instructions provided by your local health department or health professionals, as appropriate. When working with your local health department check their available hours.    [facility]   Phone Number   [facility] [phone]   [facility] [place] [phone]   [place] [address]   [place] [phone]     IF YOU HAVE CONFIRMED POSITIVE COVID-19:    Those who have completely recovered from COVID-19 may have immune-boosting antibodies in their plasma--called “convalescent plasma”--that could be  used to treat critically ill COVID19 patients.    [facility] is excited to begin working with [facility] on collecting convalescent plasma from  people who have recovered from COVID-19 as part of a program to treat patients infected with the virus. This FDA-approved “emergency investigational new drug” is a special blood product containing antibodies that may give patients an extra boost to fight the virus.    To be eligible to donate convalescent plasma, you must have a prior COVID-19 diagnosis documented by a laboratory test (or a positive test result for SARS-CoV-2 antibodies) and meet additional eligibility requirements.    If you are interested in donating convalescent plasma or have any additional questions, please contact the [facility] Convalescent Plasma  at [phone] or via e-mail at [email or web address].

## 2021-04-13 NOTE — PROGRESS NOTES
"Subjective:   Maria Teresa Haji  is a 60 y.o. female who presents for Cough (x 2 days, unable to talk without coughing, Covid vaccined on 04/08/2021, had fever) and Painful Urination (fullness feeling, chills x 5 days)      Patient identity confirmed using two patient identifiers of last name and date of birth.    Patient presents to urgent care with cough and dysuria. Patient reports receiving 2nd covid vaccine on 4/8/21. The day of vaccination she developed fever, chills, body aches, fatigue and cough. These symptoms lasted 2-3 days and slowly began to improve. However, she has had continued and worsening nonproductive cough. She is also complaining of lower abdominal pressure, with urinary urgency and frequency. Denies dysuria. Has had some mild low back pain as well.  Denies loss of taste or smell. No known exposure to COVID .      Cough  Associated symptoms include chills, a fever and shortness of breath.     Review of Systems   Constitutional: Positive for chills, fever and malaise/fatigue.   HENT: Positive for congestion.    Respiratory: Positive for cough and shortness of breath.    Genitourinary: Positive for frequency and urgency. Negative for dysuria, flank pain and hematuria.   Musculoskeletal: Positive for back pain.   All other systems reviewed and are negative.    No Known Allergies  Reviewed past medical, surgical , social and family history.  Reviewed prescription and over-the-counter medications with patient and electronic health record today.     Objective:   /70 (BP Location: Left arm, Patient Position: Sitting, BP Cuff Size: Adult long)   Pulse 74   Temp 36.4 °C (97.5 °F) (Temporal)   Resp 20   Ht 1.676 m (5' 6\")   Wt 107 kg (235 lb)   LMP  (LMP Unknown)   SpO2 94%   BMI 37.93 kg/m²   Physical Exam  Vitals reviewed.   Constitutional:       General: She is not in acute distress.     Appearance: She is well-developed. She is not ill-appearing or toxic-appearing.   HENT:      Head: " Normocephalic and atraumatic.      Right Ear: Tympanic membrane, ear canal and external ear normal.      Left Ear: Tympanic membrane, ear canal and external ear normal.      Nose: Nose normal.      Mouth/Throat:      Lips: Pink. No lesions.      Mouth: Mucous membranes are moist.      Pharynx: Oropharynx is clear. Uvula midline. No oropharyngeal exudate.   Eyes:      General: Lids are normal.      Extraocular Movements: Extraocular movements intact.      Conjunctiva/sclera: Conjunctivae normal.      Pupils: Pupils are equal, round, and reactive to light.   Cardiovascular:      Rate and Rhythm: Normal rate and regular rhythm.      Heart sounds: Normal heart sounds. No murmur. No friction rub. No gallop.    Pulmonary:      Effort: Pulmonary effort is normal. No respiratory distress.      Breath sounds: Normal breath sounds.   Abdominal:      General: Bowel sounds are normal. There is no distension.      Palpations: Abdomen is soft. There is no mass.      Tenderness: There is generalized abdominal tenderness. There is no right CVA tenderness, left CVA tenderness, guarding or rebound.   Musculoskeletal:         General: No tenderness or deformity. Normal range of motion.      Cervical back: Normal range of motion and neck supple.   Lymphadenopathy:      Head:      Right side of head: No submental, submandibular or tonsillar adenopathy.      Left side of head: No submental, submandibular or tonsillar adenopathy.      Cervical: No cervical adenopathy.      Upper Body:      Right upper body: No supraclavicular adenopathy.      Left upper body: No supraclavicular adenopathy.   Skin:     General: Skin is warm and dry.      Findings: No rash.   Neurological:      Mental Status: She is alert and oriented to person, place, and time.      Cranial Nerves: Cranial nerves are intact. No cranial nerve deficit.      Sensory: Sensation is intact. No sensory deficit.      Motor: Motor function is intact.      Coordination: Coordination  "is intact. Coordination normal.      Gait: Gait is intact.   Psychiatric:         Attention and Perception: Attention normal.         Mood and Affect: Mood and affect normal.         Speech: Speech normal.         Behavior: Behavior normal. Behavior is cooperative.         Thought Content: Thought content normal.         Judgment: Judgment normal.           Assessment/Plan:   1. Dysuria  - URINE CULTURE(NEW); Future  - POCT Urinalysis  - sulfamethoxazole-trimethoprim (BACTRIM DS) 800-160 MG tablet; Take 1 tablet by mouth every 12 hours for 3 days.  Dispense: 6 tablet; Refill: 0    2. Cough  - benzonatate (TESSALON) 100 MG Cap; Take 2 Capsules by mouth 3 times a day as needed.  Dispense: 60 capsule; Refill: 0  - promethazine-dextromethorphan (PROMETHAZINE-DM) 6.25-15 MG/5ML syrup; Take 5 mL by mouth 4 times a day as needed for Cough for up to 5 days.  Dispense: 100 mL; Refill: 0  - SARS-CoV-2 PCR (24 hour In-House): Collect NP swab in VTM; Future    3. Viral respiratory illness  - SARS-CoV-2 PCR (24 hour In-House): Collect NP swab in VTM; Future    Urinalysis: Moderate Leukocyte Estrace, negative nitrites, trace blood    Urinalysis is suspicious for urinary tract infection.  Patient reports previous history of urinary tract infection that was \"resistant\" to Keflex and cefdinir.  Patient is requesting treatment with Bactrim.  Previous medical record reviewed.  Previous urine culture from date of service 8/28/20: mixed skin steph,  7/10/20: no growth, 6/28/20 > 100,000 Citrobacter koseri, sensitive to Bactrim, 1st and 3rd generation cephalosporins.  These results are reviewed with patient today.    Patient will be treated with Bactrim per her request as above pending urine culture and sensitivity.    Given viral respiratory symptoms I do recommend Covid testing.  Testing will be performed.  Patient is encouraged to self isolate, use mask, frequent handwashing, wiping down hard surfaces, etc. pending test results.  " Printed instructions regarding test results are provided.  Patient is given Tessalon Perles as needed for daytime cough and a small supply of Promethazine DM for nighttime cough.  Patient is counseled on not driving while taking this medication as it can cause drowsiness.    Differential diagnosis, natural history, supportive care, and indications for immediate follow-up discussed.     Red flag warning symptoms and strict ER/follow-up precautions given.  The patient demonstrated a good understanding and agreed with the treatment plan.    Upon entering exam room I ensured patient was wearing a mask.  This provider wore appropriate PPE throughout entire visit.  Patient wore mask entire visit except for a brief period while examining oropharynx.    Please note that this note was created using voice recognition speech to text software. Every effort has been made to correct obvious errors.  However, I expect there are errors of grammar and possibly context that were not discovered prior to finalizing the note  BRITT Sosa PA-C

## 2021-04-14 ENCOUNTER — APPOINTMENT (OUTPATIENT)
Dept: RADIOLOGY | Facility: MEDICAL CENTER | Age: 61
End: 2021-04-14
Attending: EMERGENCY MEDICINE
Payer: COMMERCIAL

## 2021-04-14 ENCOUNTER — HOSPITAL ENCOUNTER (EMERGENCY)
Facility: MEDICAL CENTER | Age: 61
End: 2021-04-14
Attending: EMERGENCY MEDICINE
Payer: COMMERCIAL

## 2021-04-14 VITALS
OXYGEN SATURATION: 94 % | RESPIRATION RATE: 18 BRPM | SYSTOLIC BLOOD PRESSURE: 106 MMHG | BODY MASS INDEX: 37.41 KG/M2 | HEIGHT: 66 IN | WEIGHT: 232.81 LBS | HEART RATE: 68 BPM | TEMPERATURE: 97.7 F | DIASTOLIC BLOOD PRESSURE: 55 MMHG

## 2021-04-14 DIAGNOSIS — R06.00 DYSPNEA, UNSPECIFIED TYPE: ICD-10-CM

## 2021-04-14 LAB
ALBUMIN SERPL BCP-MCNC: 4.3 G/DL (ref 3.2–4.9)
ALBUMIN/GLOB SERPL: 1.3 G/DL
ALP SERPL-CCNC: 73 U/L (ref 30–99)
ALT SERPL-CCNC: 26 U/L (ref 2–50)
ANION GAP SERPL CALC-SCNC: 14 MMOL/L (ref 7–16)
AST SERPL-CCNC: 16 U/L (ref 12–45)
BASOPHILS # BLD AUTO: 0.1 % (ref 0–1.8)
BASOPHILS # BLD: 0.01 K/UL (ref 0–0.12)
BILIRUB SERPL-MCNC: 0.5 MG/DL (ref 0.1–1.5)
BUN SERPL-MCNC: 14 MG/DL (ref 8–22)
CALCIUM SERPL-MCNC: 9.5 MG/DL (ref 8.4–10.2)
CHLORIDE SERPL-SCNC: 102 MMOL/L (ref 96–112)
CO2 SERPL-SCNC: 20 MMOL/L (ref 20–33)
CREAT SERPL-MCNC: 1.05 MG/DL (ref 0.5–1.4)
D DIMER PPP IA.FEU-MCNC: 0.28 UG/ML (FEU) (ref 0–0.5)
EKG IMPRESSION: NORMAL
EOSINOPHIL # BLD AUTO: 0.04 K/UL (ref 0–0.51)
EOSINOPHIL NFR BLD: 0.4 % (ref 0–6.9)
ERYTHROCYTE [DISTWIDTH] IN BLOOD BY AUTOMATED COUNT: 41.6 FL (ref 35.9–50)
GLOBULIN SER CALC-MCNC: 3.3 G/DL (ref 1.9–3.5)
GLUCOSE SERPL-MCNC: 142 MG/DL (ref 65–99)
HCT VFR BLD AUTO: 42.6 % (ref 37–47)
HGB BLD-MCNC: 14.3 G/DL (ref 12–16)
IMM GRANULOCYTES # BLD AUTO: 0.04 K/UL (ref 0–0.11)
IMM GRANULOCYTES NFR BLD AUTO: 0.4 % (ref 0–0.9)
LYMPHOCYTES # BLD AUTO: 2.06 K/UL (ref 1–4.8)
LYMPHOCYTES NFR BLD: 20.4 % (ref 22–41)
MCH RBC QN AUTO: 31.2 PG (ref 27–33)
MCHC RBC AUTO-ENTMCNC: 33.6 G/DL (ref 33.6–35)
MCV RBC AUTO: 92.8 FL (ref 81.4–97.8)
MONOCYTES # BLD AUTO: 0.6 K/UL (ref 0–0.85)
MONOCYTES NFR BLD AUTO: 5.9 % (ref 0–13.4)
NEUTROPHILS # BLD AUTO: 7.37 K/UL (ref 2–7.15)
NEUTROPHILS NFR BLD: 72.8 % (ref 44–72)
NRBC # BLD AUTO: 0 K/UL
NRBC BLD-RTO: 0 /100 WBC
NT-PROBNP SERPL IA-MCNC: 213 PG/ML (ref 0–125)
PLATELET # BLD AUTO: 193 K/UL (ref 164–446)
PMV BLD AUTO: 8.9 FL (ref 9–12.9)
POTASSIUM SERPL-SCNC: 3.9 MMOL/L (ref 3.6–5.5)
PROT SERPL-MCNC: 7.6 G/DL (ref 6–8.2)
RBC # BLD AUTO: 4.59 M/UL (ref 4.2–5.4)
SARS-COV-2 RNA RESP QL NAA+PROBE: NOTDETECTED
SODIUM SERPL-SCNC: 136 MMOL/L (ref 135–145)
SPECIMEN SOURCE: NORMAL
TROPONIN T SERPL-MCNC: 7 NG/L (ref 6–19)
WBC # BLD AUTO: 10.1 K/UL (ref 4.8–10.8)

## 2021-04-14 PROCEDURE — 80053 COMPREHEN METABOLIC PANEL: CPT

## 2021-04-14 PROCEDURE — 93005 ELECTROCARDIOGRAM TRACING: CPT

## 2021-04-14 PROCEDURE — 83880 ASSAY OF NATRIURETIC PEPTIDE: CPT

## 2021-04-14 PROCEDURE — 85025 COMPLETE CBC W/AUTO DIFF WBC: CPT

## 2021-04-14 PROCEDURE — 84484 ASSAY OF TROPONIN QUANT: CPT

## 2021-04-14 PROCEDURE — 85379 FIBRIN DEGRADATION QUANT: CPT

## 2021-04-14 PROCEDURE — 99284 EMERGENCY DEPT VISIT MOD MDM: CPT

## 2021-04-14 PROCEDURE — 36415 COLL VENOUS BLD VENIPUNCTURE: CPT

## 2021-04-14 PROCEDURE — 93005 ELECTROCARDIOGRAM TRACING: CPT | Performed by: EMERGENCY MEDICINE

## 2021-04-14 PROCEDURE — 71045 X-RAY EXAM CHEST 1 VIEW: CPT

## 2021-04-14 ASSESSMENT — LIFESTYLE VARIABLES
HAVE YOU EVER FELT YOU SHOULD CUT DOWN ON YOUR DRINKING: NO
DO YOU DRINK ALCOHOL: YES

## 2021-04-14 ASSESSMENT — FIBROSIS 4 INDEX: FIB4 SCORE: 1.15

## 2021-04-14 ASSESSMENT — PAIN SCALES - WONG BAKER: WONGBAKER_NUMERICALRESPONSE: DOESN'T HURT AT ALL

## 2021-04-14 NOTE — ED PROVIDER NOTES
"ED Provider Note    CHIEF COMPLAINT  Chief Complaint   Patient presents with   • Shortness of Breath     2nd vaccine April 8th, felt sick for 2 days. Now she feels a repeat of those symptoms with now tenderness under left rib cage.   • Fatigue     Had a negative covid at Urgent Care yesterday. Dirty urine that she was prescribed antibiotics.   • Abdominal Pain     Bloated for 2 days, n/v       HPI  Maria Teresa Haji is a 60 y.o. female who presents with shortness of breath.  The patient states she received her second vaccination on April 8.  Subsequently she did develop fevers as well as some malaise.  She had some improvement then her symptoms returned.  She states she has a hard time catching her breath.  She is also has abdominal distention with no significant abdominal pain.  She does have a vague pain to the left chest region with no known exacerbating or relieving factors.  She has not had any pain or swelling to her lower extremities.  She continues have periodic fevers.  She also has a dry cough.  She was evaluated urgent care yesterday and had a negative Covid test and also had a urinalysis that support infection she started on Bactrim.  However she states her symptoms have persisted.  REVIEW OF SYSTEMS  See HPI for further details. All other systems are negative.     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\"   • Chickenpox    • Glaucoma    • Hemorrhoids    • Hives    • Meningitis    • Pneumonia    • Sinusitis        FAMILY HISTORY  [unfilled]    SOCIAL HISTORY  Social History     Socioeconomic History   • Marital status: Single     Spouse name: Not on file   • Number of children: Not on file   • Years of education: Not on file   • Highest education level: Not on file   Occupational History   • Not on file   Tobacco Use "   • Smoking status: Former Smoker     Packs/day: 2.00     Years: 16.00     Pack years: 32.00     Quit date:      Years since quittin.3   • Smokeless tobacco: Never Used   Substance and Sexual Activity   • Alcohol use: Not Currently     Comment: RARELY   • Drug use: Not Currently     Types: Inhaled     Comment: Marijuana   • Sexual activity: Yes     Partners: Male   Other Topics Concern   • Not on file   Social History Narrative   • Not on file     Social Determinants of Health     Financial Resource Strain:    • Difficulty of Paying Living Expenses:    Food Insecurity:    • Worried About Running Out of Food in the Last Year:    • Ran Out of Food in the Last Year:    Transportation Needs:    • Lack of Transportation (Medical):    • Lack of Transportation (Non-Medical):    Physical Activity:    • Days of Exercise per Week:    • Minutes of Exercise per Session:    Stress:    • Feeling of Stress :    Social Connections:    • Frequency of Communication with Friends and Family:    • Frequency of Social Gatherings with Friends and Family:    • Attends Mandae[employer] Services:    • Active Member of Clubs or Organizations:    • Attends Club or Organization Meetings:    • Marital Status:    Intimate Partner Violence:    • Fear of Current or Ex-Partner:    • Emotionally Abused:    • Physically Abused:    • Sexually Abused:        SURGICAL HISTORY  Past Surgical History:   Procedure Laterality Date   • ABDOMINAL EXPLORATION     • APPENDECTOMY     • GYN SURGERY      removed ovary   • KNEE ARTHROPLASTY TOTAL Bilateral 2013   • LUMPECTOMY     • OTHER      breast lift, tummy tuck   • OTHER      removal of fallopian tube for a cyst per patient.   • OTHER ORTHOPEDIC SURGERY      b/l knee replacements ,    • TONSILLECTOMY         CURRENT MEDICATIONS  Home Medications    **Home medications have not yet been reviewed for this encounter**         ALLERGIES  No Known Allergies    PHYSICAL EXAM  VITAL SIGNS: /82    "Pulse 85   Temp 36.5 °C (97.7 °F) (Temporal)   Resp 16   Ht 1.676 m (5' 6\")   Wt 106 kg (232 lb 12.9 oz)   LMP  (LMP Unknown)   SpO2 96%   BMI 37.58 kg/m²       Constitutional: Well developed, Well nourished, No acute distress, Non-toxic appearance.   HENT: Normocephalic, Atraumatic, Bilateral external ears normal, Oropharynx moist, No oral exudates, Nose normal.   Eyes: PERRLA, EOMI, Conjunctiva normal, No discharge.   Neck: Normal range of motion, No tenderness, Supple, No stridor.   Lymphatic: No lymphadenopathy noted.   Cardiovascular: Normal heart rate, Normal rhythm, No murmurs, No rubs, No gallops.   Thorax & Lungs: Normal breath sounds, No respiratory distress, No wheezing, No chest tenderness.   Abdomen: Bowel sounds normal, Soft, No tenderness, No masses, No pulsatile masses.   Skin: Warm, Dry, No erythema, No rash.   Back: No tenderness, No CVA tenderness.   Extremities: Intact distal pulses, No edema, No tenderness, No cyanosis, No clubbing.   Neurologic: Alert & oriented x 3, Normal motor function, Normal sensory function, No focal deficits noted.   Psychiatric: Affect normal, Judgment normal, Mood normal.     Results for orders placed or performed during the hospital encounter of 04/14/21   CBC w/ Differential   Result Value Ref Range    WBC 10.1 4.8 - 10.8 K/uL    RBC 4.59 4.20 - 5.40 M/uL    Hemoglobin 14.3 12.0 - 16.0 g/dL    Hematocrit 42.6 37.0 - 47.0 %    MCV 92.8 81.4 - 97.8 fL    MCH 31.2 27.0 - 33.0 pg    MCHC 33.6 33.6 - 35.0 g/dL    RDW 41.6 35.9 - 50.0 fL    Platelet Count 193 164 - 446 K/uL    MPV 8.9 (L) 9.0 - 12.9 fL    Neutrophils-Polys 72.80 (H) 44.00 - 72.00 %    Lymphocytes 20.40 (L) 22.00 - 41.00 %    Monocytes 5.90 0.00 - 13.40 %    Eosinophils 0.40 0.00 - 6.90 %    Basophils 0.10 0.00 - 1.80 %    Immature Granulocytes 0.40 0.00 - 0.90 %    Nucleated RBC 0.00 /100 WBC    Neutrophils (Absolute) 7.37 (H) 2.00 - 7.15 K/uL    Lymphs (Absolute) 2.06 1.00 - 4.80 K/uL    Monos " (Absolute) 0.60 0.00 - 0.85 K/uL    Eos (Absolute) 0.04 0.00 - 0.51 K/uL    Baso (Absolute) 0.01 0.00 - 0.12 K/uL    Immature Granulocytes (abs) 0.04 0.00 - 0.11 K/uL    NRBC (Absolute) 0.00 K/uL   Complete Metabolic Panel (CMP)   Result Value Ref Range    Sodium 136 135 - 145 mmol/L    Potassium 3.9 3.6 - 5.5 mmol/L    Chloride 102 96 - 112 mmol/L    Co2 20 20 - 33 mmol/L    Anion Gap 14.0 7.0 - 16.0    Glucose 142 (H) 65 - 99 mg/dL    Bun 14 8 - 22 mg/dL    Creatinine 1.05 0.50 - 1.40 mg/dL    Calcium 9.5 8.4 - 10.2 mg/dL    AST(SGOT) 16 12 - 45 U/L    ALT(SGPT) 26 2 - 50 U/L    Alkaline Phosphatase 73 30 - 99 U/L    Total Bilirubin 0.5 0.1 - 1.5 mg/dL    Albumin 4.3 3.2 - 4.9 g/dL    Total Protein 7.6 6.0 - 8.2 g/dL    Globulin 3.3 1.9 - 3.5 g/dL    A-G Ratio 1.3 g/dL   proBrain Natriuretic Peptide, NT   Result Value Ref Range    NT-proBNP 213 (H) 0 - 125 pg/mL   Troponin STAT   Result Value Ref Range    Troponin T 7 6 - 19 ng/L   D-Dimer (only helpful in low pre-test probability wells critieria. Do not order if patient ruled out by PERC criteria. See Weblinks at top of Labs section)   Result Value Ref Range    D-Dimer Screen 0.28 0.00 - 0.50 ug/mL (FEU)   ESTIMATED GFR   Result Value Ref Range    GFR If African American >60 >60 mL/min/1.73 m 2    GFR If Non  53 (A) >60 mL/min/1.73 m 2   EKG   Result Value Ref Range    Report       [facility] Emergency Dept[facility]    Test Date:  2021  Pt Name:    EDD BREWER                  Department: [facility]  MRN:        [id number]                      Room:  Gender:     Female                       Technician: ANNALEE  :        1960                   Requested By:ER TRIAGE PROTOCOL  Order #:    [id number]                    Reading MD: MADDI BARAKAT MD    Measurements  Intervals                                Axis  Rate:       71                           P:          71  AK:         147                          QRS:        36  QRSD:        111                          T:          31  QT:         394  QTc:        429    Interpretive Statements  Twelve-lead EKG shows a normal sinus rhythm with a ventricular rate of 71,  there  are couple premature ventricular contractions, otherwise normal intervals, no  ST  segment elevation or depression, T wave inverted in V1 and flat in V2.  Electronically Signed On 4- 13:08:17 PDT by TUCKER RUSS MD            RADIOLOGY/PROCEDURES  DX-CHEST-PORTABLE (1 VIEW)   Final Result      No evidence of acute cardiopulmonary process.            COURSE & MEDICAL DECISION MAKING  Pertinent Labs & Imaging studies reviewed. (See chart for details)  This a 60-year-old female who presents to the emergency department with shortness of breath, fatigue, and abdominal distention.  Laboratory analysis is reassuring.  There is no evidence of heart failure, pancreatitis, nor cholecystitis.  EKG and troponin does not show any ischemic findings.  I also ordered a D-dimer to look for possible thrombosis that could cause her shortness of breath and left-sided chest pain.  This was negative therefore a pulmonary embolus would be very unlikely.  These could all be nonspecific symptoms from her recent vaccine.  Otherwise I not appreciate any evidence of a focal bacterial infection.  The patient does not appear toxic and she is hemodynamically stable.  She will be discharged home with instructions to drink lots of fluids and take anti-inflammatories.  She will return if she is acutely worse.  As for the fatigue laboratory also also does not show any evidence of anemia.    FINAL IMPRESSION  1.  Dyspnea  2.  Fatigue  3.  Abdominal pain    Disposition  The patient will be discharged in stable condition         Electronically signed by: Tucker Russ M.D., 4/14/2021 12:04 PM

## 2021-04-14 NOTE — ED NOTES
Med rec updated and complete  Allergies reviewed  Interviewed pt with  at bedside with permission from pt

## 2021-04-14 NOTE — ED TRIAGE NOTES
Chief Complaint   Patient presents with   • Shortness of Breath     2nd vaccine April 8th, felt sick for 2 days. Now she feels a repeat of those symptoms with now tenderness under left rib cage.   • Fatigue     Had a negative covid at Urgent Care yesterday. Dirty urine that she was prescribed antibiotics.   • Abdominal Pain     Bloated for 2 days, n/v

## 2021-04-20 ENCOUNTER — HOSPITAL ENCOUNTER (OUTPATIENT)
Facility: MEDICAL CENTER | Age: 61
End: 2021-04-20
Attending: INTERNAL MEDICINE
Payer: COMMERCIAL

## 2021-04-20 ENCOUNTER — OFFICE VISIT (OUTPATIENT)
Dept: MEDICAL GROUP | Facility: MEDICAL CENTER | Age: 61
End: 2021-04-20
Payer: COMMERCIAL

## 2021-04-20 ENCOUNTER — HOSPITAL ENCOUNTER (OUTPATIENT)
Dept: RADIOLOGY | Facility: MEDICAL CENTER | Age: 61
End: 2021-04-20
Attending: INTERNAL MEDICINE
Payer: COMMERCIAL

## 2021-04-20 VITALS
SYSTOLIC BLOOD PRESSURE: 110 MMHG | BODY MASS INDEX: 37.45 KG/M2 | OXYGEN SATURATION: 96 % | HEART RATE: 96 BPM | HEIGHT: 66 IN | RESPIRATION RATE: 16 BRPM | WEIGHT: 233 LBS | TEMPERATURE: 97.2 F | DIASTOLIC BLOOD PRESSURE: 74 MMHG

## 2021-04-20 DIAGNOSIS — M62.838 MUSCLE SPASM: ICD-10-CM

## 2021-04-20 DIAGNOSIS — R10.84 GENERALIZED ABDOMINAL PAIN: ICD-10-CM

## 2021-04-20 DIAGNOSIS — R19.8 ABDOMINAL FULLNESS: ICD-10-CM

## 2021-04-20 DIAGNOSIS — I35.1 NONRHEUMATIC AORTIC VALVE INSUFFICIENCY: ICD-10-CM

## 2021-04-20 DIAGNOSIS — R10.9 LEFT FLANK PAIN: ICD-10-CM

## 2021-04-20 DIAGNOSIS — R06.09 OTHER FORM OF DYSPNEA: ICD-10-CM

## 2021-04-20 DIAGNOSIS — Z12.11 SCREENING FOR COLON CANCER: ICD-10-CM

## 2021-04-20 DIAGNOSIS — G47.39 OTHER SLEEP APNEA: ICD-10-CM

## 2021-04-20 LAB
APPEARANCE UR: CLEAR
BILIRUB UR STRIP-MCNC: NEGATIVE MG/DL
COLOR UR AUTO: YELLOW
GLUCOSE UR STRIP.AUTO-MCNC: NEGATIVE MG/DL
KETONES UR STRIP.AUTO-MCNC: NEGATIVE MG/DL
LEUKOCYTE ESTERASE UR QL STRIP.AUTO: NEGATIVE
NITRITE UR QL STRIP.AUTO: NEGATIVE
PH UR STRIP.AUTO: 6 [PH] (ref 5–8)
PROT UR QL STRIP: NEGATIVE MG/DL
RBC UR QL AUTO: NEGATIVE
SP GR UR STRIP.AUTO: 1.02
UROBILINOGEN UR STRIP-MCNC: 0.2 MG/DL

## 2021-04-20 PROCEDURE — 99214 OFFICE O/P EST MOD 30 MIN: CPT | Performed by: INTERNAL MEDICINE

## 2021-04-20 PROCEDURE — 74018 RADEX ABDOMEN 1 VIEW: CPT

## 2021-04-20 PROCEDURE — 87086 URINE CULTURE/COLONY COUNT: CPT

## 2021-04-20 PROCEDURE — 81002 URINALYSIS NONAUTO W/O SCOPE: CPT | Performed by: INTERNAL MEDICINE

## 2021-04-20 RX ORDER — ALBUTEROL SULFATE 90 UG/1
2 AEROSOL, METERED RESPIRATORY (INHALATION) EVERY 4 HOURS PRN
Qty: 1 EACH | Refills: 3 | Status: SHIPPED | OUTPATIENT
Start: 2021-04-20 | End: 2021-12-23 | Stop reason: SDUPTHER

## 2021-04-20 RX ORDER — BACLOFEN 5 MG/1
5-10 TABLET ORAL 3 TIMES DAILY PRN
Qty: 30 TABLET | Refills: 1 | Status: SHIPPED | OUTPATIENT
Start: 2021-04-20 | End: 2021-05-20

## 2021-04-20 RX ORDER — FLUTICASONE PROPIONATE 44 MCG
1 AEROSOL WITH ADAPTER (GRAM) INHALATION 2 TIMES DAILY
Qty: 1 EACH | Refills: 3 | Status: SHIPPED | OUTPATIENT
Start: 2021-04-20 | End: 2021-12-23

## 2021-04-20 ASSESSMENT — FIBROSIS 4 INDEX: FIB4 SCORE: 0.99

## 2021-04-20 ASSESSMENT — PATIENT HEALTH QUESTIONNAIRE - PHQ9: CLINICAL INTERPRETATION OF PHQ2 SCORE: 0

## 2021-04-21 DIAGNOSIS — R19.8 ABDOMINAL FULLNESS: ICD-10-CM

## 2021-04-24 LAB
BACTERIA UR CULT: NORMAL
SIGNIFICANT IND 70042: NORMAL
SITE SITE: NORMAL
SOURCE SOURCE: NORMAL

## 2021-04-28 ENCOUNTER — TELEMEDICINE (OUTPATIENT)
Dept: SLEEP MEDICINE | Facility: MEDICAL CENTER | Age: 61
End: 2021-04-28
Payer: COMMERCIAL

## 2021-04-28 VITALS — HEIGHT: 66 IN | WEIGHT: 230 LBS | BODY MASS INDEX: 36.96 KG/M2

## 2021-04-28 DIAGNOSIS — G47.39 OTHER SLEEP APNEA: ICD-10-CM

## 2021-04-28 PROCEDURE — 99213 OFFICE O/P EST LOW 20 MIN: CPT | Mod: 95,CR | Performed by: FAMILY MEDICINE

## 2021-04-28 ASSESSMENT — FIBROSIS 4 INDEX: FIB4 SCORE: 0.99

## 2021-04-28 NOTE — PROGRESS NOTES
[facility] Follow Up Note     Date: 4/28/2021 / Time: 8:24 AM    Patient ID:   Name:             Maria Teresa Haji   YOB: 1960  Age:                 60 y.o.  female   MRN:               [id number]      Thank you for requesting a sleep medicine consultation on Maria Teresa Haji at the sleep center. She presents today with the chief complaints of NIRALI follow up.     HISTORY OF PRESENT ILLNESS:       Pt is currently on CPAP 10 cm with O2 bleed in at 2LPM. She goes to sleep around 11 pm and wakes up around 6 am. She is getting about 6-7 hrs of sleep on a good night. The bad nights has improved since the treatment of NIRALI. She is using trazodone 50 mg however she has weaned down to 2 time a per week. Overall,  She does finds her sleep refreshing.She does take regular naps.She denies any symptoms of RLS, narcolepsy or any symptoms to suggest parasomnias such as nightmares, sleep walking or acting out of dreams.      She is using CPAP most days of the week. Pt reports 6 hrs of average nightly use of CPAP. Pt denies snoring, gasping,choking.Pt also denies significant mask leak that is interfering with sleep. The 30 day compliance was downloaded which shows adequate compliance with more that 4 hr usage about 100%. The AHI is has improved to 0.8/hr. The mask leak is normal. The symptoms of NIRALI has improved with the PAP therapy.     PMH includes pulmonary nodule, adenoid cystic carcinoma, anxiety, insomnia, hepatomegaly, nonrheumatic aortic valve insufficiency, glaucoma, GERD, history of tobacco use, overweight, tonsillectomy    SLEEP HISTORY   PSG split night study from 1/11/21 indicated moderate obstructive sleep apnea with AHI of 29.5/hr and O2 titi 79 %. Due to severity of the disease she met the split study protocol. The titration started with CPAP 5 cm and the best tolerated was CPAP 10 cm. The AHI improved to 1.55/hr with improved O2 titi of 87% and average O2 saturation of 92 %. Sleep  related hypoxia      REVIEW OF SYSTEMS:       Constitutional: Denies fevers, Denies weight changes  Eyes: Denies changes in vision, no eye pain  Ears/Nose/Throat/Mouth: Denies nasal congestion or sore throat   Cardiovascular: Denies chest pain or palpitations   Respiratory: Denies shortness of breath , Denies cough  Gastrointestinal/Hepatic: Denies abdominal pain, nausea, vomiting, diarrhea,  Musculoskeletal/Rheum: Denies  joint pain and swelling   Skin/Breast: Denies rash,   Neurological: Denies headache, confusion, memory loss   Psychiatric: denies mood disorder   Sleep: denies snoring and improved insomnia    Comprehensive review of systems form is reviewed with the patient and is attached in the EMR.     PMH:  has a past medical history of Arthritis, Back pain, Blood transfusion without reported diagnosis, Bronchitis, Cancer (HCC), Chickenpox, Glaucoma, Hemorrhoids, Hives, Meningitis, Pneumonia, and Sinusitis. She also has no past medical history of Allergy.  MEDS:   Current Outpatient Medications:   •  baclofen (LIORESAL) 5 MG Tab, Take 1-2 Tablets by mouth 3 times a day as needed for up to 30 days., Disp: 30 tablet, Rfl: 1  •  fluticasone (FLOVENT HFA) 44 MCG/ACT Aerosol, Inhale 1 Puff 2 times a day., Disp: 1 Each, Rfl: 3  •  albuterol 108 (90 Base) MCG/ACT Aero Soln inhalation aerosol, Inhale 2 Puffs every four hours as needed for Shortness of Breath., Disp: 1 Each, Rfl: 3  •  ibuprofen (MOTRIN) 200 MG Tab, Take 400 mg by mouth every 6 hours as needed for Mild Pain., Disp: , Rfl:   •  benzonatate (TESSALON) 100 MG Cap, Take 2 Capsules by mouth 3 times a day as needed. (Patient taking differently: Take 200 mg by mouth 3 times a day as needed for Cough.), Disp: 60 capsule, Rfl: 0  •  traZODone (DESYREL) 50 MG Tab, TAKE 1 TABLET BY MOUTH AT BEDTIME AS NEEDED (Patient taking differently: Take 50 mg by mouth at bedtime.), Disp: 90 tablet, Rfl: 1  •  omeprazole (PRILOSEC) 40 MG delayed-release capsule, Take 1 Cap  "by mouth as needed. Indications: Heartburn, Disp: 90 Cap, Rfl: 3  •  Cyanocobalamin (B-12 PO), Take 1 tablet by mouth every evening., Disp: , Rfl:   •  acetaminophen (TYLENOL) 500 MG Tab, Take 1,000 mg by mouth every 6 hours as needed for Moderate Pain., Disp: , Rfl:   •  therapeutic multivitamin-minerals (THERAGRAN-M) Tab, Take 1 tablet by mouth every evening., Disp: , Rfl:   ALLERGIES: No Known Allergies  SURGHX:   Past Surgical History:   Procedure Laterality Date   • ABDOMINAL EXPLORATION     • APPENDECTOMY     • GYN SURGERY      removed ovary   • KNEE ARTHROPLASTY TOTAL Bilateral 2015, 2013   • LUMPECTOMY     • OTHER      breast lift, tummy tuck   • OTHER      removal of fallopian tube for a cyst per patient.   • OTHER ORTHOPEDIC SURGERY      b/l knee replacements 2014, 2016   • TONSILLECTOMY       SOCHX:  reports that she quit smoking about 28 years ago. She has a 32.00 pack-year smoking history. She has never used smokeless tobacco. She reports previous alcohol use. She reports previous drug use. Drug: Inhaled..  FH:   Family History   Problem Relation Age of Onset   • Arthritis Mother    • Cancer Mother         skin   • Anemia Mother    • Hypertension Mother    • Obesity Mother    • Other Mother         ulcer   • Cancer Father         skin, prostate, esophageal   • Hypertension Father    • Obesity Father    • Arthritis Brother    • Cancer Maternal Aunt         breast]   • Sleep Apnea Neg Hx          Physical Exam:  Vitals/ General Appearance:   Weight/BMI: Body mass index is 37.12 kg/m².  Ht 1.676 m (5' 6\")   Wt 104 kg (230 lb)   Vitals:    04/28/21 0823   Weight: 104 kg (230 lb)   Height: 1.676 m (5' 6\")       Pt. is alert and oriented to time, place and person. Cooperative and in no apparent distress.       Constitutional: Alert, no distress, well-groomed.  Skin: No rashes in visible areas.  Eye: Round. Conjunctiva clear, lids normal. No icterus.   ENMT: Lips pink without lesions, good dentition, moist " mucous membranes. Phonation normal.  Neck: No masses, no thyromegaly. Moves freely without pain.  CV: Pulse as reported by patient  Respiratory: Unlabored respiratory effort, no cough or audible wheeze  Psych: Alert and oriented x3, normal affect and mood.     ASSESSMENT AND PLAN     1. Sleep Apnea    She is urged to avoid supine sleep, weight gain and alcoholic beverages since all of these can worsen sleep apnea. She is cautioned against drowsy driving. If She feels sleepy while driving, She must pull over for a break/nap, rather than persist on the road, in the interest of She own safety and that of others on the road.   Plan   - Continue CPAP 10 cm with O2 bleed in at 2 LPM eson 2 mask    - compliance download was reviewed and discussed with the pt   - compliance was reinforced     2.Chronic insomnia    - stimulus control and sleep hygiene reinforced    - reassess after treatment of NIRALI  -  she is weaning off of trazodone 50 mg. Ok to use trazodone PRN.it is managed by PCP     Regarding treatment of other past medical problems and general health maintenance,  She is urged to follow up with PCP.

## 2021-05-10 ENCOUNTER — HOSPITAL ENCOUNTER (OUTPATIENT)
Dept: PULMONOLOGY | Facility: MEDICAL CENTER | Age: 61
End: 2021-05-10
Attending: INTERNAL MEDICINE
Payer: COMMERCIAL

## 2021-05-10 DIAGNOSIS — R06.09 OTHER FORM OF DYSPNEA: ICD-10-CM

## 2021-05-10 PROCEDURE — 94060 EVALUATION OF WHEEZING: CPT

## 2021-05-10 RX ORDER — ALBUTEROL SULFATE 90 UG/1
2 AEROSOL, METERED RESPIRATORY (INHALATION)
Status: DISCONTINUED | OUTPATIENT
Start: 2021-05-10 | End: 2021-05-11 | Stop reason: HOSPADM

## 2021-05-10 ASSESSMENT — PULMONARY FUNCTION TESTS
FEV1/FVC_PERCENT_PREDICTED: 79
FEV1/FVC_PERCENT_LLN: 65.93
FEV1: 2.73
FEV1/FVC_PERCENT_PREDICTED: 108
FEV1/FVC_PREDICTED: 78.96
FVC_PERCENT_PREDICTED: 97
FEV1_PERCENT_CHANGE: 6
FEV1/FVC_PERCENT_PREDICTED: 105
FEV1/FVC_PERCENT_CHANGE: 117
FEV1: 2.94
FEV1/FVC: 84.48
FVC: 3.48
FEV1_LLN: 2.20
FEV1_PERCENT_PREDICTED: 103
FEV1/FVC_PERCENT_LLN: 65.93
FEV1_PERCENT_CHANGE: 7
FEV1_PERCENT_PREDICTED: 111
FVC: 3.28
FEV1/FVC: 83
FEV1/FVC_PERCENT_CHANGE: 1
FVC_PERCENT_PREDICTED: 103
FVC_LLN: 2.81
FEV1/FVC_PERCENT_PREDICTED: 106
FEV1/FVC: 84.37
FVC_PREDICTED: 3.36
FEV1/FVC_PERCENT_PREDICTED: 106
FEV1_LLN: 2.20
FVC_LLN: 2.81
FEV1/FVC: 83.35
FEV1_PREDICTED: 2.64

## 2021-05-11 ENCOUNTER — HOSPITAL ENCOUNTER (OUTPATIENT)
Dept: RADIOLOGY | Facility: MEDICAL CENTER | Age: 61
End: 2021-05-11
Attending: INTERNAL MEDICINE
Payer: COMMERCIAL

## 2021-05-11 DIAGNOSIS — R19.8 ABDOMINAL FULLNESS: ICD-10-CM

## 2021-05-11 PROCEDURE — 76700 US EXAM ABDOM COMPLETE: CPT

## 2021-05-17 ENCOUNTER — APPOINTMENT (OUTPATIENT)
Dept: MEDICAL GROUP | Facility: MEDICAL CENTER | Age: 61
End: 2021-05-17
Payer: COMMERCIAL

## 2021-05-17 NOTE — PROCEDURES
DATE OF SERVICE:  05/10/2021     PULMONARY FUNCTION TEST INTERPRETATION     SPIROMETRY:  The patient's spirometry showed normal FEV1/FVC ratio of 83%   predicted.  The pre-bronchodilator FEV1 was 2.73 liters and   post-bronchodilator FEV1 was 2.94 liters, which showed a small but not   significant bronchodilator response.     So, in summary, this is a normal spirometry with no positive bronchodilator   response.        ______________________________  MD CARLOS Espinosa/ANTHONY    DD:  05/16/2021 16:23  DT:  05/16/2021 17:39    Job#:  [id number]

## 2021-05-18 PROCEDURE — 94060 EVALUATION OF WHEEZING: CPT | Mod: 26 | Performed by: INTERNAL MEDICINE

## 2021-06-04 ENCOUNTER — OFFICE VISIT (OUTPATIENT)
Dept: SLEEP MEDICINE | Facility: MEDICAL CENTER | Age: 61
End: 2021-06-04
Payer: COMMERCIAL

## 2021-06-04 VITALS
BODY MASS INDEX: 37.77 KG/M2 | DIASTOLIC BLOOD PRESSURE: 76 MMHG | TEMPERATURE: 97.4 F | WEIGHT: 235 LBS | SYSTOLIC BLOOD PRESSURE: 112 MMHG | HEIGHT: 66 IN | OXYGEN SATURATION: 97 % | RESPIRATION RATE: 14 BRPM | HEART RATE: 63 BPM

## 2021-06-04 DIAGNOSIS — R93.89 ABNORMAL CHEST CT: ICD-10-CM

## 2021-06-04 DIAGNOSIS — R91.8 PULMONARY NODULES: ICD-10-CM

## 2021-06-04 PROCEDURE — 99244 OFF/OP CNSLTJ NEW/EST MOD 40: CPT | Performed by: INTERNAL MEDICINE

## 2021-06-04 ASSESSMENT — FIBROSIS 4 INDEX: FIB4 SCORE: 0.99

## 2021-06-04 ASSESSMENT — PAIN SCALES - GENERAL: PAINLEVEL: NO PAIN

## 2021-06-07 NOTE — PROGRESS NOTES
"Chief Complaint   Patient presents with   • New Patient     Dyspnea       HPI:  The patient is a 60-year-old woman who had a previous CT scan in August 2020 showed several small scattered pulmonary nodules.  The patient previously lived in [place] and feels that she had a CT scan in [place] showing some nodules.  We do not have those images for review.  She is asymptomatic.  She is not complaining of any shortness of breath at this time.  She does use an albuterol inhaler prior to going hiking and she feels that helps with any mild shortness of breath during activity.  She does not carry a diagnosis of asthma.  Spirometry on 5/10/2021 showed an FEV1 of 2.73 L which is 103% predicted.  FEV1 FVC ratio was 83%.  There was no significant change in FEV1 after an inhaled bronchodilator.  She is a former smoker.  She smoked for about 16 years up to 2 packs/day.  She quit smoking several years ago.  She does have a history of obstructive sleep apnea and is on CPAP at night with supplemental oxygen 2 L/min.  A prior echocardiogram did show some dilation of her right ventricle with an RSVP of 35mmHg.  She isfollowed in our sleep clinic.  Her prior CT scan was performed when she was hospitalized for a viral meningitis.  At that time her D-dimer was not elevated and she had no evidence of pulmonary embolism.    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\"   • Chickenpox    • GERD (gastroesophageal reflux disease)    • Glaucoma    • Heartburn    • Hemorrhoids    • Hives    • Hoarseness, persistent    • Meningitis    • Obesity    • Osteoporosis    • Painful joint    • Pneumonia    • Ringing in ears    • Sinusitis    • Sleep apnea    • Tonsillitis        ROS:   Constitutional: Denies fevers, chills, night sweats, fatigue or weight loss  Eyes: Denies vision loss, pain, " drainage, double vision  Ears, Nose, Throat: Denies earache, tinnitus, hoarseness  Cardiovascular: Denies chest pain, tightness, palpitations  Respiratory: See HPI  Sleep: See HPI  GI: Denies abdominal pain, nausea, vomiting, diarrhea  : Denies frequent urination, hematuria, painful urination  Musculoskeletal: Denies back pain, painful joints, sore muscles  Neurological: Denies headaches, seizures  Skin: Denies rashes, color changes  Psychiatric: Denies depression or thoughts of suicide  Hematologic: Denies bleeding tendency or clotting tendency  Allergic/Immunologic: Denies rhinitis, skin sensitivity    Social History     Socioeconomic History   • Marital status: Single     Spouse name: Not on file   • Number of children: Not on file   • Years of education: Not on file   • Highest education level: Not on file   Occupational History   • Not on file   Tobacco Use   • Smoking status: Former Smoker     Packs/day: 2.00     Years: 16.00     Pack years: 32.00     Quit date:      Years since quittin.4   • Smokeless tobacco: Never Used   Vaping Use   • Vaping Use: Never used   Substance and Sexual Activity   • Alcohol use: Not Currently     Comment: RARELY   • Drug use: Not Currently     Types: Inhaled     Comment: Marijuana   • Sexual activity: Yes     Partners: Male   Other Topics Concern   • Not on file   Social History Narrative   • Not on file     Social Determinants of Health     Financial Resource Strain:    • Difficulty of Paying Living Expenses:    Food Insecurity:    • Worried About Running Out of Food in the Last Year:    • Ran Out of Food in the Last Year:    Transportation Needs:    • Lack of Transportation (Medical):    • Lack of Transportation (Non-Medical):    Physical Activity:    • Days of Exercise per Week:    • Minutes of Exercise per Session:    Stress:    • Feeling of Stress :    Social Connections:    • Frequency of Communication with Friends and Family:    • Frequency of Social Gatherings  "with Friends and Family:    • Attends [employer] Services:    • Active Member of Clubs or Organizations:    • Attends Club or Organization Meetings:    • Marital Status:    Intimate Partner Violence:    • Fear of Current or Ex-Partner:    • Emotionally Abused:    • Physically Abused:    • Sexually Abused:      Patient has no known allergies.  Current Outpatient Medications on File Prior to Visit   Medication Sig Dispense Refill   • fluticasone (FLOVENT HFA) 44 MCG/ACT Aerosol Inhale 1 Puff 2 times a day. 1 Each 3   • albuterol 108 (90 Base) MCG/ACT Aero Soln inhalation aerosol Inhale 2 Puffs every four hours as needed for Shortness of Breath. 1 Each 3   • ibuprofen (MOTRIN) 200 MG Tab Take 400 mg by mouth every 6 hours as needed for Mild Pain.     • traZODone (DESYREL) 50 MG Tab TAKE 1 TABLET BY MOUTH AT BEDTIME AS NEEDED (Patient taking differently: Take 50 mg by mouth at bedtime.) 90 tablet 1   • omeprazole (PRILOSEC) 40 MG delayed-release capsule Take 1 Cap by mouth as needed. Indications: Heartburn 90 Cap 3   • acetaminophen (TYLENOL) 500 MG Tab Take 1,000 mg by mouth every 6 hours as needed for Moderate Pain.     • therapeutic multivitamin-minerals (THERAGRAN-M) Tab Take 1 tablet by mouth every evening.     • benzonatate (TESSALON) 100 MG Cap Take 2 Capsules by mouth 3 times a day as needed. (Patient not taking: Reported on 6/4/2021) 60 capsule 0   • Cyanocobalamin (B-12 PO) Take 1 tablet by mouth every evening.       No current facility-administered medications on file prior to visit.     /76 (BP Location: Right arm, Patient Position: Sitting, BP Cuff Size: Large adult)   Pulse 63   Temp 36.3 °C (97.4 °F) (Temporal)   Resp 14   Ht 1.676 m (5' 6\")   Wt 107 kg (235 lb)   SpO2 97%   Family History   Problem Relation Age of Onset   • Arthritis Mother    • Cancer Mother         skin   • Anemia Mother    • Hypertension Mother    • Obesity Mother    • Other Mother         ulcer   • Cancer Father         " skin, prostate, esophageal   • Hypertension Father    • Obesity Father    • Arthritis Brother    • Cancer Maternal Aunt         breast]   • Sleep Apnea Neg Hx        Physical Exam:    HEENT: PERRLA, EOMI, no scleral icterus, no nasal or oral lesions  Neck: No thyromegaly, no adenopathy, no bruits  Mallampatti: Grade II  Lungs: Equal breath sounds, no wheezes or crackles  Heart: Regular rate and rhythm, no gallops or murmurs  Abdomen: Soft, benign, no organomegaly  Extremities: No clubbing, cyanosis, or edema  Neurologic: Cranial nerve, motor, and sensory exam are normal    1. Pulmonary nodules    2. Abnormal chest CT        We will repeat her CT scan to follow-up her nodules.  We will call her with results.  There is mention of a scan in 2019 in her prior report.  However, we do not have the scan for review.  She needs to be followed for 2 years to establish stability of the nodules.

## 2021-06-18 ENCOUNTER — PATIENT MESSAGE (OUTPATIENT)
Dept: SLEEP MEDICINE | Facility: MEDICAL CENTER | Age: 61
End: 2021-06-18

## 2021-06-21 NOTE — TELEPHONE ENCOUNTER
From: Maria Teresa Haji  To: Physician Bala Dennis  Sent: 6/18/2021 2:35 PM PDT  Subject: Procedure Question    I have an order for a CT scan chest(thorax) but when I called to schedule at [facility], they said there was no order.   Order #[id number]    Thanks,  Maria Teresa Haji

## 2021-06-22 ENCOUNTER — HOSPITAL ENCOUNTER (OUTPATIENT)
Dept: CARDIOLOGY | Facility: MEDICAL CENTER | Age: 61
End: 2021-06-22
Attending: INTERNAL MEDICINE
Payer: COMMERCIAL

## 2021-06-22 DIAGNOSIS — I35.1 NONRHEUMATIC AORTIC VALVE INSUFFICIENCY: ICD-10-CM

## 2021-06-22 LAB
LV EJECT FRACT  99904: 70
LV EJECT FRACT MOD 2C 99903: 63.32
LV EJECT FRACT MOD 4C 99902: 74.58
LV EJECT FRACT MOD BP 99901: 69.34

## 2021-06-22 PROCEDURE — 93306 TTE W/DOPPLER COMPLETE: CPT

## 2021-06-22 PROCEDURE — 93306 TTE W/DOPPLER COMPLETE: CPT | Mod: 26 | Performed by: INTERNAL MEDICINE

## 2021-06-28 ENCOUNTER — HOSPITAL ENCOUNTER (OUTPATIENT)
Dept: LAB | Facility: MEDICAL CENTER | Age: 61
End: 2021-06-28
Attending: INTERNAL MEDICINE
Payer: COMMERCIAL

## 2021-06-28 ENCOUNTER — HOSPITAL ENCOUNTER (OUTPATIENT)
Dept: RADIOLOGY | Facility: MEDICAL CENTER | Age: 61
End: 2021-06-28
Attending: INTERNAL MEDICINE
Payer: COMMERCIAL

## 2021-06-28 DIAGNOSIS — R19.8 ABDOMINAL FULLNESS: ICD-10-CM

## 2021-06-28 DIAGNOSIS — R73.9 HYPERGLYCEMIA: ICD-10-CM

## 2021-06-28 DIAGNOSIS — Z00.00 PREVENTATIVE HEALTH CARE: ICD-10-CM

## 2021-06-28 DIAGNOSIS — R93.89 ABNORMAL CHEST CT: ICD-10-CM

## 2021-06-28 DIAGNOSIS — R91.8 PULMONARY NODULES: ICD-10-CM

## 2021-06-28 DIAGNOSIS — R16.0 HEPATOMEGALY: ICD-10-CM

## 2021-06-28 LAB
ALBUMIN SERPL BCP-MCNC: 4.3 G/DL (ref 3.2–4.9)
ALBUMIN/GLOB SERPL: 1.6 G/DL
ALP SERPL-CCNC: 79 U/L (ref 30–99)
ALT SERPL-CCNC: 31 U/L (ref 2–50)
ANION GAP SERPL CALC-SCNC: 11 MMOL/L (ref 7–16)
APPEARANCE UR: CLEAR
AST SERPL-CCNC: 23 U/L (ref 12–45)
BACTERIA #/AREA URNS HPF: ABNORMAL /HPF
BILIRUB SERPL-MCNC: 0.2 MG/DL (ref 0.1–1.5)
BILIRUB UR QL STRIP.AUTO: NEGATIVE
BUN SERPL-MCNC: 13 MG/DL (ref 8–22)
CALCIUM SERPL-MCNC: 9.5 MG/DL (ref 8.4–10.2)
CHLORIDE SERPL-SCNC: 105 MMOL/L (ref 96–112)
CHOLEST SERPL-MCNC: 163 MG/DL (ref 100–199)
CO2 SERPL-SCNC: 23 MMOL/L (ref 20–33)
COLOR UR: YELLOW
CREAT SERPL-MCNC: 0.86 MG/DL (ref 0.5–1.4)
EPI CELLS #/AREA URNS HPF: ABNORMAL /HPF
EST. AVERAGE GLUCOSE BLD GHB EST-MCNC: 117 MG/DL
FASTING STATUS PATIENT QL REPORTED: NORMAL
GLOBULIN SER CALC-MCNC: 2.7 G/DL (ref 1.9–3.5)
GLUCOSE SERPL-MCNC: 96 MG/DL (ref 65–99)
GLUCOSE UR STRIP.AUTO-MCNC: NEGATIVE MG/DL
HBA1C MFR BLD: 5.7 % (ref 4–5.6)
HDLC SERPL-MCNC: 32 MG/DL
HYALINE CASTS #/AREA URNS LPF: ABNORMAL /LPF
KETONES UR STRIP.AUTO-MCNC: NEGATIVE MG/DL
LDLC SERPL CALC-MCNC: 97 MG/DL
LEUKOCYTE ESTERASE UR QL STRIP.AUTO: ABNORMAL
MICRO URNS: ABNORMAL
NITRITE UR QL STRIP.AUTO: NEGATIVE
PH UR STRIP.AUTO: 7 [PH] (ref 5–8)
POTASSIUM SERPL-SCNC: 4.2 MMOL/L (ref 3.6–5.5)
PROT SERPL-MCNC: 7 G/DL (ref 6–8.2)
PROT UR QL STRIP: NEGATIVE MG/DL
RBC # URNS HPF: ABNORMAL /HPF
RBC UR QL AUTO: NEGATIVE
SODIUM SERPL-SCNC: 139 MMOL/L (ref 135–145)
SP GR UR STRIP.AUTO: 1.01
TRIGL SERPL-MCNC: 169 MG/DL (ref 0–149)
WBC #/AREA URNS HPF: ABNORMAL /HPF

## 2021-06-28 PROCEDURE — 80053 COMPREHEN METABOLIC PANEL: CPT

## 2021-06-28 PROCEDURE — 83036 HEMOGLOBIN GLYCOSYLATED A1C: CPT

## 2021-06-28 PROCEDURE — 36415 COLL VENOUS BLD VENIPUNCTURE: CPT

## 2021-06-28 PROCEDURE — 81001 URINALYSIS AUTO W/SCOPE: CPT

## 2021-06-28 PROCEDURE — 71250 CT THORAX DX C-: CPT | Mod: ME

## 2021-06-28 PROCEDURE — 80061 LIPID PANEL: CPT

## 2021-06-29 ENCOUNTER — OFFICE VISIT (OUTPATIENT)
Dept: MEDICAL GROUP | Facility: MEDICAL CENTER | Age: 61
End: 2021-06-29
Payer: COMMERCIAL

## 2021-06-29 VITALS
SYSTOLIC BLOOD PRESSURE: 120 MMHG | HEIGHT: 66 IN | OXYGEN SATURATION: 97 % | TEMPERATURE: 97.4 F | RESPIRATION RATE: 16 BRPM | WEIGHT: 243.4 LBS | BODY MASS INDEX: 39.12 KG/M2 | DIASTOLIC BLOOD PRESSURE: 72 MMHG | HEART RATE: 60 BPM

## 2021-06-29 DIAGNOSIS — R91.1 PULMONARY NODULE: ICD-10-CM

## 2021-06-29 DIAGNOSIS — K21.9 GASTROESOPHAGEAL REFLUX DISEASE WITHOUT ESOPHAGITIS: ICD-10-CM

## 2021-06-29 DIAGNOSIS — R73.03 PREDIABETES: ICD-10-CM

## 2021-06-29 DIAGNOSIS — I35.1 NONRHEUMATIC AORTIC VALVE INSUFFICIENCY: ICD-10-CM

## 2021-06-29 PROCEDURE — 99214 OFFICE O/P EST MOD 30 MIN: CPT | Performed by: INTERNAL MEDICINE

## 2021-06-29 RX ORDER — SPIRONOLACTONE 50 MG/1
50 TABLET, FILM COATED ORAL DAILY
COMMUNITY
Start: 2021-05-13 | End: 2021-12-23

## 2021-06-29 ASSESSMENT — FIBROSIS 4 INDEX: FIB4 SCORE: 1.31

## 2021-06-29 NOTE — PROGRESS NOTES
CC:  Diagnoses of Nonrheumatic aortic valve insufficiency, Prediabetes, Gastroesophageal reflux disease without esophagitis, and Pulmonary nodule were pertinent to this visit.    HISTORY OF THE PRESENT ILLNESS: Patient is a 60 y.o. female. This pleasant patient is here today to follow-up.    Symptoms of dyspnea and abdominal bloating has since resolved since last appointment.    The abdominal ultrasound 5/11/2021 showed common bile duct dilatation and she has known cholecystectomy.  She says she did see digestive health and noted EGD/colonoscopy 6/16/2021 showing no metaplasia or H. pylori but signs of chronic inflammation.  Was briefly on Carafate and now on just omeprazole.  Says her stomach symptoms resolved.    For pulmonary nodule she is followed by pulmonary team and had recent CT thorax 6/28/2021 showed stable bilateral nodules up to 5.7 mm.  History of living in [place].  Pulmonary function testing was overall normal with spirometry of 5/10/2021.  She only uses inhaler before she exercises on a long 4.5 mile hike and this helps breathing, otherwise no breathing concerns.    Noted labs 6/20/2021 white blood cells in urine, asymptomatic.  Total cholesterol 163, triglycerides of 169, HDL 32 and LDL 97.  She does use the [employer] pal application she will work on decreasing triglycerides with dietary changes.  She also aware A1c in prediabetic range at 5.7, normal CMP.    Allergies: Patient has no known allergies.    Current Outpatient Medications Ordered in Epic   Medication Sig Dispense Refill   • fluticasone (FLOVENT HFA) 44 MCG/ACT Aerosol Inhale 1 Puff 2 times a day. 1 Each 3   • albuterol 108 (90 Base) MCG/ACT Aero Soln inhalation aerosol Inhale 2 Puffs every four hours as needed for Shortness of Breath. 1 Each 3   • ibuprofen (MOTRIN) 200 MG Tab Take 400 mg by mouth every 6 hours as needed for Mild Pain.     • traZODone (DESYREL) 50 MG Tab TAKE 1 TABLET BY MOUTH AT BEDTIME AS NEEDED (Patient taking  "differently: Take 50 mg by mouth at bedtime.) 90 tablet 1   • omeprazole (PRILOSEC) 40 MG delayed-release capsule Take 1 Cap by mouth as needed. Indications: Heartburn 90 Cap 3   • Cyanocobalamin (B-12 PO) Take 1 tablet by mouth every evening.     • acetaminophen (TYLENOL) 500 MG Tab Take 1,000 mg by mouth every 6 hours as needed for Moderate Pain.     • therapeutic multivitamin-minerals (THERAGRAN-M) Tab Take 1 tablet by mouth every evening.     • spironolactone (ALDACTONE) 50 MG Tab Take 50 mg by mouth every day.       No current [facility]-ordered facility-administered medications on file.       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\"   • Chickenpox    • GERD (gastroesophageal reflux disease)    • Glaucoma    • Heartburn    • Hemorrhoids    • Hives    • Hoarseness, persistent    • Meningitis    • Obesity    • Osteoporosis    • Painful joint    • Pneumonia    • Ringing in ears    • Sinusitis    • Sleep apnea    • Tonsillitis        Past Surgical History:   Procedure Laterality Date   • ABDOMINAL EXPLORATION     • APPENDECTOMY     • ARTHROSCOPY, KNEE     • CHOLECYSTECTOMY     • GYN SURGERY      removed ovary   • KNEE ARTHROPLASTY TOTAL Bilateral ,    • LUMPECTOMY     • OTHER      breast lift, tummy tuck   • OTHER      removal of fallopian tube for a cyst per patient.   • OTHER ORTHOPEDIC SURGERY      b/l knee replacements ,    • PRIMARY C SECTION     • TONSILLECTOMY         Social History     Tobacco Use   • Smoking status: Former Smoker     Packs/day: 2.00     Years: 16.00     Pack years: 32.00     Quit date:      Years since quittin.5   • Smokeless tobacco: Never Used   Vaping Use   • Vaping Use: Never used   Substance Use Topics   • Alcohol use: Not Currently     Comment: RARELY   • Drug use: Not Currently     Types: Inhaled    " " Comment: Marijuana       Social History     Social History Narrative   • Not on file       Family History   Problem Relation Age of Onset   • Arthritis Mother    • Cancer Mother         skin   • Anemia Mother    • Hypertension Mother    • Obesity Mother    • Other Mother         ulcer   • Cancer Father         skin, prostate, esophageal   • Hypertension Father    • Obesity Father    • Arthritis Brother    • Cancer Maternal Aunt         breast]   • Sleep Apnea Neg Hx        Exam: /72 (BP Location: Left arm, Patient Position: Sitting)   Pulse 60   Temp 36.3 °C (97.4 °F) (Temporal)   Resp 16   Ht 1.676 m (5' 6\")   Wt 110 kg (243 lb 6.4 oz)   SpO2 97%  Body mass index is 39.29 kg/m².    General: Normal appearing. No distress.  EYES: Conjunctiva clear lids without ptosis, pupils equal  EARS: Normal shape and contour   Pulmonary: Clear to ausculation.  Normal effort. No rales or wheezing.  Cardiovascular: Regular rate and rhythm    Abdomen: Soft, nontender, nondistended. Normal bowel sounds.  Neurologic: Cranial nerves grossly nonfocal  Skin: Warm and dry.  No obvious lesions.  Musculoskeletal: Normal gait. No extremity cyanosis, clubbing, or edema.  Psych: Normal mood and affect. Alert and oriented x3. Judgment and insight is normal.      Assessment/Plan  1. Nonrheumatic aortic valve insufficiency  Mild AR and echocardiogram 6/26/2021 with normal LV size and EF (possibly a little hyperdynamic probably due to age/gender).  Will monitor and she is asymptomatic.  Blood pressure is controlled.    2. Prediabetes  Continue exercise, working on diet, and weight loss, will monitor.  We will.    3. Gastroesophageal reflux disease without esophagitis  Controlled on omeprazole and recent EGD just showed some inflammatory changes but no metaplasia.  Will monitor.    4. Pulmonary nodule  Stable and followed by the pulmonary team.  Will monitor.  No current pulmonary symptoms.      rtc 6m        Please note that this " dictation was created using voice recognition software. I have made every reasonable attempt to correct obvious errors, but I expect that there are errors of grammar and possibly content that I did not discover before finalizing the note.

## 2021-07-08 ENCOUNTER — TELEPHONE (OUTPATIENT)
Dept: SLEEP MEDICINE | Facility: MEDICAL CENTER | Age: 61
End: 2021-07-08

## 2021-07-08 NOTE — TELEPHONE ENCOUNTER
Caller: Leslie Ellison    Message: Leslie Ellison called and lm.  If pt was seen within 3 months compliance for insurance.  Need OV notes faxed to them. Fax#[phone]    07/08/21:  Faxed OV note to:  DME:  [facility] / janeen [phone] / [facility] [phone]

## 2021-07-14 ENCOUNTER — HOSPITAL ENCOUNTER (OUTPATIENT)
Dept: RADIOLOGY | Facility: MEDICAL CENTER | Age: 61
End: 2021-07-14
Attending: INTERNAL MEDICINE
Payer: COMMERCIAL

## 2021-07-14 DIAGNOSIS — Z12.31 VISIT FOR SCREENING MAMMOGRAM: ICD-10-CM

## 2021-07-14 PROCEDURE — 77063 BREAST TOMOSYNTHESIS BI: CPT

## 2021-07-23 DIAGNOSIS — R09.81 NASAL CONGESTION: ICD-10-CM

## 2021-07-26 RX ORDER — AZELASTINE 1 MG/ML
SPRAY, METERED NASAL
Qty: 30 ML | Refills: 3 | Status: SHIPPED | OUTPATIENT
Start: 2021-07-26 | End: 2021-12-23

## 2021-07-26 NOTE — TELEPHONE ENCOUNTER
Received request via: Pharmacy    Was the patient seen in the last year in this department? Yes    Does the patient have an active prescription (recently filled or refills available) for medication(s) requested? No     The original prescription was discontinued on 1/31/2020 by Barbara Mauricio M.D.. Renewing this prescription may not be appropriate.

## 2021-08-05 ENCOUNTER — OFFICE VISIT (OUTPATIENT)
Dept: URGENT CARE | Facility: CLINIC | Age: 61
End: 2021-08-05
Payer: COMMERCIAL

## 2021-08-05 ENCOUNTER — HOSPITAL ENCOUNTER (OUTPATIENT)
Facility: MEDICAL CENTER | Age: 61
End: 2021-08-05
Attending: PHYSICIAN ASSISTANT
Payer: COMMERCIAL

## 2021-08-05 VITALS
SYSTOLIC BLOOD PRESSURE: 132 MMHG | WEIGHT: 245.8 LBS | DIASTOLIC BLOOD PRESSURE: 68 MMHG | TEMPERATURE: 98.1 F | HEART RATE: 72 BPM | HEIGHT: 66 IN | RESPIRATION RATE: 16 BRPM | BODY MASS INDEX: 39.5 KG/M2 | OXYGEN SATURATION: 95 %

## 2021-08-05 DIAGNOSIS — R43.2 TASTE ABSENT: ICD-10-CM

## 2021-08-05 DIAGNOSIS — B97.89 VIRAL RESPIRATORY ILLNESS: ICD-10-CM

## 2021-08-05 DIAGNOSIS — R53.83 FATIGUE, UNSPECIFIED TYPE: ICD-10-CM

## 2021-08-05 DIAGNOSIS — Z20.822 EXPOSURE TO CONFIRMED CASE OF COVID-19: ICD-10-CM

## 2021-08-05 DIAGNOSIS — R43.0 ABSENT SENSE OF SMELL: ICD-10-CM

## 2021-08-05 DIAGNOSIS — R51.9 ACUTE NONINTRACTABLE HEADACHE, UNSPECIFIED HEADACHE TYPE: ICD-10-CM

## 2021-08-05 DIAGNOSIS — J98.8 VIRAL RESPIRATORY ILLNESS: ICD-10-CM

## 2021-08-05 PROCEDURE — 99214 OFFICE O/P EST MOD 30 MIN: CPT | Mod: 25,CS | Performed by: PHYSICIAN ASSISTANT

## 2021-08-05 PROCEDURE — U0005 INFEC AGEN DETEC AMPLI PROBE: HCPCS

## 2021-08-05 PROCEDURE — U0003 INFECTIOUS AGENT DETECTION BY NUCLEIC ACID (DNA OR RNA); SEVERE ACUTE RESPIRATORY SYNDROME CORONAVIRUS 2 (SARS-COV-2) (CORONAVIRUS DISEASE [COVID-19]), AMPLIFIED PROBE TECHNIQUE, MAKING USE OF HIGH THROUGHPUT TECHNOLOGIES AS DESCRIBED BY CMS-2020-01-R: HCPCS

## 2021-08-05 RX ORDER — KETOROLAC TROMETHAMINE 30 MG/ML
30 INJECTION, SOLUTION INTRAMUSCULAR; INTRAVENOUS ONCE
Status: COMPLETED | OUTPATIENT
Start: 2021-08-05 | End: 2021-08-05

## 2021-08-05 RX ORDER — BENZONATATE 100 MG/1
200 CAPSULE ORAL 3 TIMES DAILY PRN
Qty: 60 CAPSULE | Refills: 0 | Status: SHIPPED | OUTPATIENT
Start: 2021-08-05 | End: 2021-12-23

## 2021-08-05 RX ADMIN — KETOROLAC TROMETHAMINE 30 MG: 30 INJECTION, SOLUTION INTRAMUSCULAR; INTRAVENOUS at 12:08

## 2021-08-05 ASSESSMENT — ENCOUNTER SYMPTOMS
CHILLS: 1
SORE THROAT: 1
MYALGIAS: 1
COUGH: 1
HEADACHES: 1

## 2021-08-05 ASSESSMENT — FIBROSIS 4 INDEX: FIB4 SCORE: 1.33

## 2021-08-05 NOTE — PROGRESS NOTES
"Subjective:   Maria Teresa Haji  is a 61 y.o. female who presents for Cough, Headache, and Other (loss of taste and smell X 3 days, covid exposure by granddaughter)    Patient identity confirmed using two patient identifiers of last name and date of birth.        Cough  Associated symptoms include chills, headaches, myalgias and a sore throat.   Headache   Associated symptoms include coughing and a sore throat.   Other  Associated symptoms include chills, congestion, coughing, headaches, myalgias and a sore throat.   Patient presents urgent care with 3-day history of nasal congestion, headache, fatigue, generalized body aches, chills, mild cough as well as loss of taste and loss of smell.  Patient has been exposed to her granddaughter over the weekend who has tested positive for COVID-19.  Patient is fully Covid vaccinated.  Review of Systems   Constitutional: Positive for chills and malaise/fatigue.   HENT: Positive for congestion and sore throat.    Respiratory: Positive for cough.    Musculoskeletal: Positive for myalgias.   Neurological: Positive for headaches.   All other systems reviewed and are negative.    No Known Allergies  Reviewed past medical, surgical , social and family history.  Reviewed prescription and over-the-counter medications with patient and electronic health record today.     Objective:   /68 (BP Location: Left arm, Patient Position: Sitting, BP Cuff Size: Adult)   Pulse 72   Temp 36.7 °C (98.1 °F) (Temporal)   Resp 16   Ht 1.676 m (5' 6\")   Wt 111 kg (245 lb 12.8 oz)   LMP  (LMP Unknown)   SpO2 95%   BMI 39.67 kg/m²   Physical Exam  Vitals and nursing note reviewed.   Constitutional:       General: She is not in acute distress.     Appearance: She is well-developed. She is not ill-appearing or toxic-appearing.   HENT:      Head: Normocephalic and atraumatic.      Right Ear: Tympanic membrane, ear canal and external ear normal.      Left Ear: Tympanic membrane, ear canal and " external ear normal.      Nose: Nose normal.      Mouth/Throat:      Lips: Pink. No lesions.      Mouth: Mucous membranes are moist.      Pharynx: Oropharynx is clear. Uvula midline. No oropharyngeal exudate.   Eyes:      General: Lids are normal.      Extraocular Movements: Extraocular movements intact.      Conjunctiva/sclera: Conjunctivae normal.      Pupils: Pupils are equal, round, and reactive to light.   Cardiovascular:      Rate and Rhythm: Normal rate and regular rhythm.      Heart sounds: Normal heart sounds. No murmur heard.   No friction rub. No gallop.    Pulmonary:      Effort: Pulmonary effort is normal. No respiratory distress.      Breath sounds: Normal breath sounds.   Abdominal:      General: Bowel sounds are normal. There is no distension.      Palpations: Abdomen is soft. There is no mass.      Tenderness: There is no abdominal tenderness. There is no guarding or rebound.   Musculoskeletal:         General: No tenderness or deformity. Normal range of motion.      Cervical back: Full passive range of motion without pain, normal range of motion and neck supple. No rigidity.   Lymphadenopathy:      Head:      Right side of head: No submental, submandibular or tonsillar adenopathy.      Left side of head: No submental, submandibular or tonsillar adenopathy.      Cervical: No cervical adenopathy.      Upper Body:      Right upper body: No supraclavicular adenopathy.      Left upper body: No supraclavicular adenopathy.   Skin:     General: Skin is warm and dry.      Findings: No rash.   Neurological:      Mental Status: She is alert and oriented to person, place, and time.      Cranial Nerves: Cranial nerves are intact. No cranial nerve deficit.      Sensory: Sensation is intact. No sensory deficit.      Motor: Motor function is intact.      Coordination: Coordination is intact. Coordination normal.      Gait: Gait is intact.   Psychiatric:         Attention and Perception: Attention normal.          Mood and Affect: Mood and affect normal.         Speech: Speech normal.         Behavior: Behavior normal. Behavior is cooperative.         Thought Content: Thought content normal.         Judgment: Judgment normal.           Assessment/Plan:   1. Taste absent  - SARS-CoV-2 PCR (24 hour In-House): Collect NP swab in VTM; Future    2. Absent sense of smell  - SARS-CoV-2 PCR (24 hour In-House): Collect NP swab in VTM; Future    3. Exposure to confirmed case of COVID-19  - SARS-CoV-2 PCR (24 hour In-House): Collect NP swab in VTM; Future    4. Viral respiratory illness  - benzonatate (TESSALON) 100 MG Cap; Take 2 Capsules by mouth 3 times a day as needed.  Dispense: 60 capsule; Refill: 0  - SARS-CoV-2 PCR (24 hour In-House): Collect NP swab in VTM; Future    5. Fatigue, unspecified type  - SARS-CoV-2 PCR (24 hour In-House): Collect NP swab in VTM; Future    6. Acute nonintractable headache, unspecified headache type  - SARS-CoV-2 PCR (24 hour In-House): Collect NP swab in VTM; Future  - ketorolac (TORADOL) injection 30 mg    Testing performed for COVID-19.  Patient/guardian is given printed /MyChart instructions regarding self-isolation.  Work/school note is provided with specific return to work/school protocols.  Reviewed with patient/guardian that if they do test positive they will be contacted by their local health department regarding return to work/school protocols.  Results will also be released to patient/guardian in MyChart or called to the patient/guardian directly.  Encouraged mask use, frequent handwashing, wiping down hard surfaces, etc.    Nasal saline rinse  Over-the-counter Flonase nasal spray per 's instructions  Mucinex as needed    May use Tylenol or ibuprofen over-the-counter as needed for pain or fever not to exceed recommended daily dose.    Patient is given Toradol 30 mg IM here in the clinic as well as Tessalon Perles as needed for cough.  Prior to prescribing NSAID in a patient over  60 patient's previous renal function from date of service 6/20/21 is reviewed by myself with GFR greater than 60 and creatinine of 0.86.      Differential diagnosis, natural history, supportive care, and indications for immediate follow-up discussed.     Red flag warning symptoms and strict ER/follow-up precautions given.  The patient demonstrated a good understanding and agreed with the treatment plan.    Upon entering exam room I ensured patient was wearing a mask.  This provider wore appropriate PPE throughout entire visit.  Patient wore mask entire visit except for a brief period while examining oropharynx.    Please note that this note was created using voice recognition speech to text software. Every effort has been made to correct obvious errors.  However, I expect there are errors of grammar and possibly context that were not discovered prior to finalizing the note  BRITT Sosa PA-C

## 2021-08-05 NOTE — PATIENT INSTRUCTIONS
INSTRUCTIONS FOR COVID-19 OR ANY OTHER INFECTIOUS RESPIRATORY ILLNESSES    The Centers for Disease Control and Prevention (CDC) states that early indications for COVID-19 include cough, shortness of breath, difficulty breathing, or at least two of the following symptoms: chills, shaking with chills, muscle pain, headache, sore throat, and loss of taste or smell. Symptoms can range from mild to severe and may appear up to two weeks after exposure to the virus.    The practice of self-isolation and quarantine helps protect the public and your family by  preventing exposure to people who have or may have a contagious disease. Please follow the prevention steps below as based on CDC guidelines:    WHEN TO STOP ISOLATION: Persons with COVID-19 or any other infectious respiratory illness who have symptoms and were advised to care for themselves at home may discontinue home isolation under the following conditions:  · At least 24 hours have passed since recovery defined as resolution of fever without the use of fever-reducing medications; AND,  · Improvement in respiratory symptoms (e.g., cough, shortness of breath); AND,  · At least 10 days have passed since symptoms first appeared and have had no subsequent illness.    MONITOR YOUR SYMPTOMS: If your illness is worsening, seek prompt medical attention. If you have a medical emergency and need to call 911, notify the dispatch personnel that you have, or are being evaluated for confirmed or suspected COVID-19 or another infectious respiratory illness. Wear a facemask if possible.    ACTIVITY RESTRICTION: restrict activities outside your home, except for getting medical care. Do not go to work, school, or public areas. Avoid using public transportation, ride-sharing, or taxis.    SCHEDULED MEDICAL APPOINTMENTS: Notify your provider that you have, or are being evaluated for, confirmed or suspected COVID-19 or another infectious respiratory. This will help the healthcare  provider’s office safely take care of you and keep other people from getting exposed or infected.    FACEMASKS, when to wear: Anytime you are away from your home or around other people or pets. If you are unable to wear one, maintain a minimum of 6 feet distancing from others.    LIVING ENVIRONMENT: Stay in a separate room from other people and pets. If possible, use a separate bathroom, have someone else care for your pets and avoid sharing household items. Any items used should be washed thoroughly with soap and water. Clean all “high-touch” surfaces every day. Use a household cleaning spray or wipe, according to the label instructions. High touch surfaces include (but are not limited to) counters, tabletops, doorknobs, bathroom fixtures, toilets, phones, keyboards, tablets, and bedside tables.     HAND WASHING: Frequently wash hands with soap and water for at least 20 seconds,  especially after blowing your nose, coughing, or sneezing; going to the bathroom; before and after interacting with pets; and before and after eating or preparing food. If hands are visibly dirty use soap and water. If soap and water are not available, use an alcohol-based hand  with at least 60% alcohol. Avoid touching your eyes, nose, and mouth with unwashed hands. Cover your coughs and sneezes with a tissue. Throw used tissues in a lined trash can. Immediately wash your hands.    ACTIVE/FACILITATED SELF-MONITORING: Follow instructions provided by your local health department or health professionals, as appropriate. When working with your local health department check their available hours.    [facility]   Phone Number   [facility] [phone]   [facility] [place] [phone]   [place] [address]   [place] [phone]     IF YOU HAVE CONFIRMED POSITIVE COVID-19:    Those who have completely recovered from COVID-19 may have immune-boosting antibodies in their plasma--called “convalescent plasma”--that could be  used to treat critically ill COVID19 patients.    [facility] is excited to begin working with [facility] on collecting convalescent plasma from  people who have recovered from COVID-19 as part of a program to treat patients infected with the virus. This FDA-approved “emergency investigational new drug” is a special blood product containing antibodies that may give patients an extra boost to fight the virus.    To be eligible to donate convalescent plasma, you must have a prior COVID-19 diagnosis documented by a laboratory test (or a positive test result for SARS-CoV-2 antibodies) and meet additional eligibility requirements.    If you are interested in donating convalescent plasma or have any additional questions, please contact the [facility] Convalescent Plasma  at [phone] or via e-mail at [email or web address].

## 2021-08-06 DIAGNOSIS — B97.89 VIRAL RESPIRATORY ILLNESS: ICD-10-CM

## 2021-08-06 DIAGNOSIS — Z20.822 EXPOSURE TO CONFIRMED CASE OF COVID-19: ICD-10-CM

## 2021-08-06 DIAGNOSIS — R43.0 ABSENT SENSE OF SMELL: ICD-10-CM

## 2021-08-06 DIAGNOSIS — J98.8 VIRAL RESPIRATORY ILLNESS: ICD-10-CM

## 2021-08-06 DIAGNOSIS — R53.83 FATIGUE, UNSPECIFIED TYPE: ICD-10-CM

## 2021-08-06 DIAGNOSIS — R51.9 ACUTE NONINTRACTABLE HEADACHE, UNSPECIFIED HEADACHE TYPE: ICD-10-CM

## 2021-08-06 DIAGNOSIS — R43.2 TASTE ABSENT: ICD-10-CM

## 2021-08-06 LAB
COVID ORDER STATUS COVID19: NORMAL
SARS-COV-2 RNA RESP QL NAA+PROBE: NOTDETECTED
SPECIMEN SOURCE: NORMAL

## 2021-08-16 DIAGNOSIS — M54.2 NECK PAIN: ICD-10-CM

## 2021-08-16 DIAGNOSIS — Z96.659 HISTORY OF KNEE REPLACEMENT, UNSPECIFIED LATERALITY: ICD-10-CM

## 2021-08-16 RX ORDER — MELOXICAM 7.5 MG/1
7.5 TABLET ORAL DAILY
Qty: 90 TABLET | Refills: 0 | Status: SHIPPED | OUTPATIENT
Start: 2021-08-16 | End: 2021-12-23 | Stop reason: SDUPTHER

## 2021-08-16 NOTE — TELEPHONE ENCOUNTER
Received request via: Patient    Was the patient seen in the last year in this department? Yes    Does the patient have an active prescription (recently filled or refills available) for medication(s) requested? No. Prescription was discontinued on 4/18/21.      Requested Prescriptions     Pending Prescriptions Disp Refills   • meloxicam (MOBIC) 7.5 MG Tab 30 Tablet 6     Sig: Take 1 Tablet by mouth every day.

## 2021-09-02 ENCOUNTER — TELEPHONE (OUTPATIENT)
Dept: MEDICAL GROUP | Facility: MEDICAL CENTER | Age: 61
End: 2021-09-02

## 2021-09-02 NOTE — TELEPHONE ENCOUNTER
Called and spoke to patient about symptoms. Pt stated that she already got treatment for UTI outside of [facility]. And she is no longer concerned about MRSA and getting tested. Pt had no further questions.

## 2021-09-02 NOTE — TELEPHONE ENCOUNTER
----- Message from Valeria Childs M.D. sent at 9/2/2021 12:21 PM PDT -----  Regarding: FW: Meledex  Hi could you please triage this?  Last time I asked for her to have an appointment if is having  concerns.  Generally people can be carriers of MRSA without having any active infection that requires treatment.  If she is not having any signs of cellulitis, abscess, etc. concerns she does not need to be tested.   ----- Message -----  From: Virgil Love [facility]'t  Sent: 9/2/2021   8:13 AM PDT  To: Valeria Childs M.D.  Subject: FW: Dennys                                          ----- Message -----  From: Valeria Childs M.D.  Sent: 8/31/2021   9:56 AM PDT  To: [facility]  Subject: FW: Dennys                                        If she has concerns about infection including possible UTI she should make an appointment  ----- Message -----  From: Nancy Riddle [facility]  Sent: 8/31/2021   9:29 AM PDT  To: Valeria Childs M.D.  Subject: FW: Dennys                                          ----- Message -----  From: Maria Teresa Haji  Sent: 8/31/2021   9:11 AM PDT  To: [facility]  Clinical Staff  Subject: Dennys Childs,  My partner Shan Polk was routinely  tested for Mersa prior to getting his hip surgery next week. I know it can be very contagious and wondered if I should be treated. I’m worried that we will transmit it back and forth.   In addition I think I might have a urinary tract infection.     Thanks you,  Maria Teresa Haji

## 2021-09-24 ENCOUNTER — HOSPITAL ENCOUNTER (OUTPATIENT)
Dept: RADIOLOGY | Facility: MEDICAL CENTER | Age: 61
End: 2021-09-24
Attending: NURSE PRACTITIONER
Payer: COMMERCIAL

## 2021-09-24 DIAGNOSIS — R14.0 ABDOMINAL BLOATING: ICD-10-CM

## 2021-09-24 PROCEDURE — 76830 TRANSVAGINAL US NON-OB: CPT

## 2021-10-22 NOTE — ED PROVIDER NOTES
----- Message from Peggy Patel sent at 10/21/2021  1:05 PM CDT -----  Regarding: [facility] Request  Outside Film Request 10/21     Patient Name:  Marly Urbano  MRN:  [id number]    Type of Images/Reports requested:  Mammogram  Approximate date of prior imaging:  Ordering provider:      Prior Imaging Facility:    Name: [facility]  Address: [address] [place] [address], [address]  City/State: [place] [address]    Phone: [phone]  Fax: [phone]    Thanks!      Malini  [phone]         ED Provider Note    ED Provider Note    Scribed for Caro Escudero MD by Caro Escudero. 8/6/2019, 10:12 PM.    Primary care provider: Valeria Childs M.D.  Means of arrival: Private  History obtained from: Patient  History limited by: None    CHIEF COMPLAINT  Chief Complaint   Patient presents with   • Joint Pain     Bilateral lower extremities all joints, started around 12 today   • Sore Throat   • Nausea   • Fever     got a tempature of 100.7 at home       HPI  Maria Teresa Haji is a 59 y.o. female who presents to the Emergency Department for evaluation of joint discomfort and stiffness.  Patient relates starting today without injury and about 4 PM she began having stiffness and discomfort to her knees and hips bilaterally, more so when she is attempting to ambulate and bend her knees.  This has been worsening throughout the day to the extent that she cannot ambulate now without assistance, no chronically has no problem with ambulation.  She relates she developed a fever as well, T-max of 100.7 at home.  She had a similar symptoms a few years ago with an infectious process, noting she was diagnosed at that point with fifth disease.  She has no rash at this point, though she does endorse a nonproductive cough.  Sore throat as well, nausea, no vomiting.  She took Pepto-Bismol with no improvement.  She had no dysuria, no hematuria, she has no neck or back stiffness and no headache.    REVIEW OF SYSTEMS  Pertinent positives include fever, body aches to the lower extremity, cough. Pertinent negatives include no trauma, no headache nor neck pain or stiffness, no dysuria, no production of cough.  All other systems reviewed and negative.    PAST MEDICAL HISTORY   has a past medical history of Arthritis, Back pain, Blood transfusion without reported diagnosis, Bronchitis, Cancer (HCC), Glaucoma, Hemorrhoids, Hives, Meningitis, Pneumonia, and Sinusitis.    SURGICAL HISTORY   has a past surgical history that  "includes other orthopedic surgery; appendectomy; abdominal exploration; other; tonsillectomy; other; lumpectomy; and gyn surgery.    SOCIAL HISTORY  Social History     Tobacco Use   • Smoking status: Former Smoker     Packs/day: 2.00     Years: 16.00     Pack years: 32.00     Last attempt to quit:      Years since quittin.6   • Smokeless tobacco: Never Used   Substance Use Topics   • Alcohol use: Yes     Comment: 1 glass q 2mo   • Drug use: No      Social History     Substance and Sexual Activity   Drug Use No       FAMILY HISTORY  Family History   Problem Relation Age of Onset   • Arthritis Mother    • Cancer Mother         skin   • Anemia Mother    • Hypertension Mother    • Obesity Mother    • Other Mother         ulcer   • Cancer Father         skin, prostate, esophageal   • Hypertension Father    • Obesity Father    • Arthritis Brother    • Cancer Maternal Aunt         breast]       CURRENT MEDICATIONS  Home Medications     Reviewed by Kim Coats R.N. (Registered Nurse) on 19 at [employer]  Med List Status: Partial   Medication Last Dose Status   latanoprost (XALATAN) 0.005 % Solution 2019 Active   OMEPRAZOLE PO 2019 Active   traZODone (DESYREL) 50 MG Tab 2019 Active                ALLERGIES  No Known Allergies    PHYSICAL EXAM  VITAL SIGNS: /90   Pulse 87   Temp 38 °C (100.4 °F) (Temporal)   Ht 1.676 m (5' 6\")   Wt 108.8 kg (239 lb 13.8 oz)   LMP  (LMP Unknown)   SpO2 96%   BMI 38.71 kg/m²     General: Alert, mild acute distress  Skin: Warm, dry, mildly pale  Head: Normocephalic, atraumatic  Neck: Trachea midline, no tenderness  Eye: PERRL, normal conjunctiva, sclera are anicteric.  ENMT: Oral mucosa pink and dry, no pharyngeal erythema or exudate  Cardiovascular: Regular rate and rhythm, No murmur, Normal peripheral perfusion, no peripheral edema.  Respiratory: Lungs CTA, respirations are non-labored, breath sounds are equal  Gastrointestinal: Soft, nontender, non " distended, no CVA tenderness.    Musculoskeletal: No swelling, no deformity.  No erythema nor warmth on exam of the large joints of the lower extremity.  There is tenderness to palpation at both hips and the knees, range of motion is intact but limited by pain with regard to internal/external rotation as well as abduction and abduction and flexion extension of the hips, flexion of the knees is also limited by pain, elicited approximately 15 degrees bilaterally.  No edema, no effusions, no cellulitis, no proximal streaking.  2+ DP and PT pulses symmetrical bilaterally.  Upper extremities neck and back range well without discomfort.  Neurological: Alert and oriented to person, place, time, and situation.  No focal neurologic deficits with regard to the strength and sensation of lower extremities per  Lymphatics: No lymphadenopathy  Psychiatric: Cooperative, appropriate mood & affect      DIAGNOSTIC STUDIES/PROCEDURES    LABS  Results for orders placed or performed during the hospital encounter of 08/06/19   Lactic acid (lactate)   Result Value Ref Range    Lactic Acid 1.6 0.5 - 2.0 mmol/L   CBC WITH DIFFERENTIAL   Result Value Ref Range    WBC 12.2 (H) 4.8 - 10.8 K/uL    RBC 4.93 4.20 - 5.40 M/uL    Hemoglobin 15.2 12.0 - 16.0 g/dL    Hematocrit 44.7 37.0 - 47.0 %    MCV 90.7 81.4 - 97.8 fL    MCH 30.8 27.0 - 33.0 pg    MCHC 34.0 33.6 - 35.0 g/dL    RDW 40.2 35.9 - 50.0 fL    Platelet Count 226 164 - 446 K/uL    MPV 8.8 (L) 9.0 - 12.9 fL    Neutrophils-Polys 78.50 (H) 44.00 - 72.00 %    Lymphocytes 15.30 (L) 22.00 - 41.00 %    Monocytes 5.20 0.00 - 13.40 %    Eosinophils 0.40 0.00 - 6.90 %    Basophils 0.20 0.00 - 1.80 %    Immature Granulocytes 0.40 0.00 - 0.90 %    Nucleated RBC 0.00 /100 WBC    Neutrophils (Absolute) 9.54 (H) 2.00 - 7.15 K/uL    Lymphs (Absolute) 1.86 1.00 - 4.80 K/uL    Monos (Absolute) 0.63 0.00 - 0.85 K/uL    Eos (Absolute) 0.05 0.00 - 0.51 K/uL    Baso (Absolute) 0.02 0.00 - 0.12 K/uL     Immature Granulocytes (abs) 0.05 0.00 - 0.11 K/uL    NRBC (Absolute) 0.00 K/uL   COMP METABOLIC PANEL   Result Value Ref Range    Sodium 137 135 - 145 mmol/L    Potassium 3.9 3.6 - 5.5 mmol/L    Chloride 104 96 - 112 mmol/L    Co2 22 20 - 33 mmol/L    Anion Gap 11.0 0.0 - 11.9    Glucose 103 (H) 65 - 99 mg/dL    Bun 13 8 - 22 mg/dL    Creatinine 0.96 0.50 - 1.40 mg/dL    Calcium 9.5 8.4 - 10.2 mg/dL    AST(SGOT) 33 12 - 45 U/L    ALT(SGPT) 49 2 - 50 U/L    Alkaline Phosphatase 68 30 - 99 U/L    Total Bilirubin 0.8 0.1 - 1.5 mg/dL    Albumin 4.4 3.2 - 4.9 g/dL    Total Protein 7.3 6.0 - 8.2 g/dL    Globulin 2.9 1.9 - 3.5 g/dL    A-G Ratio 1.5 g/dL   URINALYSIS   Result Value Ref Range    Color Yellow     Character Clear     Specific Gravity 1.020 <1.035    Ph 5.5 5.0 - 8.0    Glucose Negative Negative mg/dL    Ketones Negative Negative mg/dL    Protein Negative Negative mg/dL    Bilirubin Negative Negative    Nitrite Negative Negative    Leukocyte Esterase Trace (A) Negative    Occult Blood Negative Negative    Micro Urine Req Microscopic    ESTIMATED GFR   Result Value Ref Range    GFR If African American >60 >60 mL/min/1.73 m 2    GFR If Non African American 59 (A) >60 mL/min/1.73 m 2   URINE MICROSCOPIC (W/UA)   Result Value Ref Range    WBC 2-5 /hpf    RBC 0-2 /hpf    Bacteria Few (A) None /hpf    Epithelial Cells Moderate (A) Few /hpf    Trans Epithelial Cells Few /hpf   RAPID STREP, CULT IF INDICATED (CULTURE IF NEGATIVE)   Result Value Ref Range    Significant Indicator NEG     Source THRT     Site THROAT     Rapid Strep Screen       Negative for Group A streptococcus.  A negative result may be obtained if the specimen is  inadequate or antigen concentration is below the  sensitivity of the test. This negative test will be followed  up with a culture as requested.     CRP QUANTITIVE (NON-CARDIAC)   Result Value Ref Range    Stat C-Reactive Protein 1.16 (H) 0.00 - 0.75 mg/dL   WESTERGREN SED RATE   Result Value  Ref Range    Sed Rate Westergren 3 0 - 30 mm/hour     All labs reviewed by me, leukocytosis with left shift, leukocyte positive urine but otherwise unremarkable.        RADIOLOGY  CT-ABDOMEN-PELVIS WITH   Final Result         1.  Hepatic steatosis and hepatomegaly.   2.  Prior cholecystectomy.   3.  No acute inflammatory abnormality is otherwise seen.   4.  Nonspecific 5 mm left lower lobe pulmonary nodule. See follow-up recommendations below.         Low Risk: No routine follow-up      High Risk: Optional CT at 12 months      Comments: Nodules less than 6 mm do not require routine follow-up, but certain patients at high risk with suspicious nodule morphology, upper lobe location, or both may warrant 12-month follow-up.      Low Risk - Minimal or absent history of smoking and of other known risk factors.      High Risk - History of smoking or of other known risk factors.      Note: These recommendations do not apply to lung cancer screening, patients with immunosuppression, or patients with known primary cancer.      Fleischner Society 2017 Guidelines for Management of Incidentally Detected Pulmonary Nodules in Adults      DX-CHEST-PORTABLE (1 VIEW)   Final Result      No acute cardiopulmonary abnormality.        The radiologist's interpretation of all radiological studies have been reviewed by me.    COURSE & MEDICAL DECISION MAKING  Pertinent Labs & Imaging studies reviewed. (See chart for details)    10:12 PM - Patient seen and examined at bedside. Patient will be treated with fluid resuscitation, ketorolac, acetaminophen, Rocephin for potential infectious process. Ordered septic work-up as well as inflammatory mediators and CT imaging of the abdomen pelvis to evaluate her symptoms. The differential diagnoses include but are not limited to: UTI, pneumonia, viral infection, intra-abdominal infection    2256: Patient reassessed, she is feeling better somewhat after ketorolac and Zofran.  Will attempt p.o. challenge  "at this time given there is no evidence of surgical emergency.    HYDRATION: Based on the patient's presentation of Dehydration and Tachycardia the patient was given IV fluids. IV Hydration was used because oral hydration was not as rapid as required. Upon recheck following hydration, the patient was Feeling better, skin tone improved, heart rate improving, currently 87.    Patient Vitals for the past 24 hrs:   BP Temp Temp src Pulse SpO2 Height Weight   08/06/19 2047 -- -- -- 87 96 % -- --   08/06/19 1950 140/90 38 °C (100.4 °F) Temporal 99 -- 1.676 m (5' 6\") 108.8 kg (239 lb 13.8 oz)         Decision Making:  This is a 59 y.o. year old female who presents with joint stiffness lower extremities as well as fever, associated cough and nausea.  On exam she appears volume depleted with dry oral mucous membranes consistent with poor oral intake.  She has leukocytosis with left shift, fever, T-max of 100.7.  Blood cultures are sent, there is no evidence of pneumonia and urine has only trace leukocytes.  As such have ordered CT imaging to evaluate her abdomen pelvis given she does have some nausea.  The on exam the joints of the lower extremity do not appear to be infected given only mildly elevated CRP and given this is a polyarthropathy this is likely secondary to inflammatory process rather than septic joints.  She has no meningismus and no headache no neck pain; not a typical presentation for meningitis.  She does have pain in the joints of the pelvis I have ordered a CT given the fever, think with no evidence of abscess or other ham inflammatory process noted on imaging.  Given leukocytosis, left shift, fever; this is concerning for SIRS.  Blood cultures are drawn and pending, urine is trace leuks but otherwise not very impressive.  I do think is reasonable given SIRS criteria met and potential UTI and to cover with antibiotics.  Patient is still unable to ambulate without assistance acutely, as such I do feel she " would benefit from admission.    Patient will be admitted in improved but guarded condition to the medical floor under the care of Dr. Short the hospitalist.    FINAL IMPRESSION  1. Dehydration    2. Inability to ambulate due to multiple joints    3. Arthralgia of hip, unspecified laterality    4. Arthralgia of both knees    5. SIRS (systemic inflammatory response syndrome) (HCC)    6. Bacteriuria          Caro HEMPHILL (Scribe), am scribing for, and in the presence of, Caro Escudero MD.    Electronically signed by: Caro Escudero (Scribe), 8/6/2019    ICaro MD personally performed the services described in this documentation, as scribed by Caro Escudero in my presence, and it is both accurate and complete    The note accurately reflects work and decisions made by me.  Caro Escudero  8/6/2019  11:38 PM

## 2021-10-26 ENCOUNTER — GYNECOLOGY VISIT (OUTPATIENT)
Dept: OBGYN | Facility: CLINIC | Age: 61
End: 2021-10-26
Payer: COMMERCIAL

## 2021-10-26 VITALS — DIASTOLIC BLOOD PRESSURE: 91 MMHG | BODY MASS INDEX: 38.74 KG/M2 | WEIGHT: 240 LBS | SYSTOLIC BLOOD PRESSURE: 131 MMHG

## 2021-10-26 DIAGNOSIS — R14.0 BLOATING: ICD-10-CM

## 2021-10-26 PROCEDURE — 99214 OFFICE O/P EST MOD 30 MIN: CPT | Performed by: OBSTETRICS & GYNECOLOGY

## 2021-10-26 ASSESSMENT — FIBROSIS 4 INDEX: FIB4 SCORE: 1.33

## 2021-10-26 NOTE — NON-PROVIDER
Pt here for reouccuring UTI's   Pt states burning sensation, pain when urinating.   Good#[phone]   Pharmacy verified

## 2021-10-26 NOTE — PROGRESS NOTES
"Chief complaint;    Maria Teresa Haji is a 61 y.o.  who presents complaining of generalized bloating.  The patient notes that she has UTIs on a very frequent basis but review of the medical record shows normal-appearing urinalysis and urine cultures many tests.    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\"   • Chickenpox    • GERD (gastroesophageal reflux disease)    • Glaucoma    • Heartburn    • Hemorrhoids    • Hives    • Hoarseness, persistent    • Meningitis    • Obesity    • Osteoporosis    • Painful joint    • Pneumonia    • Ringing in ears    • Sinusitis    • Sleep apnea    • Tonsillitis      Past surgical history-  Past Surgical History:   Procedure Laterality Date   • ABDOMINAL EXPLORATION     • APPENDECTOMY     • ARTHROSCOPY, KNEE     • CHOLECYSTECTOMY     • GYN SURGERY      removed ovary   • KNEE ARTHROPLASTY TOTAL Bilateral ,    • LUMPECTOMY     • OTHER      breast lift, tummy tuck   • OTHER      removal of fallopian tube for a cyst per patient.   • OTHER ORTHOPEDIC SURGERY      b/l knee replacements ,    • PRIMARY C SECTION     • TONSILLECTOMY       Allergies-Patient has no known allergies.  Medications-  Current Outpatient Medications on File Prior to Visit   Medication Sig Dispense Refill   • meloxicam (MOBIC) 7.5 MG Tab Take 1 Tablet by mouth every day. 90 Tablet 0   • benzonatate (TESSALON) 100 MG Cap Take 2 Capsules by mouth 3 times a day as needed. 60 capsule 0 "   • azelastine (ASTELIN) 137 MCG/SPRAY nasal spray USE 1 SPRAY IN EACH NOSTRIL TWICE DAILY 30 mL 3   • spironolactone (ALDACTONE) 50 MG Tab Take 50 mg by mouth every day.     • fluticasone (FLOVENT HFA) 44 MCG/ACT Aerosol Inhale 1 Puff 2 times a day. 1 Each 3   • albuterol 108 (90 Base) MCG/ACT Aero Soln inhalation aerosol Inhale 2 Puffs every four hours as needed for Shortness of Breath. 1 Each 3   • ibuprofen (MOTRIN) 200 MG Tab Take 400 mg by mouth every 6 hours as needed for Mild Pain.     • traZODone (DESYREL) 50 MG Tab TAKE 1 TABLET BY MOUTH AT BEDTIME AS NEEDED (Patient taking differently: Take 50 mg by mouth at bedtime.) 90 tablet 1   • omeprazole (PRILOSEC) 40 MG delayed-release capsule Take 1 Cap by mouth as needed. Indications: Heartburn 90 Cap 3   • Cyanocobalamin (B-12 PO) Take 1 tablet by mouth every evening.     • acetaminophen (TYLENOL) 500 MG Tab Take 1,000 mg by mouth every 6 hours as needed for Moderate Pain.     • therapeutic multivitamin-minerals (THERAGRAN-M) Tab Take 1 tablet by mouth every evening.       No current facility-administered medications on file prior to visit.     Social history-  Social History     Socioeconomic History   • Marital status: Single     Spouse name: Not on file   • Number of children: Not on file   • Years of education: Not on file   • Highest education level: Not on file   Occupational History   • Not on file   Tobacco Use   • Smoking status: Former Smoker     Packs/day: 2.00     Years: 16.00     Pack years: 32.00     Quit date:      Years since quittin.8   • Smokeless tobacco: Never Used   Vaping Use   • Vaping Use: Never used   Substance and Sexual Activity   • Alcohol use: Not Currently     Comment: RARELY   • Drug use: Not Currently     Types: Inhaled     Comment: Marijuana   • Sexual activity: Yes     Partners: Male   Other Topics Concern   • Not on file   Social History Narrative   • Not on file     Social Determinants of Health     Financial  Resource Strain:    • Difficulty of Paying Living Expenses:    Food Insecurity:    • Worried About Running Out of Food in the Last Year:    • Ran Out of Food in the Last Year:    Transportation Needs:    • Lack of Transportation (Medical):    • Lack of Transportation (Non-Medical):    Physical Activity:    • Days of Exercise per Week:    • Minutes of Exercise per Session:    Stress:    • Feeling of Stress :    Social Connections:    • Frequency of Communication with Friends and Family:    • Frequency of Social Gatherings with Friends and Family:    • Attends Anabaptism Services:    • Active Member of Clubs or Organizations:    • Attends Club or Organization Meetings:    • Marital Status:    Intimate Partner Violence:    • Fear of Current or Ex-Partner:    • Emotionally Abused:    • Physically Abused:    • Sexually Abused:      Past Family History-no history of breast or ovarian cancer    Physical examination;  Alert and oriented x3  General a thin well-developed well-nourished female in no apparent distress  Vitals:    10/26/21 1125   BP: 131/91   BP Location: Right arm   Patient Position: Sitting   Weight: 109 kg (240 lb)     Skin is warm and dry  Neck-supple  HEENT-head-atraumatic, normocephalic, EOMI, PERRLA  Cardiovascular-regular rate and rhythm, normal S1-S2, no murmurs or gallops  Lungs-clear to auscultation bilaterally  Back-negative CVA tenderness  Abdomen-nondistended positive bowel sounds soft nontender no masses or hepatosplenomegaly  Pelvic examination;  External genitalia-no visible lesions   Vagina-no blood or discharge-vagina appears well estrogenized without atrophic changes  Cervix-no gross lesions  Uterus-normal size and shape, nontender  Adnexa without mass or tenderness  Extremities without cyanosis clubbing or edema  Neurologic exam grossly intact    Impression;  Bloating-no evidence of GYN pathology, unable to find definitive evidence of chronic urinary tract infections  either.    Plan;  Recommend evaluation and treatment with gastroenterology for bloating symptoms.  Recommend weight loss  Patient referred back to primary care for further consideration and evaluation.      25  Minutes spent with the patient in face-to-face contact, greater than 50% of the time spent on counseling and coordination of care. All questions answered in detail.    Female chaperone present for entire examination and history

## 2021-10-28 ENCOUNTER — OFFICE VISIT (OUTPATIENT)
Dept: SLEEP MEDICINE | Facility: MEDICAL CENTER | Age: 61
End: 2021-10-28
Payer: COMMERCIAL

## 2021-10-28 VITALS
OXYGEN SATURATION: 96 % | SYSTOLIC BLOOD PRESSURE: 128 MMHG | HEART RATE: 63 BPM | RESPIRATION RATE: 16 BRPM | BODY MASS INDEX: 39.05 KG/M2 | WEIGHT: 243 LBS | DIASTOLIC BLOOD PRESSURE: 82 MMHG | HEIGHT: 66 IN

## 2021-10-28 DIAGNOSIS — G47.34 SLEEP RELATED HYPOXIA: ICD-10-CM

## 2021-10-28 DIAGNOSIS — G47.33 OSA (OBSTRUCTIVE SLEEP APNEA): ICD-10-CM

## 2021-10-28 PROCEDURE — 99213 OFFICE O/P EST LOW 20 MIN: CPT | Performed by: FAMILY MEDICINE

## 2021-10-28 ASSESSMENT — FIBROSIS 4 INDEX: FIB4 SCORE: 1.33

## 2021-10-28 NOTE — PROGRESS NOTES
[facility] Follow Up Note     Date: 10/28/2021 / Time: 10:28 AM    Patient ID:   Name:             Maria Teresa Haji   YOB: 1960  Age:                 61 y.o.  female   MRN:               [id number]      Thank you for requesting a sleep medicine consultation on Maria Teresa Haji at the sleep center. She presents today with the chief complaints of NIRALI follow up.     HISTORY OF PRESENT ILLNESS:       Pt is currently on CPAP 10 cm with O2 bleed in at 2LPM. She goes to sleep around 10:30 pm and wakes up around 6 am. She is getting about 6 hrs of sleep on a good night. Overall, she does  finds her sleep refreshing.She does not take regular naps.She drinks about 3 caffeinated beverages per day. She denies any symptoms of RLS, narcolepsy or any symptoms to suggest parasomnias such as nightmares, sleep walking or acting out of dreams.She is using CPAP most days of the week. Pt reports 5 hrs of average nightly use of CPAP. Pt denies snoring, gasping,choking.Pt also denies significant mask leak that is interfering with sleep. The 30 day compliance was downloaded which shows adequate compliance with more that 4 hr usage about 83%. The AHI is has improved to 1.1/hr. The mask leak is normal. The symptoms of NIRALI has improved with the therapy.       REVIEW OF SYSTEMS:       Constitutional: Denies fevers, Denies weight changes  Eyes: Denies changes in vision, no eye pain  Ears/Nose/Throat/Mouth: Denies nasal congestion or sore throat   Cardiovascular: Denies chest pain or palpitations   Respiratory: Denies shortness of breath , Denies cough  Gastrointestinal/Hepatic: Denies abdominal pain, nausea, vomiting, diarrhea, constipation or GI bleeding   Genitourinary: Deniesdysuria or frequency  Musculoskeletal/Rheum: Denies  joint pain and swelling   Skin/Breast: Denies rash,   Neurological: Denies headache, confusion, memory loss or focal weakness/parasthesias  Psychiatric: denies mood disorder   Sleep:  denies snoring and improved EDS    Comprehensive review of systems form is reviewed with the patient and is attached in the EMR.     PMH:  has a past medical history of Arthritis, Back pain, Blood transfusion without reported diagnosis, Bronchitis, Cancer (HCC), Chickenpox, GERD (gastroesophageal reflux disease), Glaucoma, Heartburn, Hemorrhoids, Hives, Hoarseness, persistent, Meningitis, Obesity, Osteoporosis, Painful joint, Pneumonia, Ringing in ears, Sinusitis, Sleep apnea, and Tonsillitis. She also has no past medical history of Allergy.  MEDS:   Current Outpatient Medications:   •  meloxicam (MOBIC) 7.5 MG Tab, Take 1 Tablet by mouth every day., Disp: 90 Tablet, Rfl: 0  •  azelastine (ASTELIN) 137 MCG/SPRAY nasal spray, USE 1 SPRAY IN EACH NOSTRIL TWICE DAILY, Disp: 30 mL, Rfl: 3  •  albuterol 108 (90 Base) MCG/ACT Aero Soln inhalation aerosol, Inhale 2 Puffs every four hours as needed for Shortness of Breath., Disp: 1 Each, Rfl: 3  •  traZODone (DESYREL) 50 MG Tab, TAKE 1 TABLET BY MOUTH AT BEDTIME AS NEEDED (Patient taking differently: Take 50 mg by mouth at bedtime.), Disp: 90 tablet, Rfl: 1  •  omeprazole (PRILOSEC) 40 MG delayed-release capsule, Take 1 Cap by mouth as needed. Indications: Heartburn, Disp: 90 Cap, Rfl: 3  •  Cyanocobalamin (B-12 PO), Take 1 tablet by mouth every evening., Disp: , Rfl:   •  therapeutic multivitamin-minerals (THERAGRAN-M) Tab, Take 1 tablet by mouth every evening., Disp: , Rfl:   •  benzonatate (TESSALON) 100 MG Cap, Take 2 Capsules by mouth 3 times a day as needed. (Patient not taking: Reported on 10/28/2021), Disp: 60 capsule, Rfl: 0  •  spironolactone (ALDACTONE) 50 MG Tab, Take 50 mg by mouth every day. (Patient not taking: Reported on 10/28/2021), Disp: , Rfl:   •  fluticasone (FLOVENT HFA) 44 MCG/ACT Aerosol, Inhale 1 Puff 2 times a day. (Patient not taking: Reported on 10/28/2021), Disp: 1 Each, Rfl: 3  •  ibuprofen (MOTRIN) 200 MG Tab, Take 400 mg by mouth every 6  "hours as needed for Mild Pain. (Patient not taking: Reported on 10/28/2021), Disp: , Rfl:   •  acetaminophen (TYLENOL) 500 MG Tab, Take 1,000 mg by mouth every 6 hours as needed for Moderate Pain. (Patient not taking: Reported on 10/28/2021), Disp: , Rfl:   ALLERGIES: No Known Allergies  SURGHX:   Past Surgical History:   Procedure Laterality Date   • ABDOMINAL EXPLORATION     • APPENDECTOMY     • ARTHROSCOPY, KNEE     • CHOLECYSTECTOMY     • GYN SURGERY      removed ovary   • KNEE ARTHROPLASTY TOTAL Bilateral 2015, 2013   • LUMPECTOMY     • OTHER      breast lift, tummy tuck   • OTHER      removal of fallopian tube for a cyst per patient.   • OTHER ORTHOPEDIC SURGERY      b/l knee replacements 2014, 2016   • PRIMARY C SECTION     • TONSILLECTOMY       SOCHX:  reports that she quit smoking about 28 years ago. She has a 32.00 pack-year smoking history. She has never used smokeless tobacco. She reports previous alcohol use. She reports previous drug use. Drug: Inhaled..  FH:   Family History   Problem Relation Age of Onset   • Arthritis Mother    • Cancer Mother         skin   • Anemia Mother    • Hypertension Mother    • Obesity Mother    • Other Mother         ulcer   • Cancer Father         skin, prostate, esophageal   • Hypertension Father    • Obesity Father    • Arthritis Brother    • Cancer Maternal Aunt         breast]   • Sleep Apnea Neg Hx          Physical Exam:  Vitals/ General Appearance:   Weight/BMI: Body mass index is 39.22 kg/m².  /82 (BP Location: Left arm, Patient Position: Sitting, BP Cuff Size: Adult)   Pulse 63   Resp 16   Ht 1.676 m (5' 6\")   Wt 110 kg (243 lb)   SpO2 96%   Vitals:    10/28/21 1019   BP: 128/82   BP Location: Left arm   Patient Position: Sitting   BP Cuff Size: Adult   Pulse: 63   Resp: 16   SpO2: 96%   Weight: 110 kg (243 lb)   Height: 1.676 m (5' 6\")       Pt. is alert and oriented to time, place and person. Cooperative and in no apparent distress. "       Constitutional: Alert, no distress, well-groomed.  Skin: No rashes in visible areas.  Eye: Round. Conjunctiva clear, lids normal. No icterus.   ENMT: Lips pink without lesions, good dentition, moist mucous membranes. Phonation normal.  Neck: No masses, no thyromegaly. Moves freely without pain.  CV: Pulse as reported by patient  Respiratory: Unlabored respiratory effort, no cough or audible wheeze  Psych: Alert and oriented x3, normal affect and mood.     ASSESSMENT AND PLAN     1. Sleep Apnea:    The pathophysiology of sleep anea and the increased risk of cardiovascular morbidity from untreated sleep apnea is discussed in detail with the patient. She is urged to avoid supine sleep, weight gain and alcoholic beverages since all of these can worsen sleep apnea. She is cautioned against drowsy driving. If She feels sleepy while driving, She must pull over for a break/nap, rather than persist on the road, in the interest of She own safety and that of others on the road.   Plan   - Continue CPAP 10 cm with nasal mask    - OPO is ordered today due to hx of sleep related hypoxia   - compliance download was reviewed and discussed with the pt   - compliance was reinforced     2. Regarding treatment of other past medical problems and general health maintenance,  She is urged to follow up with PCP.

## 2021-10-29 NOTE — PROGRESS NOTES
Completed assessment and administered scheduled medications. Bed in low position, locked, and appropriate alarms set. Personal belongings and call light are within reach. Patient has no additional needs at this time.   -History of non-small cell cancer of the lung noted, stage I, status post surgical resection   -No further evaluation or management necessary at this time

## 2021-11-03 ENCOUNTER — APPOINTMENT (OUTPATIENT)
Dept: SLEEP MEDICINE | Facility: MEDICAL CENTER | Age: 61
End: 2021-11-03
Attending: FAMILY MEDICINE
Payer: COMMERCIAL

## 2021-11-17 ENCOUNTER — HOME STUDY (OUTPATIENT)
Dept: SLEEP MEDICINE | Facility: MEDICAL CENTER | Age: 61
End: 2021-11-17
Attending: FAMILY MEDICINE
Payer: COMMERCIAL

## 2021-11-17 DIAGNOSIS — G47.34 SLEEP RELATED HYPOXIA: ICD-10-CM

## 2021-11-17 DIAGNOSIS — G47.33 OSA (OBSTRUCTIVE SLEEP APNEA): ICD-10-CM

## 2021-11-17 PROCEDURE — 94762 N-INVAS EAR/PLS OXIMTRY CONT: CPT | Performed by: FAMILY MEDICINE

## 2021-11-19 NOTE — PROCEDURES
Over Night Pulse Oximetry     Indication:To assess the efficacy of the current pressure.       Impression:   The study was done on CPAP 10 cm with O2 bleed in at 2LPM. The total analyzed time was 6 hrs 25 min. O2 Sat. titi was 90% and mean O2 sat was 92 % and baseline O2 at 94%. O2 sat was below 88% for 0 min of the flow evaluation time. Oxygen Desaturation (>=3%) Index was elevated at 0.3/hr.      Recommendation:  It was an unremarkable OPO, continue therapy at the current pressure. Clinical correlation recommended.

## 2021-12-23 ENCOUNTER — OFFICE VISIT (OUTPATIENT)
Dept: MEDICAL GROUP | Facility: MEDICAL CENTER | Age: 61
End: 2021-12-23
Payer: COMMERCIAL

## 2021-12-23 VITALS
WEIGHT: 240 LBS | HEIGHT: 67 IN | DIASTOLIC BLOOD PRESSURE: 68 MMHG | OXYGEN SATURATION: 96 % | BODY MASS INDEX: 37.67 KG/M2 | SYSTOLIC BLOOD PRESSURE: 116 MMHG | HEART RATE: 66 BPM | TEMPERATURE: 97.1 F

## 2021-12-23 DIAGNOSIS — E78.5 DYSLIPIDEMIA: ICD-10-CM

## 2021-12-23 DIAGNOSIS — Z96.659 HISTORY OF KNEE REPLACEMENT, UNSPECIFIED LATERALITY: ICD-10-CM

## 2021-12-23 DIAGNOSIS — B02.29 NEURALGIA, POSTHERPETIC: ICD-10-CM

## 2021-12-23 DIAGNOSIS — Z00.00 ANNUAL PHYSICAL EXAM: ICD-10-CM

## 2021-12-23 DIAGNOSIS — R73.03 PREDIABETES: ICD-10-CM

## 2021-12-23 DIAGNOSIS — R06.09 OTHER FORM OF DYSPNEA: ICD-10-CM

## 2021-12-23 DIAGNOSIS — R91.1 PULMONARY NODULE: ICD-10-CM

## 2021-12-23 DIAGNOSIS — G47.09 OTHER INSOMNIA: ICD-10-CM

## 2021-12-23 DIAGNOSIS — G47.39 OTHER SLEEP APNEA: ICD-10-CM

## 2021-12-23 DIAGNOSIS — K21.9 GASTROESOPHAGEAL REFLUX DISEASE WITHOUT ESOPHAGITIS: ICD-10-CM

## 2021-12-23 DIAGNOSIS — M54.2 NECK PAIN: ICD-10-CM

## 2021-12-23 PROBLEM — F41.9 ANXIETY: Status: RESOLVED | Noted: 2019-08-15 | Resolved: 2021-12-23

## 2021-12-23 PROBLEM — B02.8 HERPES ZOSTER WITH COMPLICATION: Status: RESOLVED | Noted: 2020-10-21 | Resolved: 2021-12-23

## 2021-12-23 PROBLEM — C80.1 ADENOID CYSTIC CARCINOMA (HCC): Status: RESOLVED | Noted: 2019-05-08 | Resolved: 2021-12-23

## 2021-12-23 PROCEDURE — 99214 OFFICE O/P EST MOD 30 MIN: CPT | Performed by: INTERNAL MEDICINE

## 2021-12-23 RX ORDER — MELOXICAM 7.5 MG/1
7.5 TABLET ORAL DAILY
Qty: 90 TABLET | Refills: 0 | Status: SHIPPED | OUTPATIENT
Start: 2021-12-23

## 2021-12-23 RX ORDER — OMEPRAZOLE 40 MG/1
40 CAPSULE, DELAYED RELEASE ORAL DAILY
Qty: 90 CAPSULE | Refills: 1 | Status: SHIPPED | OUTPATIENT
Start: 2021-12-23

## 2021-12-23 RX ORDER — TRAZODONE HYDROCHLORIDE 50 MG/1
50 TABLET ORAL
Qty: 90 TABLET | Refills: 1 | Status: SHIPPED | OUTPATIENT
Start: 2021-12-23 | End: 2022-07-05

## 2021-12-23 RX ORDER — ALBUTEROL SULFATE 90 UG/1
2 AEROSOL, METERED RESPIRATORY (INHALATION) EVERY 4 HOURS PRN
Qty: 1 EACH | Refills: 3 | Status: SHIPPED | OUTPATIENT
Start: 2021-12-23

## 2021-12-23 RX ORDER — GABAPENTIN 100 MG/1
100 CAPSULE ORAL 3 TIMES DAILY
Qty: 90 CAPSULE | Refills: 1 | Status: SHIPPED | OUTPATIENT
Start: 2021-12-23 | End: 2022-01-24

## 2021-12-23 ASSESSMENT — FIBROSIS 4 INDEX: FIB4 SCORE: 1.33

## 2021-12-23 ASSESSMENT — PATIENT HEALTH QUESTIONNAIRE - PHQ9: CLINICAL INTERPRETATION OF PHQ2 SCORE: 0

## 2021-12-23 NOTE — PROGRESS NOTES
Subjective:     Chief Complaint   Patient presents with   • Medication Refill   • Nerve Pain     left leg, x2 months       Diagnoses of Other sleep apnea, Prediabetes, Other insomnia, Pulmonary nodule, Other form of dyspnea, Neck pain, History of knee replacement, unspecified laterality, Gastroesophageal reflux disease without esophagitis, Annual physical exam, Dyslipidemia, BMI 38.0-38.9,adult, and Neuralgia, postherpetic were pertinent to this visit.    HISTORY OF THE PRESENT ILLNESS: Patient is a 61 y.o. female. This pleasant patient is here today to establish care. Her prior PCP was Dr. Childs.    Problem   Other Sleep Apnea    This is a chronic condition, managed with CPAP.  Patient reports excellent compliance and great improvement of her symptoms after therapy was initiated.  Pulmonologist Dr. Michael Damian      Neuralgia, Postherpetic    Patient has experienced multiple episodes of herpes zoster on her left thigh.  She has recently completed 2 doses of Shingrix and not had recurrent relapses.    Since last months she reports tingling and numbness in the area of distribution of L4 on the left.  It is also associated with pain and burning sensation, not associated with physical activity.       Other Insomnia    This is a chronic condition, well-controlled on trazodone      Pulmonary Nodule    6/21/2021 CT chest   Bilateral pulmonary nodules measuring up to 5.7 mm unchanged. No airspace consolidation or pleural effusions.     Prediabetes    Lab Results   Component Value Date/Time    HBA1C 5.7 (H) 06/28/2021 12:08 PM           Gastroesophageal Reflux Disease Without Esophagitis    Evaluated by digestive health and noted EGD/colonoscopy 6/16/2021 showing no metaplasia or H. pylori but signs of chronic inflammation.  Taking omeprazole 40 mg delayed release daily     Bmi 38.0-38.9,Adult    BMI 37.  Patient has gained 10 pounds during pandemic.  She is not able to exercise as much right now.  She is hiking  "approximately once a week, walking her dogs approximately 30 minutes 3 times a week.  She is practicing intermittent fasting and her weight has been steady.  Recommended daily exercise: Patient has a stationary bike at home, will try to start using at least 30 minutes/day.     Herpes Zoster With Complication (Resolved)   Anxiety (Resolved)   Adenoid Cystic Carcinoma (Hcc) (Resolved)    Dx 2013 at that time small painful lump near R parietal region.  Dx w/biopsy.   Excised x2 [facility] in [place], [place]    Since saw oncology, neg PET scan approx 2013. All studies returned negative after excision, and no f/u was indicated.  No radiation/chemo. Just excised.         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  Dx w/biopsy.  Excised x2 [facility] in [place], [place]  Since saw oncology, neg PET scan approx 2013. All studies returned negative after excision, and no f/u was indicated.  No radiation/chemo. Just excised.    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\"   • Chickenpox    • GERD (gastroesophageal reflux disease)    • Glaucoma    • Heartburn    • Hemorrhoids    • Herpes zoster with complication 10/21/2020   • Hives    • Hoarseness, persistent    • Meningitis    • Obesity    • Osteoporosis    • Painful joint    • Pneumonia    • Ringing in ears    • Sinusitis    • Sleep apnea    • Tonsillitis      Past Surgical History:   Procedure Laterality Date   • ABDOMINAL EXPLORATION     • APPENDECTOMY     • ARTHROSCOPY, KNEE     • CHOLECYSTECTOMY     • GYN SURGERY      removed ovary   • KNEE ARTHROPLASTY TOTAL Bilateral 2015, 2013   • LUMPECTOMY     • OTHER      breast lift, tummy tuck   • OTHER      removal of fallopian tube for a cyst per patient. "   • OTHER ORTHOPEDIC SURGERY      b/l knee replacements ,    • PRIMARY C SECTION     • TONSILLECTOMY       Family History   Problem Relation Age of Onset   • Arthritis Mother    • Cancer Mother         skin   • Anemia Mother    • Hypertension Mother    • Obesity Mother    • Other Mother         ulcer   • Cancer Father         skin, prostate, esophageal   • Hypertension Father    • Obesity Father    • Arthritis Brother    • Cancer Maternal Aunt         breast]   • Sleep Apnea Neg Hx      Social History     Tobacco Use   • Smoking status: Former Smoker     Packs/day: 2.00     Years: 16.00     Pack years: 32.00     Quit date:      Years since quittin.9   • Smokeless tobacco: Never Used   Vaping Use   • Vaping Use: Never used   Substance Use Topics   • Alcohol use: Not Currently     Comment: RARELY   • Drug use: Not Currently     Current Outpatient Medications Ordered in Epic   Medication Sig Dispense Refill   • gabapentin (NEURONTIN) 100 MG Cap Take 1 Capsule by mouth 3 times a day. 90 Capsule 1   • albuterol 108 (90 Base) MCG/ACT Aero Soln inhalation aerosol Inhale 2 Puffs every four hours as needed for Shortness of Breath. 1 Each 3   • meloxicam (MOBIC) 7.5 MG Tab Take 1 Tablet by mouth every day. 90 Tablet 0   • omeprazole (PRILOSEC) 40 MG delayed-release capsule Take 1 Capsule by mouth every day. Indications: Heartburn 90 Capsule 1   • traZODone (DESYREL) 50 MG Tab Take 1 Tablet by mouth at bedtime. 90 Tablet 1   • therapeutic multivitamin-minerals (THERAGRAN-M) Tab Take 1 tablet by mouth every evening.     • acetaminophen (TYLENOL) 500 MG Tab Take 1,000 mg by mouth every 6 hours as needed for Moderate Pain. (Patient not taking: Reported on 10/28/2021)       No current [facility]-ordered facility-administered medications on file.     Health Maintenance: Up-to-date    ROS:   Gen: no fevers  ENT: no sore throat  Pulm: no sob, no cough  CV: no chest pain, no palpitations      Objective:     Exam: /68  "(BP Location: Left arm, Patient Position: Sitting, BP Cuff Size: Large adult)   Pulse 66   Temp 36.2 °C (97.1 °F) (Temporal)   Ht 1.702 m (5' 7\")   Wt 109 kg (240 lb)   SpO2 96%  Body mass index is 37.59 kg/m².    General: Normal appearing. No distress.  HEENT: Normocephalic. Eyes conjunctiva clear lids without ptosis, pupils equal and reactive to light accommodation, nasal mucosa benign, oropharynx is without erythema, edema or exudates.   Pulmonary: Clear to ausculation.  Normal effort. No rales, ronchi, or wheezing.  Cardiovascular: Regular rate and rhythm without murmur. Carotid and radial pulses are intact and equal bilaterally.  Skin: Warm and dry.   Musculoskeletal: Normal gait. No extremity cyanosis, clubbing, or edema.  Psych: Normal mood and affect. Alert and oriented x3. Judgment and insight is normal.    Labs: Reviewed and discussed with patient CBC, CMP, lipid panel and hemoglobin A1c.     Assessment & Plan:   61 y.o. female with the following -    Problem List Items Addressed This Visit     BMI 38.0-38.9,adult     Extensively counseled on diet and exercise.  Patient will start using her stationary bike 30 minutes/day.         Gastroesophageal reflux disease without esophagitis     Chronic and well controlled, continue omeprazole 40 mg         Relevant Medications    omeprazole (PRILOSEC) 40 MG delayed-release capsule    History of knee replacement    Relevant Medications    meloxicam (MOBIC) 7.5 MG Tab    Neuralgia, postherpetic     No associated back pain, most likely post herpetic neuropathy.  Initiating gabapentin 100 mg tablets 3 times a day.  Extensively counseled on side effects including but not limited by dizziness drowsiness and brain fog.  Patient will let me know about her symptoms and 4 weeks through MyChart.         Relevant Medications    gabapentin (NEURONTIN) 100 MG Cap    traZODone (DESYREL) 50 MG Tab    Other insomnia     Continue trazodone at bedtime.         Relevant " Medications    traZODone (DESYREL) 50 MG Tab    Other sleep apnea     Continue CPAP.  Working on weight loss         Relevant Orders    CBC WITH DIFFERENTIAL    Prediabetes     Hemoglobin A1c as above, discussed low-carb diet and weight loss.         Relevant Orders    HEMOGLOBIN A1C    Comp Metabolic Panel    Pulmonary nodule      Other Visit Diagnoses     Other form of dyspnea        Relevant Medications    albuterol 108 (90 Base) MCG/ACT Aero Soln inhalation aerosol    Neck pain        Relevant Medications    meloxicam (MOBIC) 7.5 MG Tab    Annual physical exam        Dyslipidemia        Relevant Orders    Lipid Profile          Return in about 6 months (around 6/23/2022) for annual wellness visit .    Please note that this dictation was created using voice recognition software. I have made every reasonable attempt to correct obvious errors, but I expect that there are errors of grammar and possibly content that I did not discover before finalizing the note.

## 2021-12-23 NOTE — ASSESSMENT & PLAN NOTE
No associated back pain, most likely post herpetic neuropathy.  Initiating gabapentin 100 mg tablets 3 times a day.  Extensively counseled on side effects including but not limited by dizziness drowsiness and brain fog.  Patient will let me know about her symptoms and 4 weeks through MyChart.

## 2022-01-24 DIAGNOSIS — B02.29 NEURALGIA, POSTHERPETIC: ICD-10-CM

## 2022-01-24 RX ORDER — GABAPENTIN 100 MG/1
CAPSULE ORAL
Qty: 90 CAPSULE | Refills: 1 | Status: SHIPPED | OUTPATIENT
Start: 2022-01-24

## 2022-07-05 DIAGNOSIS — G47.09 OTHER INSOMNIA: ICD-10-CM

## 2022-07-05 RX ORDER — TRAZODONE HYDROCHLORIDE 50 MG/1
50 TABLET ORAL
Qty: 90 TABLET | Refills: 1 | Status: SHIPPED | OUTPATIENT
Start: 2022-07-05

## 2022-07-15 ENCOUNTER — RESEARCH ENCOUNTER (OUTPATIENT)
Dept: CARDIOLOGY | Facility: MEDICAL CENTER | Age: 62
End: 2022-07-15
Payer: COMMERCIAL

## 2022-07-15 DIAGNOSIS — Z00.6 RESEARCH STUDY PATIENT: ICD-10-CM

## 2022-07-15 NOTE — RESEARCH NOTE
[employer] [place] [employer] enrollment complete. Saliva sample collected onsite. Significant Other of ALEXANDRE Identified. ALEXANDRE consented and enrolled.

## 2022-08-08 DIAGNOSIS — Z00.6 RESEARCH STUDY PATIENT: ICD-10-CM

## 2022-08-11 LAB
APOB+LDLR+PCSK9 GENE MUT ANL BLD/T: NOT DETECTED
BRCA1+BRCA2 DEL+DUP + FULL MUT ANL BLD/T: NOT DETECTED
MLH1+MSH2+MSH6+PMS2 GN DEL+DUP+FUL M: NOT DETECTED

## 2023-02-17 ENCOUNTER — APPOINTMENT (OUTPATIENT)
Dept: RADIOLOGY | Facility: MEDICAL CENTER | Age: 63
End: 2023-02-17
Attending: CLINICAL NURSE SPECIALIST
Payer: COMMERCIAL

## 2023-03-24 NOTE — PROGRESS NOTES
"Telemedicine Visit: New Patient     This encounter was conducted via Zoom . Verbal consent was obtained. Patient's identity was verified.Given the importance of social distancing and other strategies recommended to reduce the risk of COVID-19 transmission, I am providing medical care to this patient via audio/video visit in place of an in person visit at the request of the patient.        [facility]  Consult Note     Date: 12/18/2020 / Time: 8:44 AM    Patient ID:   Name:             Maria Teresa Haji   YOB: 1960  Age:                 60 y.o.  female   MRN:               [id number]      Thank you for requesting a sleep medicine consultation on Maria Teresa Haji at the sleep center. She presents today with the chief complaints of snoring and occasional apneas. She is referred by Dr. Childs for evaluation and treatment of sleep disorder breathing.     HISTORY OF PRESENT ILLNESS:       At night,  Maria Teresa Haji goes to bed around 11 pm on weekdays and on the weekends. She gets out of bed at 7 am on weekdays and on the weekends.  She  Averages about 7-8 hrs of sleep on a good night and 5 hrs on a bad night. Pt has bad nights about 3 nights per month. She falls asleep within 30 minutes with the use of trazodone 50 mg. She rarely wakes up middle of the night.    She is  aware of snoring but denies gasping or choking in sleep.  She  denies any symptoms of restless legs syndrome such as an \"urge to move\"  She  legs in the evening or bedtime. She  denies any symptoms of narcolepsy such as sleep paralysis or cataplexy, or any symptoms to suggest parasomnias such as sleep walking or acting out of dreams.     Overall, she does finds her sleep refreshing.In terms of  excessive daytime sleepiness, she denies of sleepiness while  at work, while reading or watching TV or while driving.She does not take regular naps.She drinks about 3 caffeinated beverages per day.    Her other comorbid conditions include " anxiety, pulmonary nodule and GERD. Her current medications include omeprazole, meloxicam, trazodone and spironolactone.     REVIEW OF SYSTEMS:       Constitutional: Denies fevers, Denies weight changes  Eyes: Denies changes in vision, no eye pain  Ears/Nose/Throat/Mouth: Denies nasal congestion or sore throat   Cardiovascular: Denies chest pain or palpitations   Respiratory: Denies shortness of breath , Denies cough  Gastrointestinal/Hepatic: Denies abdominal pain, nausea, vomiting, diarrhea, constipation or GI bleeding   Genitourinary: Denies bladder dysfunction, dysuria or frequency  Musculoskeletal/Rheum: Denies  joint pain and swelling   Skin/Breast: Denies rash  Neurological: Denies headache, confusion, memory loss or focal weakness/parasthesias  Psychiatric: denies mood disorder     Comprehensive review of systems form is reviewed with the patient and is attached in the EMR.     PMH:  has a past medical history of Arthritis, Back pain, Blood transfusion without reported diagnosis, Bronchitis, Cancer (HCC), Chickenpox, Glaucoma, Hemorrhoids, Hives, Meningitis, Pneumonia, and Sinusitis. She also has no past medical history of Allergy.  MEDS:   Current Outpatient Medications:   •  omeprazole (PRILOSEC) 40 MG delayed-release capsule, Take 1 Cap by mouth as needed. Indications: Heartburn, Disp: 90 Cap, Rfl: 3  •  traZODone (DESYREL) 50 MG Tab, TAKE 1 TABLET BY MOUTH AT BEDTIME AS NEEDED, Disp: 90 Tab, Rfl: 1  •  Cyanocobalamin (B-12 PO), Take 1 Tab by mouth every day., Disp: , Rfl:   •  spironolactone (ALDACTONE) 50 MG Tab, Take 50 mg by mouth as needed (For swelling)., Disp: , Rfl:   •  therapeutic multivitamin-minerals (THERAGRAN-M) Tab, Take 1 Tab by mouth every day., Disp: , Rfl:   •  meloxicam (MOBIC) 7.5 MG Tab, Take 1 Tab by mouth every day., Disp: 30 Tab, Rfl: 6  •  acetaminophen (TYLENOL) 500 MG Tab, Take 1,000 mg by mouth every 6 hours as needed for Moderate Pain., Disp: , Rfl:   ALLERGIES: No Known  "Allergies  SURGHX:   Past Surgical History:   Procedure Laterality Date   • ABDOMINAL EXPLORATION     • APPENDECTOMY     • GYN SURGERY      removed ovary   • KNEE ARTHROPLASTY TOTAL Bilateral 2015, 2013   • LUMPECTOMY     • OTHER      breast lift, tummy tuck   • OTHER      removal of fallopian tube for a cyst per patient.   • OTHER ORTHOPEDIC SURGERY      b/l knee replacements 2014, 2016   • TONSILLECTOMY       SOCHX:  reports that she quit smoking about 27 years ago. She has a 32.00 pack-year smoking history. She has never used smokeless tobacco. She reports current alcohol use. She reports previous drug use. Drug: Inhaled.   FH:   Family History   Problem Relation Age of Onset   • Arthritis Mother    • Cancer Mother         skin   • Anemia Mother    • Hypertension Mother    • Obesity Mother    • Other Mother         ulcer   • Cancer Father         skin, prostate, esophageal   • Hypertension Father    • Obesity Father    • Arthritis Brother    • Cancer Maternal Aunt         breast]   • Sleep Apnea Neg Hx        Physical Exam:  Vitals/ General Appearance:   Weight/BMI: Body mass index is 37.04 kg/m².  /78 (BP Location: Left arm, Patient Position: Sitting, BP Cuff Size: Adult)   Pulse 71   Ht 1.689 m (5' 6.5\")   Wt 105.7 kg (233 lb)   Vitals:    12/18/20 0833   BP: 124/78   BP Location: Left arm   Patient Position: Sitting   BP Cuff Size: Adult   Pulse: 71   Weight: 105.7 kg (233 lb)   Height: 1.689 m (5' 6.5\")           Constitutional: Alert, no distress, well-groomed.  Skin: No rashes in visible areas.  Eye: Round. Conjunctiva clear, lids normal. No icterus.   ENMT: Lips pink without lesions, good dentition, moist mucous membranes. Phonation normal.  Neck: No masses, no thyromegaly. Moves freely without pain.  CV: Pulse as reported by patient  Respiratory: Unlabored respiratory effort, no cough or audible wheeze  Psych: Alert and oriented x3, normal affect and mood.   INVESTIGATIONS:       ASSESSMENT AND " PLAN     1. She  has symptoms of Obstructive Sleep Apnea (NIRALI). The pathophysiology of NIRALI and the increased risk of cardiovascular morbidity from untreated NIRALI is discussed in detail with the patient. We have discussed diagnostic options including in-laboratory, attended polysomnography and home sleep testing. We have also discussed treatment options including airway pressurization, reconstructive otolaryngologic surgery, dental appliances and weight management.       Subsequently,treatment options will be discussed based on the diagnostic study. Meanwhile, She is urged to avoid supine sleep, weight gain and alcoholic beverages since all of these can worsen NIRALI. She is cautioned against drowsy driving. If She feels sleepy while driving, She must pull over for a break/nap, rather than persist on the road, in the interest of She own safety and that of others on the road.    Plan  -  She  will be scheduled for an overnight PSG to assess sleep related  breathing disorder.    2.Chronic insomnia  - Stimulus control and sleep hygiene reinforced   - Reassess after treatment of NIRALI  - Ok to use trazodone but recommended drug holidays to reduce dependency and tolerance. It ti managed by PCP       3. Regarding treatment of other past medical problems and general health maintenance,  She is urged to follow up with PCP.       yes

## 2024-01-09 ENCOUNTER — PATIENT MESSAGE (OUTPATIENT)
Dept: HEALTH INFORMATION MANAGEMENT | Facility: OTHER | Age: 64
End: 2024-01-09

## 2024-02-21 ENCOUNTER — TELEPHONE (OUTPATIENT)
Dept: OPHTHALMOLOGY | Facility: MEDICAL CENTER | Age: 64
End: 2024-02-21
Payer: COMMERCIAL

## 2024-03-06 NOTE — PATIENT INSTRUCTIONS
Cervical Radiculopathy    Cervical radiculopathy means that a nerve in the neck (a cervical nerve) is pinched or bruised. This can happen because of an injury to the cervical spine (vertebrae) in the neck, or as a normal part of getting older. This can cause pain or loss of feeling (numbness) that runs from your neck all the way down to your arm and fingers. Often, this condition gets better with rest. Treatment may be needed if the condition does not get better.  What are the causes?  · A neck injury.  · A bulging disk in your spine.  · Muscle movements that you cannot control (muscle spasms).  · Tight muscles in your neck due to overuse.  · Arthritis.  · Breakdown in the bones and joints of the spine (spondylosis) due to getting older.  · Bone spurs that form near the nerves in the neck.  What are the signs or symptoms?  · Pain. The pain may:  ? Run from the neck to the arm and hand.  ? Be very bad or irritating.  ? Be worse when you move your neck.  · Loss of feeling or tingling in your arm or hand.  · Weakness in your arm or hand, in very bad cases.  How is this treated?  In many cases, treatment is not needed for this condition. With rest, the condition often gets better over time. If treatment is needed, options may include:  · Wearing a soft neck collar (cervical collar) for short periods of time, as told by your doctor.  · Doing exercises (physical therapy) to strengthen your neck muscles.  · Taking medicines.  · Having shots (injections) in your spine, in very bad cases.  · Having surgery. This may be needed if other treatments do not help. The type of surgery that is used depends on the cause of your condition.  Follow these instructions at home:  If you have a soft neck collar:  · Wear it as told by your doctor. Remove it only as told by your doctor.  · Ask your doctor if you can remove the collar for cleaning and bathing. If you are allowed to remove the collar for cleaning or bathing:  ? Follow  Date of Injury: 02/10/24  Initial Treatment Date: 02/21/24  Date Last Seen: 03/04/24  Mechanism of Onset: Other  Occupation:   Referring by: Get Mejia DC  Primary Care Physician: Jacqui Minor DO  Date informed consent signed:  02/21/24  ABN: N/A  I have reviewed the past medical history, family history, social history, medications and allergies listed in the medical record as obtained by my nursing staff and support staff and agree with their documentation.  Zunilda  reports that she quit smoking about 6 years ago. Her smoking use included cigarettes. She started smoking about 16 years ago. She has a 10.0 pack-year smoking history. She quit smokeless tobacco use about 7 years ago.    Zunilda is allergic to allergy.   Advance Directive Status: No  Visit Number of Current Episode: 7    Chief Complaint   Patient presents with    Office Visit    Follow-up    Numbness     Numbness is  2nd and 3rd digits of the left hand         Subjective: Zunilda is a 46 year old female who comes in today for evaluation and treatment of Upper Back pain, Numbness in the 2nd and 3rd digits of the left hand, a vicki horse feeling in the left arm and it feels like a nail in her left wrist. Patient rates her pain at a 0/10 with 10 being the worst. Patient complains of numbness in the 2nd and 3rd digits of the left hand.  Initial History:  Patient rates her pain today at 7/10 with 10 being the worst. Patient relates some irritation of constant pain that began on February 10, 2024 returning from [place].  She relates that she was dragging her suitcase across the airport and weighed 50 lb and she relates that she walks for like 3 blocks.  Patient relates that she woke up the next day in pain.  Patient complains of upper thoracic pain just below the vertebral prominence and reports numbness and tingling in the 2nd and 3rd digit of the left hand and relates it feels like a nail in her forearm and she points to the pronator muscles  instructions from your doctor about how to remove the collar safely.  ? Clean the collar by wiping it with mild soap and water and drying it completely.  ? Take out any removable pads in the collar every 1-2 days. Wash them by hand with soap and water. Let them air-dry completely before you put them back in the collar.  ? Check your skin under the collar for redness or sores. If you see any, tell your doctor.  Managing pain         · Take over-the-counter and prescription medicines only as told by your doctor.  · If told, put ice on the painful area.  ? If you have a soft neck collar, remove it as told by your doctor.  ? Put ice in a plastic bag.  ? Place a towel between your skin and the bag.  ? Leave the ice on for 20 minutes, 2-3 times a day.  · If using ice does not help, you can try using heat. Use the heat source that your doctor recommends, such as a moist heat pack or a heating pad.  ? Place a towel between your skin and the heat source.  ? Leave the heat on for 20-30 minutes.  ? Remove the heat if your skin turns bright red. This is very important if you are unable to feel pain, heat, or cold. You may have a greater risk of getting burned.  · You may try a gentle neck and shoulder rub (massage).  Activity  · Rest as needed.  · Return to your normal activities as told by your doctor. Ask your doctor what activities are safe for you.  · Do exercises as told by your doctor or physical therapist.  · Do not lift anything that is heavier than 10 lb (4.5 kg) until your doctor tells you that it is safe.  General instructions  · Use a flat pillow when you sleep.  · Do not drive while wearing a soft neck collar. If you do not have a soft neck collar, ask your doctor if it is safe to drive while your neck heals.  · Ask your doctor if the medicine prescribed to you requires you to avoid driving or using heavy machinery.  · Do not use any products that contain nicotine or tobacco, such as cigarettes, e-cigarettes, and  of left hand as well as what feels like a Charley horse in the lateral upper arm just above the elbow.  Patient reports pain with pain with straining to make a bowel movement.  She relates she has not coughed.  She denies any true loss of bowel or bladder control or any ominous signs.  She does report some stress incontinence.    Current Outpatient Medications   Medication Sig    ibuprofen (MOTRIN) 600 MG tablet Take 1 tablet by mouth every 6 hours as needed for Pain.    tiZANidine (ZANAFLEX) 2 MG tablet Take 1 tablet by mouth every 8 hours as needed for Muscle spasms.    fluticasone-salmeterol 500-50 MCG/ACT inhaler Inhale 1 puff into the lungs in the morning and 1 puff in the evening.    Dextromethorphan-guaiFENesin (Mucinex DM)  MG TABLET SR 12 HR Take 2 tablets by mouth in the morning and 2 tablets in the evening.    nicotine (NICOTROL) 10 MG inhaler Inhale 1 puff into the lungs as needed for Smoking cessation.    venlafaxine XR (EFFEXOR XR) 75 MG 24 hr capsule Take 1 capsule by mouth daily.    varenicline (CHANTIX) 0.5 MG tablet Days 1 to 3: 0.5 mg once daily. Days 4 to 7: 0.5 mg twice daily. Maintenance (day 8 and later): 1 mg twice daily    varenicline (CHANTIX) 1 MG tablet Take 1 tablet by mouth in the morning and 1 tablet in the evening.     No current facility-administered medications for this visit.       Objective:    The patient is a 48 year old female who is pleasant, coherent x3, ambulatory under her own power and does not appear to be in visible distress.  Spinal Segmental Joint Restriction: T4 and C6 is Body Left exhibited limited passive joint motion and segmental restriction with tenderness upon palpation.  Extraspinal Joint Limitations/Restrictions: N/A  Isometric Manual Resistance indicates N/A were tender under tension resulting in antagonist pain patterns.  Tissue Tone Changes: Soft tissue palpation revealed increased or decreased tone and tenderness in the following muscle groups: Left  Trapezius and Left Rhomboid  General Tenderness to Palpation is noted over N/A      NEUROLOGICAL ASSESSMENT on 02/21/24:   Muscle testing +5 bilaterally in the upper extremities.  Deep tendon reflexes +2 bilaterally in the upper extremities.    ORTHOPEDIC/NEUROLOGICAL TESTING on 02/21/24:  Neutral Foramin Compression Negative  Bernardo Compression Negative Bilateral  Vazquez's Negative Bilateral  Cervical Distraction Positive  Shoulder Depression Positive Left  Kemps Negative Bilateral  [place] Negative  Pb Negative Bilateral  Tinels Negative Bilateral  Phalens Negative Bilateral    Objective disability index score for this episode on 06/01/22 is 63% using the Functional rating index questionnaire.    Patient emotional screening, [employer]/[place] Pain Supplemental Questionnaire score was 3/40.      Relevant Diagnostic Imaging/Testing:  MRI Head W/WO Contrast  6/18/2009  IMPRESSION(S):  Negative MRI examination of the brain performed with and without  contrast administration      Assessment:  Patient complains of numbness in the 2nd and 3rd digits of the left hand. Patient previously complained of Upper Back pain, vicki horse feeling in the left arm and it feels like a nail in her left forearm pronators. Numbness in the 2nd and 3rd digits of the left hand that I believe is secondary to cervical radiculitis in the C6/7 distribution with a differential diagnosis of thoracic outlet syndrome. Segmental dysfunction in the cervical and thoracic regions.    1. Cervical radiculitis    2. Upper back pain    3. Segmental and somatic dysfunction of cervical region    4. Segmental and somatic dysfunction of thoracic region                      Complicating Factors/Comorbidities:  Previous melanoma.  She she had a previous disc bulge in the neck years ago.  No imaging is available for review.    Plan:  Risks and benefits to chiropractic today therapy to include fracture and stroke have been reviewed with this patient and she would like  chewing tobacco. These can delay healing. If you need help quitting, ask your doctor.  · Keep all follow-up visits as told by your doctor. This is important.  Contact a doctor if:  · Your condition does not get better with treatment.  Get help right away if:  · Your pain gets worse and is not helped with medicine.  · You lose feeling or feel weak in your hand, arm, face, or leg.  · You have a high fever.  · You have a stiff neck.  · You cannot control when you poop or pee (have incontinence).  · You have trouble with walking, balance, or talking.  Summary  · Cervical radiculopathy means that a nerve in the neck is pinched or bruised.  · A nerve can get pinched from a bulging disk, arthritis, an injury to the neck, or other causes.  · Symptoms include pain, tingling, or loss of feeling that goes from the neck into the arm or hand.  · Weakness in your arm or hand can happen in very bad cases.  · Treatment may include resting, wearing a soft neck collar, and doing exercises. You might need to take medicines for pain. In very bad cases, shots or surgery may be needed.  This information is not intended to replace advice given to you by your health care provider. Make sure you discuss any questions you have with your health care provider.  Document Released: 12/06/2012 Document Revised: 11/08/2019 Document Reviewed: 11/08/2019  Elsevier Patient Education © 2020 [employer] Inc[employer]       to proceed with treatment.  Patient was evaluated and treatment with Clarke distraction/decompression was applied to the Thoracic and Cervical spine at the levels listed in the objective. Activator adjusting instrument is utilized in the cervical spine at the levels listed in the objective. Treatment was well tolerated.    Cross friction soft tissue work to reduce muscle tension was applied to the Left Trapezius and Left Rhomboid musculature.      Proprioceptive nerve facilitated stretching to provide muscle elongation was applied to N/A.    Therapeutic Exercise/Rehab/Modalities performed today:  None      Patient Instruction/Education:  Cryotherapy may be utilized 15 minutes/hours needed to reduce pain and swelling.  The patient's home-based rehabilitation exercise plan was updated, and the patient was provided with e-mailed descriptions and demonstrations of new exercises including:    * Median Nerve Floss (3 sets of 10 reps, 2 times per day)  * Ulnar Nerve Floss (1 sets of 10 reps, 3 times per day)  * Radial Nerve Tensioner (3 sets of 10 reps, 2 times per day)    Patient was instructed if she should experience any discomfort performing the exercises/stretches she should stop and discontinue.    Goals of Care: Goal of care is to improve muscular and skeletal function and provide symptom relief.     Other treatment options were not discussed.     Patient rates her pain post treatment at a 0/10 with 10 being worst.    Patient is to return on 03/11/24 for continued care and treatment of her condition consistent with plan of care.    On 03/06/2024, Rosy GORDON CT scribed the services personally performed by Dr. Get Mejia.     Get GORDON, MUMTAZ state the documentation recorded by the scribe accurately and completely reflects the service(s) I personally performed and the decisions made by me.

## 2024-07-05 ENCOUNTER — APPOINTMENT (OUTPATIENT)
Dept: RADIOLOGY | Facility: MEDICAL CENTER | Age: 64
End: 2024-07-05
Attending: STUDENT IN AN ORGANIZED HEALTH CARE EDUCATION/TRAINING PROGRAM
Payer: MEDICARE

## 2024-07-05 DIAGNOSIS — Z12.31 VISIT FOR SCREENING MAMMOGRAM: ICD-10-CM

## 2024-07-05 PROCEDURE — 77063 BREAST TOMOSYNTHESIS BI: CPT

## 2024-07-17 ENCOUNTER — OFFICE VISIT (OUTPATIENT)
Dept: SLEEP MEDICINE | Facility: MEDICAL CENTER | Age: 64
End: 2024-07-17
Attending: PHYSICIAN ASSISTANT
Payer: MEDICARE

## 2024-07-17 VITALS
RESPIRATION RATE: 16 BRPM | HEART RATE: 62 BPM | BODY MASS INDEX: 35.2 KG/M2 | HEIGHT: 66 IN | SYSTOLIC BLOOD PRESSURE: 120 MMHG | OXYGEN SATURATION: 96 % | WEIGHT: 219 LBS | DIASTOLIC BLOOD PRESSURE: 72 MMHG

## 2024-07-17 DIAGNOSIS — G47.33 OSA (OBSTRUCTIVE SLEEP APNEA): ICD-10-CM

## 2024-07-17 DIAGNOSIS — R91.8 LUNG NODULE, MULTIPLE: ICD-10-CM

## 2024-07-17 PROCEDURE — 99213 OFFICE O/P EST LOW 20 MIN: CPT | Performed by: PHYSICIAN ASSISTANT

## 2024-07-17 PROCEDURE — 3078F DIAST BP <80 MM HG: CPT | Performed by: PHYSICIAN ASSISTANT

## 2024-07-17 PROCEDURE — 3074F SYST BP LT 130 MM HG: CPT | Performed by: PHYSICIAN ASSISTANT

## 2024-07-17 ASSESSMENT — ENCOUNTER SYMPTOMS
WHEEZING: 0
CHILLS: 0
SPUTUM PRODUCTION: 0
DIZZINESS: 0
SINUS PAIN: 0
TREMORS: 0
SHORTNESS OF BREATH: 0
HEADACHES: 0
FEVER: 0
PALPITATIONS: 0
WEIGHT LOSS: 0
COUGH: 0
HEARTBURN: 1
SORE THROAT: 0
INSOMNIA: 0
ORTHOPNEA: 0

## 2024-07-29 ENCOUNTER — OFFICE VISIT (OUTPATIENT)
Dept: SLEEP MEDICINE | Facility: MEDICAL CENTER | Age: 64
End: 2024-07-29
Attending: PHYSICIAN ASSISTANT
Payer: MEDICARE

## 2024-07-29 VITALS
OXYGEN SATURATION: 96 % | BODY MASS INDEX: 35.68 KG/M2 | DIASTOLIC BLOOD PRESSURE: 62 MMHG | SYSTOLIC BLOOD PRESSURE: 110 MMHG | HEIGHT: 66 IN | WEIGHT: 222 LBS | HEART RATE: 63 BPM | RESPIRATION RATE: 14 BRPM

## 2024-07-29 DIAGNOSIS — R91.8 PULMONARY NODULES: ICD-10-CM

## 2024-07-29 PROCEDURE — 99212 OFFICE O/P EST SF 10 MIN: CPT | Performed by: FAMILY MEDICINE

## 2024-07-30 ENCOUNTER — APPOINTMENT (RX ONLY)
Dept: URBAN - METROPOLITAN AREA CLINIC 15 | Facility: CLINIC | Age: 64
Setting detail: DERMATOLOGY
End: 2024-07-30

## 2024-07-30 DIAGNOSIS — D22 MELANOCYTIC NEVI: ICD-10-CM

## 2024-07-30 DIAGNOSIS — Z71.89 OTHER SPECIFIED COUNSELING: ICD-10-CM

## 2024-07-30 DIAGNOSIS — L81.4 OTHER MELANIN HYPERPIGMENTATION: ICD-10-CM

## 2024-07-30 DIAGNOSIS — D18.0 HEMANGIOMA: ICD-10-CM

## 2024-07-30 DIAGNOSIS — L82.1 OTHER SEBORRHEIC KERATOSIS: ICD-10-CM

## 2024-07-30 PROBLEM — D18.01 HEMANGIOMA OF SKIN AND SUBCUTANEOUS TISSUE: Status: ACTIVE | Noted: 2024-07-30

## 2024-07-30 PROBLEM — D22.71 MELANOCYTIC NEVI OF RIGHT LOWER LIMB, INCLUDING HIP: Status: ACTIVE | Noted: 2024-07-30

## 2024-07-30 PROCEDURE — 99203 OFFICE O/P NEW LOW 30 MIN: CPT

## 2024-07-30 PROCEDURE — ? SUNSCREEN RECOMMENDATIONS

## 2024-07-30 PROCEDURE — ? COUNSELING

## 2024-07-30 PROCEDURE — ? ADDITIONAL NOTES

## 2024-07-30 ASSESSMENT — LOCATION DETAILED DESCRIPTION DERM
LOCATION DETAILED: LEFT ANTERIOR DISTAL UPPER ARM
LOCATION DETAILED: RIGHT PROXIMAL CALF
LOCATION DETAILED: LEFT PROXIMAL DORSAL FOREARM
LOCATION DETAILED: RIGHT MEDIAL INFERIOR CHEST

## 2024-07-30 ASSESSMENT — LOCATION SIMPLE DESCRIPTION DERM
LOCATION SIMPLE: CHEST
LOCATION SIMPLE: LEFT UPPER ARM
LOCATION SIMPLE: LEFT FOREARM
LOCATION SIMPLE: RIGHT CALF

## 2024-07-30 ASSESSMENT — LOCATION ZONE DERM
LOCATION ZONE: LEG
LOCATION ZONE: ARM
LOCATION ZONE: TRUNK

## 2024-07-30 NOTE — PROCEDURE: ADDITIONAL NOTES
Detail Level: Detailed
Render Risk Assessment In Note?: no
Additional Notes: Discussed cosmetic destruction options with Kinjal Vital RN. \\nRecommend cetaphil rough and bumpy lotion

## 2024-08-12 ENCOUNTER — HOSPITAL ENCOUNTER (OUTPATIENT)
Dept: LAB | Facility: MEDICAL CENTER | Age: 64
End: 2024-08-12
Attending: INTERNAL MEDICINE
Payer: MEDICARE

## 2024-08-12 ENCOUNTER — HOSPITAL ENCOUNTER (OUTPATIENT)
Dept: RADIOLOGY | Facility: MEDICAL CENTER | Age: 64
End: 2024-08-12
Attending: FAMILY MEDICINE
Payer: MEDICARE

## 2024-08-12 DIAGNOSIS — R91.8 PULMONARY NODULES: ICD-10-CM

## 2024-08-12 LAB
ALT SERPL-CCNC: 27 U/L (ref 2–50)
APPEARANCE UR: CLEAR
AST SERPL-CCNC: 25 U/L (ref 12–45)
BACTERIA #/AREA URNS HPF: ABNORMAL /HPF
BASOPHILS # BLD AUTO: 0.1 % (ref 0–1.8)
BASOPHILS # BLD: 0.01 K/UL (ref 0–0.12)
BILIRUB UR QL STRIP.AUTO: NEGATIVE
COLOR UR: YELLOW
CREAT UR-MCNC: 43.43 MG/DL
CRP SERPL HS-MCNC: <0.3 MG/DL (ref 0–0.75)
EOSINOPHIL # BLD AUTO: 0.12 K/UL (ref 0–0.51)
EOSINOPHIL NFR BLD: 1.7 % (ref 0–6.9)
EPI CELLS #/AREA URNS HPF: ABNORMAL /HPF
ERYTHROCYTE [DISTWIDTH] IN BLOOD BY AUTOMATED COUNT: 40.9 FL (ref 35.9–50)
ERYTHROCYTE [SEDIMENTATION RATE] IN BLOOD BY WESTERGREN METHOD: 5 MM/HOUR (ref 0–25)
GLUCOSE UR STRIP.AUTO-MCNC: NEGATIVE MG/DL
HCT VFR BLD AUTO: 44.8 % (ref 37–47)
HGB BLD-MCNC: 15.6 G/DL (ref 12–16)
IMM GRANULOCYTES # BLD AUTO: 0.01 K/UL (ref 0–0.11)
IMM GRANULOCYTES NFR BLD AUTO: 0.1 % (ref 0–0.9)
KETONES UR STRIP.AUTO-MCNC: NEGATIVE MG/DL
LEUKOCYTE ESTERASE UR QL STRIP.AUTO: ABNORMAL
LYMPHOCYTES # BLD AUTO: 2.17 K/UL (ref 1–4.8)
LYMPHOCYTES NFR BLD: 31.5 % (ref 22–41)
MCH RBC QN AUTO: 33.5 PG (ref 27–33)
MCHC RBC AUTO-ENTMCNC: 34.8 G/DL (ref 32.2–35.5)
MCV RBC AUTO: 96.1 FL (ref 81.4–97.8)
MICRO URNS: ABNORMAL
MONOCYTES # BLD AUTO: 0.37 K/UL (ref 0–0.85)
MONOCYTES NFR BLD AUTO: 5.4 % (ref 0–13.4)
NEUTROPHILS # BLD AUTO: 4.21 K/UL (ref 1.82–7.42)
NEUTROPHILS NFR BLD: 61.2 % (ref 44–72)
NITRITE UR QL STRIP.AUTO: NEGATIVE
NRBC # BLD AUTO: 0 K/UL
NRBC BLD-RTO: 0 /100 WBC (ref 0–0.2)
PH UR STRIP.AUTO: 6.5 [PH] (ref 5–8)
PLATELET # BLD AUTO: 223 K/UL (ref 164–446)
PMV BLD AUTO: 8.7 FL (ref 9–12.9)
PROT UR QL STRIP: NEGATIVE MG/DL
PROT UR-MCNC: 4 MG/DL (ref 0–15)
PROT/CREAT UR: 92 MG/G (ref 10–107)
RBC # BLD AUTO: 4.66 M/UL (ref 4.2–5.4)
RBC # URNS HPF: ABNORMAL /HPF
RBC UR QL AUTO: NEGATIVE
SP GR UR STRIP.AUTO: <=1.005
WBC # BLD AUTO: 6.9 K/UL (ref 4.8–10.8)
WBC #/AREA URNS HPF: ABNORMAL /HPF

## 2024-08-12 PROCEDURE — 84450 TRANSFERASE (AST) (SGOT): CPT

## 2024-08-12 PROCEDURE — 87186 SC STD MICRODIL/AGAR DIL: CPT

## 2024-08-12 PROCEDURE — 87086 URINE CULTURE/COLONY COUNT: CPT

## 2024-08-12 PROCEDURE — 86038 ANTINUCLEAR ANTIBODIES: CPT

## 2024-08-12 PROCEDURE — 71250 CT THORAX DX C-: CPT

## 2024-08-12 PROCEDURE — 36415 COLL VENOUS BLD VENIPUNCTURE: CPT

## 2024-08-12 PROCEDURE — 82570 ASSAY OF URINE CREATININE: CPT

## 2024-08-12 PROCEDURE — 84460 ALANINE AMINO (ALT) (SGPT): CPT

## 2024-08-12 PROCEDURE — 86235 NUCLEAR ANTIGEN ANTIBODY: CPT | Mod: 91

## 2024-08-12 PROCEDURE — 86140 C-REACTIVE PROTEIN: CPT

## 2024-08-12 PROCEDURE — 84156 ASSAY OF PROTEIN URINE: CPT

## 2024-08-12 PROCEDURE — 82378 CARCINOEMBRYONIC ANTIGEN: CPT | Mod: GA

## 2024-08-12 PROCEDURE — 82565 ASSAY OF CREATININE: CPT

## 2024-08-12 PROCEDURE — 81001 URINALYSIS AUTO W/SCOPE: CPT

## 2024-08-12 PROCEDURE — 87077 CULTURE AEROBIC IDENTIFY: CPT

## 2024-08-12 PROCEDURE — 86256 FLUORESCENT ANTIBODY TITER: CPT

## 2024-08-12 PROCEDURE — 85652 RBC SED RATE AUTOMATED: CPT

## 2024-08-12 PROCEDURE — 86225 DNA ANTIBODY NATIVE: CPT

## 2024-08-12 PROCEDURE — 85025 COMPLETE CBC W/AUTO DIFF WBC: CPT

## 2024-08-13 LAB
CEA SERPL-MCNC: NORMAL NG/ML (ref 0–3)
CREAT SERPL-MCNC: 0.93 MG/DL (ref 0.5–1.4)
ENA SM IGG SER-ACNC: 3 AU/ML (ref 0–40)
ENA SS-A 60KD AB SER-ACNC: 0 AU/ML (ref 0–40)
ENA SS-A IGG SER QL: 4 AU/ML (ref 0–40)
ENA SS-B IGG SER IA-ACNC: 0 AU/ML (ref 0–40)
GFR SERPLBLD CREATININE-BSD FMLA CKD-EPI: 68 ML/MIN/1.73 M 2
NUCLEAR IGG SER QL IA: NORMAL
U1 SNRNP IGG SER QL: 3 UNITS (ref 0–19)

## 2024-08-14 LAB
DSDNA AB TITR SER CLIF: NORMAL {TITER}
DSDNA IGG TITR SER CLIF: NORMAL {TITER}

## 2024-09-05 ENCOUNTER — APPOINTMENT (OUTPATIENT)
Dept: MEDICAL GROUP | Facility: MEDICAL CENTER | Age: 64
End: 2024-09-05
Payer: COMMERCIAL

## 2024-09-10 ENCOUNTER — HOSPITAL ENCOUNTER (EMERGENCY)
Facility: MEDICAL CENTER | Age: 64
End: 2024-09-10
Payer: MEDICARE

## 2024-09-10 VITALS
HEART RATE: 63 BPM | SYSTOLIC BLOOD PRESSURE: 128 MMHG | WEIGHT: 216.71 LBS | DIASTOLIC BLOOD PRESSURE: 81 MMHG | TEMPERATURE: 97 F | RESPIRATION RATE: 18 BRPM | BODY MASS INDEX: 34.83 KG/M2 | HEIGHT: 66 IN | OXYGEN SATURATION: 94 %

## 2024-09-10 LAB
ALBUMIN SERPL BCP-MCNC: 4.5 G/DL (ref 3.2–4.9)
ALBUMIN/GLOB SERPL: 1.5 G/DL
ALP SERPL-CCNC: 68 U/L (ref 30–99)
ALT SERPL-CCNC: 25 U/L (ref 2–50)
ANION GAP SERPL CALC-SCNC: 12 MMOL/L (ref 7–16)
AST SERPL-CCNC: 20 U/L (ref 12–45)
BASOPHILS # BLD AUTO: 0.2 % (ref 0–1.8)
BASOPHILS # BLD: 0.01 K/UL (ref 0–0.12)
BILIRUB SERPL-MCNC: 0.4 MG/DL (ref 0.1–1.5)
BUN SERPL-MCNC: 16 MG/DL (ref 8–22)
CALCIUM ALBUM COR SERPL-MCNC: 10 MG/DL (ref 8.5–10.5)
CALCIUM SERPL-MCNC: 10.4 MG/DL (ref 8.4–10.2)
CHLORIDE SERPL-SCNC: 99 MMOL/L (ref 96–112)
CO2 SERPL-SCNC: 23 MMOL/L (ref 20–33)
CREAT SERPL-MCNC: 0.94 MG/DL (ref 0.5–1.4)
EKG IMPRESSION: NORMAL
EOSINOPHIL # BLD AUTO: 0.11 K/UL (ref 0–0.51)
EOSINOPHIL NFR BLD: 2 % (ref 0–6.9)
ERYTHROCYTE [DISTWIDTH] IN BLOOD BY AUTOMATED COUNT: 42.9 FL (ref 35.9–50)
GFR SERPLBLD CREATININE-BSD FMLA CKD-EPI: 67 ML/MIN/1.73 M 2
GLOBULIN SER CALC-MCNC: 3 G/DL (ref 1.9–3.5)
GLUCOSE SERPL-MCNC: 88 MG/DL (ref 65–99)
HCT VFR BLD AUTO: 44.2 % (ref 37–47)
HGB BLD-MCNC: 15.4 G/DL (ref 12–16)
IMM GRANULOCYTES # BLD AUTO: 0.01 K/UL (ref 0–0.11)
IMM GRANULOCYTES NFR BLD AUTO: 0.2 % (ref 0–0.9)
LYMPHOCYTES # BLD AUTO: 1.65 K/UL (ref 1–4.8)
LYMPHOCYTES NFR BLD: 30.2 % (ref 22–41)
MCH RBC QN AUTO: 34 PG (ref 27–33)
MCHC RBC AUTO-ENTMCNC: 34.8 G/DL (ref 32.2–35.5)
MCV RBC AUTO: 97.6 FL (ref 81.4–97.8)
MONOCYTES # BLD AUTO: 0.42 K/UL (ref 0–0.85)
MONOCYTES NFR BLD AUTO: 7.7 % (ref 0–13.4)
NEUTROPHILS # BLD AUTO: 3.26 K/UL (ref 1.82–7.42)
NEUTROPHILS NFR BLD: 59.7 % (ref 44–72)
NRBC # BLD AUTO: 0 K/UL
NRBC BLD-RTO: 0 /100 WBC (ref 0–0.2)
PLATELET # BLD AUTO: 240 K/UL (ref 164–446)
PMV BLD AUTO: 9.2 FL (ref 9–12.9)
POTASSIUM SERPL-SCNC: 4.5 MMOL/L (ref 3.6–5.5)
PROT SERPL-MCNC: 7.5 G/DL (ref 6–8.2)
RBC # BLD AUTO: 4.53 M/UL (ref 4.2–5.4)
SODIUM SERPL-SCNC: 134 MMOL/L (ref 135–145)
TROPONIN T SERPL-MCNC: 6 NG/L (ref 6–19)
WBC # BLD AUTO: 5.5 K/UL (ref 4.8–10.8)

## 2024-09-10 PROCEDURE — 85025 COMPLETE CBC W/AUTO DIFF WBC: CPT

## 2024-09-10 PROCEDURE — 93005 ELECTROCARDIOGRAM TRACING: CPT

## 2024-09-10 PROCEDURE — 302449 STATCHG TRIAGE ONLY (STATISTIC)

## 2024-09-10 PROCEDURE — 84484 ASSAY OF TROPONIN QUANT: CPT

## 2024-09-10 PROCEDURE — 80053 COMPREHEN METABOLIC PANEL: CPT

## 2024-09-10 ASSESSMENT — FIBROSIS 4 INDEX: FIB4 SCORE: 1.4

## 2024-09-10 NOTE — ED TRIAGE NOTES
"Chief Complaint   Patient presents with    Chest Pain     Pt was getting out of a car 1 hour PTA when she suddenly felt dizzy and vision went black but she did not pass out or fall. Then suddenly she developed cheat pain/heaviness that does not radiating to her neck, shoulder, back or left arm. Mild associated SOB. Still having dizziness when getting up from a sitting position. No cardiac history.   No recent illness.      Pt getting a brain MRI scan on Thursday for a lump on left occipital region.     /81   Pulse 63   Temp 36.1 °C (97 °F) (Temporal)   Resp 18   Ht 1.676 m (5' 6\")   Wt 98.3 kg (216 lb 11.4 oz)   LMP  (LMP Unknown)   SpO2 94%   BMI 34.98 kg/m²     "

## 2024-09-27 SDOH — ECONOMIC STABILITY: FOOD INSECURITY: WITHIN THE PAST 12 MONTHS, THE FOOD YOU BOUGHT JUST DIDN'T LAST AND YOU DIDN'T HAVE MONEY TO GET MORE.: NEVER TRUE

## 2024-09-27 SDOH — ECONOMIC STABILITY: TRANSPORTATION INSECURITY
IN THE PAST 12 MONTHS, HAS LACK OF RELIABLE TRANSPORTATION KEPT YOU FROM MEDICAL APPOINTMENTS, MEETINGS, WORK OR FROM GETTING THINGS NEEDED FOR DAILY LIVING?: NO

## 2024-09-27 SDOH — ECONOMIC STABILITY: TRANSPORTATION INSECURITY
IN THE PAST 12 MONTHS, HAS LACK OF TRANSPORTATION KEPT YOU FROM MEETINGS, WORK, OR FROM GETTING THINGS NEEDED FOR DAILY LIVING?: NO

## 2024-09-27 SDOH — ECONOMIC STABILITY: TRANSPORTATION INSECURITY
IN THE PAST 12 MONTHS, HAS THE LACK OF TRANSPORTATION KEPT YOU FROM MEDICAL APPOINTMENTS OR FROM GETTING MEDICATIONS?: NO

## 2024-09-27 SDOH — ECONOMIC STABILITY: FOOD INSECURITY: WITHIN THE PAST 12 MONTHS, YOU WORRIED THAT YOUR FOOD WOULD RUN OUT BEFORE YOU GOT MONEY TO BUY MORE.: NEVER TRUE

## 2024-09-27 SDOH — ECONOMIC STABILITY: INCOME INSECURITY: HOW HARD IS IT FOR YOU TO PAY FOR THE VERY BASICS LIKE FOOD, HOUSING, MEDICAL CARE, AND HEATING?: NOT HARD AT ALL

## 2024-09-27 SDOH — HEALTH STABILITY: PHYSICAL HEALTH: ON AVERAGE, HOW MANY MINUTES DO YOU ENGAGE IN EXERCISE AT THIS LEVEL?: 50 MIN

## 2024-09-27 SDOH — ECONOMIC STABILITY: INCOME INSECURITY: IN THE LAST 12 MONTHS, WAS THERE A TIME WHEN YOU WERE NOT ABLE TO PAY THE MORTGAGE OR RENT ON TIME?: NO

## 2024-09-27 SDOH — ECONOMIC STABILITY: HOUSING INSECURITY
IN THE LAST 12 MONTHS, WAS THERE A TIME WHEN YOU DID NOT HAVE A STEADY PLACE TO SLEEP OR SLEPT IN A SHELTER (INCLUDING NOW)?: NO

## 2024-09-27 SDOH — HEALTH STABILITY: PHYSICAL HEALTH: ON AVERAGE, HOW MANY DAYS PER WEEK DO YOU ENGAGE IN MODERATE TO STRENUOUS EXERCISE (LIKE A BRISK WALK)?: 3 DAYS

## 2024-09-27 SDOH — HEALTH STABILITY: MENTAL HEALTH
STRESS IS WHEN SOMEONE FEELS TENSE, NERVOUS, ANXIOUS, OR CAN'T SLEEP AT NIGHT BECAUSE THEIR MIND IS TROUBLED. HOW STRESSED ARE YOU?: TO SOME EXTENT

## 2024-09-27 ASSESSMENT — SOCIAL DETERMINANTS OF HEALTH (SDOH)
ARE YOU MARRIED, WIDOWED, DIVORCED, SEPARATED, NEVER MARRIED, OR LIVING WITH A PARTNER?: LIVING WITH PARTNER
HOW OFTEN DO YOU ATTEND CHURCH OR RELIGIOUS SERVICES?: NEVER
HOW OFTEN DO YOU ATTENT MEETINGS OF THE CLUB OR ORGANIZATION YOU BELONG TO?: 1 TO 4 TIMES PER YEAR
HOW OFTEN DO YOU GET TOGETHER WITH FRIENDS OR RELATIVES?: TWICE A WEEK
HOW OFTEN DO YOU ATTENT MEETINGS OF THE CLUB OR ORGANIZATION YOU BELONG TO?: 1 TO 4 TIMES PER YEAR
HOW HARD IS IT FOR YOU TO PAY FOR THE VERY BASICS LIKE FOOD, HOUSING, MEDICAL CARE, AND HEATING?: NOT HARD AT ALL
IN A TYPICAL WEEK, HOW MANY TIMES DO YOU TALK ON THE PHONE WITH FAMILY, FRIENDS, OR NEIGHBORS?: MORE THAN THREE TIMES A WEEK
HOW OFTEN DO YOU HAVE A DRINK CONTAINING ALCOHOL: MONTHLY OR LESS
DO YOU BELONG TO ANY CLUBS OR ORGANIZATIONS SUCH AS CHURCH GROUPS UNIONS, FRATERNAL OR ATHLETIC GROUPS, OR SCHOOL GROUPS?: YES
HOW OFTEN DO YOU GET TOGETHER WITH FRIENDS OR RELATIVES?: TWICE A WEEK
DO YOU BELONG TO ANY CLUBS OR ORGANIZATIONS SUCH AS CHURCH GROUPS UNIONS, FRATERNAL OR ATHLETIC GROUPS, OR SCHOOL GROUPS?: YES
ARE YOU MARRIED, WIDOWED, DIVORCED, SEPARATED, NEVER MARRIED, OR LIVING WITH A PARTNER?: LIVING WITH PARTNER
HOW MANY DRINKS CONTAINING ALCOHOL DO YOU HAVE ON A TYPICAL DAY WHEN YOU ARE DRINKING: 1 OR 2
IN THE PAST 12 MONTHS, HAS THE ELECTRIC, GAS, OIL, OR WATER COMPANY THREATENED TO SHUT OFF SERVICE IN YOUR HOME?: NO
HOW OFTEN DO YOU ATTEND CHURCH OR RELIGIOUS SERVICES?: NEVER
IN A TYPICAL WEEK, HOW MANY TIMES DO YOU TALK ON THE PHONE WITH FAMILY, FRIENDS, OR NEIGHBORS?: MORE THAN THREE TIMES A WEEK
WITHIN THE PAST 12 MONTHS, YOU WORRIED THAT YOUR FOOD WOULD RUN OUT BEFORE YOU GOT THE MONEY TO BUY MORE: NEVER TRUE
HOW OFTEN DO YOU HAVE SIX OR MORE DRINKS ON ONE OCCASION: NEVER

## 2024-09-27 ASSESSMENT — LIFESTYLE VARIABLES
SKIP TO QUESTIONS 9-10: 1
HOW MANY STANDARD DRINKS CONTAINING ALCOHOL DO YOU HAVE ON A TYPICAL DAY: 1 OR 2
HOW OFTEN DO YOU HAVE SIX OR MORE DRINKS ON ONE OCCASION: NEVER
HOW OFTEN DO YOU HAVE A DRINK CONTAINING ALCOHOL: MONTHLY OR LESS
AUDIT-C TOTAL SCORE: 1

## 2024-09-30 ENCOUNTER — OFFICE VISIT (OUTPATIENT)
Dept: MEDICAL GROUP | Facility: MEDICAL CENTER | Age: 64
End: 2024-09-30
Payer: MEDICARE

## 2024-09-30 VITALS
WEIGHT: 213.19 LBS | TEMPERATURE: 97.3 F | BODY MASS INDEX: 34.26 KG/M2 | HEART RATE: 70 BPM | OXYGEN SATURATION: 96 % | HEIGHT: 66 IN | SYSTOLIC BLOOD PRESSURE: 98 MMHG | DIASTOLIC BLOOD PRESSURE: 64 MMHG | RESPIRATION RATE: 16 BRPM

## 2024-09-30 DIAGNOSIS — K21.9 GASTROESOPHAGEAL REFLUX DISEASE WITHOUT ESOPHAGITIS: ICD-10-CM

## 2024-09-30 DIAGNOSIS — Z23 NEED FOR VACCINATION: ICD-10-CM

## 2024-09-30 DIAGNOSIS — E55.9 VITAMIN D DEFICIENCY: ICD-10-CM

## 2024-09-30 DIAGNOSIS — B02.29 NEURALGIA, POSTHERPETIC: ICD-10-CM

## 2024-09-30 DIAGNOSIS — M54.2 NECK PAIN: ICD-10-CM

## 2024-09-30 DIAGNOSIS — E78.5 HYPERLIPIDEMIA, UNSPECIFIED HYPERLIPIDEMIA TYPE: ICD-10-CM

## 2024-09-30 DIAGNOSIS — F41.9 ANXIETY: ICD-10-CM

## 2024-09-30 DIAGNOSIS — M45.6 ANKYLOSING SPONDYLITIS OF LUMBAR REGION (HCC): ICD-10-CM

## 2024-09-30 DIAGNOSIS — Z96.659 HISTORY OF KNEE REPLACEMENT, UNSPECIFIED LATERALITY: ICD-10-CM

## 2024-09-30 DIAGNOSIS — S83.412S: ICD-10-CM

## 2024-09-30 DIAGNOSIS — G47.09 OTHER INSOMNIA: ICD-10-CM

## 2024-09-30 DIAGNOSIS — R73.03 PREDIABETES: ICD-10-CM

## 2024-09-30 RX ORDER — TRAZODONE HYDROCHLORIDE 50 MG/1
50 TABLET, FILM COATED ORAL
Qty: 90 TABLET | Refills: 1 | Status: SHIPPED | OUTPATIENT
Start: 2024-09-30

## 2024-09-30 RX ORDER — VALACYCLOVIR HYDROCHLORIDE 1 G/1
1000 TABLET, FILM COATED ORAL
COMMUNITY
Start: 2024-07-31

## 2024-09-30 RX ORDER — FLUOXETINE 10 MG/1
15 CAPSULE ORAL DAILY
COMMUNITY
End: 2024-09-30

## 2024-09-30 RX ORDER — GABAPENTIN 100 MG/1
100 CAPSULE ORAL 3 TIMES DAILY
Qty: 90 CAPSULE | Refills: 1 | Status: SHIPPED | OUTPATIENT
Start: 2024-09-30

## 2024-09-30 RX ORDER — BACLOFEN 10 MG/1
TABLET ORAL
COMMUNITY

## 2024-09-30 RX ORDER — OMEPRAZOLE 40 MG/1
40 CAPSULE, DELAYED RELEASE ORAL DAILY
Qty: 90 CAPSULE | Refills: 1 | Status: SHIPPED | OUTPATIENT
Start: 2024-09-30

## 2024-09-30 RX ORDER — MELOXICAM 15 MG/1
15 TABLET ORAL
COMMUNITY
End: 2024-09-30 | Stop reason: SDUPTHER

## 2024-09-30 RX ORDER — MELOXICAM 15 MG/1
15 TABLET ORAL
Qty: 90 TABLET | Refills: 2 | Status: SHIPPED | OUTPATIENT
Start: 2024-09-30

## 2024-09-30 RX ORDER — BUPROPION HYDROCHLORIDE 150 MG/1
150 TABLET ORAL EVERY MORNING
Qty: 90 TABLET | Refills: 1 | Status: SHIPPED | OUTPATIENT
Start: 2024-09-30

## 2024-09-30 RX ORDER — TRIAMCINOLONE ACETONIDE 0.25 MG/G
CREAM TOPICAL
COMMUNITY

## 2024-09-30 ASSESSMENT — FIBROSIS 4 INDEX: FIB4 SCORE: 1.083333333333333333

## 2024-09-30 ASSESSMENT — PATIENT HEALTH QUESTIONNAIRE - PHQ9
5. POOR APPETITE OR OVEREATING: 0 - NOT AT ALL
SUM OF ALL RESPONSES TO PHQ QUESTIONS 1-9: 6
CLINICAL INTERPRETATION OF PHQ2 SCORE: 3

## 2024-09-30 ASSESSMENT — ENCOUNTER SYMPTOMS
NERVOUS/ANXIOUS: 1
DEPRESSION: 0

## 2024-09-30 NOTE — LETTER
[facility]  Lindsay Medina M.D.  [address]  [place] [address]  Fax: [phone]   Authorization for Release/Disclosure of   Protected Health Information   Name: MARIA TERESA HAJI : 1960 SSN: [id number]   Address: [address] [place] [address]  [place] [address] Phone:    There are no phone numbers on file.   I authorize the entity listed below to release/disclose the PHI below to:   [facility]/Lindsay Medina M.D. and Lindsay Medina M.D.   Provider or Entity Name:     Address   City, State, Zip   Phone:      Fax:     Reason for request: continuity of care   Information to be released:    [  ] LAST COLONOSCOPY,  including any PATH REPORT and follow-up  [  ] LAST FIT/COLOGUARD RESULT [  ] LAST DEXA  [  ] LAST MAMMOGRAM  [  ] LAST PAP  [  ] LAST LABS [  ] RETINA EXAM REPORT  [  ] IMMUNIZATION RECORDS  [  ] Release all info      [  ] Check here and initial the line next to each item to release ALL health information INCLUDING  _____ Care and treatment for drug and / or alcohol abuse  _____ HIV testing, infection status, or AIDS  _____ Genetic Testing    DATES OF SERVICE OR TIME PERIOD TO BE DISCLOSED: _____________  I understand and acknowledge that:  * This Authorization may be revoked at any time by you in writing, except if your health information has already been used or disclosed.  * Your health information that will be used or disclosed as a result of you signing this authorization could be re-disclosed by the recipient. If this occurs, your re-disclosed health information may no longer be protected by State or Federal laws.  * You may refuse to sign this Authorization. Your refusal will not affect your ability to obtain treatment.  * This Authorization becomes effective upon signing and will  on (date) __________.      If no date is indicated, this Authorization will  one (1) year from the signature date.    Name: Maria Teresa Haji  Signature:  Date:   9/30/2024     PLEASE FAX REQUESTED RECORDS BACK TO: [phone]

## 2024-09-30 NOTE — PROGRESS NOTES
CC:   Chief Complaint   Patient presents with    Annual Exam     Medication Refill - Trazodone, Prozac, Gabapentin, Omeprazol       Diagnoses of Ankylosing spondylitis of lumbar region (HCC), Neuralgia, postherpetic, Neck pain, History of knee replacement, unspecified laterality, Gastroesophageal reflux disease without esophagitis, Other insomnia, Complete tear of medial collateral ligament of left knee, sequela, Need for vaccination, Anxiety, Prediabetes, Hyperlipidemia, unspecified hyperlipidemia type, and Vitamin D deficiency were pertinent to this visit.  Verbal consent was acquired by the patient to use Indexing ambient listening note generation during this visit Yes     History of Present Illness  Maria Teresa is a pleasant 64-year-old female presenting today to [facility]. She was last seen by me in 2021.    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 After a series of MRIs, she was diagnosed with ankylosing spondylitis. Dr. Stallings also conducted a rheumatologic workup tests, revealing a small marker for lupus, which he plans to monitor with further blood tests. She visits him every 6 to 9 months. She had a negative lupus test years ago, but recent tests have shown weakly positive results.    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 She experiences similar pain now as she did during her cancer treatment, but no recurrence has been detected. However, she has some occipital nerve issues and is scheduled for a nerve block. She is currently taking gabapentin 100 mg three times daily for nerve pain, particularly in the area where she had shingles. She needs a refill of this medication.    She also takes meloxicam 7.5 mg for a torn MCL, which is improving. She uses omeprazole intermittently for stomach issues, taking it for  "two weeks at a time before trying to discontinue it. She takes trazodone 50 mg daily for sleep and baclofen as needed.    She sees Dr. Swift for a small lung nodule. Her last Pap smear was a year ago and was normal. She wishes to have her vitamin D and A1c levels checked. Her last colonoscopy was four years ago, and she has them every five years.     She uses a CPAP machine for NIRALI.     She recently had cataract surgery, resulting in 20/20 vision.    She reports consistently hoarse throat. She takes Mucinex for this, which sometimes helps. She drinks a lot of water to stay hydrated and often feels thirsty. She does not feel any postnasal drip, but after eating, she finds it difficult to talk for about half an hour. She was previously put on omeprazole for reflux, which improved with weight loss, but the symptoms have returned, so she plans to resume the medication.    She has been dealing with family issues, including caring for her mother who has dementia. She also lost a close friend to cancer a year ago and another friend is dying from chondrosarcoma. Despite these challenges, she does not feel depressed and enjoys her hobbies. However, she experiences significant anxiety, which she describes as overwhelming. She is currently taking Prozac, which she feels numbs her emotions. She is interested in exploring other medication options for her anxiety.    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  Dx w/biopsy.  Excised x2 [facility] in [place], [place]  Since saw oncology, neg PET scan approx 2013. All studies returned negative after excision, and no f/u was indicated.  No radiation/chemo. Just excised.     Anxiety 8/15/2019    Arthritis     Back pain     Blood transfusion without reported diagnosis     Bronchitis     Cancer (HCC)     posterior scalp pt states \"adenoid cystic carcinoma\"    Chickenpox " "    GERD (gastroesophageal reflux disease)     Glaucoma     Heartburn     Hemorrhoids     Herpes zoster with complication 10/21/2020    Hives     Hoarseness, persistent     Meningitis     Obesity     Osteoporosis     Painful joint     Pneumonia     Ringing in ears     Sinusitis     Sleep apnea     Tonsillitis        Current Outpatient Medications Ordered in Epic   Medication Sig Dispense Refill    valacyclovir (VALTREX) 1 GM Tab Take 1,000 mg by mouth every day.      buPROPion (WELLBUTRIN XL) 150 MG XL tablet Take 1 Tablet by mouth every morning. 90 Tablet 1    gabapentin (NEURONTIN) 100 MG Cap Take 1 Capsule by mouth 3 times a day. 90 Capsule 1    omeprazole (PRILOSEC) 40 MG delayed-release capsule Take 1 Capsule by mouth every day. Indications: Heartburn 90 Capsule 1    traZODone (DESYREL) 50 MG Tab Take 1 Tablet by mouth at bedtime. 90 Tablet 1    meloxicam (MOBIC) 15 MG tablet Take 1 Tablet by mouth every day. 90 Tablet 2    baclofen (LIORESAL) 10 MG Tab TAKE 1 TABLET BY MOUTH 3 TIMES A DAY FOR 7 DAYS      triamcinolone acetonide (KENALOG) 0.025 % Cream APPLY TOPICALLY TO THE AFFECTED AREA OF FACE UP TO 4 DAYS A WEEK EVERY DAY AS NEEDED FOR RASH       No current Epic-ordered facility-administered medications on file.     Review of Systems   Psychiatric/Behavioral:  Negative for depression. The patient is nervous/anxious.    All other systems reviewed and are negative.    Objective:     Exam:  BP 98/64 (BP Location: Left arm, Patient Position: Sitting, BP Cuff Size: Large adult)   Pulse 70   Temp 36.3 °C (97.3 °F) (Temporal)   Resp 16   Ht 1.676 m (5' 6\")   Wt 96.7 kg (213 lb 3 oz)   LMP  (LMP Unknown)   SpO2 96%   BMI 34.41 kg/m²  Body mass index is 34.41 kg/m².    Physical Exam  Constitutional:       General: She is not in acute distress.     Appearance: Normal appearance. She is not toxic-appearing.   Cardiovascular:      Rate and Rhythm: Normal rate and regular rhythm.      Pulses: Normal pulses.      " Heart sounds: Normal heart sounds. No murmur heard.  Pulmonary:      Effort: Pulmonary effort is normal. No respiratory distress.      Breath sounds: Normal breath sounds.   Musculoskeletal:      Right lower leg: No edema.      Left lower leg: No edema.   Neurological:      General: No focal deficit present.      Mental Status: She is alert and oriented to person, place, and time.   Psychiatric:         Mood and Affect: Mood normal.         Behavior: Behavior normal.         Thought Content: Thought content normal.       Assessment & Plan:   Maria Teresa  is a pleasant 64 y.o. female with the following -     Assessment & Plan  1. Anxiety.  She is currently taking Prozac but finds it numbing and would like to try something different. Wellbutrin 150 mg daily will be started, and Prozac will be discontinued. If Wellbutrin does not improve symptoms, other medications will be considered.    2. Ankylosing Spondylitis.  This is a chronic problem. She is currently under the care of Dr. Stallings and has been monitored regularly with serial blood tests.    3. Positive Lupus Antibodies.  She was noted to have a weakly positive lupus antibody marker. Currently, no treatment is required, and she is being monitored by rheumatology with regular blood tests every 6 to 9 months.    4. Gastroesophageal Reflux Disease (GERD).  This is a chronic problem. She has a prescription for omeprazole but has not been taking it regularly. She reports discomfort and mucus buildup. Advised to resume omeprazole daily for 2-3 weeks and then as needed.    5. History of Adenoid Cystic Carcinoma.  There is no recurrence. She experiences occipital nerve pain and is awaiting a nerve block procedure.    6. MCL Injury.  This is a chronic problem. She is currently taking meloxicam 7.5 mg daily to manage pain and inflammation. Continue the current regimen as needed.    7. Nerve Pain.  She is taking gabapentin 100 mg three times daily for nerve pain.      Follow-up  Patient will return in 4 to 5 weeks for follow-up.    Problem List Items Addressed This Visit       Ankylosing spondylitis of lumbar region (HCC)    Relevant Medications    baclofen (LIORESAL) 10 MG Tab    meloxicam (MOBIC) 15 MG tablet    Other Relevant Orders    CBC WITH DIFFERENTIAL    Comp Metabolic Panel    Anxiety    Relevant Medications    buPROPion (WELLBUTRIN XL) 150 MG XL tablet    traZODone (DESYREL) 50 MG Tab    Other Relevant Orders    CBC WITH DIFFERENTIAL    Comp Metabolic Panel    Gastroesophageal reflux disease without esophagitis    Relevant Medications    omeprazole (PRILOSEC) 40 MG delayed-release capsule    Other Relevant Orders    CBC WITH DIFFERENTIAL    Comp Metabolic Panel    History of knee replacement    Relevant Orders    CBC WITH DIFFERENTIAL    Comp Metabolic Panel    Neuralgia, postherpetic    Relevant Medications    baclofen (LIORESAL) 10 MG Tab    buPROPion (WELLBUTRIN XL) 150 MG XL tablet    gabapentin (NEURONTIN) 100 MG Cap    traZODone (DESYREL) 50 MG Tab    Other Relevant Orders    CBC WITH DIFFERENTIAL    Comp Metabolic Panel    Other insomnia    Relevant Medications    traZODone (DESYREL) 50 MG Tab    Other Relevant Orders    CBC WITH DIFFERENTIAL    Comp Metabolic Panel    Prediabetes    Relevant Orders    CBC WITH DIFFERENTIAL    Comp Metabolic Panel    HEMOGLOBIN A1C     Other Visit Diagnoses       Neck pain        Relevant Orders    CBC WITH DIFFERENTIAL    Comp Metabolic Panel    Complete tear of medial collateral ligament of left knee, sequela        Relevant Orders    CBC WITH DIFFERENTIAL    Comp Metabolic Panel    Need for vaccination        Relevant Orders    INFLUENZA VACCINE, HIGH DOSE (65+ ONLY) (Completed)    CBC WITH DIFFERENTIAL    Comp Metabolic Panel    Hyperlipidemia, unspecified hyperlipidemia type        Relevant Orders    Lipid Profile    CBC WITH DIFFERENTIAL    Comp Metabolic Panel    TSH WITH REFLEX TO FT4    Vitamin D deficiency         Relevant Orders    CBC WITH DIFFERENTIAL    Comp Metabolic Panel    VITAMIN D,25 HYDROXY (DEFICIENCY)          Return in about 4 weeks (around 10/28/2024), or if symptoms worsen or fail to improve.    Please note that this dictation was created using voice recognition software. I have made every reasonable attempt to correct obvious errors, but I expect that there are errors of grammar and possibly content that I did not discover before finalizing the note.

## 2024-09-30 NOTE — LETTER
[facility] [facility]  Lindsay Medina M.D.  [address]  [place] [address]  Fax: [phone]   Authorization for Release/Disclosure of   Protected Health Information   Name: MARIA TERESA BREWER : 1960 SSN: [id number]   Address: [address] [place] [address]  [place] [address] Phone:    There are no phone numbers on file.   I authorize the entity listed below to release/disclose the PHI below to:   Formerly [facility]/Lindsay Medina M.D. and Lindsay Medina M.D.   Provider or Entity Name:  [facility]    Address   City, State, Zip   Phone:      Fax:     Reason for request: continuity of care   Information to be released:    [  ] LAST CxOLONOSCOPY,  including any PATH REPORT and follow-up  [  ] LAST FIT/COLOGUARD RESULT [  ] LAST DEXA  [  ] LAST MAMMOGRAM  [  ] LAST PAP  [  ] LAST LABS [  ] RETINA EXAM REPORT  [  ] IMMUNIZATION RECORDS  [x  ] Release all info      [  ] Check here and initial the line next to each item to release ALL health information INCLUDING  _____ Care and treatment for drug and / or alcohol abuse  _____ HIV testing, infection status, or AIDS  _____ Genetic Testing    DATES OF SERVICE OR TIME PERIOD TO BE DISCLOSED: _____________  I understand and acknowledge that:  * This Authorization may be revoked at any time by you in writing, except if your health information has already been used or disclosed.  * Your health information that will be used or disclosed as a result of you signing this authorization could be re-disclosed by the recipient. If this occurs, your re-disclosed health information may no longer be protected by State or Federal laws.  * You may refuse to sign this Authorization. Your refusal will not affect your ability to obtain treatment.  * This Authorization becomes effective upon signing and will  on (date) __________.      If no date is indicated, this Authorization will  one (1) year from the signature date.    Name: Maria Teresa Brantley  Adithya  Signature: Date:   9/30/2024     PLEASE FAX REQUESTED RECORDS BACK TO: [phone]

## 2024-10-01 ENCOUNTER — TELEPHONE (OUTPATIENT)
Dept: SLEEP MEDICINE | Facility: MEDICAL CENTER | Age: 64
End: 2024-10-01
Payer: MEDICARE

## 2024-10-16 ENCOUNTER — OFFICE VISIT (OUTPATIENT)
Dept: SLEEP MEDICINE | Facility: MEDICAL CENTER | Age: 64
End: 2024-10-16
Attending: FAMILY MEDICINE
Payer: MEDICARE

## 2024-10-16 VITALS
SYSTOLIC BLOOD PRESSURE: 116 MMHG | RESPIRATION RATE: 14 BRPM | DIASTOLIC BLOOD PRESSURE: 80 MMHG | WEIGHT: 215 LBS | BODY MASS INDEX: 34.55 KG/M2 | HEIGHT: 66 IN | HEART RATE: 76 BPM | OXYGEN SATURATION: 95 %

## 2024-10-16 DIAGNOSIS — L98.9 SCALP LESION: ICD-10-CM

## 2024-10-16 DIAGNOSIS — K76.0 FATTY LIVER: ICD-10-CM

## 2024-10-16 DIAGNOSIS — R91.8 PULMONARY NODULES: ICD-10-CM

## 2024-10-16 DIAGNOSIS — I71.9 AORTIC ANEURYSM WITHOUT RUPTURE, UNSPECIFIED PORTION OF AORTA (HCC): ICD-10-CM

## 2024-10-16 DIAGNOSIS — Z85.9: ICD-10-CM

## 2024-10-16 DIAGNOSIS — Z87.891 PERSONAL HISTORY OF TOBACCO USE, PRESENTING HAZARDS TO HEALTH: ICD-10-CM

## 2024-10-16 PROCEDURE — 99212 OFFICE O/P EST SF 10 MIN: CPT | Performed by: FAMILY MEDICINE

## 2024-10-16 PROCEDURE — 3079F DIAST BP 80-89 MM HG: CPT | Performed by: FAMILY MEDICINE

## 2024-10-16 PROCEDURE — 3074F SYST BP LT 130 MM HG: CPT | Performed by: FAMILY MEDICINE

## 2024-10-16 PROCEDURE — 99214 OFFICE O/P EST MOD 30 MIN: CPT | Performed by: FAMILY MEDICINE

## 2024-10-16 ASSESSMENT — FIBROSIS 4 INDEX: FIB4 SCORE: 1.083333333333333333

## 2024-10-17 ENCOUNTER — OFFICE VISIT (OUTPATIENT)
Dept: MEDICAL GROUP | Facility: MEDICAL CENTER | Age: 64
End: 2024-10-17
Payer: MEDICARE

## 2024-10-17 ENCOUNTER — HOSPITAL ENCOUNTER (OUTPATIENT)
Dept: LAB | Facility: MEDICAL CENTER | Age: 64
End: 2024-10-17
Attending: INTERNAL MEDICINE
Payer: MEDICARE

## 2024-10-17 VITALS
DIASTOLIC BLOOD PRESSURE: 70 MMHG | OXYGEN SATURATION: 97 % | HEART RATE: 80 BPM | HEIGHT: 66 IN | BODY MASS INDEX: 34.97 KG/M2 | WEIGHT: 217.6 LBS | SYSTOLIC BLOOD PRESSURE: 118 MMHG

## 2024-10-17 DIAGNOSIS — Z23 NEED FOR VACCINATION: ICD-10-CM

## 2024-10-17 DIAGNOSIS — G47.09 OTHER INSOMNIA: ICD-10-CM

## 2024-10-17 DIAGNOSIS — F41.9 ANXIETY: ICD-10-CM

## 2024-10-17 DIAGNOSIS — E78.5 HYPERLIPIDEMIA, UNSPECIFIED HYPERLIPIDEMIA TYPE: ICD-10-CM

## 2024-10-17 DIAGNOSIS — D64.9 ANEMIA, UNSPECIFIED TYPE: ICD-10-CM

## 2024-10-17 DIAGNOSIS — R73.03 PREDIABETES: ICD-10-CM

## 2024-10-17 DIAGNOSIS — Z96.659 HISTORY OF KNEE REPLACEMENT, UNSPECIFIED LATERALITY: ICD-10-CM

## 2024-10-17 DIAGNOSIS — M54.2 NECK PAIN: ICD-10-CM

## 2024-10-17 DIAGNOSIS — E55.9 VITAMIN D DEFICIENCY: ICD-10-CM

## 2024-10-17 DIAGNOSIS — Z85.9 HISTORY OF ADENOID CYSTIC CARCINOMA: ICD-10-CM

## 2024-10-17 DIAGNOSIS — B02.29 NEURALGIA, POSTHERPETIC: ICD-10-CM

## 2024-10-17 DIAGNOSIS — R22.0 LUMP OF SCALP: ICD-10-CM

## 2024-10-17 DIAGNOSIS — S83.412S: ICD-10-CM

## 2024-10-17 DIAGNOSIS — K21.9 GASTROESOPHAGEAL REFLUX DISEASE WITHOUT ESOPHAGITIS: ICD-10-CM

## 2024-10-17 DIAGNOSIS — M45.6 ANKYLOSING SPONDYLITIS OF LUMBAR REGION (HCC): ICD-10-CM

## 2024-10-17 LAB
25(OH)D3 SERPL-MCNC: 58 NG/ML (ref 30–100)
ALBUMIN SERPL BCP-MCNC: 4.2 G/DL (ref 3.2–4.9)
ALBUMIN/GLOB SERPL: 1.7 G/DL
ALP SERPL-CCNC: 73 U/L (ref 30–99)
ALT SERPL-CCNC: 21 U/L (ref 2–50)
ANION GAP SERPL CALC-SCNC: 9 MMOL/L (ref 7–16)
AST SERPL-CCNC: 18 U/L (ref 12–45)
BASOPHILS # BLD AUTO: 0.2 % (ref 0–1.8)
BASOPHILS # BLD: 0.01 K/UL (ref 0–0.12)
BILIRUB SERPL-MCNC: 0.2 MG/DL (ref 0.1–1.5)
BUN SERPL-MCNC: 21 MG/DL (ref 8–22)
CALCIUM ALBUM COR SERPL-MCNC: 9.4 MG/DL (ref 8.5–10.5)
CALCIUM SERPL-MCNC: 9.6 MG/DL (ref 8.4–10.2)
CHLORIDE SERPL-SCNC: 103 MMOL/L (ref 96–112)
CHOLEST SERPL-MCNC: 165 MG/DL (ref 100–199)
CO2 SERPL-SCNC: 25 MMOL/L (ref 20–33)
CREAT SERPL-MCNC: 0.99 MG/DL (ref 0.5–1.4)
EOSINOPHIL # BLD AUTO: 0.07 K/UL (ref 0–0.51)
EOSINOPHIL NFR BLD: 1.1 % (ref 0–6.9)
ERYTHROCYTE [DISTWIDTH] IN BLOOD BY AUTOMATED COUNT: 45.7 FL (ref 35.9–50)
EST. AVERAGE GLUCOSE BLD GHB EST-MCNC: 100 MG/DL
FASTING STATUS PATIENT QL REPORTED: NORMAL
GFR SERPLBLD CREATININE-BSD FMLA CKD-EPI: 63 ML/MIN/1.73 M 2
GLOBULIN SER CALC-MCNC: 2.5 G/DL (ref 1.9–3.5)
GLUCOSE SERPL-MCNC: 97 MG/DL (ref 65–99)
HBA1C MFR BLD: 5.1 % (ref 4–5.6)
HCT VFR BLD AUTO: 41 % (ref 37–47)
HDLC SERPL-MCNC: 34 MG/DL
HGB BLD-MCNC: 13.8 G/DL (ref 12–16)
IMM GRANULOCYTES # BLD AUTO: 0.1 K/UL (ref 0–0.11)
IMM GRANULOCYTES NFR BLD AUTO: 1.6 % (ref 0–0.9)
LDLC SERPL CALC-MCNC: 92 MG/DL
LYMPHOCYTES # BLD AUTO: 2.88 K/UL (ref 1–4.8)
LYMPHOCYTES NFR BLD: 46.8 % (ref 22–41)
MCH RBC QN AUTO: 33.3 PG (ref 27–33)
MCHC RBC AUTO-ENTMCNC: 33.7 G/DL (ref 32.2–35.5)
MCV RBC AUTO: 98.8 FL (ref 81.4–97.8)
MONOCYTES # BLD AUTO: 0.47 K/UL (ref 0–0.85)
MONOCYTES NFR BLD AUTO: 7.6 % (ref 0–13.4)
NEUTROPHILS # BLD AUTO: 2.63 K/UL (ref 1.82–7.42)
NEUTROPHILS NFR BLD: 42.7 % (ref 44–72)
NRBC # BLD AUTO: 0 K/UL
NRBC BLD-RTO: 0 /100 WBC (ref 0–0.2)
PLATELET # BLD AUTO: 202 K/UL (ref 164–446)
PMV BLD AUTO: 8.8 FL (ref 9–12.9)
POTASSIUM SERPL-SCNC: 3.9 MMOL/L (ref 3.6–5.5)
PROT SERPL-MCNC: 6.7 G/DL (ref 6–8.2)
RBC # BLD AUTO: 4.15 M/UL (ref 4.2–5.4)
SODIUM SERPL-SCNC: 137 MMOL/L (ref 135–145)
TRIGL SERPL-MCNC: 194 MG/DL (ref 0–149)
TSH SERPL DL<=0.005 MIU/L-ACNC: 4.66 UIU/ML (ref 0.38–5.33)
WBC # BLD AUTO: 6.2 K/UL (ref 4.8–10.8)

## 2024-10-17 PROCEDURE — 80053 COMPREHEN METABOLIC PANEL: CPT

## 2024-10-17 PROCEDURE — 80061 LIPID PANEL: CPT

## 2024-10-17 PROCEDURE — 85025 COMPLETE CBC W/AUTO DIFF WBC: CPT

## 2024-10-17 PROCEDURE — 3078F DIAST BP <80 MM HG: CPT | Performed by: INTERNAL MEDICINE

## 2024-10-17 PROCEDURE — 3074F SYST BP LT 130 MM HG: CPT | Performed by: INTERNAL MEDICINE

## 2024-10-17 PROCEDURE — 99214 OFFICE O/P EST MOD 30 MIN: CPT | Performed by: INTERNAL MEDICINE

## 2024-10-17 PROCEDURE — 83036 HEMOGLOBIN GLYCOSYLATED A1C: CPT | Mod: GA

## 2024-10-17 PROCEDURE — 36415 COLL VENOUS BLD VENIPUNCTURE: CPT

## 2024-10-17 PROCEDURE — 82306 VITAMIN D 25 HYDROXY: CPT

## 2024-10-17 PROCEDURE — 84443 ASSAY THYROID STIM HORMONE: CPT

## 2024-10-17 ASSESSMENT — FIBROSIS 4 INDEX: FIB4 SCORE: 1.26

## 2024-10-18 ENCOUNTER — APPOINTMENT (OUTPATIENT)
Dept: RADIOLOGY | Facility: MEDICAL CENTER | Age: 64
End: 2024-10-18
Attending: INTERNAL MEDICINE
Payer: MEDICARE

## 2024-10-18 DIAGNOSIS — R22.0 LUMP OF SCALP: ICD-10-CM

## 2024-10-18 PROCEDURE — 76536 US EXAM OF HEAD AND NECK: CPT

## 2024-10-21 DIAGNOSIS — D64.9 ANEMIA, UNSPECIFIED TYPE: ICD-10-CM

## 2024-10-21 DIAGNOSIS — D75.89 MACROCYTOSIS: ICD-10-CM

## 2024-10-22 ENCOUNTER — HOSPITAL ENCOUNTER (OUTPATIENT)
Dept: RADIOLOGY | Facility: MEDICAL CENTER | Age: 64
End: 2024-10-22
Attending: INTERNAL MEDICINE
Payer: MEDICARE

## 2024-10-22 DIAGNOSIS — R22.0 LUMP OF SCALP: ICD-10-CM

## 2024-10-22 PROCEDURE — 10005 FNA BX W/US GDN 1ST LES: CPT

## 2024-10-22 PROCEDURE — 88305 TISSUE EXAM BY PATHOLOGIST: CPT

## 2024-10-22 PROCEDURE — 88173 CYTOPATH EVAL FNA REPORT: CPT

## 2024-10-23 LAB — PATHOLOGY CONSULT NOTE: NORMAL

## 2024-10-24 ENCOUNTER — PATIENT MESSAGE (OUTPATIENT)
Dept: MEDICAL GROUP | Facility: MEDICAL CENTER | Age: 64
End: 2024-10-24
Payer: MEDICARE

## 2024-10-29 ENCOUNTER — HOSPITAL ENCOUNTER (OUTPATIENT)
Dept: HEMATOLOGY ONCOLOGY | Facility: MEDICAL CENTER | Age: 64
End: 2024-10-29
Attending: NURSE PRACTITIONER
Payer: MEDICARE

## 2024-10-29 VITALS
WEIGHT: 218.7 LBS | BODY MASS INDEX: 35.15 KG/M2 | SYSTOLIC BLOOD PRESSURE: 116 MMHG | HEIGHT: 66 IN | RESPIRATION RATE: 16 BRPM | HEART RATE: 67 BPM | DIASTOLIC BLOOD PRESSURE: 78 MMHG | TEMPERATURE: 97.6 F | OXYGEN SATURATION: 98 %

## 2024-10-29 DIAGNOSIS — L98.9 SCALP LESION: ICD-10-CM

## 2024-10-29 DIAGNOSIS — Z85.9 HISTORY OF ADENOID CYSTIC CARCINOMA: ICD-10-CM

## 2024-10-29 DIAGNOSIS — C76.0 MALIGNANT NEOPLASM OF HEAD, FACE AND NECK (HCC): ICD-10-CM

## 2024-10-29 DIAGNOSIS — C41.0 MALIGNANT NEOPLASM OF BONES OF SKULL AND FACE (HCC): ICD-10-CM

## 2024-10-29 PROCEDURE — 99204 OFFICE O/P NEW MOD 45 MIN: CPT | Performed by: NURSE PRACTITIONER

## 2024-10-29 PROCEDURE — 99212 OFFICE O/P EST SF 10 MIN: CPT | Performed by: NURSE PRACTITIONER

## 2024-10-29 ASSESSMENT — ENCOUNTER SYMPTOMS
BLURRED VISION: 0
DIZZINESS: 0
WEIGHT LOSS: 0
CHILLS: 0
COUGH: 0
PALPITATIONS: 0
DOUBLE VISION: 0
SHORTNESS OF BREATH: 0
DIARRHEA: 0
HEADACHES: 1
NERVOUS/ANXIOUS: 0
DIAPHORESIS: 0
VOMITING: 0
DEPRESSION: 0
MYALGIAS: 0
FEVER: 0
NAUSEA: 0
CONSTIPATION: 0

## 2024-10-29 ASSESSMENT — FIBROSIS 4 INDEX: FIB4 SCORE: 1.26

## 2024-10-29 ASSESSMENT — PAIN SCALES - GENERAL: PAINLEVEL: 5=MODERATE PAIN

## 2024-10-29 NOTE — PROGRESS NOTES
"Subjective     Maria Teresa Haji is a 64 y.o. female who presents with New Patient (IOC/adenoid cystic carcinoma/[place].)          HPI    Patient referred to me, Intake Oncology Coordinator by Dr. Yael Swift for scalp lesion.  Patient is accompanied by her daughter Rachel for today's visit.      Review of Systems   Constitutional:  Negative for chills, diaphoresis, fever, malaise/fatigue and weight loss.   Eyes:  Negative for blurred vision and double vision.   Respiratory:  Negative for cough and shortness of breath.    Cardiovascular:  Negative for chest pain and palpitations.   Gastrointestinal:  Negative for constipation, diarrhea, nausea and vomiting.   Genitourinary:  Negative for dysuria.   Musculoskeletal:  Negative for myalgias.   Neurological:  Positive for headaches (throbbing pain in the head). Negative for dizziness.   Psychiatric/Behavioral:  Negative for depression. The patient is not nervous/anxious.               Objective     /78 (BP Location: Right arm, Patient Position: Sitting, BP Cuff Size: Adult)   Pulse 67   Temp 36.4 °C (97.6 °F) (Temporal)   Resp 16   Ht 1.676 m (5' 5.98\")   Wt 99.2 kg (218 lb 11.1 oz)   LMP  (LMP Unknown)   SpO2 98%   BMI 35.32 kg/m²      Physical Exam  Vitals reviewed.   Constitutional:       General: She is not in acute distress.     Appearance: Normal appearance. She is not diaphoretic.   HENT:      Head: Normocephalic and atraumatic.      Comments: Pain in the back/occipital just left of center  Cardiovascular:      Rate and Rhythm: Normal rate and regular rhythm.      Heart sounds: Normal heart sounds. No murmur heard.     No friction rub. No gallop.   Pulmonary:      Effort: Pulmonary effort is normal. No respiratory distress.      Breath sounds: Normal breath sounds. No wheezing.   Abdominal:      General: Bowel sounds are normal. There is no distension.      Palpations: Abdomen is soft.      Tenderness: There is no abdominal tenderness. "   Musculoskeletal:         General: No swelling or tenderness. Normal range of motion.   Skin:     General: Skin is warm and dry.   Neurological:      Mental Status: She is alert and oriented to person, place, and time.   Psychiatric:         Mood and Affect: Mood normal.         Behavior: Behavior normal.             IMAGING  US-SOFT TISSUES OF HEAD - NECK    Result Date: 10/18/2024  10/18/2024 1:46 PM HISTORY/REASON FOR EXAM:  Mass/Lump; Lump, history of adenoid cystic carcinoma TECHNIQUE/EXAM DESCRIPTION: Ultrasound of the soft tissues of the head and neck. COMPARISON:  None FINDINGS: Focused ultrasound of the area of concern left occipital scalp  demonstrates 0.8 x 0.3 x 0.7 cm elliptically shaped nodule within the superficial soft tissues. Internal blood flow is demonstrated by color flow Doppler. Uncertain etiology.     0.3 x 0.8 cm solid nodule within the left occipital superficial soft tissues. Uncertain etiology. Malignancy not excluded.       PATHOLOGY  FINAL DIAGNOSIS:     A. Left posterior scalp mass fine needle aspiration:          Cystic basaloid neoplasm, see comment     Comment: The patient history of previous adenoid cystic carcinoma of   the head now presenting with a cystic scalp lesion is noted.  Cytologic   examination shows cystic debris and inflammation in the background of   scattered clusters of basaloid epithelium arranged in rounded nests.   Cell block was attempted for further characterization but was   acellular.  The differential diagnosis is broad and includes a variety   of diagnoses including recurrent adenoid cystic carcinoma, basal cell   carcinoma and benign basaloid epithelial lined cysts among others.   Core biopsy or excisional biopsy is recommended as these entities may   be challenging to differentiate on cytologic samples.                    Assessment & Plan     1. History of adenoid cystic carcinoma  CT-HEAD WITH    CT-SOFT TISSUE NECK WITH    [id number] BASE TO  MID-THIGH    Referral to Surgical Oncology      2. Scalp lesion  CT-HEAD WITH    CT-SOFT TISSUE NECK WITH    [id number] BASE TO MID-THIGH    Referral to Surgical Oncology      3. Malignant neoplasm of head, face and neck (HCC)  CT-HEAD WITH      4. 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)  CT-SOFT TISSUE NECK WITH                       "PRAPARE - Transportation     Lack of Transportation (Medical): No     Lack of Transportation (Non-Medical): No   Physical Activity: Sufficiently Active (9/27/2024)    Exercise Vital Sign     Days of Exercise per Week: 3 days     Minutes of Exercise per Session: 50 min   Stress: Stress Concern Present (9/27/2024)    [place] [employer] - Occupational Stress Questionnaire     Feeling of Stress : To some extent   Social Connections: Moderately Integrated (9/27/2024)    Social Connection and Isolation Panel [NHANES]     Frequency of Communication with Friends and Family: More than three times a week     Frequency of Social Gatherings with Friends and Family: Twice a week     Attends Worship Services: Never     Active Member of Clubs or Organizations: Yes     Attends Club or Organization Meetings: 1 to 4 times per year     Marital Status: Living with partner   Housing Stability: Low Risk  (9/27/2024)    Housing Stability Vital Sign     Unable to Pay for Housing in the Last Year: No     Number of Times Moved in the Last Year: 0     Homeless in the Last Year: No           Review of Systems   Constitutional:  Negative for chills, diaphoresis, fever, malaise/fatigue and weight loss.   Eyes:  Negative for blurred vision and double vision.   Respiratory:  Negative for cough and shortness of breath.    Cardiovascular:  Negative for chest pain and palpitations.   Gastrointestinal:  Negative for constipation, diarrhea, nausea and vomiting.   Genitourinary:  Negative for dysuria.   Musculoskeletal:  Negative for myalgias.   Neurological:  Positive for headaches (throbbing pain in the head). Negative for dizziness.   Psychiatric/Behavioral:  Negative for depression. The patient is not nervous/anxious.               Objective     /78 (BP Location: Right arm, Patient Position: Sitting, BP Cuff Size: Adult)   Pulse 67   Temp 36.4 °C (97.6 °F) (Temporal)   Resp 16   Ht 1.676 m (5' 5.98\")   Wt 99.2 kg " (218 lb 11.1 oz)   LMP  (LMP Unknown)   SpO2 98%   BMI 35.32 kg/m²      Physical Exam  Vitals reviewed.   Constitutional:       General: She is not in acute distress.     Appearance: Normal appearance. She is not diaphoretic.   HENT:      Head: Normocephalic and atraumatic.      Comments: Pain in the back/occipital just left of center  Cardiovascular:      Rate and Rhythm: Normal rate and regular rhythm.      Heart sounds: Normal heart sounds. No murmur heard.     No friction rub. No gallop.   Pulmonary:      Effort: Pulmonary effort is normal. No respiratory distress.      Breath sounds: Normal breath sounds. No wheezing.   Abdominal:      General: Bowel sounds are normal. There is no distension.      Palpations: Abdomen is soft.      Tenderness: There is no abdominal tenderness.   Musculoskeletal:         General: No swelling or tenderness. Normal range of motion.   Skin:     General: Skin is warm and dry.   Neurological:      Mental Status: She is alert and oriented to person, place, and time.   Psychiatric:         Mood and Affect: Mood normal.         Behavior: Behavior normal.             IMAGING  US-SOFT TISSUES OF HEAD - NECK    Result Date: 10/18/2024  10/18/2024 1:46 PM HISTORY/REASON FOR EXAM:  Mass/Lump; Lump, history of adenoid cystic carcinoma TECHNIQUE/EXAM DESCRIPTION: Ultrasound of the soft tissues of the head and neck. COMPARISON:  None FINDINGS: Focused ultrasound of the area of concern left occipital scalp  demonstrates 0.8 x 0.3 x 0.7 cm elliptically shaped nodule within the superficial soft tissues. Internal blood flow is demonstrated by color flow Doppler. Uncertain etiology.     0.3 x 0.8 cm solid nodule within the left occipital superficial soft tissues. Uncertain etiology. Malignancy not excluded.       PATHOLOGY  FINAL DIAGNOSIS:     A. Left posterior scalp mass fine needle aspiration:          Cystic basaloid neoplasm, see comment     Comment: The patient history of previous adenoid  cystic carcinoma of   the head now presenting with a cystic scalp lesion is noted.  Cytologic   examination shows cystic debris and inflammation in the background of   scattered clusters of basaloid epithelium arranged in rounded nests.   Cell block was attempted for further characterization but was   acellular.  The differential diagnosis is broad and includes a variety   of diagnoses including recurrent adenoid cystic carcinoma, basal cell   carcinoma and benign basaloid epithelial lined cysts among others.   Core biopsy or excisional biopsy is recommended as these entities may   be challenging to differentiate on cytologic samples.                    Assessment & Plan     1. History of adenoid cystic carcinoma  CT-HEAD WITH    CT-SOFT TISSUE NECK WITH    [id number] BASE TO MID-THIGH    Referral to Surgical Oncology      2. Scalp lesion  CT-HEAD WITH    CT-SOFT TISSUE NECK WITH    [id number] BASE TO MID-THIGH    Referral to Surgical Oncology      3. Malignant neoplasm of head, face and neck (HCC)  CT-HEAD WITH      4. 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  This was back in 2013 where she did undergo biopsy with dermatology followed by surgical resection with clear margins with surgeon.    New onset of lump in the back left occipital region was found on ultrasound and subsequently underwent FNA with cystic basaloid neoplasm with multiple differentials.  However with her significant medical history highly concerning for either recurrent or new disease.    Discussed the case in detail with the patient and her daughter today.  Spoke personally with surgical oncologist, Dr. Dietrich who has agreed to see the patient.  He is requesting a CT head as well as soft tissue neck as well as a PET/CT for further evaluation.  Referral has been placed to Dr. Dietrich, and patient  did verbalize understanding's and agreed with the plan.    CT scan scheduled for 10/30/2024.  PET/CT scheduled for 11/7/2024.      Please note that this dictation was created using voice recognition software. I have made every reasonable attempt to correct obvious errors, but I expect that there are errors of grammar and possibly content that I did not discover before finalizing the note.

## 2024-10-30 ENCOUNTER — HOSPITAL ENCOUNTER (OUTPATIENT)
Dept: RADIOLOGY | Facility: MEDICAL CENTER | Age: 64
End: 2024-10-30
Attending: NURSE PRACTITIONER
Payer: MEDICARE

## 2024-10-30 DIAGNOSIS — L98.9 SCALP LESION: ICD-10-CM

## 2024-10-30 DIAGNOSIS — C41.0 MALIGNANT NEOPLASM OF BONES OF SKULL AND FACE (HCC): ICD-10-CM

## 2024-10-30 DIAGNOSIS — Z85.9 HISTORY OF ADENOID CYSTIC CARCINOMA: ICD-10-CM

## 2024-10-30 DIAGNOSIS — C76.0 MALIGNANT NEOPLASM OF HEAD, FACE AND NECK (HCC): ICD-10-CM

## 2024-10-30 PROCEDURE — 70460 CT HEAD/BRAIN W/DYE: CPT

## 2024-10-30 PROCEDURE — 700117 HCHG RX CONTRAST REV CODE 255: Performed by: NURSE PRACTITIONER

## 2024-10-30 PROCEDURE — 70491 CT SOFT TISSUE NECK W/DYE: CPT

## 2024-10-30 RX ADMIN — IOHEXOL 80 ML: 350 INJECTION, SOLUTION INTRAVENOUS at 13:01

## 2024-11-04 ENCOUNTER — OFFICE VISIT (OUTPATIENT)
Dept: MEDICAL GROUP | Facility: MEDICAL CENTER | Age: 64
End: 2024-11-04
Payer: MEDICARE

## 2024-11-04 ENCOUNTER — HOSPITAL ENCOUNTER (OUTPATIENT)
Dept: LAB | Facility: MEDICAL CENTER | Age: 64
End: 2024-11-04
Attending: INTERNAL MEDICINE
Payer: MEDICARE

## 2024-11-04 VITALS
HEART RATE: 68 BPM | BODY MASS INDEX: 35.03 KG/M2 | OXYGEN SATURATION: 98 % | WEIGHT: 218 LBS | DIASTOLIC BLOOD PRESSURE: 70 MMHG | SYSTOLIC BLOOD PRESSURE: 112 MMHG | RESPIRATION RATE: 16 BRPM | HEIGHT: 66 IN

## 2024-11-04 DIAGNOSIS — D64.9 ANEMIA, UNSPECIFIED TYPE: ICD-10-CM

## 2024-11-04 DIAGNOSIS — D75.89 MACROCYTOSIS: ICD-10-CM

## 2024-11-04 DIAGNOSIS — R22.0 LUMP OF SCALP: ICD-10-CM

## 2024-11-04 LAB
BASOPHILS # BLD AUTO: 0.2 % (ref 0–1.8)
BASOPHILS # BLD: 0.01 K/UL (ref 0–0.12)
EOSINOPHIL # BLD AUTO: 0.16 K/UL (ref 0–0.51)
EOSINOPHIL NFR BLD: 2.8 % (ref 0–6.9)
ERYTHROCYTE [DISTWIDTH] IN BLOOD BY AUTOMATED COUNT: 43.6 FL (ref 35.9–50)
FERRITIN SERPL-MCNC: 245 NG/ML (ref 10–291)
FOLATE SERPL-MCNC: 33.5 NG/ML
HCT VFR BLD AUTO: 46 % (ref 37–47)
HGB BLD-MCNC: 15.7 G/DL (ref 12–16)
HGB RETIC QN AUTO: 39 PG/CELL (ref 29–35)
IMM GRANULOCYTES # BLD AUTO: 0.02 K/UL (ref 0–0.11)
IMM GRANULOCYTES NFR BLD AUTO: 0.3 % (ref 0–0.9)
IMM RETICS NFR: 7 % (ref 2.6–16.1)
IRON SATN MFR SERPL: 39 % (ref 15–55)
IRON SERPL-MCNC: 96 UG/DL (ref 40–170)
LYMPHOCYTES # BLD AUTO: 1.54 K/UL (ref 1–4.8)
LYMPHOCYTES NFR BLD: 26.7 % (ref 22–41)
MCH RBC QN AUTO: 33.5 PG (ref 27–33)
MCHC RBC AUTO-ENTMCNC: 34.1 G/DL (ref 32.2–35.5)
MCV RBC AUTO: 98.1 FL (ref 81.4–97.8)
MONOCYTES # BLD AUTO: 0.41 K/UL (ref 0–0.85)
MONOCYTES NFR BLD AUTO: 7.1 % (ref 0–13.4)
NEUTROPHILS # BLD AUTO: 3.62 K/UL (ref 1.82–7.42)
NEUTROPHILS NFR BLD: 62.9 % (ref 44–72)
NRBC # BLD AUTO: 0 K/UL
NRBC BLD-RTO: 0 /100 WBC (ref 0–0.2)
PLATELET # BLD AUTO: 217 K/UL (ref 164–446)
PMV BLD AUTO: 9 FL (ref 9–12.9)
RBC # BLD AUTO: 4.69 M/UL (ref 4.2–5.4)
RETICS # AUTO: 0.08 M/UL (ref 0.04–0.12)
RETICS/RBC NFR: 1.8 % (ref 0.8–2.6)
TIBC SERPL-MCNC: 249 UG/DL (ref 250–450)
UIBC SERPL-MCNC: 153 UG/DL (ref 110–370)
VIT B12 SERPL-MCNC: 1128 PG/ML (ref 211–911)
WBC # BLD AUTO: 5.8 K/UL (ref 4.8–10.8)

## 2024-11-04 PROCEDURE — 82525 ASSAY OF COPPER: CPT

## 2024-11-04 PROCEDURE — 85025 COMPLETE CBC W/AUTO DIFF WBC: CPT

## 2024-11-04 PROCEDURE — 82607 VITAMIN B-12: CPT

## 2024-11-04 PROCEDURE — 99214 OFFICE O/P EST MOD 30 MIN: CPT | Performed by: INTERNAL MEDICINE

## 2024-11-04 PROCEDURE — 82746 ASSAY OF FOLIC ACID SERUM: CPT

## 2024-11-04 PROCEDURE — 85046 RETICYTE/HGB CONCENTRATE: CPT

## 2024-11-04 PROCEDURE — 83550 IRON BINDING TEST: CPT

## 2024-11-04 PROCEDURE — 83540 ASSAY OF IRON: CPT

## 2024-11-04 PROCEDURE — 3074F SYST BP LT 130 MM HG: CPT | Performed by: INTERNAL MEDICINE

## 2024-11-04 PROCEDURE — 36415 COLL VENOUS BLD VENIPUNCTURE: CPT

## 2024-11-04 PROCEDURE — 3078F DIAST BP <80 MM HG: CPT | Performed by: INTERNAL MEDICINE

## 2024-11-04 PROCEDURE — 82728 ASSAY OF FERRITIN: CPT

## 2024-11-04 ASSESSMENT — FIBROSIS 4 INDEX: FIB4 SCORE: 1.26

## 2024-11-04 NOTE — PROGRESS NOTES
"CC:   Chief Complaint   Patient presents with    Results     CT scan and biopsy     Diagnoses of Lump of scalp and Anemia, unspecified type were pertinent to this visit.  Verbal consent was acquired by the patient to use [employer] ambient listening note generation during this visit Yes     History of Present Illness  Maria Teresa is a pleasant 64 y.o. female who presents today to discuss the following problems:       She underwent a biopsy and CT scan, and consulted with Dr. Fraire, an ear, nose, and throat specialist. Dr. Fraire recommended a comprehensive excision of the cyst, not just a partial removal. She is currently awaiting an appointment with Dr. Dietrich, a head and neck surgeon, for a second opinion.     She met with an oncology NP  last week who referred her to Dr. Dietrich. However, she was told by a medical assistant that they are still waiting for insurance approval. She plans to contact the intake nurse to expedite the process.    She has not yet completed her lab work. She reported that the cyst was not visible on the CT scan, but it is extremely painful to touch and the pain has worsened since the biopsy.     She has a PET scan scheduled for Thursday. She is considering whether to have the cyst removed or to undergo a wide excision for cancer, as recommended by Dr. Fraire. She wakes up in the morning with significant pain, which she attributes to the recent biopsy.     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  Dx w/biopsy.  Excised x2 [facility] in [place]  Since saw oncology, neg PET scan approx 2013. All studies returned negative after excision, and no f/u was indicated.  No radiation/chemo. Just excised.     Anxiety 08/15/2019    Arthritis     Back pain     Blood transfusion without reported diagnosis     Bronchitis     Cancer (HCC)     posterior scalp pt states \"adenoid " "cystic carcinoma\"    Chickenpox     GERD (gastroesophageal reflux disease)     Glaucoma     Heartburn     Hemorrhoids     Herpes zoster with complication 10/21/2020    Hives     Hoarseness, persistent     Meningitis     Obesity     Painful joint     Pneumonia     Ringing in ears     Sinusitis     Sleep apnea     Tonsillitis        Current Outpatient Medications Ordered in Epic   Medication Sig Dispense Refill    baclofen (LIORESAL) 10 MG Tab TAKE 1 TABLET BY MOUTH 3 TIMES A DAY FOR 7 DAYS      valacyclovir (VALTREX) 1 GM Tab Take 1,000 mg by mouth every day.      gabapentin (NEURONTIN) 100 MG Cap Take 1 Capsule by mouth 3 times a day. 90 Capsule 1    omeprazole (PRILOSEC) 40 MG delayed-release capsule Take 1 Capsule by mouth every day. Indications: Heartburn 90 Capsule 1    traZODone (DESYREL) 50 MG Tab Take 1 Tablet by mouth at bedtime. 90 Tablet 1    meloxicam (MOBIC) 15 MG tablet Take 1 Tablet by mouth every day. 90 Tablet 2     No current Epic-ordered facility-administered medications on file.     Review of Systems   All other systems reviewed and are negative.    Objective:     Exam:  /70 (BP Location: Left arm, Patient Position: Sitting, BP Cuff Size: Adult)   Pulse 68   Resp 16   Ht 1.676 m (5' 6\")   Wt 98.9 kg (218 lb)   LMP  (LMP Unknown)   SpO2 98%   BMI 35.19 kg/m²  Body mass index is 35.19 kg/m².    Physical Exam  Constitutional:       Appearance: Normal appearance.   HENT:      Head: Normocephalic and atraumatic.   Pulmonary:      Effort: Pulmonary effort is normal.   Neurological:      Mental Status: She is alert.   Psychiatric:         Mood and Affect: Mood normal.         Thought Content: Thought content normal.         Judgment: Judgment normal.         Results:  Reviewed results of CBC, CMP, A1c, TSH, T4 from 10/17/2024.     Imaging: Reviewed results of CT head with contrast, CT of the soft tissue of the neck with contrast from 10/30/2024    External notes: Reviewed oncology note from " 10/29/2024  Results    Cytologic   examination shows cystic debris and inflammation in the background of   scattered clusters of basaloid epithelium arranged in rounded nests.   Cell block was attempted for further characterization but was   acellular.  The differential diagnosis is broad and includes a variety   of diagnoses including recurrent adenoid cystic carcinoma, basal cell   carcinoma and benign basaloid epithelial lined cysts among others.   Core biopsy or excisional biopsy is recommended as these entities may   be challenging to differentiate on cytologic samples.     Assessment & Plan:   Maria Teresa  is a pleasant 64 y.o. female with the following -   Assessment & Plan  1. Cyst  of the scalp.  This is a new problem, uncertain prognosis and etiology.   the biopsy results were inconclusive. Given her history of cancer, a wide excision is considered. She is considering a second opinion from a surgical oncologist.     2. Thyroid abnormality.  The thyroid appears mildly heterogeneous without any nodules. The TSH levels are within a good normal range, and no further action is required at this time.    3. Anemia.  New problem, mild anemia.  The red blood cell count was slightly low, prompting additional blood tests.    Follow-up  Return in 6 weeks for follow up after surgery.    Problem List Items Addressed This Visit       Anemia    Lump of scalp       Return in about 5 weeks (around 12/9/2024), or if symptoms worsen or fail to improve, for after surgery .    Please note that this dictation was created using voice recognition software. I have made every reasonable attempt to correct obvious errors, but I expect that there are errors of grammar and possibly content that I did not discover before finalizing the note.

## 2024-11-04 NOTE — LETTER
[place]  Lindsay Medina M.D.  [address]  [place] [address]  Fax: [phone]   Authorization for Release/Disclosure of   Protected Health Information   Name: EDD BREWER : 1960 SSN: [id number]   Address: [address] [place] [address]  [place] [address] Phone:    There are no phone numbers on file.   I authorize the entity listed below to release/disclose the PHI below to:   [facility]/Lindsay Medina M.D. and Lindsay Medina M.D.   Provider or Entity Name:  [facility]    Address   City, [facility], [facility]   Phone:      Fax:     Reason for request: continuity of care   Information to be released:    [  ] LAST COLONOSCOPY,  including any PATH REPORT and follow-up  [  ] LAST FIT/COLOGUARD RESULT [  ] LAST DEXA  [  ] LAST MAMMOGRAM  [  ] LAST PAP  [  ] LAST LABS [  ] RETINA EXAM REPORT  [  ] IMMUNIZATION RECORDS  [ x ] Release all info      [  ] Check here and initial the line next to each item to release ALL health information INCLUDING  _____ Care and treatment for drug and / or alcohol abuse  _____ HIV testing, infection status, or AIDS  _____ Genetic Testing    DATES OF SERVICE OR TIME PERIOD TO BE DISCLOSED: _____________  I understand and acknowledge that:  * This Authorization may be revoked at any time by you in writing, except if your health information has already been used or disclosed.  * Your health information that will be used or disclosed as a result of you signing this authorization could be re-disclosed by the recipient. If this occurs, your re-disclosed health information may no longer be protected by State or Federal laws.  * You may refuse to sign this Authorization. Your refusal will not affect your ability to obtain treatment.  * This Authorization becomes effective upon signing and will  on (date) __________.      If no date is indicated, this Authorization will  one (1) year from the signature date.    Name:  Maria Teresa Haji  Signature: Date:   11/4/2024     PLEASE FAX REQUESTED RECORDS BACK TO: [phone]

## 2024-11-06 LAB — COPPER SERPL-MCNC: 93.4 UG/DL (ref 80–155)

## 2024-11-06 NOTE — PROGRESS NOTES
Subjective:   11/12/2024  6:11 AM  Primary care physician: Lindsay Medina M.D.  Referring Provider: TIESHA Choudhury     Chief Complaint:   Chief Complaint   Patient presents with   • New Patient     L POST SCALP MASS  HX ADENOID CYSTIC CARCINOMA  FNA BX: CYSTIC BASALOID NEOPLASM  CT 10/30: NO METS        Diagnosis:   1. Scalp lesion        2. History of adenoid cystic carcinoma        3. Abnormal finding on imaging            History of presenting illness:    Maria Teresa Haji is a pleasant 64 y.o. female with PMH of infiltrating carcinoma and subtype basal cell carcinoma. In 2013 she underwent a biopsy with dermatology followed by surgical resection with clear margins. She was recently referred to [facility] for new onset lump in the back left occipital region. US SOFT TISSUE on 10/18/24 noted 0.3 x 0.8 cm solid nodule within the left occipital superficial soft tissues. On 10/22/24 she completed US FNA BIOPSY of the left posterior scalp mass, pathology noted cystic basaloid neoplasm. CT SOFT TISSUE NECK on 10/30/24 noted no definite evidence of metastatic disease in the neck. CT HEAD on 10/30/24 noted no evidence of acute cerebral infarction, hemorrhage, mass lesion, or abnormal enhancement, postoperative changes of the scalp near the midline in the posterior parietal region, hyperostosis frontalis interna, and no evidence of metastatic disease to the brain parenchyma.    She is here today for surgical evaluation.  She was diagnosed more than 10 years ago with an appendectomy and a adenoid cystic carcinoma of the scalp.  She had this resected with wide margins which were negative at the time of resection.  She then developed a palpable lesion in the left posterior auricular area.  She did have an ultrasound which described a small soft tissue mass ultimately underwent FNA biopsy which showed a adenoid cystic neoplasm.  She had staging scans which  "did not show any evidence of disease anywhere else and this lesion itself on cross-sectional imaging is very difficult to see.  She is got pain over the area that does wax and wane but certainly got a little worse after biopsy.  She otherwise has no other lesions anywhere else.    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  Dx w/biopsy.  Excised x2 [facility] in [place]  Since saw oncology, neg PET scan approx 2013. All studies returned negative after excision, and no f/u was indicated.  No radiation/chemo. Just excised.    • Anxiety 08/15/2019   • Arthritis    • Back pain    • Blood transfusion without reported diagnosis    • Bronchitis    • Cancer (HCC)     posterior scalp pt states \"adenoid cystic carcinoma\"   • Cataract     IOL SX 09/2024   • Chickenpox    • GERD (gastroesophageal reflux disease)    • Heart murmur 1990   • Heartburn    • Hemorrhoids    • Herpes zoster with complication 10/21/2020   • Hives    • Hoarseness, persistent    • Meningitis    • Obesity    • Painful joint    • Pneumonia    • PONV (postoperative nausea and vomiting) 2013    I wake up from anesthesia with nausea.   • Ringing in ears    • Sinusitis    • Sleep apnea    • Tonsillitis      Past Surgical History:   Procedure Laterality Date   • ABDOMINAL EXPLORATION     • APPENDECTOMY     • ARTHROSCOPY, KNEE     • CHOLECYSTECTOMY     • GYN SURGERY      removed ovary   • KNEE ARTHROPLASTY TOTAL Bilateral 2015, 2013   • OTHER      breast lift, tummy tuck   • OTHER      removal of fallopian tube for a cyst per patient.   • OTHER ORTHOPEDIC SURGERY      b/l knee replacements 2014, 2016   • WA BREAST REDUCTION     • PRIMARY C SECTION     • TONSILLECTOMY       No Known Allergies  Outpatient Encounter Medications as of 11/12/2024   Medication Sig Dispense Refill   • baclofen (LIORESAL) 10 MG Tab Take 10 mg by mouth 3 times " a day as needed (back pain).       MEDICATION INSTRUCTIONS FOR SURGERY/PROCEDURE SCHEDULED FOR 12/10/24   OK TO CONTINUE TAKING PRIOR TO SURGERY AND DAY OF SURGERY     • valacyclovir (VALTREX) 1 GM Tab Take 1,000 mg by mouth every day.       MEDICATION INSTRUCTIONS FOR SURGERY/PROCEDURE SCHEDULED FOR 12/10/24   OK TO CONTINUE TAKING PRIOR TO SURGERY AND DAY OF SURGERY     • gabapentin (NEURONTIN) 100 MG Cap Take 1 Capsule by mouth 3 times a day. (Patient taking differently: Take 100 mg by mouth 3 times a day.     MEDICATION INSTRUCTIONS FOR SURGERY/PROCEDURE SCHEDULED FOR 12/10/24   OK TO CONTINUE TAKING PRIOR TO SURGERY AND DAY OF SURGERY) 90 Capsule 1   • omeprazole (PRILOSEC) 40 MG delayed-release capsule Take 1 Capsule by mouth every day. Indications: Heartburn (Patient taking differently: Take 40 mg by mouth every day.       MEDICATION INSTRUCTIONS FOR SURGERY/PROCEDURE SCHEDULED FOR 12/10/24   OK TO CONTINUE TAKING PRIOR TO SURGERY AND DAY OF SURGERY   Indications: Heartburn) 90 Capsule 1   • traZODone (DESYREL) 50 MG Tab Take 1 Tablet by mouth at bedtime. (Patient taking differently: Take 50 mg by mouth at bedtime.     MEDICATION INSTRUCTIONS FOR SURGERY/PROCEDURE SCHEDULED FOR 12/10/24   OK TO CONTINUE TAKING PRIOR TO SURGERY) 90 Tablet 1   • meloxicam (MOBIC) 15 MG tablet Take 1 Tablet by mouth every day. (Patient taking differently: Take 15 mg by mouth every day.     MEDICATION INSTRUCTIONS FOR SURGERY/PROCEDURE SCHEDULED FOR 12/10/24   DO NOT TAKE 5 DAYS PRIOR TO SURGERY) 90 Tablet 2     No facility-administered encounter medications on file as of 11/12/2024.     Social History     Socioeconomic History   • Marital status: Single     Spouse name: Not on file   • Number of children: Not on file   • Years of education: Not on file   • Highest education level: Master's degree (e.g., MA, MS, [place], MEd, MSW, [employer])   Occupational History   • Not on file   Tobacco Use   • Smoking status: Former     Current  packs/day: 0.00     Average packs/day: 2.0 packs/day for 16.0 years (32.0 ttl pk-yrs)     Types: Cigarettes     Start date: 1977     Quit date:      Years since quittin.9   • Smokeless tobacco: Never   Vaping Use   • Vaping status: Never Used   Substance and Sexual Activity   • Alcohol use: Not Currently     Comment: RARELY   • Drug use: Never   • Sexual activity: Yes     Partners: Male   Other Topics Concern   • Not on file   Social History Narrative    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     Social Drivers of Health     Financial Resource Strain: Low Risk  (2024)    Overall Financial Resource Strain (CARDIA)    • Difficulty of Paying Living Expenses: Not hard at all   Food Insecurity: No Food Insecurity (2024)    Hunger Vital Sign    • Worried About Running Out of Food in the Last Year: Never true    • Ran Out of Food in the Last Year: Never true   Transportation Needs: No Transportation Needs (2024)    PRAPARE - Transportation    • Lack of Transportation (Medical): No    • Lack of Transportation (Non-Medical): No   Physical Activity: Sufficiently Active (2024)    Exercise Vital Sign    • Days of Exercise per Week: 3 days    • Minutes of Exercise per Session: 50 min   Stress: Stress Concern Present (2024)    [place] [employer] - Occupational Stress Questionnaire    • Feeling of Stress : To some extent   Social Connections: Moderately Integrated (2024)    Social Connection and Isolation Panel [NHANES]    • Frequency of Communication with Friends and Family: More than three times a week    • Frequency of Social Gatherings with Friends and Family: Twice a week    • Attends Cheondoism Services: Never    • Active Member of Clubs or Organizations: Yes    • Attends Club or Organization Meetings: 1 to 4 times per year    • Marital Status: Living with partner   Intimate Partner Violence: Not on file   Housing Stability: Low Risk  (2024)     Housing Stability Vital Sign    • Unable to Pay for Housing in the Last Year: No    • Number of Times Moved in the Last Year: 0    • Homeless in the Last Year: No      Social History     Tobacco Use   Smoking Status Former   • Current packs/day: 0.00   • Average packs/day: 2.0 packs/day for 16.0 years (32.0 ttl pk-yrs)   • Types: Cigarettes   • Start date: 1977   • Quit date:    • Years since quittin.9   Smokeless Tobacco Never     Social History     Substance and Sexual Activity   Alcohol Use Not Currently    Comment: RARELY     Social History     Substance and Sexual Activity   Drug Use Never      Family History   Problem Relation Age of Onset   • Arthritis Mother    • Cancer Mother         skin   • Anemia Mother    • Hypertension Mother    • Obesity Mother    • Other Mother         ulcer   • Dementia Mother    • Hyperlipidemia Mother    • Cancer Father         skin, prostate, esophageal   • Hypertension Father    • Obesity Father    • Glaucoma Father    • Arthritis Brother    • Breast Cancer Maternal Aunt         breast]   • Sleep Apnea Neg Hx    • Lung Disease Neg Hx        Review of Systems   Constitutional:  Negative for chills, fever, malaise/fatigue and weight loss.   HENT:  Negative for congestion, ear discharge, ear pain, hearing loss and nosebleeds.    Eyes:  Negative for blurred vision, double vision, photophobia, pain and discharge.   Respiratory:  Negative for cough, hemoptysis and sputum production.    Cardiovascular:  Negative for chest pain, palpitations, orthopnea and claudication.   Gastrointestinal:  Negative for abdominal pain, constipation, diarrhea, heartburn, nausea and vomiting.   Genitourinary:  Negative for dysuria, frequency, hematuria and urgency.   Musculoskeletal:  Negative for back pain, joint pain, myalgias and neck pain.   Skin:  Negative for itching and rash.   Neurological:  Negative for dizziness, tingling, tremors, sensory change, speech change, focal weakness and  headaches.   Endo/Heme/Allergies:  Negative for environmental allergies. Does not bruise/bleed easily.   Psychiatric/Behavioral:  Negative for depression, hallucinations, substance abuse and suicidal ideas. The patient is not nervous/anxious.         Objective:   /64 (BP Location: Left arm, Patient Position: Sitting, BP Cuff Size: Large adult long)   Pulse 66   Temp 35.8 °C (96.5 °F) (Temporal)   Wt 99.3 kg (219 lb)   LMP  (LMP Unknown)   SpO2 96%   BMI 35.35 kg/m²     Physical Exam  Constitutional:       General: She is not in acute distress.  HENT:      Head: Normocephalic and atraumatic.   Eyes:      General: No scleral icterus.  Cardiovascular:      Rate and Rhythm: Normal rate and regular rhythm.   Pulmonary:      Effort: No respiratory distress.   Abdominal:      Palpations: Abdomen is soft.   Musculoskeletal:      Cervical back: Normal range of motion.   Lymphadenopathy:      Comments: No palpable cervical axillary or inguinal adenopathy.   Skin:     Comments: Faintly palpable subcentimeter lesion in the left posterior auricular area.  Well-healed posterior scalp scar   Neurological:      Mental Status: She is alert.       Labs   Latest Reference Range & Units 11/04/24 08:02   WBC 4.8 - 10.8 K/uL 5.8   RBC 4.20 - 5.40 M/uL 4.69   Hemoglobin 12.0 - 16.0 g/dL 15.7   Hematocrit 37.0 - 47.0 % 46.0   MCV 81.4 - 97.8 fL 98.1 (H)   MCH 27.0 - 33.0 pg 33.5 (H)   MCHC 32.2 - 35.5 g/dL 34.1   RDW 35.9 - 50.0 fL 43.6   Platelet Count 164 - 446 K/uL 217   MPV 9.0 - 12.9 fL 9.0   (H): Data is abnormally high        Latest Reference Range & Units 10/17/24 08:14   Sodium 135 - 145 mmol/L 137   Potassium 3.6 - 5.5 mmol/L 3.9   Chloride 96 - 112 mmol/L 103   Co2 20 - 33 mmol/L 25   Anion Gap 7.0 - 16.0  9.0   Glucose 65 - 99 mg/dL 97   Bun 8 - 22 mg/dL 21   Creatinine 0.50 - 1.40 mg/dL 0.99   GFR (CKD-EPI) >60 mL/min/1.73 m 2 63   Calcium 8.4 - 10.2 mg/dL 9.6   Correct Calcium 8.5 - 10.5 mg/dL 9.4   AST(SGOT) 12 -  45 U/L 18   ALT(SGPT) 2 - 50 U/L 21   Alkaline Phosphatase 30 - 99 U/L 73   Total Bilirubin 0.1 - 1.5 mg/dL 0.2   Albumin 3.2 - 4.9 g/dL 4.2   Total Protein 6.0 - 8.2 g/dL 6.7   Globulin 1.9 - 3.5 g/dL 2.5   A-G Ratio g/dL 1.7       Imaging  PET CT 11/7/24  IMPRESSION:  Normal PET scan. No abnormal hypermetabolism in the scalp. No FDG avid distant metastatic disease.     CT HEAD 10/30/24  IMPRESSION:  1.  Head CT with contrast within normal limits. No evidence of acute cerebral infarction, hemorrhage, mass lesion, or abnormal enhancement.  2.  Postoperative changes of the scalp near the midline in the posterior parietal region.  3.  Hyperostosis frontalis interna.  4.  No evidence of metastatic disease to the brain parenchyma.    CT SOFT TISSUE NECK 10/30/24  IMPRESSION:  1.  The small occipital lesion seen on recent ultrasound and that was recently sampled is not definitely seen however not marked on the current study.  2.  No definite evidence of metastatic disease in the neck is otherwise seen.    US SOFT TISSUE 10/18/24  IMPRESSION:  0.3 x 0.8 cm solid nodule within the left occipital superficial soft tissues. Uncertain etiology. Malignancy not excluded.    Pathology  PATH 10/22/24  FINAL DIAGNOSIS:   A. Left posterior scalp mass fine needle aspiration:          Cystic basaloid neoplasm, see comment   Comment: The patient history of previous adenoid cystic carcinoma of   the head now presenting with a cystic scalp lesion is noted.  Cytologic   examination shows cystic debris and inflammation in the background of   scattered clusters of basaloid epithelium arranged in rounded nests.   Cell block was attempted for further characterization but was   acellular.  The differential diagnosis is broad and includes a variety   of diagnoses including recurrent adenoid cystic carcinoma, basal cell   carcinoma and benign basaloid epithelial lined cysts among others.   Core biopsy or excisional biopsy is recommended as these  entities may   be challenging to differentiate on cytologic samples.     Procedures  10/22/24 US FNA BIOPSY     Diagnosis:     1. Scalp lesion        2. History of adenoid cystic carcinoma        3. Abnormal finding on imaging            Medical Decision Making:  Today's Assessment / Status / Plan:     64-year-old female with a history of adenoid cystic carcinoma of the occiput of the scalp.  She developed a subcutaneous lesion in the left posterior auricular area.  On ultrasound its subcentimeter nodule which was biopsied and showed an underlying cystic neoplasm.    Had a long discussion with the patient and family member today.  It is difficult to know whether this represents a recurrence near the primary or if this is a node that is developed disease.  There was not enough tissue for confirmatory diagnosis therefore we will plan for a wide excision of the area with primary closure in order to obtain the final diagnosis and determine the next steps of treatment.  Risk-benefit and alternatives of a resection of a left posterior scalp mass, possible skin graft, intraoperative ultrasound were explained in detail the patient.  Including but not limited to bleeding, infection, wound healing issues, poor take of the skin graft, recurrence of disease, need for further procedures, VTE, MI, stroke and a very small risk of death.  All her questions were answered and she is in agreement to proceed.    I, Armani Dietrich MD have entered, reviewed and confirmed the above diagnosis related to this patient on this date of service, November 12, 2024     Armani Dietrich M.D.

## 2024-11-07 ENCOUNTER — HOSPITAL ENCOUNTER (OUTPATIENT)
Dept: RADIOLOGY | Facility: MEDICAL CENTER | Age: 64
End: 2024-11-07
Attending: NURSE PRACTITIONER
Payer: MEDICARE

## 2024-11-07 DIAGNOSIS — L98.9 SCALP LESION: ICD-10-CM

## 2024-11-07 DIAGNOSIS — Z85.9 HISTORY OF ADENOID CYSTIC CARCINOMA: ICD-10-CM

## 2024-11-07 PROCEDURE — A9552 F18 FDG: HCPCS

## 2024-11-12 ENCOUNTER — OFFICE VISIT (OUTPATIENT)
Dept: SURGICAL ONCOLOGY | Facility: MEDICAL CENTER | Age: 64
End: 2024-11-12
Payer: MEDICARE

## 2024-11-12 VITALS
TEMPERATURE: 96.5 F | DIASTOLIC BLOOD PRESSURE: 64 MMHG | BODY MASS INDEX: 35.35 KG/M2 | SYSTOLIC BLOOD PRESSURE: 116 MMHG | HEART RATE: 66 BPM | OXYGEN SATURATION: 96 % | WEIGHT: 219 LBS

## 2024-11-12 DIAGNOSIS — R93.89 ABNORMAL FINDING ON IMAGING: ICD-10-CM

## 2024-11-12 DIAGNOSIS — Z85.9 HISTORY OF ADENOID CYSTIC CARCINOMA: ICD-10-CM

## 2024-11-12 DIAGNOSIS — L98.9 SCALP LESION: ICD-10-CM

## 2024-11-12 PROCEDURE — 99204 OFFICE O/P NEW MOD 45 MIN: CPT | Performed by: SURGERY

## 2024-11-12 PROCEDURE — 3078F DIAST BP <80 MM HG: CPT | Performed by: SURGERY

## 2024-11-12 PROCEDURE — 3074F SYST BP LT 130 MM HG: CPT | Performed by: SURGERY

## 2024-11-12 ASSESSMENT — FIBROSIS 4 INDEX: FIB4 SCORE: 1.18

## 2024-11-13 ENCOUNTER — APPOINTMENT (OUTPATIENT)
Dept: ADMISSIONS | Facility: MEDICAL CENTER | Age: 64
End: 2024-11-13
Attending: OTOLARYNGOLOGY
Payer: MEDICARE

## 2024-11-14 ENCOUNTER — TELEPHONE (OUTPATIENT)
Dept: SURGICAL ONCOLOGY | Facility: MEDICAL CENTER | Age: 64
End: 2024-11-14
Payer: MEDICARE

## 2024-11-14 NOTE — TELEPHONE ENCOUNTER
I left a message to schedule surgery with Dr. Dietrich. I left my direct number [phone].    Thank you,  Joana Surgery Scheduler

## 2024-11-15 ENCOUNTER — PRE-ADMISSION TESTING (OUTPATIENT)
Dept: ADMISSIONS | Facility: MEDICAL CENTER | Age: 64
End: 2024-11-15
Attending: OTOLARYNGOLOGY
Payer: MEDICARE

## 2024-11-15 NOTE — OR NURSING
"Pre-Admit RN appointment completed with Maria Teresa Haji, by this RN, via phone, for procedure 12/10/24. Discussed pre-procedure instructions. We also discussed fasting guidelines including 16oz of clear liquid until 2 hours prior to surgery and hydration with the use of an electrolyte drink the day prior to surgery unless patient is on any diet, fluid restrictions, or physician states otherwise. Discussed post-op expectations for pain, and approximate duration of time in PACU etc. Instructed pt on medication instructions from \"Guideline for Pre-Operative Medication Management\" & \"[facility] Perioperative Medication Management Clinical Practice Guideline\", unless otherwise instructed by prescribing MD or Surgeon. Patient verbalized understanding of all instructions and denies any questions or concerns. Copy of Medication Reconciliation with instructions provided to pt via email.  "

## 2024-11-19 ASSESSMENT — ENCOUNTER SYMPTOMS
HALLUCINATIONS: 0
BLURRED VISION: 0
NERVOUS/ANXIOUS: 0
SENSORY CHANGE: 0
CLAUDICATION: 0
HEADACHES: 0
CHILLS: 0
ABDOMINAL PAIN: 0
COUGH: 0
NAUSEA: 0
FOCAL WEAKNESS: 0
SPEECH CHANGE: 0
EYE PAIN: 0
DIARRHEA: 0
MYALGIAS: 0
DOUBLE VISION: 0
TREMORS: 0
ORTHOPNEA: 0
TINGLING: 0
CONSTIPATION: 0
PALPITATIONS: 0
EYE DISCHARGE: 0
FEVER: 0
HEMOPTYSIS: 0
NECK PAIN: 0
SPUTUM PRODUCTION: 0
PHOTOPHOBIA: 0
WEIGHT LOSS: 0
DIZZINESS: 0
DEPRESSION: 0
VOMITING: 0
BRUISES/BLEEDS EASILY: 0
HEARTBURN: 0
BACK PAIN: 0

## 2024-11-19 ASSESSMENT — LIFESTYLE VARIABLES: SUBSTANCE_ABUSE: 0

## 2024-12-10 ENCOUNTER — ANESTHESIA EVENT (OUTPATIENT)
Dept: SURGERY | Facility: MEDICAL CENTER | Age: 64
End: 2024-12-10
Payer: MEDICARE

## 2024-12-10 ENCOUNTER — PHARMACY VISIT (OUTPATIENT)
Dept: PHARMACY | Facility: MEDICAL CENTER | Age: 64
End: 2024-12-10
Payer: COMMERCIAL

## 2024-12-10 ENCOUNTER — ANESTHESIA (OUTPATIENT)
Dept: SURGERY | Facility: MEDICAL CENTER | Age: 64
End: 2024-12-10
Payer: MEDICARE

## 2024-12-10 ENCOUNTER — HOSPITAL ENCOUNTER (OUTPATIENT)
Facility: MEDICAL CENTER | Age: 64
End: 2024-12-10
Attending: OTOLARYNGOLOGY | Admitting: OTOLARYNGOLOGY
Payer: MEDICARE

## 2024-12-10 VITALS
DIASTOLIC BLOOD PRESSURE: 78 MMHG | BODY MASS INDEX: 34.4 KG/M2 | RESPIRATION RATE: 15 BRPM | HEART RATE: 75 BPM | HEIGHT: 66 IN | TEMPERATURE: 97.1 F | SYSTOLIC BLOOD PRESSURE: 154 MMHG | OXYGEN SATURATION: 94 % | WEIGHT: 214.07 LBS

## 2024-12-10 DIAGNOSIS — R22.0 LUMP OF SCALP: ICD-10-CM

## 2024-12-10 PROCEDURE — 160025 RECOVERY II MINUTES (STATS): Performed by: OTOLARYNGOLOGY

## 2024-12-10 PROCEDURE — 160047 HCHG PACU  - EA ADDL 30 MINS PHASE II: Performed by: OTOLARYNGOLOGY

## 2024-12-10 PROCEDURE — 700111 HCHG RX REV CODE 636 W/ 250 OVERRIDE (IP): Performed by: ANESTHESIOLOGY

## 2024-12-10 PROCEDURE — A9270 NON-COVERED ITEM OR SERVICE: HCPCS | Performed by: ANESTHESIOLOGY

## 2024-12-10 PROCEDURE — 160048 HCHG OR STATISTICAL LEVEL 1-5: Performed by: OTOLARYNGOLOGY

## 2024-12-10 PROCEDURE — 700101 HCHG RX REV CODE 250: Performed by: ANESTHESIOLOGY

## 2024-12-10 PROCEDURE — 160035 HCHG PACU - 1ST 60 MINS PHASE I: Performed by: OTOLARYNGOLOGY

## 2024-12-10 PROCEDURE — 160039 HCHG SURGERY MINUTES - EA ADDL 1 MIN LEVEL 3: Performed by: OTOLARYNGOLOGY

## 2024-12-10 PROCEDURE — 160002 HCHG RECOVERY MINUTES (STAT): Performed by: OTOLARYNGOLOGY

## 2024-12-10 PROCEDURE — 160028 HCHG SURGERY MINUTES - 1ST 30 MINS LEVEL 3: Performed by: OTOLARYNGOLOGY

## 2024-12-10 PROCEDURE — 160009 HCHG ANES TIME/MIN: Performed by: OTOLARYNGOLOGY

## 2024-12-10 PROCEDURE — 700105 HCHG RX REV CODE 258: Performed by: OTOLARYNGOLOGY

## 2024-12-10 PROCEDURE — 700102 HCHG RX REV CODE 250 W/ 637 OVERRIDE(OP): Performed by: ANESTHESIOLOGY

## 2024-12-10 PROCEDURE — 700111 HCHG RX REV CODE 636 W/ 250 OVERRIDE (IP): Mod: JZ | Performed by: ANESTHESIOLOGY

## 2024-12-10 PROCEDURE — 88305 TISSUE EXAM BY PATHOLOGIST: CPT

## 2024-12-10 PROCEDURE — 700101 HCHG RX REV CODE 250: Performed by: OTOLARYNGOLOGY

## 2024-12-10 PROCEDURE — RXMED WILLOW AMBULATORY MEDICATION CHARGE: Performed by: OTOLARYNGOLOGY

## 2024-12-10 PROCEDURE — 160046 HCHG PACU - 1ST 60 MINS PHASE II: Performed by: OTOLARYNGOLOGY

## 2024-12-10 RX ORDER — HYDROMORPHONE HYDROCHLORIDE 1 MG/ML
0.4 INJECTION, SOLUTION INTRAMUSCULAR; INTRAVENOUS; SUBCUTANEOUS
Status: DISCONTINUED | OUTPATIENT
Start: 2024-12-10 | End: 2024-12-10 | Stop reason: HOSPADM

## 2024-12-10 RX ORDER — DIPHENHYDRAMINE HYDROCHLORIDE 50 MG/ML
12.5 INJECTION INTRAMUSCULAR; INTRAVENOUS
Status: DISCONTINUED | OUTPATIENT
Start: 2024-12-10 | End: 2024-12-10 | Stop reason: HOSPADM

## 2024-12-10 RX ORDER — OXYCODONE HCL 5 MG/5 ML
10 SOLUTION, ORAL ORAL
Status: COMPLETED | OUTPATIENT
Start: 2024-12-10 | End: 2024-12-10

## 2024-12-10 RX ORDER — SODIUM CHLORIDE, SODIUM LACTATE, POTASSIUM CHLORIDE, CALCIUM CHLORIDE 600; 310; 30; 20 MG/100ML; MG/100ML; MG/100ML; MG/100ML
INJECTION, SOLUTION INTRAVENOUS CONTINUOUS
Status: ACTIVE | OUTPATIENT
Start: 2024-12-10 | End: 2024-12-10

## 2024-12-10 RX ORDER — SODIUM CHLORIDE, SODIUM LACTATE, POTASSIUM CHLORIDE, CALCIUM CHLORIDE 600; 310; 30; 20 MG/100ML; MG/100ML; MG/100ML; MG/100ML
INJECTION, SOLUTION INTRAVENOUS CONTINUOUS
Status: DISCONTINUED | OUTPATIENT
Start: 2024-12-10 | End: 2024-12-10 | Stop reason: HOSPADM

## 2024-12-10 RX ORDER — HYDROMORPHONE HYDROCHLORIDE 1 MG/ML
0.1 INJECTION, SOLUTION INTRAMUSCULAR; INTRAVENOUS; SUBCUTANEOUS
Status: DISCONTINUED | OUTPATIENT
Start: 2024-12-10 | End: 2024-12-10 | Stop reason: HOSPADM

## 2024-12-10 RX ORDER — DEXAMETHASONE SODIUM PHOSPHATE 4 MG/ML
INJECTION, SOLUTION INTRA-ARTICULAR; INTRALESIONAL; INTRAMUSCULAR; INTRAVENOUS; SOFT TISSUE PRN
Status: DISCONTINUED | OUTPATIENT
Start: 2024-12-10 | End: 2024-12-10 | Stop reason: SURG

## 2024-12-10 RX ORDER — ALBUTEROL SULFATE 5 MG/ML
2.5 SOLUTION RESPIRATORY (INHALATION)
Status: DISCONTINUED | OUTPATIENT
Start: 2024-12-10 | End: 2024-12-10 | Stop reason: HOSPADM

## 2024-12-10 RX ORDER — MEPERIDINE HYDROCHLORIDE 25 MG/ML
6.25 INJECTION INTRAMUSCULAR; INTRAVENOUS; SUBCUTANEOUS
Status: DISCONTINUED | OUTPATIENT
Start: 2024-12-10 | End: 2024-12-10 | Stop reason: HOSPADM

## 2024-12-10 RX ORDER — LIDOCAINE HYDROCHLORIDE 10 MG/ML
INJECTION, SOLUTION EPIDURAL; INFILTRATION; INTRACAUDAL; PERINEURAL
Status: DISCONTINUED
Start: 2024-12-10 | End: 2024-12-10 | Stop reason: HOSPADM

## 2024-12-10 RX ORDER — HYDROMORPHONE HYDROCHLORIDE 1 MG/ML
0.2 INJECTION, SOLUTION INTRAMUSCULAR; INTRAVENOUS; SUBCUTANEOUS
Status: DISCONTINUED | OUTPATIENT
Start: 2024-12-10 | End: 2024-12-10 | Stop reason: HOSPADM

## 2024-12-10 RX ORDER — LIDOCAINE HYDROCHLORIDE AND EPINEPHRINE 10; 10 MG/ML; UG/ML
INJECTION, SOLUTION INFILTRATION; PERINEURAL
Status: DISCONTINUED | OUTPATIENT
Start: 2024-12-10 | End: 2024-12-10 | Stop reason: HOSPADM

## 2024-12-10 RX ORDER — CEFAZOLIN SODIUM 1 G/3ML
INJECTION, POWDER, FOR SOLUTION INTRAMUSCULAR; INTRAVENOUS PRN
Status: DISCONTINUED | OUTPATIENT
Start: 2024-12-10 | End: 2024-12-10 | Stop reason: SURG

## 2024-12-10 RX ORDER — ONDANSETRON 2 MG/ML
INJECTION INTRAMUSCULAR; INTRAVENOUS PRN
Status: DISCONTINUED | OUTPATIENT
Start: 2024-12-10 | End: 2024-12-10 | Stop reason: SURG

## 2024-12-10 RX ORDER — BUPIVACAINE HYDROCHLORIDE AND EPINEPHRINE 5; 5 MG/ML; UG/ML
INJECTION, SOLUTION EPIDURAL; INTRACAUDAL; PERINEURAL
Status: DISCONTINUED
Start: 2024-12-10 | End: 2024-12-10 | Stop reason: HOSPADM

## 2024-12-10 RX ORDER — ONDANSETRON 2 MG/ML
4 INJECTION INTRAMUSCULAR; INTRAVENOUS
Status: COMPLETED | OUTPATIENT
Start: 2024-12-10 | End: 2024-12-10

## 2024-12-10 RX ORDER — OXYCODONE HYDROCHLORIDE 5 MG/1
5 TABLET ORAL EVERY 4 HOURS PRN
Qty: 30 TABLET | Refills: 0 | Status: SHIPPED | OUTPATIENT
Start: 2024-12-10 | End: 2024-12-15

## 2024-12-10 RX ORDER — BACITRACIN ZINC 500 [USP'U]/G
OINTMENT TOPICAL
Status: DISCONTINUED
Start: 2024-12-10 | End: 2024-12-10 | Stop reason: HOSPADM

## 2024-12-10 RX ORDER — EPINEPHRINE 1 MG/ML(1)
AMPUL (ML) INJECTION
Status: DISCONTINUED
Start: 2024-12-10 | End: 2024-12-10 | Stop reason: HOSPADM

## 2024-12-10 RX ORDER — HALOPERIDOL 5 MG/ML
1 INJECTION INTRAMUSCULAR
Status: DISCONTINUED | OUTPATIENT
Start: 2024-12-10 | End: 2024-12-10 | Stop reason: HOSPADM

## 2024-12-10 RX ORDER — EPHEDRINE SULFATE 50 MG/ML
INJECTION, SOLUTION INTRAVENOUS PRN
Status: DISCONTINUED | OUTPATIENT
Start: 2024-12-10 | End: 2024-12-10 | Stop reason: SURG

## 2024-12-10 RX ORDER — OXYCODONE HCL 5 MG/5 ML
5 SOLUTION, ORAL ORAL
Status: COMPLETED | OUTPATIENT
Start: 2024-12-10 | End: 2024-12-10

## 2024-12-10 RX ORDER — LIDOCAINE HYDROCHLORIDE 40 MG/ML
SOLUTION TOPICAL PRN
Status: DISCONTINUED | OUTPATIENT
Start: 2024-12-10 | End: 2024-12-10 | Stop reason: SURG

## 2024-12-10 RX ADMIN — EPHEDRINE SULFATE 25 MG: 50 INJECTION, SOLUTION INTRAVENOUS at 15:09

## 2024-12-10 RX ADMIN — LIDOCAINE HYDROCHLORIDE 4 ML: 40 SOLUTION TOPICAL at 14:35

## 2024-12-10 RX ADMIN — FENTANYL CITRATE 100 MCG: 50 INJECTION, SOLUTION INTRAMUSCULAR; INTRAVENOUS at 14:33

## 2024-12-10 RX ADMIN — ONDANSETRON 4 MG: 2 INJECTION INTRAMUSCULAR; INTRAVENOUS at 14:58

## 2024-12-10 RX ADMIN — PROPOFOL 200 MG: 10 INJECTION, EMULSION INTRAVENOUS at 14:33

## 2024-12-10 RX ADMIN — FENTANYL CITRATE 50 MCG: 50 INJECTION, SOLUTION INTRAMUSCULAR; INTRAVENOUS at 15:34

## 2024-12-10 RX ADMIN — OXYCODONE HYDROCHLORIDE 10 MG: 5 SOLUTION ORAL at 16:15

## 2024-12-10 RX ADMIN — EPHEDRINE SULFATE 25 MG: 50 INJECTION, SOLUTION INTRAVENOUS at 14:43

## 2024-12-10 RX ADMIN — FENTANYL CITRATE 50 MCG: 50 INJECTION, SOLUTION INTRAMUSCULAR; INTRAVENOUS at 16:15

## 2024-12-10 RX ADMIN — ONDANSETRON 4 MG: 2 INJECTION INTRAMUSCULAR; INTRAVENOUS at 16:10

## 2024-12-10 RX ADMIN — Medication 40 MG: at 14:33

## 2024-12-10 RX ADMIN — CEFAZOLIN 2 G: 1 INJECTION, POWDER, FOR SOLUTION INTRAMUSCULAR; INTRAVENOUS at 14:40

## 2024-12-10 RX ADMIN — SODIUM CHLORIDE, POTASSIUM CHLORIDE, SODIUM LACTATE AND CALCIUM CHLORIDE: 600; 310; 30; 20 INJECTION, SOLUTION INTRAVENOUS at 14:28

## 2024-12-10 RX ADMIN — FENTANYL CITRATE 50 MCG: 50 INJECTION, SOLUTION INTRAMUSCULAR; INTRAVENOUS at 15:06

## 2024-12-10 RX ADMIN — DEXAMETHASONE SODIUM PHOSPHATE 4 MG: 4 INJECTION INTRA-ARTICULAR; INTRALESIONAL; INTRAMUSCULAR; INTRAVENOUS; SOFT TISSUE at 14:58

## 2024-12-10 ASSESSMENT — PAIN DESCRIPTION - PAIN TYPE
TYPE: SURGICAL PAIN

## 2024-12-10 ASSESSMENT — FIBROSIS 4 INDEX: FIB4 SCORE: 1.18

## 2024-12-10 NOTE — OP REPORT
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    Anesthesiologist/Type of Anesthesia:  Anesthesiologist: Hari Griffith M.D./* No anesthesia type entered *    Surgical Staff:  Circulator: Haleigh Becerra R.N.  Scrub Person: Esvin Jaffe    Specimens removed if any:  ID Type Source Tests Collected by Time Destination   A : LEFT POSTERIOR SCALP LESION STITCH MARKS SPOT Other Other PATHOLOGY SPECIMEN Enrique Fraire M.D. 12/10/2024  3:30 PM    B : OCCIPITAL SKULL PERIOSTAL MARGIN Other Other PATHOLOGY SPECIMEN Enrique Fraire M.D. 12/10/2024  3:15 PM      Description of procedure: Patient had a adenoid cystic resected many years ago and had a bump on the back of her skull.  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  The area was outlined and the overlying skin resected the resection then went radially until it met the skull circumferentially.  It started as a approximately 1.5 cm diameter opening and then went to approximately a 3 cm area where it met the skull.  The periosteum of the skull was then elevated and sent as a separate specimen for the deep margin.  The occipital artery was in the field and was ligated and divided surgical clips were used to control the bleeding.  Wound was closed in layers with 3-0 Monocryl, a Quill stitch and vertical mattress Prolene's for the final layer.  Estimated Blood Loss: 20 cc    Findings: The palpable lesion was no longer palpable once the specimen was removed    Complications: None

## 2024-12-10 NOTE — ANESTHESIA PREPROCEDURE EVALUATION
" Case: [id number] Date/Time: 12/10/24 1330    Procedure: LEFT EXCISION, MALIGNANT LESION (10), SCALP; EXCISED DIAM > 4.0 CM, ADJACENT TISSUE TRANSFER FOREHEAD, CHEEKS, CHIN, MOUTH, NECK, 10 SQ CM OR LESS    Pre-op diagnosis: H04.221    Location: [facility] ROOM 23 / SURGERY SAME DAY [facility]    Surgeons: Enrique Fraire M.D.            Relevant Problems   ANESTHESIA   (positive) Other sleep apnea      CARDIAC   (positive) Nonrheumatic aortic valve insufficiency      GI   (positive) Gastroesophageal reflux disease without esophagitis         (positive) Hepatomegaly      Other   (positive) Ankylosing spondylitis of lumbar region (HCC)     BP (!) 149/77   Pulse 85   Temp 36.1 °C (96.9 °F) (Temporal)   Resp 17   Ht 1.676 m (5' 6\")   Wt 97.1 kg (214 lb 1.1 oz)   LMP  (LMP Unknown)   SpO2 94%   BMI 34.55 kg/m²     Physical Exam    Airway   Mallampati: II  TM distance: >3 FB  Neck ROM: full       Cardiovascular - normal exam  Rhythm: regular  Rate: normal  (-) murmur     Dental - normal exam           Pulmonary - normal exam  Breath sounds clear to auscultation     Abdominal    Neurological - normal exam                   Anesthesia Plan    ASA 3       Plan - general       Airway plan will be ETT          Induction: intravenous    Postoperative Plan: Postoperative administration of opioids is intended.    Pertinent diagnostic labs and testing reviewed    Informed Consent:    Anesthetic plan and risks discussed with patient.    Use of blood products discussed with: patient whom consented to blood products.           "

## 2024-12-10 NOTE — ANESTHESIA PROCEDURE NOTES
Airway    Date/Time: 12/10/2024 2:35 PM    Performed by: Hari Griffith M.D.  Authorized by: Hari Griffith M.D.    Location:  OR  Urgency:  Elective  Difficult Airway: No    Indications for Airway Management:  Anesthesia      Spontaneous Ventilation: absent    Sedation Level:  Deep  Preoxygenated: Yes    Patient Position:  Sniffing  Mask Difficulty Assessment:  0 - not attempted  Final Airway Type:  Endotracheal airway  Final Endotracheal Airway:  ETT  Cuffed: Yes    Technique Used for Successful ETT Placement:  Direct laryngoscopy    Insertion Site:  Oral  Blade Type:  Jay  Laryngoscope Blade/Videolaryngoscope Blade Size:  3  ETT Size (mm):  7.0  Measured from:  Teeth  ETT to Teeth (cm):  23  Placement Verified by: auscultation and capnometry    Cormack-Lehane Classification:  Grade I - full view of glottis  Number of Attempts at Approach:  1

## 2024-12-10 NOTE — ANESTHESIA TIME REPORT
Anesthesia Start and Stop Event Times       Date Time Event    12/10/2024 1410 Ready for Procedure     1428 Anesthesia Start     1543 Anesthesia Stop          Responsible Staff  12/10/24      Name Role Begin End    Hari Griffith M.D. Anesth 1425 1544          Overtime Reason:  no overtime (within assigned shift)    Comments:

## 2024-12-10 NOTE — ANESTHESIA POSTPROCEDURE EVALUATION
Patient: Maria Teresa Haji    Procedure Summary       Date: 12/10/24 Room / Location: [facility] ROOM 23 / SURGERY SAME DAY [facility]    Anesthesia Start: 1428 Anesthesia Stop: 1543    Procedure: LEFT EXCISION, MALIGNANT LESION (10), SCALP; EXCISED DIAM > 4.0 CM, ADJACENT TISSUE TRANSFER FOREHEAD, CHEEKS, CHIN, MOUTH, NECK, 10 SQ CM OR LESS Diagnosis: (H04.221)    Surgeons: Enrique Fraire M.D. Responsible Provider: Hari Griffith M.D.    Anesthesia Type: general ASA Status: 3            Final Anesthesia Type: general  Last vitals  BP   Blood Pressure : (!) 149/77    Temp   36.1 °C (96.9 °F)    Pulse   85   Resp   17    SpO2   94 %      Anesthesia Post Evaluation    Patient location during evaluation: PACU  Patient participation: complete - patient participated  Level of consciousness: awake and alert    Airway patency: patent  Anesthetic complications: no  Cardiovascular status: hemodynamically stable  Respiratory status: face mask    PONV: none          No notable events documented.     Nurse Pain Score: 6 (NPRS)

## 2024-12-11 LAB — PATHOLOGY CONSULT NOTE: NORMAL

## 2024-12-11 NOTE — DISCHARGE INSTRUCTIONS
HOME CARE INSTRUCTIONS    ACTIVITY: Rest and take it easy for the first 24 hours.  A responsible adult is recommended to remain with you during that time.  It is normal to feel sleepy.  We encourage you to not do anything that requires balance, judgment or coordination.    FOR 24 HOURS DO NOT:  Drive, operate machinery or run household appliances.  Drink beer or alcoholic beverages.  Make important decisions or sign legal documents.    SPECIAL INSTRUCTIONS: If bloody crusts noticed on incision can put hydrogen peroxide on incision and then cover with bacitracin    DIET: To avoid nausea, slowly advance diet as tolerated, avoiding spicy or greasy foods for the first day.  Add more substantial food to your diet according to your physician's instructions.  Babies can be fed formula or breast milk as soon as they are hungry.  INCREASE FLUIDS AND FIBER TO AVOID CONSTIPATION.    SURGICAL DRESSING/BATHING: Ok to shower tomorrow, pat dry, do not submerge in water until skin has fully healed    MEDICATIONS: Resume taking daily medication.  Take prescribed pain medication with food.  If no medication is prescribed, you may take non-aspirin pain medication if needed.  PAIN MEDICATION CAN BE VERY CONSTIPATING.  Take a stool softener or laxative such as senokot, pericolace, or milk of magnesia if needed.    Last pain medication given : Oxycodone given at 4:15 pm.  Can take next dose after 8:15 pm, as needed for pain.     A follow-up appointment should be arranged with your doctor; call to schedule.    You should CALL YOUR PHYSICIAN if you develop:  Fever greater than 101 degrees F.  Pain not relieved by medication, or persistent nausea or vomiting.  Excessive bleeding (blood soaking through dressing) or unexpected drainage from the wound.  Extreme redness or swelling around the incision site, drainage of pus or foul smelling drainage.  Inability to urinate or empty your bladder within 8 hours.  Problems with breathing or chest  pain.    You should call 911 if you develop problems with breathing or chest pain.  If you are unable to contact your doctor or surgical center, you should go to the nearest emergency room or urgent care center.    Physician's telephone #: Dr. Fraire [phone]    MILD FLU-LIKE SYMPTOMS ARE NORMAL.  YOU MAY EXPERIENCE GENERALIZED MUSCLE ACHES, THROAT IRRITATION, HEADACHE AND/OR SOME NAUSEA.    If any questions arise, call your doctor.  If your doctor is not available, please feel free to call the Surgical Center at [phone].  The Center is open Monday through Friday from 7AM to 7PM.      A registered nurse may call you a few days after your surgery to see how you are doing after your procedure.    You may also receive a survey in the mail within the next two weeks and we ask that you take a few moments to complete the survey and return it to us.  Our goal is to provide you with very good care and we value your comments.

## 2024-12-20 ENCOUNTER — PATIENT MESSAGE (OUTPATIENT)
Dept: SLEEP MEDICINE | Facility: MEDICAL CENTER | Age: 64
End: 2024-12-20
Payer: MEDICARE

## 2024-12-24 ENCOUNTER — TELEPHONE (OUTPATIENT)
Dept: HEMATOLOGY ONCOLOGY | Facility: MEDICAL CENTER | Age: 64
End: 2024-12-24
Payer: MEDICARE

## 2024-12-24 DIAGNOSIS — C80.1 ADENOID CYSTIC CARCINOMA (HCC): ICD-10-CM

## 2024-12-24 NOTE — TELEPHONE ENCOUNTER
Patient was originally seen back on 10/29/2024 after referral from pulmonary for a scalp lesion.    Patient is followed by Dr. Yael Swift with the pulmonary team for lung nodules.  Patient has a known 5.1 mm nodule in the lung which appears to be unchanged on most recent scan on 8/12/2024.  Patient with a significant history of adenoid cystic carcinoma of the head diagnosed in 2013.  She was seen by dermatology in which she did undergo biopsy followed by surgical resection by surgeon with clear margins.       Patient has been experiencing new onset of pain in the back left occipital region.  She was sent for an ultrasound of the soft tissue of the head and neck on 10/18/2024 which showed a 0.3 x 0.8 cm solid nodule within the left occipital superficial soft tissue.  Subsequently she was sent for an FNA biopsy on 10/22/2024 which showed left posterior scalp mass FNA aspiration noting cystic basaloid neoplasm.  According to the pathologist the diagnosis is quite broad and includes a variety of diagnoses including recurrent adenoid cystic carcinoma, basal cell carcinoma and benign basaloid epithelial lined cysts.  Recommended for core biopsy or excisional biopsy.     She was sent to surgical oncology who subsequently for to ENT, Dr. Fraire.  She underwent surgery on 12/10/2024.  Left posterior scalp lesion was found to be positive for adenoid cystic carcinoma with subcutaneous lymph node involvement.  The tumor focally involves the deep and 3:00 deep soft tissue margins of resection.  The occipital school parosteal margin was negative for carcinoma.    I did personally contact the patient via phone today with regards to the results.  She did state that she followed up with the ENT physician today and was informed that he has to go back in for more surgery due to the positive margins.  She does not have a date just yet but was told it may be in February.    I did discuss with patient the results, and recommend that  she be referred to medical oncology and radiation oncology.  Patient was very pleased and in agreement with this plan.  Therefore we will go ahead and place [facility] as Dr. Julien with radiation oncology.  Patient was very appreciative of the phone call and in agreement with the plan.      1. Adenoid cystic carcinoma (HCC)  Referral to Hematology Oncology    Referral to Radiation Oncology

## 2024-12-30 ASSESSMENT — LIFESTYLE VARIABLES
SMOKING_STATUS: NO
TOBACCO_USE: NO

## 2025-01-02 ENCOUNTER — HOSPITAL ENCOUNTER (OUTPATIENT)
Dept: RADIATION ONCOLOGY | Facility: MEDICAL CENTER | Age: 65
End: 2025-01-02
Attending: RADIOLOGY
Payer: MEDICARE

## 2025-01-02 VITALS
SYSTOLIC BLOOD PRESSURE: 128 MMHG | RESPIRATION RATE: 14 BRPM | TEMPERATURE: 97.9 F | BODY MASS INDEX: 34.69 KG/M2 | DIASTOLIC BLOOD PRESSURE: 84 MMHG | WEIGHT: 215.83 LBS | HEART RATE: 74 BPM | HEIGHT: 66 IN | OXYGEN SATURATION: 96 %

## 2025-01-02 DIAGNOSIS — Z85.9 HISTORY OF ADENOID CYSTIC CARCINOMA: ICD-10-CM

## 2025-01-02 DIAGNOSIS — C80.1 ADENOID CYSTIC CARCINOMA (HCC): ICD-10-CM

## 2025-01-02 PROCEDURE — 99214 OFFICE O/P EST MOD 30 MIN: CPT | Performed by: RADIOLOGY

## 2025-01-02 PROCEDURE — 99205 OFFICE O/P NEW HI 60 MIN: CPT | Performed by: RADIOLOGY

## 2025-01-02 ASSESSMENT — PAIN SCALES - GENERAL: PAINLEVEL_OUTOF10: 4=SLIGHT-MODERATE PAIN

## 2025-01-02 ASSESSMENT — FIBROSIS 4 INDEX: FIB4 SCORE: 1.18

## 2025-01-02 NOTE — CONSULTS
RADIATION ONCOLOGY CONSULT    Patient name:  Maria Teresa Haji    Primary Physician:  Lindsay Medina M.D. MRN: [id number]  CSN: [id number]   Referring physician:  Maame Mccain A.P.N.  : 1960, 64 y.o.     DATE OF SERVICE: 2025    IDENTIFICATION: A 64 y.o. female with   Adenoid cystic carcinoma (HCC)  Staging form: Cutaneous Carcinoma of the Head and Neck, AJCC 8th Edition.  - Pathologic stage from 2025: Stage IV (pTX, pN3b, cM0) - Signed by Brendan PIKE M.D. on 2025  Histopathologic type: Adenoid cystic carcinoma  Residual tumor (R): R1 - Microscopic  Presence of extranodal extension: Present  Extraosseous extension: Unknown  ECOG performance status: Grade 0  Karnofsky performance status: Score 100        She is here at the kind request of Maame Gilmore A.P.N.        HISTORY OF PRESENT ILLNESS:  Subjective     64-year-old female with past medical history significant for small adenoid cystic carcinoma removed from the left posterior occipital region in .  Patient did well without any further adjuvant therapy until 2019 when she developed recurrence which was causing some mild discomfort.  The small nodule grew discomfort increased and she sought medical care.  Ultrasound soft tissue head and neck 10/18/2024 demonstrated a 0.3 x 0.8 cm solid nodule within the left occipital superficial soft tissue    10/22/2024 ultrasound FNA demonstrated cystic basaloid neoplasm.    10/29/2024 CT head soft tissue neck PET/CT ordered which essentially showed no abnormal hypermetabolism or visualized mass.  In retrospect reviewing the CT head there is some asymmetry noted in the left occipital region.    12/10/2024 underwent wide local excision by Dr. Fraire.  Pathology demonstrated on gross 3.3 x 2.9 x 1.7 cm portion of tan-yellow to red-brown rubbery soft tissue covered partially by 1.5 x 1.5 cm portion of skin.  Microscopically sections of the subcutaneous tissue showed involvement  by tumor morphologically consistent with adenoid cystic carcinoma.  There is a lymph node within the subcutaneous tissue that is largely effaced by tumor it is uncertain if the findings could represent metastatic tumor to a subcutaneous lymph node with extranodal extension of tumor or tumor within subcutaneous tissue that directly extends into a subcutaneous lymph node.  In addition tumor focally extends to the deep and deep 3:00 soft tissue margin of resection.  Occipital skull periosteal margin negative for tumor.    Current thought is that she has a metastasis to lymph node from her original primary demonstrating extranodal extension.      She is 3 weeks postop.  Primary complaint is some hypoesthesia involving the left occipital scalp up to the vertex with the vertex area demonstrating some discomfort        PROBLEM LIST:  Patient Active Problem List   Diagnosis    Adenoid cystic carcinoma (HCC)    Preventative health care    Menopause    Glaucoma    Gastroesophageal reflux disease without esophagitis    History of tobacco use    BMI 38.0-38.9,adult    Prediabetes    Pulmonary nodule    Anxiety    Other insomnia    Hepatomegaly    Nonrheumatic aortic valve insufficiency    Neuralgia, postherpetic    History of knee replacement    Other sleep apnea    Ankylosing spondylitis of lumbar region (HCC)    History of adenoid cystic carcinoma    Anemia    Lump of scalp        PAST SURGICAL HISTORY:  Past Surgical History:   Procedure Laterality Date    ID EXC SKIN MALIG >4CM REMAINDR BODY Left 12/10/2024    Procedure: LEFT EXCISION, MALIGNANT LESION (10), SCALP; EXCISED DIAM > 4.0 CM, ADJACENT TISSUE TRANSFER FOREHEAD, CHEEKS, CHIN, MOUTH, NECK, 10 SQ CM OR LESS;  Surgeon: Enrique Fraire M.D.;  Location: SURGERY SAME DAY [facility];  Service: Oral Surgery    ABDOMINAL EXPLORATION      APPENDECTOMY      ARTHROSCOPY, KNEE      CHOLECYSTECTOMY      GYN SURGERY      removed ovary    KNEE ARTHROPLASTY TOTAL Bilateral 2015,  2013    OTHER      breast lift, tummy tuck    OTHER      removal of fallopian tube for a cyst per patient.    OTHER ORTHOPEDIC SURGERY      b/l knee replacements 2014, 2016    [place] BREAST REDUCTION      PRIMARY C SECTION      TONSILLECTOMY         CURRENT MEDICATIONS:  Current Outpatient Medications   Medication Sig Dispense Refill    multivitamin Tab Take 1 Tablet by mouth every day.     MEDICATION INSTRUCTIONS FOR SURGERY/PROCEDURE SCHEDULED FOR 12/10/24   DO NOT TAKE 7 DAYS PRIOR TO SURGERY      baclofen (LIORESAL) 10 MG Tab Take 10 mg by mouth 3 times a day as needed (back pain).       MEDICATION INSTRUCTIONS FOR SURGERY/PROCEDURE SCHEDULED FOR 12/10/24   OK TO CONTINUE TAKING PRIOR TO SURGERY AND DAY OF SURGERY      valacyclovir (VALTREX) 1 GM Tab Take 1,000 mg by mouth every day.       MEDICATION INSTRUCTIONS FOR SURGERY/PROCEDURE SCHEDULED FOR 12/10/24   OK TO CONTINUE TAKING PRIOR TO SURGERY AND DAY OF SURGERY      gabapentin (NEURONTIN) 100 MG Cap Take 1 Capsule by mouth 3 times a day. (Patient taking differently: Take 100 mg by mouth 3 times a day.     MEDICATION INSTRUCTIONS FOR SURGERY/PROCEDURE SCHEDULED FOR 12/10/24   OK TO CONTINUE TAKING PRIOR TO SURGERY AND DAY OF SURGERY) 90 Capsule 1    omeprazole (PRILOSEC) 40 MG delayed-release capsule Take 1 Capsule by mouth every day. Indications: Heartburn (Patient taking differently: Take 40 mg by mouth every day.       MEDICATION INSTRUCTIONS FOR SURGERY/PROCEDURE SCHEDULED FOR 12/10/24   OK TO CONTINUE TAKING PRIOR TO SURGERY AND DAY OF SURGERY   Indications: Heartburn) 90 Capsule 1    traZODone (DESYREL) 50 MG Tab Take 1 Tablet by mouth at bedtime. (Patient taking differently: Take 50 mg by mouth at bedtime.     MEDICATION INSTRUCTIONS FOR SURGERY/PROCEDURE SCHEDULED FOR 12/10/24   OK TO CONTINUE TAKING PRIOR TO SURGERY) 90 Tablet 1    meloxicam (MOBIC) 15 MG tablet Take 1 Tablet by mouth every day. (Patient taking differently: Take 15 mg by mouth every  "day.     MEDICATION INSTRUCTIONS FOR SURGERY/PROCEDURE SCHEDULED FOR 12/10/24   DO NOT TAKE 5 DAYS PRIOR TO SURGERY) 90 Tablet 2     No current facility-administered medications for this encounter.       ALLERGIES:    Patient has no known allergies.    FAMILY HISTORY:    family history includes Anemia in her mother; Arthritis in her brother and mother; Breast Cancer in her maternal aunt; Cancer in her father and mother; Dementia in her mother; Glaucoma in her father; Hyperlipidemia in her mother; Hypertension in her father and mother; Obesity in her father and mother; Other in her mother.    SOCIAL HISTORY:     reports that she quit smoking about 32 years ago. Her smoking use included cigarettes. She started smoking about 48 years ago. She has a 32 pack-year smoking history. She has never used smokeless tobacco. She reports that she does not currently use alcohol. She reports that she does not use drugs.  Patient currently lives in [place] with her spouse Shan.  She is retired, [employer] .    REVIEW OF SYSTEMS:    A complete review of systems taken. Pertinent items in HPI. All others negative.    PHYSICAL EXAM:    PERFORMANCE STATUS:      1/2/2025     2:09 PM   ECOG Performance Review   ECOG Performance Status Restricted in physically strenuous activity but ambulatory and able to carry out work of a light or sedentary nature, e.g., light house work, office work         1/2/2025     2:10 PM   Karnofsky Score   Karnofsky Score 80     /84 (BP Location: Left arm, Patient Position: Sitting)   Pulse 74   Temp 36.6 °C (97.9 °F)   Resp 14   Ht 1.676 m (5' 6\")   Wt 97.9 kg (215 lb 13.3 oz)   LMP  (LMP Unknown)   SpO2 96%   BMI 34.84 kg/m²   Physical Exam  Vitals and nursing note reviewed.   Constitutional:       General: She is not in acute distress.     Appearance: She is well-developed.   HENT:      Head: Normocephalic.   Eyes:      Conjunctiva/sclera: Conjunctivae normal.      " Pupils: Pupils are equal, round, and reactive to light.   Cardiovascular:      Rate and Rhythm: Normal rate and regular rhythm.      Heart sounds: Normal heart sounds.   Pulmonary:      Effort: Pulmonary effort is normal.      Breath sounds: Normal breath sounds.   Abdominal:      General: Bowel sounds are normal.      Palpations: Abdomen is soft.   Musculoskeletal:         General: Normal range of motion.      Cervical back: Normal range of motion.   Lymphadenopathy:      Cervical: No cervical adenopathy.   Skin:     General: Skin is warm and dry.      Findings: Erythema (Mild erythema left occipital site with scab at surgical site.) present.   Neurological:      Mental Status: She is alert and oriented to person, place, and time.   Psychiatric:         Behavior: Behavior normal.         Thought Content: Thought content normal.         Judgment: Judgment normal.          LABORATORY DATA:   Lab Results   Component Value Date/Time    WBC 5.8 11/04/2024 08:02 AM    RBC 4.69 11/04/2024 08:02 AM    HEMOGLOBIN 15.7 11/04/2024 08:02 AM    HEMATOCRIT 46.0 11/04/2024 08:02 AM    MCV 98.1 (H) 11/04/2024 08:02 AM    MCH 33.5 (H) 11/04/2024 08:02 AM    MCHC 34.1 11/04/2024 08:02 AM    RDW 43.6 11/04/2024 08:02 AM    PLATELETCT 217 11/04/2024 08:02 AM    MPV 9.0 11/04/2024 08:02 AM    NEUTSPOLYS 62.90 11/04/2024 08:02 AM    LYMPHOCYTES 26.70 11/04/2024 08:02 AM    MONOCYTES 7.10 11/04/2024 08:02 AM    EOSINOPHILS 2.80 11/04/2024 08:02 AM    BASOPHILS 0.20 11/04/2024 08:02 AM      Lab Results   Component Value Date/Time    SODIUM 137 10/17/2024 08:14 AM    POTASSIUM 3.9 10/17/2024 08:14 AM    CHLORIDE 103 10/17/2024 08:14 AM    CO2 25 10/17/2024 08:14 AM    GLUCOSE 97 10/17/2024 08:14 AM    BUN 21 10/17/2024 08:14 AM    CREATININE 0.99 10/17/2024 08:14 AM           RADIOLOGY DATA:  HB-[id number] BASE TO MID-THIGH    Result Date: 11/8/2024  Normal PET scan. No abnormal hypermetabolism in the scalp. No FDG avid distant metastatic  disease.      IMPRESSION:    A 64 y.o. with  Adenoid cystic carcinoma (HCC)  Staging form: Cutaneous Carcinoma of the Head and Neck, AJCC 8th Edition.  - Pathologic stage from 1/2/2025: Stage IV (pTX, pN3b, cM0) - Signed by Brendan PIKE M.D. 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  Pathology demonstrates extranodal extension along with microscopic positive margin and perineural invasion.    There are several bad features associated with the tumor.  Ideally if we can obtain reexcision with clear margins she will need adjuvant radiotherapy to follow.  Radiotherapy will involve delivering 60 Gray 30 fractions over 6 weeks to the local surgical bed as well as tracking the greater occipital nerve from vertex to origin at C2-C3.    The technical aspects benefits risks associate with radiotherapy were reviewed with the patient.  She can expect some redness and irritation of the skin in the treatment area as well as permanent hair loss.  I do not expect any oral cavity oropharyngeal adverse effects.    After discussions, she understands and wishes to proceed.  Will get in touch with Dr. Fraire to see if reexcision can be performed in a timely manner.  Will get nurse navigation involved as well.    Thank you for the opportunity to participate in her care.  If any questions or comments, please do not hesitate in calling.    Orders Placed This Encounter    Referral to Oncology Psychosocial Screening for Distress    REFERRAL TO ONCOLOGY NURSE NAVIGATOR

## 2025-01-02 NOTE — PROGRESS NOTES
"Patient was seen today in clinic with Dr. Julien for consult.  Vitals signs and weight were obtained and pain assessment was completed.  Allergies and medications were reviewed with the patient.       Vitals/Pain:  Vitals:    01/02/25 1400   BP: 128/84   BP Location: Left arm   Patient Position: Sitting   Pulse: 74   Resp: 14   Temp: 36.6 °C (97.9 °F)   SpO2: 96%   Weight: 97.9 kg (215 lb 13.3 oz)   Height: 1.676 m (5' 6\")   Pain Score: 4=Slight-Moderate Pain        Allergies:   Patient has no known allergies.    Current Medications:  Current Outpatient Medications   Medication Sig Dispense Refill    multivitamin Tab Take 1 Tablet by mouth every day.     MEDICATION INSTRUCTIONS FOR SURGERY/PROCEDURE SCHEDULED FOR 12/10/24   DO NOT TAKE 7 DAYS PRIOR TO SURGERY      baclofen (LIORESAL) 10 MG Tab Take 10 mg by mouth 3 times a day as needed (back pain).       MEDICATION INSTRUCTIONS FOR SURGERY/PROCEDURE SCHEDULED FOR 12/10/24   OK TO CONTINUE TAKING PRIOR TO SURGERY AND DAY OF SURGERY      valacyclovir (VALTREX) 1 GM Tab Take 1,000 mg by mouth every day.       MEDICATION INSTRUCTIONS FOR SURGERY/PROCEDURE SCHEDULED FOR 12/10/24   OK TO CONTINUE TAKING PRIOR TO SURGERY AND DAY OF SURGERY      gabapentin (NEURONTIN) 100 MG Cap Take 1 Capsule by mouth 3 times a day. (Patient taking differently: Take 100 mg by mouth 3 times a day.     MEDICATION INSTRUCTIONS FOR SURGERY/PROCEDURE SCHEDULED FOR 12/10/24   OK TO CONTINUE TAKING PRIOR TO SURGERY AND DAY OF SURGERY) 90 Capsule 1    omeprazole (PRILOSEC) 40 MG delayed-release capsule Take 1 Capsule by mouth every day. Indications: Heartburn (Patient taking differently: Take 40 mg by mouth every day.       MEDICATION INSTRUCTIONS FOR SURGERY/PROCEDURE SCHEDULED FOR 12/10/24   OK TO CONTINUE TAKING PRIOR TO SURGERY AND DAY OF SURGERY   Indications: Heartburn) 90 Capsule 1    traZODone (DESYREL) 50 MG Tab Take 1 Tablet by mouth at bedtime. (Patient taking differently: Take 50 mg " by mouth at bedtime.     MEDICATION INSTRUCTIONS FOR SURGERY/PROCEDURE SCHEDULED FOR 12/10/24   OK TO CONTINUE TAKING PRIOR TO SURGERY) 90 Tablet 1    meloxicam (MOBIC) 15 MG tablet Take 1 Tablet by mouth every day. (Patient taking differently: Take 15 mg by mouth every day.     MEDICATION INSTRUCTIONS FOR SURGERY/PROCEDURE SCHEDULED FOR 12/10/24   DO NOT TAKE 5 DAYS PRIOR TO SURGERY) 90 Tablet 2     No current facility-administered medications for this encounter.         PCP:  Adam St R.N.

## 2025-01-05 ASSESSMENT — LIFESTYLE VARIABLES
TOBACCO_USE: NO
SMOKING_STATUS: NO

## 2025-01-07 ENCOUNTER — PATIENT OUTREACH (OUTPATIENT)
Dept: ONCOLOGY | Facility: MEDICAL CENTER | Age: 65
End: 2025-01-07
Payer: MEDICARE

## 2025-01-07 NOTE — PROGRESS NOTES
Cancer Nurse Navigation Assessment:      Assessment/Discussion: Call placed to patient, introduced self and explained role of navigator.  ONN also called to address distress of 10 related to worry and treatment decisions.    TRANSPORTATION: Denied any barriers  FINANCIAL:  Denied  SUPPORT SYSTEM:  States that she has lots of family and friends.  ONN discussed resources for support groups and or counseling support.  Patient stated at this time she did not feel she needed it.  S  PSYCHOLOGICAL:                                                   DST: Patient stated that since her distress score of 10 Dr. Julien contacted her surgeon and he was able to get her surgery moved forward from March to January 30th and that relieved her stress.    Communication preference: mychart/phone call    Resources Provided/Intervention: Contact Information sent

## 2025-01-08 ENCOUNTER — HOSPITAL ENCOUNTER (OUTPATIENT)
Dept: RADIATION ONCOLOGY | Facility: MEDICAL CENTER | Age: 65
End: 2025-01-08
Attending: RADIOLOGY
Payer: MEDICARE

## 2025-01-08 ENCOUNTER — APPOINTMENT (OUTPATIENT)
Dept: ADMISSIONS | Facility: MEDICAL CENTER | Age: 65
End: 2025-01-08
Attending: OTOLARYNGOLOGY
Payer: MEDICARE

## 2025-01-08 ENCOUNTER — OFFICE VISIT (OUTPATIENT)
Dept: MEDICAL GROUP | Age: 65
End: 2025-01-08
Payer: MEDICARE

## 2025-01-08 VITALS
OXYGEN SATURATION: 95 % | DIASTOLIC BLOOD PRESSURE: 70 MMHG | HEART RATE: 72 BPM | TEMPERATURE: 97.3 F | BODY MASS INDEX: 34.55 KG/M2 | SYSTOLIC BLOOD PRESSURE: 102 MMHG | HEIGHT: 66 IN | RESPIRATION RATE: 16 BRPM | WEIGHT: 215 LBS

## 2025-01-08 DIAGNOSIS — C80.1 ADENOID CYSTIC CARCINOMA (HCC): ICD-10-CM

## 2025-01-08 DIAGNOSIS — Z78.0 POSTMENOPAUSE: ICD-10-CM

## 2025-01-08 DIAGNOSIS — Z12.31 ENCOUNTER FOR SCREENING MAMMOGRAM FOR BREAST CANCER: ICD-10-CM

## 2025-01-08 DIAGNOSIS — M45.6 ANKYLOSING SPONDYLITIS OF LUMBAR REGION (HCC): ICD-10-CM

## 2025-01-08 DIAGNOSIS — F41.9 ANXIETY: ICD-10-CM

## 2025-01-08 PROCEDURE — 99214 OFFICE O/P EST MOD 30 MIN: CPT | Performed by: INTERNAL MEDICINE

## 2025-01-08 PROCEDURE — 3074F SYST BP LT 130 MM HG: CPT | Performed by: INTERNAL MEDICINE

## 2025-01-08 PROCEDURE — 3078F DIAST BP <80 MM HG: CPT | Performed by: INTERNAL MEDICINE

## 2025-01-08 RX ORDER — BACLOFEN 10 MG/1
10 TABLET ORAL 3 TIMES DAILY PRN
Qty: 90 TABLET | Refills: 1 | Status: SHIPPED | OUTPATIENT
Start: 2025-01-08

## 2025-01-08 RX ORDER — BACLOFEN 10 MG/1
10 TABLET ORAL 3 TIMES DAILY PRN
Qty: 90 TABLET | Status: CANCELLED | OUTPATIENT
Start: 2025-01-08

## 2025-01-08 RX ORDER — LORAZEPAM 0.5 MG/1
0.5 TABLET ORAL
Qty: 30 TABLET | Refills: 2 | Status: SHIPPED | OUTPATIENT
Start: 2025-01-08 | End: 2025-04-08

## 2025-01-08 ASSESSMENT — FIBROSIS 4 INDEX: FIB4 SCORE: 1.18

## 2025-01-08 ASSESSMENT — ENCOUNTER SYMPTOMS
TINGLING: 1
HEADACHES: 1

## 2025-01-08 ASSESSMENT — PATIENT HEALTH QUESTIONNAIRE - PHQ9: CLINICAL INTERPRETATION OF PHQ2 SCORE: 0

## 2025-01-08 NOTE — PROGRESS NOTES
CC:   Chief Complaint   Patient presents with    Follow-Up     6 weeks, Lab results     Medication Refill     Baclofen      Diagnoses of Postmenopause, Adenoid cystic carcinoma (HCC), Anxiety, Ankylosing spondylitis of lumbar region (HCC), and Encounter for screening mammogram for breast cancer were pertinent to this visit.  Verbal consent was acquired by the patient to use [employer] ambient listening note generation during this visit Yes     History of Present Illness  Maria Teresa is a pleasant 64 y.o. female presenting for adenoid cystic carcinoma. She already met with radiation oncology. 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    She underwent surgery on 12/10/2024, but the procedure did not achieve complete tumor resection. A second surgery was initially scheduled for 03/11/2025, but following consultation with Dr. Ye, it was rescheduled to 01/30/2025. She is scheduled to receive radiation therapy, consisting of 30 treatments, 3 weeks post-surgery. The upcoming surgery aims to excise the remaining tumor with clean margins. She reports experiencing anxiety, persistent numbness in her head, and intermittent nerve pain. She has been prescribed oxycodone for post-surgical pain, which she takes occasionally when the pain becomes severe and unresponsive to gabapentin.     She is considering an ultrasound mammogram due to the risk of breast cancer.  She is currently on gabapentin twice daily for nerve pain, which provides some relief but causes fatigue.     She prefers to take both doses in the morning, but if she forgets, she takes the second dose around dinner time or before bed.   She is seeking medication to manage her anxiety during radiation therapy. She does not believe she needs counseling or daily antidepressants, as she does not consider herself depressed. She has previously tried Wellbutrin and Prozac but discontinued  "them due to side effects. She has not been prescribed hydroxyzine or Xanax before.    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  Dx w/biopsy.  Excised x2 [facility] in [place], [place]  Since saw oncology, neg PET scan approx 2013. All studies returned negative after excision, and no f/u was indicated.  No radiation/chemo. Just excised.     Anxiety 08/15/2019    Arthritis     Back pain     Blood transfusion without reported diagnosis     Bronchitis     Cancer (HCC)     posterior scalp pt states \"adenoid cystic carcinoma\"    Cataract     IOL SX 09/2024    Chickenpox     GERD (gastroesophageal reflux disease)     Heart murmur 1990    Heartburn     Hemorrhoids     Herpes zoster with complication 10/21/2020    Hives     Hoarseness, persistent     Meningitis     Obesity     Painful joint     Pneumonia     PONV (postoperative nausea and vomiting) 2013    I wake up from anesthesia with nausea.    Ringing in ears     Sinusitis     Sleep apnea     Tonsillitis        Current Outpatient Medications Ordered in Epic   Medication Sig Dispense Refill    LORazepam (ATIVAN) 0.5 MG Tab Take 1 Tablet by mouth 1 time a day as needed for Anxiety for up to 90 days. 30 Tablet 2    baclofen (LIORESAL) 10 MG Tab Take 1 Tablet by mouth 3 times a day as needed (back pain). 90 Tablet 1    multivitamin Tab Take 1 Tablet by mouth every day.     MEDICATION INSTRUCTIONS FOR SURGERY/PROCEDURE SCHEDULED FOR 12/10/24   DO NOT TAKE 7 DAYS PRIOR TO SURGERY      valacyclovir (VALTREX) 1 GM Tab Take 1,000 mg by mouth every day.       MEDICATION INSTRUCTIONS FOR SURGERY/PROCEDURE SCHEDULED FOR 12/10/24   OK TO CONTINUE TAKING PRIOR TO SURGERY AND DAY OF SURGERY      gabapentin (NEURONTIN) 100 MG Cap Take 1 Capsule by mouth 3 times a day. (Patient taking differently: Take 100 mg by mouth 3 times a day.     MEDICATION INSTRUCTIONS FOR " "SURGERY/PROCEDURE SCHEDULED FOR 12/10/24   OK TO CONTINUE TAKING PRIOR TO SURGERY AND DAY OF SURGERY) 90 Capsule 1    omeprazole (PRILOSEC) 40 MG delayed-release capsule Take 1 Capsule by mouth every day. Indications: Heartburn (Patient taking differently: Take 40 mg by mouth every day.       MEDICATION INSTRUCTIONS FOR SURGERY/PROCEDURE SCHEDULED FOR 12/10/24   OK TO CONTINUE TAKING PRIOR TO SURGERY AND DAY OF SURGERY   Indications: Heartburn) 90 Capsule 1    traZODone (DESYREL) 50 MG Tab Take 1 Tablet by mouth at bedtime. (Patient taking differently: Take 50 mg by mouth at bedtime.     MEDICATION INSTRUCTIONS FOR SURGERY/PROCEDURE SCHEDULED FOR 12/10/24   OK TO CONTINUE TAKING PRIOR TO SURGERY) 90 Tablet 1    meloxicam (MOBIC) 15 MG tablet Take 1 Tablet by mouth every day. (Patient taking differently: Take 15 mg by mouth every day.     MEDICATION INSTRUCTIONS FOR SURGERY/PROCEDURE SCHEDULED FOR 12/10/24   DO NOT TAKE 5 DAYS PRIOR TO SURGERY) 90 Tablet 2     No current Epic-ordered facility-administered medications on file.     Review of Systems   Neurological:  Positive for tingling and headaches.     Objective:     Exam:  /70 (BP Location: Right arm, Patient Position: Sitting, BP Cuff Size: Large adult)   Pulse 72   Temp 36.3 °C (97.3 °F) (Temporal)   Resp 16   Ht 1.676 m (5' 6\")   Wt 97.5 kg (215 lb)   LMP  (LMP Unknown)   SpO2 95%   BMI 34.70 kg/m²  Body mass index is 34.7 kg/m².    Physical Exam  Constitutional:       Appearance: Normal appearance.   HENT:      Head: Normocephalic and atraumatic.     Pulmonary:      Effort: Pulmonary effort is normal. No respiratory distress.   Neurological:      Mental Status: She is alert and oriented to person, place, and time.   Psychiatric:         Mood and Affect: Mood normal.         Behavior: Behavior normal.         Thought Content: Thought content normal.         Judgment: Judgment normal.       Results:     Latest Reference Range & Units 11/04/24 08:02 "   WBC 4.8 - 10.8 K/uL 5.8   RBC 4.20 - 5.40 M/uL 4.69   Hemoglobin 12.0 - 16.0 g/dL 15.7   Hematocrit 37.0 - 47.0 % 46.0   MCV 81.4 - 97.8 fL 98.1 (H)   MCH 27.0 - 33.0 pg 33.5 (H)   MCHC 32.2 - 35.5 g/dL 34.1   RDW 35.9 - 50.0 fL 43.6   Platelet Count 164 - 446 K/uL 217   MPV 9.0 - 12.9 fL 9.0   Neutrophils-Polys 44.00 - 72.00 % 62.90   Neutrophils (Absolute) 1.82 - 7.42 K/uL 3.62   Lymphocytes 22.00 - 41.00 % 26.70   Lymphs (Absolute) 1.00 - 4.80 K/uL 1.54   Monocytes 0.00 - 13.40 % 7.10   Monos (Absolute) 0.00 - 0.85 K/uL 0.41   Eosinophils 0.00 - 6.90 % 2.80   Eos (Absolute) 0.00 - 0.51 K/uL 0.16   Basophils 0.00 - 1.80 % 0.20   Baso (Absolute) 0.00 - 0.12 K/uL 0.01   Immature Granulocytes 0.00 - 0.90 % 0.30   Immature Granulocytes (abs) 0.00 - 0.11 K/uL 0.02   Nucleated RBC 0.00 - 0.20 /100 WBC 0.00   NRBC (Absolute) K/uL 0.00   Reticulocyte Count 0.8 - 2.6 % 1.8   Retic, Absolute 0.04 - 0.12 M/uL 0.08   Imm. Reticulocyte Fraction 2.6 - 16.1 % 7.0   Retic Hgb Equivalent 29.0 - 35.0 pg/cell 39.0 (H)   Iron 40 - 170 ug/dL 96   Total Iron Binding 250 - 450 ug/dL 249 (L)   % Saturation 15 - 55 % 39   Unsat Iron Binding 110 - 370 ug/dL 153   Copper Serum 80.0 - 155.0 ug/dL 93.4   Ferritin 10.0 - 291.0 ng/mL 245.0   Folate -Folic Acid >4.0 ng/mL 33.5   Vitamin B12 -True Cobalamin 211 - 911 pg/mL 1128 (H)   (H): Data is abnormally high  (L): Data is abnormally low  Results      Assessment & Plan:   Maria Teresa  is a pleasant 64 y.o. female with the following -     Assessment & Plan  1. Adenoid cystic carcinoma.  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 A second surgery is scheduled for 01/30/2025 to achieve clean margins. Radiation therapy will start approximately 3 weeks post-surgery.    2. Anxiety.  She experiences significant anxiety, particularly in the evenings. Lorazepam has been prescribed, with instructions to start at half a tablet and  adjust as needed. She is advised not to take lorazepam concurrently with gabapentin due to potential sedation. A controlled substance agreement for lorazepam has been signed. She is also advised not to drink alcohol while taking lorazepam. If lorazepam is not effective, alternative treatments will be considered.    3. Postmenopausal status.  A bone density scan has been ordered, which she can schedule in July 2025.    4. Breast cancer screening.  Both a mammogram and an ultrasound have been ordered for comprehensive breast cancer screening. She can schedule these tests in July 2025.    5. Medication management.  A refill for baclofen, which she takes once daily, has been provided.    Follow-up  The patient will follow up in 3 months    Problem List Items Addressed This Visit       Adenoid cystic carcinoma (HCC)    Relevant Orders    DS-BONE DENSITY STUDY (DEXA)    Ankylosing spondylitis of lumbar region (HCC)    Relevant Medications    baclofen (LIORESAL) 10 MG Tab    Anxiety    Relevant Medications    LORazepam (ATIVAN) 0.5 MG Tab    Other Relevant Orders    Controlled Substance Treatment Agreement     Other Visit Diagnoses       Postmenopause        Relevant Orders    DS-BONE DENSITY STUDY (DEXA)    Encounter for screening mammogram for breast cancer        Relevant Orders    US-SCREENING WHOLE BREAST (3D SCREENING)    MA-SCREENING MAMMO BILAT W/TOMOSYNTHESIS W/CAD          No follow-ups on file.  3mo meds, anxiety, Sx   Please note that this dictation was created using voice recognition software. I have made every reasonable attempt to correct obvious errors, but I expect that there are errors of grammar and possibly content that I did not discover before finalizing the note.

## 2025-01-11 ENCOUNTER — HOSPITAL ENCOUNTER (OUTPATIENT)
Dept: RADIOLOGY | Facility: MEDICAL CENTER | Age: 65
End: 2025-01-11
Attending: INTERNAL MEDICINE
Payer: MEDICARE

## 2025-01-11 DIAGNOSIS — C80.1 ADENOID CYSTIC CARCINOMA (HCC): ICD-10-CM

## 2025-01-11 DIAGNOSIS — Z78.0 POSTMENOPAUSE: ICD-10-CM

## 2025-01-11 PROCEDURE — 77080 DXA BONE DENSITY AXIAL: CPT

## 2025-01-13 ENCOUNTER — PRE-ADMISSION TESTING (OUTPATIENT)
Dept: ADMISSIONS | Facility: MEDICAL CENTER | Age: 65
End: 2025-01-13
Attending: OTOLARYNGOLOGY
Payer: MEDICARE

## 2025-01-13 NOTE — PREADMIT AVS NOTE
Current Medications   Medication Instructions    LORazepam (ATIVAN) 0.5 MG Tab As needed medication, may take if needed, including morning of procedure     baclofen (LIORESAL) 10 MG Tab As needed medication, may take if needed, including morning of procedure     multivitamin Tab Stop 7 days before surgery    valacyclovir (VALTREX) 1 GM Tab Continue taking medication as prescribed, including morning of procedure     gabapentin (NEURONTIN) 100 MG Cap Continue taking medication as prescribed, including morning of procedure     omeprazole (PRILOSEC) 40 MG delayed-release capsule Continue taking medication as prescribed, including morning of procedure     traZODone (DESYREL) 50 MG Tab Continue until night before surgery    meloxicam (MOBIC) 15 MG tablet Stop 5 days before surgery

## 2025-01-14 ENCOUNTER — PATIENT OUTREACH (OUTPATIENT)
Dept: ONCOLOGY | Facility: MEDICAL CENTER | Age: 65
End: 2025-01-14
Payer: MEDICARE

## 2025-01-14 NOTE — PROGRESS NOTES
On January 14, 2025,  Yuliet Huang, spoke to pt. via telephone. SHARIF Huang provided information about the role of social work at the [facility], as well as information about the support services team. Pt. reported that she does not need any resources or barriers to treatment at this time. Pt. stated that she has a good support system and pt. declined any resources for mental health or support group. Pt. denied any financial barriers, including housing or food and stated she can get to treatment without any issues.     SHARIF Huang will send pt. a message with support services information and social work information and encouraged pt. to reach out with any questions or concerns.

## 2025-01-15 ENCOUNTER — HOSPITAL ENCOUNTER (OUTPATIENT)
Dept: HEMATOLOGY ONCOLOGY | Facility: MEDICAL CENTER | Age: 65
End: 2025-01-15
Attending: STUDENT IN AN ORGANIZED HEALTH CARE EDUCATION/TRAINING PROGRAM
Payer: MEDICARE

## 2025-01-15 ENCOUNTER — PRE-ADMISSION TESTING (OUTPATIENT)
Dept: ADMISSIONS | Facility: MEDICAL CENTER | Age: 65
End: 2025-01-15
Attending: OTOLARYNGOLOGY
Payer: MEDICARE

## 2025-01-15 VITALS
HEART RATE: 75 BPM | DIASTOLIC BLOOD PRESSURE: 54 MMHG | OXYGEN SATURATION: 97 % | BODY MASS INDEX: 34.39 KG/M2 | HEIGHT: 66 IN | WEIGHT: 214 LBS | SYSTOLIC BLOOD PRESSURE: 102 MMHG | TEMPERATURE: 97.1 F

## 2025-01-15 DIAGNOSIS — C80.1 ADENOID CYSTIC CARCINOMA (HCC): ICD-10-CM

## 2025-01-15 DIAGNOSIS — Z01.812 PRE-OPERATIVE LABORATORY EXAMINATION: ICD-10-CM

## 2025-01-15 DIAGNOSIS — Z01.810 PRE-OPERATIVE CARDIOVASCULAR EXAMINATION: ICD-10-CM

## 2025-01-15 LAB
ANION GAP SERPL CALC-SCNC: 11 MMOL/L (ref 7–16)
BUN SERPL-MCNC: 15 MG/DL (ref 8–22)
CALCIUM SERPL-MCNC: 9.9 MG/DL (ref 8.5–10.5)
CHLORIDE SERPL-SCNC: 102 MMOL/L (ref 96–112)
CO2 SERPL-SCNC: 26 MMOL/L (ref 20–33)
CREAT SERPL-MCNC: 0.92 MG/DL (ref 0.5–1.4)
EKG IMPRESSION: NORMAL
ERYTHROCYTE [DISTWIDTH] IN BLOOD BY AUTOMATED COUNT: 42 FL (ref 35.9–50)
GFR SERPLBLD CREATININE-BSD FMLA CKD-EPI: 69 ML/MIN/1.73 M 2
GLUCOSE SERPL-MCNC: 92 MG/DL (ref 65–99)
HCT VFR BLD AUTO: 43.4 % (ref 37–47)
HGB BLD-MCNC: 15.1 G/DL (ref 12–16)
MCH RBC QN AUTO: 33.6 PG (ref 27–33)
MCHC RBC AUTO-ENTMCNC: 34.8 G/DL (ref 32.2–35.5)
MCV RBC AUTO: 96.4 FL (ref 81.4–97.8)
PLATELET # BLD AUTO: 219 K/UL (ref 164–446)
PMV BLD AUTO: 9.2 FL (ref 9–12.9)
POTASSIUM SERPL-SCNC: 4.6 MMOL/L (ref 3.6–5.5)
RBC # BLD AUTO: 4.5 M/UL (ref 4.2–5.4)
SODIUM SERPL-SCNC: 139 MMOL/L (ref 135–145)
WBC # BLD AUTO: 6 K/UL (ref 4.8–10.8)

## 2025-01-15 PROCEDURE — 93005 ELECTROCARDIOGRAM TRACING: CPT | Mod: TC

## 2025-01-15 PROCEDURE — 36415 COLL VENOUS BLD VENIPUNCTURE: CPT

## 2025-01-15 PROCEDURE — 85027 COMPLETE CBC AUTOMATED: CPT

## 2025-01-15 PROCEDURE — 80048 BASIC METABOLIC PNL TOTAL CA: CPT

## 2025-01-15 PROCEDURE — 99215 OFFICE O/P EST HI 40 MIN: CPT | Performed by: STUDENT IN AN ORGANIZED HEALTH CARE EDUCATION/TRAINING PROGRAM

## 2025-01-15 PROCEDURE — 93010 ELECTROCARDIOGRAM REPORT: CPT | Performed by: INTERNAL MEDICINE

## 2025-01-15 PROCEDURE — 99212 OFFICE O/P EST SF 10 MIN: CPT | Performed by: STUDENT IN AN ORGANIZED HEALTH CARE EDUCATION/TRAINING PROGRAM

## 2025-01-15 ASSESSMENT — ENCOUNTER SYMPTOMS
INSOMNIA: 0
ABDOMINAL PAIN: 0
HEADACHES: 0
SINUS PAIN: 0
FEVER: 0
WEAKNESS: 0
PALPITATIONS: 0
BLOOD IN STOOL: 0
SHORTNESS OF BREATH: 0
TINGLING: 0
CONSTIPATION: 0
MYALGIAS: 0
HEARTBURN: 0
COUGH: 0
DIAPHORESIS: 0
NAUSEA: 0
BRUISES/BLEEDS EASILY: 0
CHILLS: 0
WEIGHT LOSS: 0
VOMITING: 0
DOUBLE VISION: 0
BLURRED VISION: 0
NERVOUS/ANXIOUS: 0
SPUTUM PRODUCTION: 0
SORE THROAT: 0
DIARRHEA: 0
BACK PAIN: 0
WHEEZING: 0
ORTHOPNEA: 0
DIZZINESS: 0

## 2025-01-15 ASSESSMENT — FIBROSIS 4 INDEX: FIB4 SCORE: 1.17

## 2025-01-15 ASSESSMENT — PAIN SCALES - GENERAL: PAINLEVEL_OUTOF10: NO PAIN

## 2025-01-15 NOTE — PROGRESS NOTES
"Consult:  Hematology/Oncology      Referring Physician: DELFINO Choudhury  Primary Care:  Lindsay Medina M.D.    Diagnosis: Adenoid cystic carcinoma    Chief Complaint:  Evaluation for systemic therapy    History of Presenting Illness:  Maria Teresa Haji is a 64 y.o.  woman who presents to the clinic for evaluation for systemic therapy for a diagnosis of recurrent adenoid cystic carcinoma. In 2013, she had a bump in her head which was thought to be a lipoma. She had a biopsy and surgery was done. It was found to be an adenoid cystic carcinoma and she did not go for adjuvant therapy. She was monitored and in 2019 started having occipital region. She was eventually seen by neurosurgery and nothing was found, and so she was referred to PM&R. A lump was detected on the back of her head and that had not been found on any scans. A biopsy was done which was negative, but she then had surgery done by ENT which showed recurrent disease with positive margins. She was referred to medical and radiation oncology accordingly. She is going for re-resection on 01/30/25.     Interval History:  Patient is here for consultation. She is having some discomfort post-operatively and is using gabapentin to help with this. She otherwise is doing okay.     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  Dx w/biopsy.  Excised x2 [facility] in [place], [place]  Since saw oncology, neg PET scan approx 2013. All studies returned negative after excision, and no f/u was indicated.  No radiation/chemo. Just excised.     Anesthesia 01/13/2025    PONV, pt with history of motion sickness    Anxiety 08/15/2019    Arthritis 01/13/2025    general body    Back pain     Blood transfusion without reported diagnosis     Bronchitis 01/13/2025    history of    Cancer (HCC)     posterior scalp pt states \"adenoid cystic " "carcinoma\"    Cataract 2025    IOLOU    Chickenpox     GERD (gastroesophageal reflux disease)     Heart murmur     Heartburn 2025    medicated    Hemorrhoids     Herpes zoster with complication 10/21/2020    Hives     Hoarseness, persistent     Meningitis     Neck pain 2025    from incision    Obesity     Painful joint     Pneumonia 2025    history of    PONV (postoperative nausea and vomiting)     pt with history of motion sickness    Ringing in ears     Sinusitis     Sleep apnea 2025    can't use CPAP at present    Tonsillitis        Past Surgical History:   Procedure Laterality Date    OTHER Bilateral 2025    IOLOU    ND EXC SKIN MALIG >4CM REMAINDR BODY Left 12/10/2024    Procedure: LEFT EXCISION, MALIGNANT LESION (10), SCALP; EXCISED DIAM > 4.0 CM, ADJACENT TISSUE TRANSFER FOREHEAD, CHEEKS, CHIN, MOUTH, NECK, 10 SQ CM OR LESS;  Surgeon: Enrique Fraire M.D.;  Location: SURGERY SAME DAY [facility];  Service: Oral Surgery    ABDOMINAL EXPLORATION      APPENDECTOMY      ARTHROSCOPY, KNEE      CHOLECYSTECTOMY      GYN SURGERY      removed ovary    OTHER      breast lift, tummy tuck    OTHER      removal of fallopian tube for a cyst per patient.    OTHER ORTHOPEDIC SURGERY      b/l knee replacements ,     ND BREAST REDUCTION      PRIMARY C SECTION      TONSILLECTOMY         Social History     Socioeconomic History    Marital status: Single     Spouse name: Not on file    Number of children: Not on file    Years of education: Not on file    Highest education level: Master's degree (e.g., MA, MS, Laith, MEd, MSW, NAREN)   Occupational History    Not on file   Tobacco Use    Smoking status: Former     Current packs/day: 0.00     Average packs/day: 2.0 packs/day for 16.0 years (32.0 ttl pk-yrs)     Types: Cigarettes     Start date: 1977     Quit date:      Years since quittin.0    Smokeless tobacco: Never   Vaping Use    Vaping status: Never Used   Substance " and Sexual Activity    Alcohol use: Yes     Comment: RARELY    Drug use: Never    Sexual activity: Yes     Partners: Male   Other Topics Concern    Not on file   Social History Narrative    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     Social Drivers of Health     Financial Resource Strain: Low Risk  (9/27/2024)    Overall Financial Resource Strain (CARDIA)     Difficulty of Paying Living Expenses: Not hard at all   Food Insecurity: No Food Insecurity (9/27/2024)    Hunger Vital Sign     Worried About Running Out of Food in the Last Year: Never true     Ran Out of Food in the Last Year: Never true   Transportation Needs: No Transportation Needs (9/27/2024)    PRAPARE - Transportation     Lack of Transportation (Medical): No     Lack of Transportation (Non-Medical): No   Physical Activity: Sufficiently Active (9/27/2024)    Exercise Vital Sign     Days of Exercise per Week: 3 days     Minutes of Exercise per Session: 50 min   Stress: Stress Concern Present (9/27/2024)    [place] [employer] - Occupational Stress Questionnaire     Feeling of Stress : To some extent   Social Connections: Moderately Integrated (9/27/2024)    Social Connection and Isolation Panel [NHANES]     Frequency of Communication with Friends and Family: More than three times a week     Frequency of Social Gatherings with Friends and Family: Twice a week     Attends Yazidi Services: Never     Active Member of Clubs or Organizations: Yes     Attends Club or Organization Meetings: 1 to 4 times per year     Marital Status: Living with partner   Intimate Partner Violence: Not on file   Housing Stability: Low Risk  (9/27/2024)    Housing Stability Vital Sign     Unable to Pay for Housing in the Last Year: No     Number of Times Moved in the Last Year: 0     Homeless in the Last Year: No       Family History   Problem Relation Age of Onset    Arthritis Mother     Cancer Mother         skin    Anemia Mother      Hypertension Mother     Obesity Mother     Other Mother         ulcer    Dementia Mother     Hyperlipidemia Mother     Cancer Father         skin, prostate, esophageal    Hypertension Father     Obesity Father     Glaucoma Father     Arthritis Brother     Breast Cancer Maternal Aunt         breast]    Sleep Apnea Neg Hx     Lung Disease Neg Hx        OB History    Para Term  AB Living   1 1 1     2   SAB IAB Ectopic Molar Multiple Live Births           1 2      # Outcome Date GA Lbr Turner/2nd Weight Sex Type Anes PTL Lv   1A Term 82 40w0d  3.43 kg (7 lb 9 oz) F CS-LTranv   AUDRA   1B Term 82 40w0d  3.487 kg (7 lb 11 oz) M CS-LTranv   AUDRA      Obstetric Comments   One pregnancy, had twins (boy/girl)       Allergies as of 01/15/2025    (No Known Allergies)         Current Outpatient Medications:     LORazepam (ATIVAN) 0.5 MG Tab, Take 1 Tablet by mouth 1 time a day as needed for Anxiety for up to 90 days., Disp: 30 Tablet, Rfl: 2    baclofen (LIORESAL) 10 MG Tab, Take 1 Tablet by mouth 3 times a day as needed (back pain)., Disp: 90 Tablet, Rfl: 1    multivitamin Tab, Take 1 Tablet by mouth every day., Disp: , Rfl:     valacyclovir (VALTREX) 1 GM Tab, Take 1,000 mg by mouth every day., Disp: , Rfl:     gabapentin (NEURONTIN) 100 MG Cap, Take 1 Capsule by mouth 3 times a day. (Patient taking differently: Take 100 mg by mouth 3 times a day as needed. ), Disp: 90 Capsule, Rfl: 1    omeprazole (PRILOSEC) 40 MG delayed-release capsule, Take 1 Capsule by mouth every day. Indications: Heartburn (Patient taking differently: Take 40 mg by mouth every day.     Indications: Heartburn), Disp: 90 Capsule, Rfl: 1    traZODone (DESYREL) 50 MG Tab, Take 1 Tablet by mouth at bedtime. (Patient taking differently: Take 50 mg by mouth at bedtime. ), Disp: 90 Tablet, Rfl: 1    meloxicam (MOBIC) 15 MG tablet, Take 1 Tablet by mouth every day. (Patient taking differently: Take 15 mg by mouth 1 time a day as needed.),  "Disp: 90 Tablet, Rfl: 2    Review of Systems:  Review of Systems   Constitutional:  Negative for chills, diaphoresis, fever, malaise/fatigue and weight loss.   HENT:  Negative for hearing loss, nosebleeds, sinus pain and sore throat.    Eyes:  Negative for blurred vision and double vision.   Respiratory:  Negative for cough, sputum production, shortness of breath and wheezing.    Cardiovascular:  Negative for chest pain, palpitations, orthopnea and leg swelling.   Gastrointestinal:  Negative for abdominal pain, blood in stool, constipation, diarrhea, heartburn, melena, nausea and vomiting.   Genitourinary:  Negative for dysuria, frequency, hematuria and urgency.   Musculoskeletal:  Negative for back pain, joint pain and myalgias.   Skin:  Negative for rash.   Neurological:  Negative for dizziness, tingling, weakness and headaches.   Endo/Heme/Allergies:  Does not bruise/bleed easily.   Psychiatric/Behavioral:  The patient is not nervous/anxious and does not have insomnia.           Physical Exam:  Vitals:    01/15/25 1049   BP: 102/54   Pulse: 75   Temp: 36.2 °C (97.1 °F)   TempSrc: Temporal   SpO2: 97%   Weight: 97.1 kg (214 lb)   Height: 1.676 m (5' 5.98\")       DESC; KARNOFSKY SCALE WITH ECOG EQUIVALENT: 100, Fully active, able to carry on all pre-disease performed without restriction (ECOG equivalent 0)    DISTRESS LEVEL: no apparent distress    Physical Exam  Vitals and nursing note reviewed.   Constitutional:       General: She is awake. She is not in acute distress.     Appearance: Normal appearance. She is normal weight. She is not ill-appearing, toxic-appearing or diaphoretic.   HENT:      Head: Normocephalic and atraumatic.      Nose: Nose normal. No congestion.      Mouth/Throat:      Pharynx: Oropharynx is clear. No oropharyngeal exudate or posterior oropharyngeal erythema.   Eyes:      General: No scleral icterus.     Extraocular Movements: Extraocular movements intact.      Conjunctiva/sclera: " Conjunctivae normal.      Pupils: Pupils are equal, round, and reactive to light.   Cardiovascular:      Rate and Rhythm: Normal rate and regular rhythm.      Pulses: Normal pulses.      Heart sounds: Normal heart sounds. No murmur heard.     No friction rub. No gallop.   Pulmonary:      Effort: Pulmonary effort is normal.      Breath sounds: Normal breath sounds. No decreased air movement. No wheezing, rhonchi or rales.   Abdominal:      General: Bowel sounds are normal. There is no distension.      Tenderness: There is no abdominal tenderness.   Musculoskeletal:         General: No deformity. Normal range of motion.      Cervical back: Normal range of motion and neck supple. No tenderness.      Right lower leg: No edema.      Left lower leg: No edema.   Lymphadenopathy:      Cervical: Cervical adenopathy present.      Upper Body:      Right upper body: No axillary adenopathy.      Left upper body: No axillary adenopathy.      Lower Body: No right inguinal adenopathy. No left inguinal adenopathy.   Skin:     General: Skin is warm and dry.      Coloration: Skin is not jaundiced.      Findings: No erythema or rash.   Neurological:      General: No focal deficit present.      Mental Status: She is alert and oriented to person, place, and time.      Sensory: Sensation is intact.      Motor: Motor function is intact. No weakness.      Gait: Gait is intact.   Psychiatric:         Attention and Perception: Attention normal.         Mood and Affect: Mood normal.         Behavior: Behavior normal. Behavior is cooperative.         Thought Content: Thought content normal.         Judgment: Judgment normal.        Depression Screening    Little interest or pleasure in doing things?      Feeling down, depressed , or hopeless?     Trouble falling or staying asleep, or sleeping too much?      Feeling tired or having little energy?      Poor appetite or overeating?      Feeling bad about yourself - or that you are a failure or  have let yourself or your family down?     Trouble concentrating on things, such as reading the newspaper or watching television?     Moving or speaking so slowly that other people could have noticed.  Or the opposite - being so fidgety or restless that you have been moving around a lot more than usual?      Thoughts that you would be better off dead, or of hurting yourself?      Patient Health Questionnaire Score:         If depressive symptoms identified deferred to follow up visit unless specifically addressed in assesment and plan.    Interpretation of PHQ-9 Total Score   Score Severity   1-4 No Depression   5-9 Mild Depression   10-14 Moderate Depression   15-19 Moderately Severe Depression   20-27 Severe Depression    Labs:  Pre-Admission Testing on 01/15/2025   Component Date Value Ref Range Status    WBC 01/15/2025 6.0  4.8 - 10.8 K/uL Final    RBC 01/15/2025 4.50  4.20 - 5.40 M/uL Final    Hemoglobin 01/15/2025 15.1  12.0 - 16.0 g/dL Final    Hematocrit 01/15/2025 43.4  37.0 - 47.0 % Final    MCV 01/15/2025 96.4  81.4 - 97.8 fL Final    MCH 01/15/2025 33.6 (H)  27.0 - 33.0 pg Final    MCHC 01/15/2025 34.8  32.2 - 35.5 g/dL Final    RDW 01/15/2025 42.0  35.9 - 50.0 fL Final    Platelet Count 01/15/2025 219  164 - 446 K/uL Final    MPV 01/15/2025 9.2  9.0 - 12.9 fL Final    Sodium 01/15/2025 139  135 - 145 mmol/L Final    Potassium 01/15/2025 4.6  3.6 - 5.5 mmol/L Final    Chloride 01/15/2025 102  96 - 112 mmol/L Final    Co2 01/15/2025 26  20 - 33 mmol/L Final    Glucose 01/15/2025 92  65 - 99 mg/dL Final    Bun 01/15/2025 15  8 - 22 mg/dL Final    Creatinine 01/15/2025 0.92  0.50 - 1.40 mg/dL Final    Calcium 01/15/2025 9.9  8.5 - 10.5 mg/dL Final    Anion Gap 01/15/2025 11.0  7.0 - 16.0 Final    Report 01/15/2025    Preliminary                    Value:[facility]    Test Date:  2025-01-15  Pt Name:    EDD BREWER                  Department: [facility]  MRN:        [id number]                       Room:  Gender:     Female                       Technician: JOSE  :        [id number]                   Requested By:DANISH ONEAL  Order #:    [id number]                    Reading MD:    Measurements  Intervals                                Axis  Rate:       61                           P:          46  MS:         135                          QRS:        61  QRSD:       112                          T:          57  QT:         431  QTc:        434    Interpretive Statements  Sinus rhythm  Borderline intraventricular conduction delay  Abnormal R-wave progression, early transition  Compared to ECG 09/10/2024 11:23:36  No significant changes      GFR (CKD-EPI) 01/15/2025 69  >60 mL/min/1.73 m 2 Final    Comment: Estimated Glomerular Filtration Rate is calculated using  race neutral CKD-EPI  equation per NKF-ASN recommendations.         Imaging:   All listed images below have been independently reviewed by me. I agree with the findings as summarized below:    DS-BONE DENSITY STUDY (DEXA)    Result Date: 2025 1:18 PM HISTORY/REASON FOR EXAM:  Postmenopausal, family history of osteoporosis and hip fracture in parent TECHNIQUE/EXAM DESCRIPTION AND NUMBER OF VIEWS: Dual x-ray bone densitometry was performed from the L1 through L4 levels and from the proximal left femur utilizing the GE Prodigy unit. COMPARISON: Bone densitometry scan 10/30/2019 FINDINGS: The lumbar spine has a mean bone mineral density of 1.557 g/cm2, with a T score of 3.1 and a Z score of 3.6. The proximal left femur has a mean bone mineral density of 1.034 g/cm2, with a T score of 0.2 and a Z score of 0.6. When compared with the most recent study dated 10/30/2019, there has been a 6.0% increase in the bone mineral density of the lumbar spine and a 0.4% increase in the bone mineral density of the left femur.     According to the World Health Organization classification, bone mineral density of this patient is normal for the lumbar  spine and normal for the left femur. Increase in bone density in the lumbar spine since the prior exam is statistically significant. Increase in bone density in the left femur since the prior exam is not statistically significant. 10-year Probability of Fracture: Major Osteoporotic     12.8% Hip     0.3% Population      [place] () Based on left femur neck BMD INTERPRETING LOCATION: [address] [place], [place] [address], [address]       Pathology:  ADDENDUM:     This case is being addended to provide additional information as   follows after discussion with Dr. Julien:     Perineural invasion by tumor is identified. The diagnosis remains   unchanged.     Addendum Signed By:  Mayela Griffith MD   Addendum Date:  1/2/2025   Original Report Date:  12/13/2024     FINAL DIAGNOSIS:     A. Left posterior scalp, excision:          Adenoid cystic carcinoma with subcutaneous lymph node           involvement (see comment).          Tumor focally involves the deep and 3 o'clock deep soft tissue           margins of resection.     B. Occipital skull periosteal margin:          Negative for carcinoma.     Comment: Part A: Sections demonstrate subcutaneous tissue involvement   by a tumor morphologically consistent with adenoid cystic carcinoma.   There is a lymph node within the subcutaneous tissue that is largely   effaced by tumor, and it is uncertain if the findings could represent   metastatic tumor to a subcutaneous lymph node with extranodal extension   of tumor into the surrounding tissue or tumor within subcutaneous   tissue that directly extended into a subcutaneous lymph node. In   addition, the tumor focally extends to the deep and deep/ 3 o'clock   soft tissue margins of resection.     The occipital skull periosteal margin (Part B) is negative for tumor,   and clinical correlation is recommended to determine if this represents   the true margin of resection.                                         Diagnosis performed by:                                        BRIAN CANALES MD     Assessment & Plan:  1. Adenoid cystic carcinoma (HCC)            This is a 64 year old  woman with recurrent adenoid cystic carcinoma, s/p resection in 2013, and then recurrence of disease detected via resection last month.     Current Diagnosis and Staging: Adenoid cystic carcinoma, [id number] stage IV disease    Treatment Plan: Re-resection, adjuvant radiation therapy planned. Hold off on systemic therapy for now.     Treatment Citation: NCCN    Plan of Care:    Primary Therapy: Re-resection and adjuvant XRT  Supportive Therapy: Medical management per primary  Toxicity: Patient is getting antineoplastic therapy and needs monitoring of blood counts, hepatic function, and renal function due to potential for organ dysfunction.   Labs: CBC with diff, CMP monitoring  Imaging: Post-treatment scans per rad onc  Treatment Planning: Patient has slow-growing disease which has followed a more indolent course, so would plan on re-resection and then radiation therapy alone. If her disease because more aggressive or advanced, can offer chemotherapy.   Consultations: Radiation oncology (Dr. Julien); ENT (Dr. Fraire)  Code Status: Full  Miscellaneous: NA  Return for Follow Up: PRN    Any questions and concerns raised by the patient were answered to the best of my ability. Thank you for allowing me to participate in the care for this patient. Please feel free to contact me for any questions or concerns.     Total time spent on chart review, clinic encounter, and documentation: 61 minutes.

## 2025-01-30 ENCOUNTER — ANESTHESIA (OUTPATIENT)
Dept: SURGERY | Facility: MEDICAL CENTER | Age: 65
End: 2025-01-30
Payer: MEDICARE

## 2025-01-30 ENCOUNTER — ANESTHESIA EVENT (OUTPATIENT)
Dept: SURGERY | Facility: MEDICAL CENTER | Age: 65
End: 2025-01-30
Payer: MEDICARE

## 2025-01-30 ENCOUNTER — HOSPITAL ENCOUNTER (OUTPATIENT)
Facility: MEDICAL CENTER | Age: 65
End: 2025-01-30
Attending: OTOLARYNGOLOGY | Admitting: OTOLARYNGOLOGY
Payer: MEDICARE

## 2025-01-30 ENCOUNTER — PHARMACY VISIT (OUTPATIENT)
Dept: PHARMACY | Facility: MEDICAL CENTER | Age: 65
End: 2025-01-30
Payer: COMMERCIAL

## 2025-01-30 VITALS
TEMPERATURE: 97.7 F | OXYGEN SATURATION: 94 % | HEART RATE: 68 BPM | HEIGHT: 66 IN | DIASTOLIC BLOOD PRESSURE: 62 MMHG | RESPIRATION RATE: 25 BRPM | BODY MASS INDEX: 34.05 KG/M2 | SYSTOLIC BLOOD PRESSURE: 138 MMHG | WEIGHT: 211.86 LBS

## 2025-01-30 DIAGNOSIS — C80.1 ADENOID CYSTIC CARCINOMA (HCC): ICD-10-CM

## 2025-01-30 DIAGNOSIS — Z85.9 HISTORY OF ADENOID CYSTIC CARCINOMA: ICD-10-CM

## 2025-01-30 DIAGNOSIS — R22.0 LUMP OF SCALP: ICD-10-CM

## 2025-01-30 LAB
GRAM STN SPEC: NORMAL
PATHOLOGY CONSULT NOTE: NORMAL
SIGNIFICANT IND 70042: NORMAL
SITE SITE: NORMAL
SOURCE SOURCE: NORMAL

## 2025-01-30 PROCEDURE — 700102 HCHG RX REV CODE 250 W/ 637 OVERRIDE(OP): Performed by: ANESTHESIOLOGY

## 2025-01-30 PROCEDURE — 160035 HCHG PACU - 1ST 60 MINS PHASE I: Performed by: OTOLARYNGOLOGY

## 2025-01-30 PROCEDURE — 87070 CULTURE OTHR SPECIMN AEROBIC: CPT

## 2025-01-30 PROCEDURE — 88307 TISSUE EXAM BY PATHOLOGIST: CPT | Performed by: PATHOLOGY

## 2025-01-30 PROCEDURE — 160002 HCHG RECOVERY MINUTES (STAT): Performed by: OTOLARYNGOLOGY

## 2025-01-30 PROCEDURE — RXMED WILLOW AMBULATORY MEDICATION CHARGE: Performed by: OTOLARYNGOLOGY

## 2025-01-30 PROCEDURE — 160048 HCHG OR STATISTICAL LEVEL 1-5: Performed by: OTOLARYNGOLOGY

## 2025-01-30 PROCEDURE — 700105 HCHG RX REV CODE 258: Performed by: OTOLARYNGOLOGY

## 2025-01-30 PROCEDURE — 700101 HCHG RX REV CODE 250: Performed by: OTOLARYNGOLOGY

## 2025-01-30 PROCEDURE — 160039 HCHG SURGERY MINUTES - EA ADDL 1 MIN LEVEL 3: Performed by: OTOLARYNGOLOGY

## 2025-01-30 PROCEDURE — 700111 HCHG RX REV CODE 636 W/ 250 OVERRIDE (IP): Mod: JZ | Performed by: ANESTHESIOLOGY

## 2025-01-30 PROCEDURE — 87186 SC STD MICRODIL/AGAR DIL: CPT | Mod: 91

## 2025-01-30 PROCEDURE — 160009 HCHG ANES TIME/MIN: Performed by: OTOLARYNGOLOGY

## 2025-01-30 PROCEDURE — A9270 NON-COVERED ITEM OR SERVICE: HCPCS | Performed by: ANESTHESIOLOGY

## 2025-01-30 PROCEDURE — 87077 CULTURE AEROBIC IDENTIFY: CPT | Mod: 91

## 2025-01-30 PROCEDURE — 87205 SMEAR GRAM STAIN: CPT

## 2025-01-30 PROCEDURE — 87075 CULTR BACTERIA EXCEPT BLOOD: CPT

## 2025-01-30 PROCEDURE — 88307 TISSUE EXAM BY PATHOLOGIST: CPT | Mod: 26 | Performed by: PATHOLOGY

## 2025-01-30 PROCEDURE — 160046 HCHG PACU - 1ST 60 MINS PHASE II: Performed by: OTOLARYNGOLOGY

## 2025-01-30 PROCEDURE — 700111 HCHG RX REV CODE 636 W/ 250 OVERRIDE (IP): Performed by: ANESTHESIOLOGY

## 2025-01-30 PROCEDURE — 700101 HCHG RX REV CODE 250: Performed by: ANESTHESIOLOGY

## 2025-01-30 PROCEDURE — 160028 HCHG SURGERY MINUTES - 1ST 30 MINS LEVEL 3: Performed by: OTOLARYNGOLOGY

## 2025-01-30 PROCEDURE — 160025 RECOVERY II MINUTES (STATS): Performed by: OTOLARYNGOLOGY

## 2025-01-30 RX ORDER — SODIUM CHLORIDE, SODIUM LACTATE, POTASSIUM CHLORIDE, CALCIUM CHLORIDE 600; 310; 30; 20 MG/100ML; MG/100ML; MG/100ML; MG/100ML
INJECTION, SOLUTION INTRAVENOUS CONTINUOUS
Status: DISCONTINUED | OUTPATIENT
Start: 2025-01-30 | End: 2025-01-30 | Stop reason: HOSPADM

## 2025-01-30 RX ORDER — EPHEDRINE SULFATE 50 MG/ML
5 INJECTION, SOLUTION INTRAVENOUS
Status: DISCONTINUED | OUTPATIENT
Start: 2025-01-30 | End: 2025-01-30 | Stop reason: HOSPADM

## 2025-01-30 RX ORDER — LIDOCAINE HYDROCHLORIDE 10 MG/ML
INJECTION, SOLUTION EPIDURAL; INFILTRATION; INTRACAUDAL; PERINEURAL
Status: DISCONTINUED
Start: 2025-01-30 | End: 2025-01-30 | Stop reason: HOSPADM

## 2025-01-30 RX ORDER — ONDANSETRON 2 MG/ML
4 INJECTION INTRAMUSCULAR; INTRAVENOUS
Status: DISCONTINUED | OUTPATIENT
Start: 2025-01-30 | End: 2025-01-30 | Stop reason: HOSPADM

## 2025-01-30 RX ORDER — CEFAZOLIN SODIUM 1 G/3ML
INJECTION, POWDER, FOR SOLUTION INTRAMUSCULAR; INTRAVENOUS PRN
Status: DISCONTINUED | OUTPATIENT
Start: 2025-01-30 | End: 2025-01-30 | Stop reason: SURG

## 2025-01-30 RX ORDER — CEPHALEXIN 500 MG/1
500 CAPSULE ORAL 3 TIMES DAILY
Qty: 30 CAPSULE | Refills: 0 | Status: SHIPPED | OUTPATIENT
Start: 2025-01-30

## 2025-01-30 RX ORDER — OXYCODONE HCL 5 MG/5 ML
5 SOLUTION, ORAL ORAL
Status: COMPLETED | OUTPATIENT
Start: 2025-01-30 | End: 2025-01-30

## 2025-01-30 RX ORDER — OXYCODONE HCL 5 MG/5 ML
10 SOLUTION, ORAL ORAL
Status: COMPLETED | OUTPATIENT
Start: 2025-01-30 | End: 2025-01-30

## 2025-01-30 RX ORDER — LIDOCAINE HYDROCHLORIDE 20 MG/ML
INJECTION, SOLUTION EPIDURAL; INFILTRATION; INTRACAUDAL; PERINEURAL PRN
Status: DISCONTINUED | OUTPATIENT
Start: 2025-01-30 | End: 2025-01-30 | Stop reason: SURG

## 2025-01-30 RX ORDER — OXYCODONE HYDROCHLORIDE 5 MG/1
5 TABLET ORAL EVERY 4 HOURS PRN
Qty: 30 TABLET | Refills: 0 | Status: SHIPPED | OUTPATIENT
Start: 2025-01-30 | End: 2025-02-04

## 2025-01-30 RX ORDER — LIDOCAINE HYDROCHLORIDE 40 MG/ML
SOLUTION TOPICAL PRN
Status: DISCONTINUED | OUTPATIENT
Start: 2025-01-30 | End: 2025-01-30 | Stop reason: SURG

## 2025-01-30 RX ORDER — ONDANSETRON 2 MG/ML
INJECTION INTRAMUSCULAR; INTRAVENOUS PRN
Status: DISCONTINUED | OUTPATIENT
Start: 2025-01-30 | End: 2025-01-30 | Stop reason: SURG

## 2025-01-30 RX ORDER — LIDOCAINE HYDROCHLORIDE AND EPINEPHRINE 10; 10 MG/ML; UG/ML
INJECTION, SOLUTION INFILTRATION; PERINEURAL
Status: DISCONTINUED | OUTPATIENT
Start: 2025-01-30 | End: 2025-01-30 | Stop reason: HOSPADM

## 2025-01-30 RX ORDER — HYDRALAZINE HYDROCHLORIDE 20 MG/ML
5 INJECTION INTRAMUSCULAR; INTRAVENOUS
Status: DISCONTINUED | OUTPATIENT
Start: 2025-01-30 | End: 2025-01-30 | Stop reason: HOSPADM

## 2025-01-30 RX ORDER — EPINEPHRINE 1 MG/ML(1)
AMPUL (ML) INJECTION
Status: DISCONTINUED
Start: 2025-01-30 | End: 2025-01-30 | Stop reason: HOSPADM

## 2025-01-30 RX ORDER — ALBUTEROL SULFATE 5 MG/ML
2.5 SOLUTION RESPIRATORY (INHALATION)
Status: DISCONTINUED | OUTPATIENT
Start: 2025-01-30 | End: 2025-01-30 | Stop reason: HOSPADM

## 2025-01-30 RX ORDER — DIPHENHYDRAMINE HYDROCHLORIDE 50 MG/ML
12.5 INJECTION, SOLUTION INTRAMUSCULAR; INTRAVENOUS
Status: DISCONTINUED | OUTPATIENT
Start: 2025-01-30 | End: 2025-01-30 | Stop reason: HOSPADM

## 2025-01-30 RX ORDER — MIDAZOLAM HYDROCHLORIDE 1 MG/ML
INJECTION INTRAMUSCULAR; INTRAVENOUS PRN
Status: DISCONTINUED | OUTPATIENT
Start: 2025-01-30 | End: 2025-01-30 | Stop reason: SURG

## 2025-01-30 RX ORDER — BACITRACIN ZINC 500 [USP'U]/G
OINTMENT TOPICAL
Status: DISCONTINUED
Start: 2025-01-30 | End: 2025-01-30 | Stop reason: HOSPADM

## 2025-01-30 RX ORDER — DEXAMETHASONE SODIUM PHOSPHATE 4 MG/ML
INJECTION, SOLUTION INTRA-ARTICULAR; INTRALESIONAL; INTRAMUSCULAR; INTRAVENOUS; SOFT TISSUE PRN
Status: DISCONTINUED | OUTPATIENT
Start: 2025-01-30 | End: 2025-01-30 | Stop reason: SURG

## 2025-01-30 RX ADMIN — MIDAZOLAM HYDROCHLORIDE 1 MG: 1 INJECTION, SOLUTION INTRAMUSCULAR; INTRAVENOUS at 12:04

## 2025-01-30 RX ADMIN — PROPOFOL 100 MG: 10 INJECTION, EMULSION INTRAVENOUS at 12:09

## 2025-01-30 RX ADMIN — DEXAMETHASONE SODIUM PHOSPHATE 4 MG: 4 INJECTION INTRA-ARTICULAR; INTRALESIONAL; INTRAMUSCULAR; INTRAVENOUS; SOFT TISSUE at 12:13

## 2025-01-30 RX ADMIN — SODIUM CHLORIDE, POTASSIUM CHLORIDE, SODIUM LACTATE AND CALCIUM CHLORIDE: 600; 310; 30; 20 INJECTION, SOLUTION INTRAVENOUS at 11:15

## 2025-01-30 RX ADMIN — ROCURONIUM BROMIDE 50 MG: 10 INJECTION, SOLUTION INTRAVENOUS at 12:10

## 2025-01-30 RX ADMIN — OXYCODONE HYDROCHLORIDE 10 MG: 5 SOLUTION ORAL at 13:18

## 2025-01-30 RX ADMIN — LIDOCAINE HYDROCHLORIDE 60 MG: 20 INJECTION, SOLUTION EPIDURAL; INFILTRATION; INTRACAUDAL; PERINEURAL at 12:09

## 2025-01-30 RX ADMIN — SUGAMMADEX 200 MG: 100 INJECTION, SOLUTION INTRAVENOUS at 12:56

## 2025-01-30 RX ADMIN — ONDANSETRON 4 MG: 2 INJECTION INTRAMUSCULAR; INTRAVENOUS at 12:56

## 2025-01-30 RX ADMIN — CEFAZOLIN 2 G: 1 INJECTION, POWDER, FOR SOLUTION INTRAMUSCULAR; INTRAVENOUS at 12:13

## 2025-01-30 RX ADMIN — LIDOCAINE HYDROCHLORIDE 4 ML: 40 SOLUTION TOPICAL at 12:10

## 2025-01-30 RX ADMIN — FENTANYL CITRATE 50 MCG: 50 INJECTION, SOLUTION INTRAMUSCULAR; INTRAVENOUS at 12:09

## 2025-01-30 RX ADMIN — FENTANYL CITRATE 50 MCG: 50 INJECTION, SOLUTION INTRAMUSCULAR; INTRAVENOUS at 13:19

## 2025-01-30 ASSESSMENT — PAIN DESCRIPTION - PAIN TYPE
TYPE: SURGICAL PAIN

## 2025-01-30 ASSESSMENT — FIBROSIS 4 INDEX: FIB4 SCORE: 1.17

## 2025-01-30 NOTE — ANESTHESIA PROCEDURE NOTES
Airway    Date/Time: 1/30/2025 12:11 PM    Performed by: Vinay Colby M.D.  Authorized by: Vinay Colby M.D.    Location:  OR  Urgency:  Elective  Difficult Airway: No    Indications for Airway Management:  Anesthesia      Spontaneous Ventilation: absent    Sedation Level:  Deep  Preoxygenated: Yes    Patient Position:  Sniffing  Mask Difficulty Assessment:  1 - vent by mask  Final Airway Type:  Endotracheal airway  Final Endotracheal Airway:  ETT  Cuffed: Yes    Technique Used for Successful ETT Placement:  Direct laryngoscopy    Insertion Site:  Oral  Blade Type:  Nieto  Laryngoscope Blade/Videolaryngoscope Blade Size:  2  ETT Size (mm):  7.0  Measured from:  Teeth  ETT to Teeth (cm):  21  Placement Verified by: auscultation and capnometry    Cormack-Lehane Classification:  Grade I - full view of glottis  Number of Attempts at Approach:  1

## 2025-01-30 NOTE — ANESTHESIA POSTPROCEDURE EVALUATION
Patient: Maria Teresa Haji    Procedure Summary       Date: 01/30/25 Room / Location: [facility] ROOM 24 / SURGERY SAME DAY [facility]    Anesthesia Start: 1204 Anesthesia Stop: 1311    Procedure: LEFT EXCISION TUMOR; SOFT TISSUE; NECK; DEEP; SUBFASCIAL (Left: Neck) Diagnosis: (Other and unspecified malignant neoplasm of scalp and skin of neck)    Surgeons: Enrique Fraire M.D. Responsible Provider: Vinay Colby M.D.    Anesthesia Type: general ASA Status: 2            Final Anesthesia Type: general  Last vitals  BP   Blood Pressure : (!) 177/85    Temp   36.5 °C (97.7 °F)    Pulse   75   Resp   18    SpO2   92 %      Anesthesia Post Evaluation    Patient location during evaluation: PACU  Patient participation: complete - patient participated  Level of consciousness: sleepy but conscious    Airway patency: patent  Anesthetic complications: no  Cardiovascular status: hemodynamically stable  Respiratory status: acceptable  Hydration status: balanced    PONV: none          No notable events documented.     Nurse Pain Score: 4 (NPRS)

## 2025-01-30 NOTE — DISCHARGE INSTRUCTIONS
HOME CARE INSTRUCTIONS    ACTIVITY: Rest and take it easy for the first 24 hours.  A responsible adult is recommended to remain with you during that time.  It is normal to feel sleepy.  We encourage you to not do anything that requires balance, judgment or coordination.    FOR 24 HOURS DO NOT:  Drive, operate machinery or run household appliances.  Drink beer or alcoholic beverages.  Make important decisions or sign legal documents.    SPECIAL INSTRUCTIONS: Ok to shower tomorrow, pat incision dry after shower.  Avoid submerging incision in water (hot tub, pool, bath) until skin is completely healed. Avoid rubbing incisions or getting any powders / lotions on incision.     DIET: To avoid nausea, slowly advance diet as tolerated, avoiding spicy or greasy foods for the first day.  Add more substantial food to your diet according to your physician's instructions.  Babies can be fed formula or breast milk as soon as they are hungry.  INCREASE FLUIDS AND FIBER TO AVOID CONSTIPATION.    MEDICATIONS: Resume taking daily medication.  Take prescribed pain medication with food.  If no medication is prescribed, you may take non-aspirin pain medication if needed.  PAIN MEDICATION CAN BE VERY CONSTIPATING.  Take a stool softener or laxative such as senokot, pericolace, or milk of magnesia if needed.    Last pain medication given: Oxycodone given at 1:15 pm.  Can take next dose after 5:15 pm, as needed for pain.   Ok to take tylenol / ibuprofen at any time.     A follow-up appointment should be arranged with your doctor; call to schedule.    You should CALL YOUR PHYSICIAN if you develop:  Fever greater than 101 degrees F.  Pain not relieved by medication, or persistent nausea or vomiting.  Excessive bleeding (blood soaking through dressing) or unexpected drainage from the wound.  Extreme redness or swelling around the incision site, drainage of pus or foul smelling drainage.  Inability to urinate or empty your bladder within 8  hours.  Problems with breathing or chest pain.    You should call 911 if you develop problems with breathing or chest pain.  If you are unable to contact your doctor or surgical center, you should go to the nearest emergency room or urgent care center.    Physician's telephone #: Dr. Fraire [phone]    MILD FLU-LIKE SYMPTOMS ARE NORMAL.  YOU MAY EXPERIENCE GENERALIZED MUSCLE ACHES, THROAT IRRITATION, HEADACHE AND/OR SOME NAUSEA.    If any questions arise, call your doctor.  If your doctor is not available, please feel free to call the Surgical Center at [phone].    The Center is open Monday through Friday from 7AM to 7PM.      A registered nurse may call you a few days after your surgery to see how you are doing after your procedure.    You may also receive a survey in the mail within the next two weeks and we ask that you take a few moments to complete the survey and return it to us.  Our goal is to provide you with very good care and we value your comments.

## 2025-01-30 NOTE — ANESTHESIA PREPROCEDURE EVALUATION
" Case: [id number] Date/Time: 01/30/25 1230    Procedure: LEFT EXCISION TUMOR; SOFT TISSUE; NECK; DEEP; SUBFASCIAL (Neck)    Anesthesia type: General    Pre-op diagnosis: Other and unspecified malignant neoplasm of scalp and skin of neck    Location: [facility] ROOM 24 / SURGERY SAME DAY [facility]    Surgeons: Enrique Fraire M.D.            Relevant Problems   ANESTHESIA   (positive) Other sleep apnea      CARDIAC   (positive) Nonrheumatic aortic valve insufficiency      GI   (positive) Gastroesophageal reflux disease without esophagitis         (positive) Hepatomegaly      Other   (positive) Adenoid cystic carcinoma (HCC)   (positive) Ankylosing spondylitis of lumbar region (HCC)   (positive) Prediabetes     Anes H&P:  PAST MEDICAL HISTORY:   64 y.o. female who presents for Procedure(s):  LEFT EXCISION TUMOR; SOFT TISSUE; NECK; DEEP; SUBFASCIAL.  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  Dx w/biopsy.  Excised x2 [facility] in [place], [place]  Since saw oncology, neg PET scan approx 2013. All studies returned negative after excision, and no f/u was indicated.  No radiation/chemo. Just excised.     Anesthesia 01/13/2025    PONV, pt with history of motion sickness    Anxiety 08/15/2019    Arthritis 01/13/2025    general body    Back pain     Blood transfusion without reported diagnosis     Bronchitis 01/13/2025    history of    Cancer (HCC)     posterior scalp pt states \"adenoid cystic carcinoma\"    Cataract 01/13/2025    IOLOU    Chickenpox     GERD (gastroesophageal reflux disease)     Heart murmur 1990    Heartburn 01/13/2025    medicated    Hemorrhoids     Herpes zoster with complication 10/21/2020    Hives     Hoarseness, persistent     Meningitis     Neck pain 01/13/2025    from incision    Obesity     Painful joint     Pneumonia " 2025    history of    PONV (postoperative nausea and vomiting) 2013    pt with history of motion sickness    Ringing in ears     Sinusitis     Sleep apnea 2025    can't use CPAP at present    Tonsillitis        SMOKING/ALCOHOL/RECREATIONAL DRUG USE:  Social History     Tobacco Use    Smoking status: Former     Current packs/day: 0.00     Average packs/day: 2.0 packs/day for 16.0 years (32.0 ttl pk-yrs)     Types: Cigarettes     Start date: 1977     Quit date:      Years since quittin.1    Smokeless tobacco: Never   Vaping Use    Vaping status: Never Used   Substance Use Topics    Alcohol use: Yes     Comment: RARELY    Drug use: Never     Social History     Substance and Sexual Activity   Drug Use Never       PAST SURGICAL HISTORY:  Past Surgical History:   Procedure Laterality Date    OTHER Bilateral 2025    IOLOU    NE EXC SKIN MALIG >4CM REMAINDR BODY Left 12/10/2024    Procedure: LEFT EXCISION, MALIGNANT LESION (10), SCALP; EXCISED DIAM > 4.0 CM, ADJACENT TISSUE TRANSFER FOREHEAD, CHEEKS, CHIN, MOUTH, NECK, 10 SQ CM OR LESS;  Surgeon: Enrique Fraire M.D.;  Location: SURGERY SAME DAY [facility];  Service: Oral Surgery    ABDOMINAL EXPLORATION      APPENDECTOMY      ARTHROSCOPY, KNEE      CHOLECYSTECTOMY      GYN SURGERY      removed ovary    OTHER      breast lift, tummy tuck    OTHER      removal of fallopian tube for a cyst per patient.    OTHER ORTHOPEDIC SURGERY      b/l knee replacements ,     NE BREAST REDUCTION      PRIMARY C SECTION      TONSILLECTOMY         ALLERGIES:   No Known Allergies    MEDICATIONS:  No current facility-administered medications on file prior to encounter.     Current Outpatient Medications on File Prior to Encounter   Medication Sig Dispense Refill    multivitamin Tab Take 1 Tablet by mouth every day.      valacyclovir (VALTREX) 1 GM Tab Take 1,000 mg by mouth every day.      gabapentin (NEURONTIN) 100 MG Cap Take 1 Capsule by mouth 3 times a  day. (Patient taking differently: Take 100 mg by mouth 3 times a day as needed.   ) 90 Capsule 1    omeprazole (PRILOSEC) 40 MG delayed-release capsule Take 1 Capsule by mouth every day. Indications: Heartburn (Patient taking differently: Take 40 mg by mouth every day.       Indications: Heartburn) 90 Capsule 1    traZODone (DESYREL) 50 MG Tab Take 1 Tablet by mouth at bedtime. (Patient taking differently: Take 50 mg by mouth at bedtime.   ) 90 Tablet 1    meloxicam (MOBIC) 15 MG tablet Take 1 Tablet by mouth every day. (Patient taking differently: Take 15 mg by mouth 1 time a day as needed.) 90 Tablet 2       LABS:  Lab Results   Component Value Date/Time    HEMOGLOBIN 15.1 01/15/2025 1004    HEMATOCRIT 43.4 01/15/2025 1004    WBC 6.0 01/15/2025 1004     Lab Results   Component Value Date/Time    SODIUM 139 01/15/2025 1004    POTASSIUM 4.6 01/15/2025 1004    CHLORIDE 102 01/15/2025 1004    CO2 26 01/15/2025 1004    GLUCOSE 92 01/15/2025 1004    BUN 15 01/15/2025 1004    CALCIUM 9.9 01/15/2025 1004         PREVIOUS ANESTHETICS: See EMR  __________________________________________      Physical Exam    Airway   Mallampati: II  TM distance: >3 FB  Neck ROM: full       Cardiovascular - normal exam  Rhythm: regular  Rate: normal  (-) murmur     Dental - normal exam           Pulmonary - normal exam  Breath sounds clear to auscultation     Abdominal    Neurological - normal exam                   Anesthesia Plan    ASA 2       Plan - general       Airway plan will be ETT          Induction: intravenous    Postoperative Plan: Postoperative administration of opioids is intended.    Pertinent diagnostic labs and testing reviewed    Informed Consent:    Anesthetic plan and risks discussed with patient.    Use of blood products discussed with: patient whom consented to blood products.

## 2025-01-30 NOTE — ANESTHESIA TIME REPORT
Anesthesia Start and Stop Event Times       Date Time Event    1/30/2025 1148 Ready for Procedure     1204 Anesthesia Start     1311 Anesthesia Stop          Responsible Staff  01/30/25      Name Role Begin End    Vinay Colby M.D. Anesth 1204 1311          Overtime Reason:  no overtime (within assigned shift)    Comments:

## 2025-01-30 NOTE — OR NURSING
1304 - Pt to PACU 5 from OR. Bedside report from anesthesiologist and RN. Attached to monitoring, VSS, breathing is calm and unlabored. Titrated from 6L mask to room air.  Denies pain or nausea at this time.     1315 - Pt has had some water, VSS, room air, family bedside to see patient.     1326 - Pt medicated for reported pain to head incision / throat.  Using cold pack on incision, pt having water, crackers and popsicle.     1422 - Pt stable to discharge. Instructions given, patient and granddaughter,  verbalize understanding. IV And armbands removed. Taken via wheelchair to car. Pt has all belongings with them. Given cold pack for home use.

## 2025-01-30 NOTE — OP REPORT
Operative report    PreOp Diagnosis: Adenoid cystic carcinoma      PostOp Diagnosis: Adenoid cystic carcinoma      Procedure(s):  LEFT EXCISION TUMOR; SOFT TISSUE; NECK; DEEP; SUBFASCIAL - Wound Class: Dirty or Infected    Surgeon(s):  Enrique Fraire M.D.    Anesthesiologist/Type of Anesthesia:  Anesthesiologist: Vinay Colby M.D./General    Surgical Staff:  Circulator: Ade Lmia R.N.  Limb Vaughn: Evelyn Hood R.N.  Relief Circulator: Kecia Mishra R.N.  Scrub Person: Juana Zurita    Specimens removed if any:  ID Type Source Tests Collected by Time Destination   1 : Left scalp wound Other Other AEROBIC/ANAEROBIC CULTURE (SURGERY) Enrique Fraire M.D. 1/30/2025 12:30 PM    A : Left posterior upper neck, long lateral short superior Tissue Neck PATHOLOGY SPECIMEN Enrique Fraire M.D. 1/30/2025 12:42 PM    Description of procedure: Patient was placed under general endotracheal anesthesia and then placed in the lateral decubitus position with the right side down.  The area was clipped prepped and draped in usual sterile fashion.  Prior to making the incision it was noted that was a small amount of pus coming from the old wound.  This probably represented a suture abscess under the skin.  In any event the wound was reopened and dissection extended towards the 3 o'clock position which was the medial direction.  The dissection went down to the skull and laterally it also went inferiorly into the upper neck and the tissue in the 3 o'clock position was reexcised.  Surgical clips were placed to angel the inferior and superior and medial extent of the resection.  The specimen was marked and sent to pathology.  A branch of the occipital artery was visualized and coagulated.  Wound was then closed in layers with a 3-0 Monocryl used for the deeper layer and a subcuticular Quill stitch for the skin and dermal adhesive as the final layer.    Estimated Blood Loss: 5 cc    Findings: As per  dictation    Complications: Wound type dirty due to pus present at the beginning of the case.

## 2025-01-31 LAB
BACTERIA SPEC ANAEROBE CULT: NORMAL
BACTERIA WND AEROBE CULT: ABNORMAL
BACTERIA WND AEROBE CULT: ABNORMAL
GRAM STN SPEC: ABNORMAL
SIGNIFICANT IND 70042: ABNORMAL
SIGNIFICANT IND 70042: NORMAL
SITE SITE: ABNORMAL
SITE SITE: NORMAL
SOURCE SOURCE: ABNORMAL
SOURCE SOURCE: NORMAL

## 2025-02-02 LAB
BACTERIA SPEC ANAEROBE CULT: NORMAL
BACTERIA WND AEROBE CULT: ABNORMAL
GRAM STN SPEC: ABNORMAL
SIGNIFICANT IND 70042: ABNORMAL
SIGNIFICANT IND 70042: NORMAL
SITE SITE: ABNORMAL
SITE SITE: NORMAL
SOURCE SOURCE: ABNORMAL
SOURCE SOURCE: NORMAL

## 2025-02-05 DIAGNOSIS — B02.29 NEURALGIA, POSTHERPETIC: ICD-10-CM

## 2025-02-05 RX ORDER — GABAPENTIN 100 MG/1
100 CAPSULE ORAL 3 TIMES DAILY
Qty: 90 CAPSULE | Refills: 1 | Status: SHIPPED | OUTPATIENT
Start: 2025-02-05

## 2025-02-11 ENCOUNTER — HOSPITAL ENCOUNTER (OUTPATIENT)
Dept: RADIOLOGY | Facility: MEDICAL CENTER | Age: 65
End: 2025-02-11
Attending: OTOLARYNGOLOGY
Payer: MEDICARE

## 2025-02-11 DIAGNOSIS — C44.40 MALIGNANT NEOPLASM OF SCALP AND SKIN OF NECK: ICD-10-CM

## 2025-02-11 PROCEDURE — 76536 US EXAM OF HEAD AND NECK: CPT

## 2025-02-17 ASSESSMENT — LIFESTYLE VARIABLES
SMOKING_STATUS: NO
TOBACCO_USE: NO

## 2025-02-20 ENCOUNTER — HOSPITAL ENCOUNTER (OUTPATIENT)
Dept: RADIATION ONCOLOGY | Facility: MEDICAL CENTER | Age: 65
End: 2025-02-20
Attending: RADIOLOGY
Payer: MEDICARE

## 2025-02-20 VITALS
DIASTOLIC BLOOD PRESSURE: 90 MMHG | TEMPERATURE: 98.3 F | OXYGEN SATURATION: 97 % | BODY MASS INDEX: 35.3 KG/M2 | HEART RATE: 57 BPM | WEIGHT: 218.7 LBS | SYSTOLIC BLOOD PRESSURE: 155 MMHG

## 2025-02-20 DIAGNOSIS — C80.1 ADENOID CYSTIC CARCINOMA (HCC): ICD-10-CM

## 2025-02-20 ASSESSMENT — PAIN SCALES - GENERAL: PAINLEVEL_OUTOF10: NO PAIN

## 2025-02-20 ASSESSMENT — FIBROSIS 4 INDEX: FIB4 SCORE: 1.17

## 2025-02-20 NOTE — CT SIMULATION
PATIENT NAME Maria Teresa Haji   PRIMARY PHYSICIAN AdamLindsay TIESHA [id number]   REFERRING PHYSICIAN Maame Mccain A.P.N. 1960     Adenoid cystic carcinoma (HCC)  Staging form: Cutaneous Carcinoma of the Head and Neck, AJCC 8th Edition.  - Pathologic stage from 1/2/2025: Stage IV (pTX, pN3b, cM0) - Signed by Brendan PIKE M.D. on 1/2/2025  Histopathologic type: Adenoid cystic carcinoma  Residual tumor (R): R1 - Microscopic  Presence of extranodal extension: Present  Extraosseous extension: Unknown  ECOG performance status: Grade 0  Karnofsky performance status: Score 100         Treatment Planning CT Simulation        Order Questions       Question Answer    Is this for a new course of treatment? Yes    Is this an Addendum? No    Implanted Device/Pacemaker No    Simulation Status Initial    Planned Start Date 3/10/2025    Treatment Site Scalp    Laterality Left    Treatment Technique IMRT    Treatment Pattern/Frequency Daily    Number of fractions: 30    Concurrent Chemotherapy No    CT Technique 3D    Slice Thickness 2mm    Scan Extent H&N    Contrast IV    IV Contrast Volume Other    Volume (cc) 100    Bowel Preparation No    Treatment Device(s) S-Frame Mask     Shoulder Pulls     Bolus 1.0 cm    Patient Attire Gown    Patient Position Supine    Patient Orientation Head First    Arm Position Down    Dentures Out    Chin Position Neutral    Treatment Machine No preference    Treatment Image Guidance CBCT    Frequency (CBCT) Daily    Image Guidance Match Bone    Treatment Planning Image Fusion CT/PET     CT/CT    In Vivo Dosimetry TLD    Other Orders Weekly Physics Check     Special Tx Procedure    Release to patient Immediate

## 2025-02-20 NOTE — PROGRESS NOTES
Patient was seen today in clinic with Dr. Julien for follow up.  Vitals signs and weight were obtained and pain assessment was completed.  Allergies and medications were reviewed with the patient.  Toxicities of treatment assessed.     Vitals/Pain:  Vitals:    02/20/25 1052   BP: (!) 155/90   BP Location: Right arm   Patient Position: Sitting   BP Cuff Size: Adult   Pulse: (!) 57   Temp: 36.8 °C (98.3 °F)   TempSrc: Temporal   SpO2: 97%   Weight: 99.2 kg (218 lb 11.1 oz)   Pain Score: No pain        Allergies:   Patient has no known allergies.    Current Medications:  Current Outpatient Medications   Medication Sig Dispense Refill    gabapentin (NEURONTIN) 100 MG Cap TAKE 1 CAPSULE BY MOUTH THREE TIMES A DAY 90 Capsule 1    cephALEXin (KEFLEX) 500 MG Cap Take 1 Capsule by mouth 3 times a day. 30 Capsule 0    LORazepam (ATIVAN) 0.5 MG Tab Take 1 Tablet by mouth 1 time a day as needed for Anxiety for up to 90 days. 30 Tablet 2    baclofen (LIORESAL) 10 MG Tab Take 1 Tablet by mouth 3 times a day as needed (back pain). 90 Tablet 1    multivitamin Tab Take 1 Tablet by mouth every day.      valacyclovir (VALTREX) 1 GM Tab Take 1,000 mg by mouth every day.      omeprazole (PRILOSEC) 40 MG delayed-release capsule Take 1 Capsule by mouth every day. Indications: Heartburn (Patient taking differently: Take 40 mg by mouth every day.       Indications: Heartburn) 90 Capsule 1    traZODone (DESYREL) 50 MG Tab Take 1 Tablet by mouth at bedtime. (Patient taking differently: Take 50 mg by mouth at bedtime.   ) 90 Tablet 1    meloxicam (MOBIC) 15 MG tablet Take 1 Tablet by mouth every day. (Patient taking differently: Take 15 mg by mouth 1 time a day as needed.) 90 Tablet 2     No current facility-administered medications for this encounter.           Aviva Lopez, Med Ass't

## 2025-02-20 NOTE — PROGRESS NOTES
RADIATION ONCOLOGY FOLLOW-UP    Patient name:  Maria Teresa Haji    Primary Physician:  Lindsay Medina M.D. MRN: [id number]  [facility]: [id number]   Referring physician:  Maame Mccani A.P.N.  : 1960, 64 y.o.     DATE OF SERVICE: 2025    IDENTIFICATION:   A 64 y.o. female with    Adenoid cystic carcinoma (HCC)  Staging form: Cutaneous Carcinoma of the Head and Neck, AJCC 8th Edition.  - Pathologic stage from 2025: Stage IV (pTX, pN3b, cM0) - Signed by Brendan PIKE M.D. on 2025  Histopathologic type: Adenoid cystic carcinoma  Residual tumor (R): R1 - Microscopic  Presence of extranodal extension: Present  Extraosseous extension: Unknown  ECOG performance status: Grade 0  Karnofsky performance status: Score 100           HISTORY OF PRESENT ILLNESS:  Subjective    64-year-old female with past medical history significant for small adenoid cystic carcinoma removed from the left posterior occipital region in .  Patient did well without any further adjuvant therapy until 2019 when she developed recurrence which was causing some mild discomfort.  The small nodule grew discomfort increased and she sought medical care.  Ultrasound soft tissue head and neck 10/18/2024 demonstrated a 0.3 x 0.8 cm solid nodule within the left occipital superficial soft tissue     10/22/2024 ultrasound FNA demonstrated cystic basaloid neoplasm.     10/29/2024 CT head soft tissue neck PET/CT ordered which essentially showed no abnormal hypermetabolism or visualized mass.  In retrospect reviewing the CT head there is some asymmetry noted in the left occipital region.     12/10/2024 underwent wide local excision by Dr. Fraire.  Pathology demonstrated on gross 3.3 x 2.9 x 1.7 cm portion of tan-yellow to red-brown rubbery soft tissue covered partially by 1.5 x 1.5 cm portion of skin.  Microscopically sections of the subcutaneous tissue showed involvement by tumor morphologically consistent with adenoid cystic  carcinoma.  There is a lymph node within the subcutaneous tissue that is largely effaced by tumor it is uncertain if the findings could represent metastatic tumor to a subcutaneous lymph node with extranodal extension of tumor or tumor within subcutaneous tissue that directly extends into a subcutaneous lymph node.  In addition tumor focally extends to the deep and deep 3:00 soft tissue margin of resection.  Occipital skull periosteal margin negative for tumor.     Current thought is that she has a metastasis to lymph node from her original primary demonstrating extranodal extension.       She is 3 weeks postop.  Primary complaint is some hypoesthesia involving the left occipital scalp up to the vertex with the vertex area demonstrating some discomfort          INTERVAL HISTORY:  2/20/2025.  Returns today for follow-up after undergoing reexcision on 1/30/2025.  Pathology showed cautery artifact, multinucleate giant cells, fibrosis, and granulation tissue consistent with previous procedural site.  No residual carcinoma identified.  She is doing well and offers no complaints.    PROBLEM LIST:  Patient Active Problem List   Diagnosis    Adenoid cystic carcinoma (HCC)    Preventative health care    Menopause    Glaucoma    Gastroesophageal reflux disease without esophagitis    History of tobacco use    BMI 38.0-38.9,adult    Prediabetes    Pulmonary nodule    Anxiety    Other insomnia    Hepatomegaly    Nonrheumatic aortic valve insufficiency    Neuralgia, postherpetic    History of knee replacement    Other sleep apnea    Ankylosing spondylitis of lumbar region (HCC)    History of adenoid cystic carcinoma    Anemia    Lump of scalp       CURRENT MEDICATIONS:  Current Outpatient Medications   Medication Sig Dispense Refill    gabapentin (NEURONTIN) 100 MG Cap TAKE 1 CAPSULE BY MOUTH THREE TIMES A DAY 90 Capsule 1    cephALEXin (KEFLEX) 500 MG Cap Take 1 Capsule by mouth 3 times a day. 30 Capsule 0    LORazepam (ATIVAN)  0.5 MG Tab Take 1 Tablet by mouth 1 time a day as needed for Anxiety for up to 90 days. 30 Tablet 2    baclofen (LIORESAL) 10 MG Tab Take 1 Tablet by mouth 3 times a day as needed (back pain). 90 Tablet 1    multivitamin Tab Take 1 Tablet by mouth every day.      valacyclovir (VALTREX) 1 GM Tab Take 1,000 mg by mouth every day.      omeprazole (PRILOSEC) 40 MG delayed-release capsule Take 1 Capsule by mouth every day. Indications: Heartburn (Patient taking differently: Take 40 mg by mouth every day.       Indications: Heartburn) 90 Capsule 1    traZODone (DESYREL) 50 MG Tab Take 1 Tablet by mouth at bedtime. (Patient taking differently: Take 50 mg by mouth at bedtime.   ) 90 Tablet 1    meloxicam (MOBIC) 15 MG tablet Take 1 Tablet by mouth every day. (Patient taking differently: Take 15 mg by mouth 1 time a day as needed.) 90 Tablet 2     No current facility-administered medications for this encounter.       ALLERGIES:  Patient has no known allergies.    REVIEW OF SYSTEMS:    A complete review of system taken. Pertinent items in HPI.  All others negative.    PHYSICAL EXAM:  PERFORMANCE STATUS:      1/2/2025     2:09 PM   ECOG Performance Review   ECOG Performance Status Restricted in physically strenuous activity but ambulatory and able to carry out work of a light or sedentary nature, e.g., light house work, office work         1/2/2025     2:10 PM   Karnofsky Score   Karnofsky Score 80     BP (!) 155/90 (BP Location: Right arm, Patient Position: Sitting, BP Cuff Size: Adult)   Pulse (!) 57   Temp 36.8 °C (98.3 °F) (Temporal)   Wt 99.2 kg (218 lb 11.1 oz)   LMP  (LMP Unknown)   SpO2 97%   BMI 35.30 kg/m²   Physical Exam  Vitals and nursing note reviewed.   Constitutional:       Appearance: She is well-developed.   HENT:      Head: Normocephalic.   Lymphadenopathy:      Cervical: No cervical adenopathy.   Skin:     General: Skin is warm and dry.      Findings: No erythema.      Comments: Well-healed surgical  site left occipital scalp   Neurological:      Mental Status: She is alert and oriented to person, place, and time.   Psychiatric:         Behavior: Behavior normal.         Thought Content: Thought content normal.         Judgment: Judgment normal.         LABORATORY DATA:   Lab Results   Component Value Date/Time    WBC 6.0 01/15/2025 10:04 AM    RBC 4.50 01/15/2025 10:04 AM    HEMOGLOBIN 15.1 01/15/2025 10:04 AM    HEMATOCRIT 43.4 01/15/2025 10:04 AM    MCV 96.4 01/15/2025 10:04 AM    MCH 33.6 (H) 01/15/2025 10:04 AM    MCHC 34.8 01/15/2025 10:04 AM    RDW 42.0 01/15/2025 10:04 AM    PLATELETCT 219 01/15/2025 10:04 AM    MPV 9.2 01/15/2025 10:04 AM    NEUTSPOLYS 62.90 11/04/2024 08:02 AM    LYMPHOCYTES 26.70 11/04/2024 08:02 AM    MONOCYTES 7.10 11/04/2024 08:02 AM    EOSINOPHILS 2.80 11/04/2024 08:02 AM    BASOPHILS 0.20 11/04/2024 08:02 AM      Lab Results   Component Value Date/Time    SODIUM 139 01/15/2025 10:04 AM    POTASSIUM 4.6 01/15/2025 10:04 AM    CHLORIDE 102 01/15/2025 10:04 AM    CO2 26 01/15/2025 10:04 AM    GLUCOSE 92 01/15/2025 10:04 AM    BUN 15 01/15/2025 10:04 AM    CREATININE 0.92 01/15/2025 10:04 AM         RADIOLOGY DATA:  DS-BONE DENSITY STUDY (DEXA)  Result Date: 1/13/2025  According to the World Health Organization classification, bone mineral density of this patient is normal for the lumbar spine and normal for the left femur. Increase in bone density in the lumbar spine since the prior exam is statistically significant. Increase in bone density in the left femur since the prior exam is not statistically significant. 10-year Probability of Fracture: Major Osteoporotic     12.8% Hip     0.3% Population      [place] () Based on left femur neck BMD INTERPRETING LOCATION: [address] [place] [address], [place], [id number]    -SOFT TISSUES OF HEAD - NECK  Result Date: 2/11/2025  1. Mildly prominent left neck lymph nodes are identified. Consider inflammation infection or  neoplasm.      IMPRESSION:    A 64 y.o. with   Adenoid cystic carcinoma (HCC)  Staging form: Cutaneous Carcinoma of the Head and Neck, AJCC 8th Edition.  - Pathologic stage from 1/2/2025: Stage IV (pTX, pN3b, cM0) - Signed by Brendan PIKE M.D. on 1/2/2025  Histopathologic type: Adenoid cystic carcinoma  Residual tumor (R): R1 - Microscopic  Presence of extranodal extension: Present  Extraosseous extension: Unknown  ECOG performance status: Grade 0  Karnofsky performance status: Score 100      CANCER STATUS:  No Evidence of Disease    RECOMMENDATIONS:   Reviewed pathology findings with patient.  Reassured her.  It appears that the reexcision shows no residual disease.  Nevertheless considering that she had high risk features of either subcutaneous metastasis or extranodal extension I did recommend adjuvant radiotherapy treating the surgical bed with tracking of the greater occipital nerve to its origin.  Radiotherapy will involve delivering 60 Gray 30 fractions over 6 weeks.    We reviewed the technical aspects benefits risks.  I did warn her of permanent alopecia in the treatment field along with radiation dermatitis which should resolve.  She may also experience some fatigue.  I do not expect any significant xerostomia, dysgeusia, mucositis, or pharyngitis from the treatment.    After discussion she would wishes to proceed.  She will return for simulation on February 27 with treatment to start March 10 approximately 5 weeks postop.    Thank you for the opportunity to participate in her care.  If any questions or comments, please do not hesitate in calling.    Orders Placed This Encounter    Rad Onc Treatment Planning CT Simulation

## 2025-02-27 ENCOUNTER — HOSPITAL ENCOUNTER (OUTPATIENT)
Dept: RADIATION ONCOLOGY | Facility: MEDICAL CENTER | Age: 65
End: 2025-02-27

## 2025-02-27 PROCEDURE — 77470 SPECIAL RADIATION TREATMENT: CPT | Performed by: RADIOLOGY

## 2025-02-27 PROCEDURE — 77334 RADIATION TREATMENT AID(S): CPT | Performed by: RADIOLOGY

## 2025-02-27 PROCEDURE — 77290 THER RAD SIMULAJ FIELD CPLX: CPT | Performed by: RADIOLOGY

## 2025-02-27 NOTE — RADIATION PLANNING NOTES
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  - Pathologic stage from 1/2/2025: Stage IV (pTX, pN3b, cM0) - Signed by Brendan PIKE M.D. on 1/2/2025  Histopathologic type: Adenoid cystic carcinoma  Residual tumor (R): R1 - Microscopic  Presence of extranodal extension: Present  Extraosseous extension: Unknown  ECOG performance status: Grade 0  Karnofsky performance status: Score 100         IMAGING REVIEWED:  [x] CT     [] MRI     [] PET/CT     [] BONE SCAN     [] MAMMO     [] OTHER      TREATMENT INTENT:   [x] CURATIVE     [] MAINTENANCE     []  PALLIATIVE      []  SUPPORTIVE     []  PROPHYLACTIC     [] BENIGN     []  CONSOLIDATIVE      [] DEFINITIVE   []  OLOGIMETASTATIC      LINE OF TREATMENT:  [x] ADJUVANT   [] DEFINITIVE   [] NEOADJUVANT   [] RE-TREATMENT      TECHNIQUE PLANNED:  [x] IMRT   [] 3D   [] SBRT   [] SRS/SRT   [] HDR   [] ELECTRON       IMRT JUSTIFICATION:  []   An immediately adjacent area has been previously irradiated and abutting portals must be established with high precision.    []  Dose escalation is planned to deliver radiation doses in excess of those commonly utilized for similar tumors with conventional treatment.    []  The target volume is concave or convex, and the critical normal tissues are within or around that convexity or concavity.    [x]  The target volume is in close proximity to critical structures that must be protected.    [x]  The volume of interest must be covered with narrow margins to adequately protect  immediately adjacent structures.      FIELDS & BLOCKING:  [] COMPLEX BLOCKS     []  = 3 TX AREAS     [x]  ARCS     []  CUSTOM SHEILD        []  SIMPLE BLOCK      CHEMOTHERAPY:  []  CONCURRENT     []  INDUCTION     [] SEQUENTIAL     []  <30 DAYS FROM XRT      NOTES:  OAR CONSTRAINTS: (GUIDELINES ONLY NOT ABSOLUTE)  Target  Prescribed Coverage   PTV1 95% of PTV1 covered by 100% (cGy) of RX Dose     PTV1 99% of PTV1 covered by 93% (cGy) of RX Dose       COLTEN Goal   Any volume (1cc) outside PTV Max Dose < 74Gy   Plan Hot Spot Max Dose < 110%   Cord  Max Dose < 45Gy   Cord + 0.5cm Max Dose < 50Gy   Brainstem Max Dose < 52 Gy   Brainstem + 0.5cm                           (0.3cm w/ Dr. Mirian.) Max Dose < 52Gy   Oral Pharynx  Mean Dose < 45Gy    Oral Cavity Mean Dose < 30Gy   R Parotid  Mean Dose < 23Gy    L Parotid  Mean Dose < 23Gy    Cervical Esophagus Mean Dose < 30Gy   Contralateral Submandibular gland (not target)  Max Dose < 39Gy   Optic Chiasm Max Dose < 54Gy   R Optic Nerve Max Dose < 54Gy   L Optic Nerve Max Dose < 54Gy   R Eye Max Dose < 35Gy   L Eye Max Dose < 35Gy   R Lens Max Dose < 10Gy   L Lens Max Dose < 10Gy   Inner Ear Mean Dose < 50Gy    Mandible Goal - 1cc Max Dose < 70Gy   *RTOG 1016, RTOG 0225

## 2025-02-27 NOTE — RADIATION PLANNING NOTES
DATE OF SERVICE: 2/27/2025    DIAGNOSIS:  Adenoid cystic carcinoma (HCC)  Staging form: Cutaneous Carcinoma of the Head and Neck, AJCC 8th Edition.  - Pathologic stage from 1/2/2025: Stage IV (pTX, pN3b, cM0) - Signed by Brendan PIKE M.D. on 1/2/2025  Histopathologic type: Adenoid cystic carcinoma  Residual tumor (R): R1 - Microscopic  Presence of extranodal extension: Present  Extraosseous extension: Unknown  ECOG performance status: Grade 0  Karnofsky performance status: Score 100       DATE OF SERVICE: 2/27/2025    TYPE OF SIMULATION: Head & Neck    GOAL OF TREATMENT:   [x] Curative  [] Palliative  [] Oligometastatic    CONTRAST:    [x] IV Contrast*  [] Oral Contrast               POSITION:    [x]  Supine  [] Prone     COMPLEX:  [] Complex Blocking   [x]Arcs  [] Custom Blocks  [] >3 Sites    PROCEDURE: Patient place in supine position on CT table with head in neutral position. Dentures removed. Shoulder pulls used to stabilize shoulders. 1 cm bolus placed left occipital region. Patient head and shoulders were immobilized with a thermoplastic mask.   films evaluated to ensure proper patient position.  CT obtained through entire volume of interest. Exam pushed to treatment planning system for contouring and planning.    I have personally reviewed the relevant data, performed the target localization, and determined all relevant factors for this patient’s simulation.    *Omnipaque 80 -100cc IVP in conjunction with 500cc NS

## 2025-03-02 DIAGNOSIS — M45.6 ANKYLOSING SPONDYLITIS OF LUMBAR REGION (HCC): ICD-10-CM

## 2025-03-04 RX ORDER — BACLOFEN 10 MG/1
10 TABLET ORAL 3 TIMES DAILY PRN
Qty: 90 TABLET | Refills: 1 | Status: SHIPPED | OUTPATIENT
Start: 2025-03-04

## 2025-03-04 NOTE — TELEPHONE ENCOUNTER
Received request via: Pharmacy    Was the patient seen in the last year in this department? Yes    Does the patient have an active prescription (recently filled or refills available) for medication(s) requested? No    Pharmacy Name: [facility] #[id number] - [place], [place] - [address]    Does the patient have FPC Plus and need 100-day supply? (This applies to ALL medications) Patient does not have SCP   Requested Prescriptions     Pending Prescriptions Disp Refills    baclofen (LIORESAL) 10 MG Tab [Pharmacy Med Name: BACLOFEN 10 MG TABLET] 90 Tablet 1     Sig: TAKE 1 TABLET BY MOUTH 3 TIMES A DAY AS NEEDED (BACK PAIN).

## 2025-03-06 ENCOUNTER — HOSPITAL ENCOUNTER (OUTPATIENT)
Dept: RADIATION ONCOLOGY | Facility: MEDICAL CENTER | Age: 65
End: 2025-03-06
Attending: RADIOLOGY
Payer: MEDICARE

## 2025-03-06 PROCEDURE — 77300 RADIATION THERAPY DOSE PLAN: CPT | Performed by: RADIOLOGY

## 2025-03-06 PROCEDURE — 77333 RADIATION TREATMENT AID(S): CPT | Mod: 26,XU | Performed by: RADIOLOGY

## 2025-03-06 PROCEDURE — 77338 DESIGN MLC DEVICE FOR IMRT: CPT | Mod: 26 | Performed by: RADIOLOGY

## 2025-03-06 PROCEDURE — 77301 RADIOTHERAPY DOSE PLAN IMRT: CPT | Performed by: RADIOLOGY

## 2025-03-06 PROCEDURE — 77301 RADIOTHERAPY DOSE PLAN IMRT: CPT | Mod: 26 | Performed by: RADIOLOGY

## 2025-03-06 PROCEDURE — 77338 DESIGN MLC DEVICE FOR IMRT: CPT | Performed by: RADIOLOGY

## 2025-03-06 PROCEDURE — 77333 RADIATION TREATMENT AID(S): CPT | Mod: XU | Performed by: RADIOLOGY

## 2025-03-06 PROCEDURE — 77300 RADIATION THERAPY DOSE PLAN: CPT | Mod: 26 | Performed by: RADIOLOGY

## 2025-03-10 ENCOUNTER — HOSPITAL ENCOUNTER (OUTPATIENT)
Dept: RADIATION ONCOLOGY | Facility: MEDICAL CENTER | Age: 65
End: 2025-03-10

## 2025-03-10 LAB
CHEMOTHERAPY INFUSION START DATE: NORMAL
CHEMOTHERAPY RECORDS: 2 GY
CHEMOTHERAPY RECORDS: 6000 CGY
CHEMOTHERAPY RECORDS: NORMAL
CHEMOTHERAPY RX CANCER: NORMAL
DATE 1ST CHEMO CANCER: NORMAL
RAD ONC ARIA COURSE LAST TREATMENT DATE: NORMAL
RAD ONC ARIA COURSE TREATMENT ELAPSED DAYS: NORMAL
RAD ONC ARIA REFERENCE POINT DOSAGE GIVEN TO DATE: 2 GY
RAD ONC ARIA REFERENCE POINT ID: NORMAL
RAD ONC ARIA REFERENCE POINT SESSION DOSAGE GIVEN: 2 GY

## 2025-03-10 PROCEDURE — 77280 THER RAD SIMULAJ FIELD SMPL: CPT | Performed by: RADIOLOGY

## 2025-03-10 PROCEDURE — 77386 HCHG IMRT DELIVERY COMPLEX: CPT | Performed by: RADIOLOGY

## 2025-03-10 NOTE — CT SIMULATION
3/10/2025 12:40 PM      Adenoid cystic carcinoma (HCC)  Staging form: Cutaneous Carcinoma of the Head and Neck, AJCC 8th Edition.  - Pathologic stage from 1/2/2025: Stage IV (pTX, pN3b, cM0) - Signed by Brendan PIKE M.D. on 1/2/2025  Histopathologic type: Adenoid cystic carcinoma  Residual tumor (R): R1 - Microscopic  Presence of extranodal extension: Present  Extraosseous extension: Unknown  ECOG performance status: Grade 0  Karnofsky performance status: Score 100       [facility] Treatment Information          3/10/2025   [facility] Course Treatment Dates   Course First Treatment Date 03/10/2025    Course Last Treatment Date 03/10/2025    [facility] Treatment Summary   L Scalp  Plan from Course C1_left scalp   Fraction 1 of 30   Elapsed Course Days 0 @ 03/10/2025   Prescribed Fraction Dose 200 cGy   Prescribed Total Dose 6,000 cGy   L Scalp  Reference Point from Course C1_left scalp   Elapsed Course Days 0 @ 03/10/2025   Session Dose 200 cGy   Total Dose 200 cGy        First Visit Simple Simulation: Called by [facility] machine to verify treatment parameters including:  treatment site, treatment dose, and treatment setup prior to first treatment. Image derived shifts reviewed in all appropriate planes.  Shifts approved.  Patient treated.    I have personally reviewed the relevant data, performed the target localization, and determined all relevant factors for this patient’s simulation.

## 2025-03-11 ENCOUNTER — HOSPITAL ENCOUNTER (OUTPATIENT)
Dept: RADIATION ONCOLOGY | Facility: MEDICAL CENTER | Age: 65
End: 2025-03-11
Payer: MEDICARE

## 2025-03-11 LAB
CHEMOTHERAPY INFUSION START DATE: NORMAL
CHEMOTHERAPY RECORDS: 2 GY
CHEMOTHERAPY RECORDS: 6000 CGY
CHEMOTHERAPY RECORDS: NORMAL
CHEMOTHERAPY RX CANCER: NORMAL
DATE 1ST CHEMO CANCER: NORMAL
RAD ONC ARIA COURSE LAST TREATMENT DATE: NORMAL
RAD ONC ARIA COURSE TREATMENT ELAPSED DAYS: NORMAL
RAD ONC ARIA REFERENCE POINT DOSAGE GIVEN TO DATE: 4 GY
RAD ONC ARIA REFERENCE POINT ID: NORMAL
RAD ONC ARIA REFERENCE POINT SESSION DOSAGE GIVEN: 2 GY

## 2025-03-11 PROCEDURE — 77386 HCHG IMRT DELIVERY COMPLEX: CPT | Performed by: RADIOLOGY

## 2025-03-11 PROCEDURE — 77014 PR CT GUIDANCE PLACEMENT RAD THERAPY FIELDS: CPT | Mod: 26 | Performed by: RADIOLOGY

## 2025-03-12 ENCOUNTER — HOSPITAL ENCOUNTER (OUTPATIENT)
Dept: RADIATION ONCOLOGY | Facility: MEDICAL CENTER | Age: 65
End: 2025-03-12
Payer: MEDICARE

## 2025-03-12 ENCOUNTER — HOSPITAL ENCOUNTER (OUTPATIENT)
Dept: RADIATION ONCOLOGY | Facility: MEDICAL CENTER | Age: 65
End: 2025-03-12
Attending: RADIOLOGY
Payer: MEDICARE

## 2025-03-12 VITALS
HEART RATE: 71 BPM | WEIGHT: 216.05 LBS | DIASTOLIC BLOOD PRESSURE: 73 MMHG | SYSTOLIC BLOOD PRESSURE: 146 MMHG | TEMPERATURE: 97.1 F | OXYGEN SATURATION: 97 % | BODY MASS INDEX: 34.87 KG/M2

## 2025-03-12 LAB
CHEMOTHERAPY INFUSION START DATE: NORMAL
CHEMOTHERAPY RECORDS: 2 GY
CHEMOTHERAPY RECORDS: 6000 CGY
CHEMOTHERAPY RECORDS: NORMAL
CHEMOTHERAPY RX CANCER: NORMAL
DATE 1ST CHEMO CANCER: NORMAL
RAD ONC ARIA COURSE LAST TREATMENT DATE: NORMAL
RAD ONC ARIA COURSE TREATMENT ELAPSED DAYS: NORMAL
RAD ONC ARIA REFERENCE POINT DOSAGE GIVEN TO DATE: 6 GY
RAD ONC ARIA REFERENCE POINT ID: NORMAL
RAD ONC ARIA REFERENCE POINT SESSION DOSAGE GIVEN: 2 GY

## 2025-03-12 PROCEDURE — 77386 HCHG IMRT DELIVERY COMPLEX: CPT | Performed by: RADIOLOGY

## 2025-03-12 PROCEDURE — 77014 PR CT GUIDANCE PLACEMENT RAD THERAPY FIELDS: CPT | Mod: 26 | Performed by: RADIOLOGY

## 2025-03-12 PROCEDURE — 77336 RADIATION PHYSICS CONSULT: CPT | Performed by: RADIOLOGY

## 2025-03-12 ASSESSMENT — PAIN SCALES - GENERAL: PAINLEVEL_OUTOF10: NO PAIN

## 2025-03-12 ASSESSMENT — FIBROSIS 4 INDEX: FIB4 SCORE: 1.17

## 2025-03-12 NOTE — ON TREATMENT VISIT
ON TREATMENT  NOTE  RADIATION ONCOLOGY DEPARTMENT    Patient name:  Maria Teresa Haji    Primary Physician:  Lindsay Medina M.D. MRN: [id number]  [facility]: [id number]   Referring physician:  Maame Mccain A.P.N.   : 1960, 64 y.o.     ENCOUNTER DATE:  3/12/2025      DIAGNOSIS:  Adenoid cystic carcinoma (HCC)  Staging form: Cutaneous Carcinoma of the Head and Neck, AJCC 8th Edition.  - Pathologic stage from 2025: Stage IV (pTX, pN3b, cM0) - Signed by Brendan PIKE M.D. on 2025  Histopathologic type: Adenoid cystic carcinoma  Residual tumor (R): R1  Presence of extranodal extension: Present  Extraosseous extension: Unknown  ECOG performance status: Grade 0  Karnofsky performance status: Score 100      TREATMENT SUMMARY:  Course First Treatment Date 03/10/2025  Course Last Treatment Date 2025  Radiation Therapy Episodes       Active Episodes       Radiation Therapy: IMRT (3/10/2025)                   Radiation Treatments         Plan Last Treated On Elapsed Days Fractions Treated Prescribed Fraction Dose (cGy) Prescribed Total Dose (cGy)    L Scalp 3/12/2025 2 @ 2025 3 of 30 200 6,000                  Reference Point Last Treated On Elapsed Days Most Recent Session Dose (cGy) Total Dose (cGy)    L Scalp 3/12/2025 2 @ 2025 200 600                               SUBJECTIVE:  Tolerating well.    VITAL SIGNS:      3/12/2025    10:13 AM 2025    10:52 AM 2025     2:15 PM 2025     2:00 PM 2025     1:45 PM 2025     1:30 PM 2025     1:15 PM   Vitals   SYSTOLIC 146 155 138 139 136 135 127   DIASTOLIC 73 90 62 64 60 99 90   Pulse 71 57 68 66 75 76 76   Temperature 36.2 °C (97.1 °F) 36.8 °C (98.3 °F)        Respiration   25 14 18 17 20   Weight 216.05 218.7        BMI 34.87 kg/m2 35.3 kg/m2        Pulse Oximetry 97 % 97 % 94 % 91 % 92 % 93 % 96 %     KPS: 100, Fully active, able to carry on all pre-disease performed without restriction (ECOG equivalent  0)  Encounter Vitals  Temperature: 36.2 °C (97.1 °F)  Temp src: Temporal  Blood Pressure : (!) 146/73  Pulse: 71  Pulse Oximetry: 97 %  Weight: 98 kg (216 lb 0.8 oz)  Pain Score: No pain      3/12/2025    10:13 AM 2/20/2025    10:52 AM 1/15/2025    10:49 AM 1/2/2025     2:00 PM 10/29/2024     9:51 AM   Pain Assessment   Pain Score NO PAIN NO PAIN NO PAIN 4=SLIGHT PARTH 5=MODERATE P   Pain Loc    HEAD HEAD          PHYSICAL EXAM:  Physical Exam  Vitals and nursing note reviewed.   Constitutional:       Appearance: She is well-developed.   HENT:      Head: Normocephalic.   Skin:     General: Skin is warm and dry.      Findings: No erythema.   Neurological:      Mental Status: She is alert and oriented to person, place, and time.   Psychiatric:         Behavior: Behavior normal.         Thought Content: Thought content normal.         Judgment: Judgment normal.              3/12/2025    10:14 AM   Toxicity Assessment   Toxicity Assessment Skin   Fatigue (lethargy, malaise, asthenia) None   Radiation Dermatitis None   Photosensitivity None   Dry Skin Normal   Alopecia Normal   Erythema Multiforme (e.g., Rodriguez-Srinivasa syndrome, toxic epidermal necrolysis) Absent   Nail Changes Normal   Wound (Non-Infectious) None   Wound (Infectious) None   RT - Pain due to RT None   Tumor Pain (onset or exacerbation of tumor pain due to treatment) None   Skin - Late RT No change from baseline       CURRENT MEDICATIONS:    Current Outpatient Medications:     baclofen (LIORESAL) 10 MG Tab, TAKE 1 TABLET BY MOUTH 3 TIMES A DAY AS NEEDED (BACK PAIN)., Disp: 90 Tablet, Rfl: 1    gabapentin (NEURONTIN) 100 MG Cap, TAKE 1 CAPSULE BY MOUTH THREE TIMES A DAY, Disp: 90 Capsule, Rfl: 1    cephALEXin (KEFLEX) 500 MG Cap, Take 1 Capsule by mouth 3 times a day., Disp: 30 Capsule, Rfl: 0    LORazepam (ATIVAN) 0.5 MG Tab, Take 1 Tablet by mouth 1 time a day as needed for Anxiety for up to 90 days., Disp: 30 Tablet, Rfl: 2    multivitamin Tab, Take 1  Tablet by mouth every day., Disp: , Rfl:     valacyclovir (VALTREX) 1 GM Tab, Take 1,000 mg by mouth every day., Disp: , Rfl:     omeprazole (PRILOSEC) 40 MG delayed-release capsule, Take 1 Capsule by mouth every day. Indications: Heartburn (Patient taking differently: Take 40 mg by mouth every day.     Indications: Heartburn), Disp: 90 Capsule, Rfl: 1    traZODone (DESYREL) 50 MG Tab, Take 1 Tablet by mouth at bedtime. (Patient taking differently: Take 50 mg by mouth at bedtime. ), Disp: 90 Tablet, Rfl: 1    meloxicam (MOBIC) 15 MG tablet, Take 1 Tablet by mouth every day. (Patient taking differently: Take 15 mg by mouth 1 time a day as needed.), Disp: 90 Tablet, Rfl: 2    LABORATORY DATA:   Lab Results   Component Value Date/Time    SODIUM 139 01/15/2025 10:04 AM    POTASSIUM 4.6 01/15/2025 10:04 AM    CHLORIDE 102 01/15/2025 10:04 AM    CO2 26 01/15/2025 10:04 AM    GLUCOSE 92 01/15/2025 10:04 AM    BUN 15 01/15/2025 10:04 AM    CREATININE 0.92 01/15/2025 10:04 AM       Lab Results   Component Value Date/Time    WBC 6.0 01/15/2025 10:04 AM    RBC 4.50 01/15/2025 10:04 AM    HEMOGLOBIN 15.1 01/15/2025 10:04 AM    HEMATOCRIT 43.4 01/15/2025 10:04 AM    MCV 96.4 01/15/2025 10:04 AM    MCH 33.6 (H) 01/15/2025 10:04 AM    MCHC 34.8 01/15/2025 10:04 AM    PLATELETCT 219 01/15/2025 10:04 AM         RADIOLOGY DATA:  DS-BONE DENSITY STUDY (DEXA)  Result Date: 1/13/2025  According to the World Health Organization classification, bone mineral density of this patient is normal for the lumbar spine and normal for the left femur. Increase in bone density in the lumbar spine since the prior exam is statistically significant. Increase in bone density in the left femur since the prior exam is not statistically significant. 10-year Probability of Fracture: Major Osteoporotic     12.8% Hip     0.3% Population      [place] () Based on left femur neck BMD INTERPRETING LOCATION: [address] [facility] [place], [place], [address]    -[facility]  TISSUES OF HEAD - NECK  Addendum Date: 3/4/2025  2/11/2025 3:48 PM HISTORY/REASON FOR EXAM:  Left mid back palpable lump TECHNIQUE/EXAM DESCRIPTION: Ultrasound of the soft tissues of the head and neck. COMPARISON:  10/18/2024 FINDINGS: Focused ultrasound of the area of concern left neck demonstrates mildly prominent lymph nodes compared to right-sided lymph nodes. Largest on the left measures 2.3 cm. Additional node more posteriorly measures 1.3 cm. Architecture appears normal within these nodes. Survey of the additional bilateral lateral cervical lymph node chains reveals no suspicious-appearing lymph nodes. 1. Mildly prominent left neck lymph nodes are identified. Consider inflammation infection or neoplasm.    Result Date: 3/4/2025  1. Mildly prominent left neck lymph nodes are identified. Consider inflammation infection or neoplasm.      IMPRESSION:  Cancer Staging   Adenoid cystic carcinoma (HCC)  Staging form: Cutaneous Carcinoma of the Head and Neck, AJCC 8th Edition.  - Pathologic stage from 1/2/2025: Stage IV (pTX, pN3b, cM0) - Signed by Brendan PIKE M.D. on 1/2/2025      PLAN:  No change in treatment plan    Disposition:  Treatment plan and imaging reviewed. Questions answered. Continue therapy outlined.     Brendan PIKE M.D.    No orders of the defined types were placed in this encounter.

## 2025-03-13 ENCOUNTER — HOSPITAL ENCOUNTER (OUTPATIENT)
Dept: RADIATION ONCOLOGY | Facility: MEDICAL CENTER | Age: 65
End: 2025-03-13
Payer: MEDICARE

## 2025-03-13 LAB
CHEMOTHERAPY INFUSION START DATE: NORMAL
CHEMOTHERAPY RECORDS: 2 GY
CHEMOTHERAPY RECORDS: 6000 CGY
CHEMOTHERAPY RECORDS: NORMAL
CHEMOTHERAPY RX CANCER: NORMAL
DATE 1ST CHEMO CANCER: NORMAL
RAD ONC ARIA COURSE LAST TREATMENT DATE: NORMAL
RAD ONC ARIA COURSE TREATMENT ELAPSED DAYS: NORMAL
RAD ONC ARIA REFERENCE POINT DOSAGE GIVEN TO DATE: 8 GY
RAD ONC ARIA REFERENCE POINT ID: NORMAL
RAD ONC ARIA REFERENCE POINT SESSION DOSAGE GIVEN: 2 GY

## 2025-03-13 PROCEDURE — 77014 PR CT GUIDANCE PLACEMENT RAD THERAPY FIELDS: CPT | Mod: 26 | Performed by: RADIOLOGY

## 2025-03-13 PROCEDURE — 77386 HCHG IMRT DELIVERY COMPLEX: CPT | Performed by: RADIOLOGY

## 2025-03-14 ENCOUNTER — HOSPITAL ENCOUNTER (OUTPATIENT)
Dept: RADIATION ONCOLOGY | Facility: MEDICAL CENTER | Age: 65
End: 2025-03-14
Payer: MEDICARE

## 2025-03-14 LAB
CHEMOTHERAPY INFUSION START DATE: NORMAL
CHEMOTHERAPY RECORDS: 2 GY
CHEMOTHERAPY RECORDS: 6000 CGY
CHEMOTHERAPY RECORDS: NORMAL
CHEMOTHERAPY RX CANCER: NORMAL
DATE 1ST CHEMO CANCER: NORMAL
RAD ONC ARIA COURSE LAST TREATMENT DATE: NORMAL
RAD ONC ARIA COURSE TREATMENT ELAPSED DAYS: NORMAL
RAD ONC ARIA REFERENCE POINT DOSAGE GIVEN TO DATE: 10 GY
RAD ONC ARIA REFERENCE POINT ID: NORMAL
RAD ONC ARIA REFERENCE POINT SESSION DOSAGE GIVEN: 2 GY

## 2025-03-14 PROCEDURE — 77427 RADIATION TX MANAGEMENT X5: CPT | Performed by: RADIOLOGY

## 2025-03-14 PROCEDURE — 77386 HCHG IMRT DELIVERY COMPLEX: CPT | Performed by: RADIOLOGY

## 2025-03-14 PROCEDURE — 77014 PR CT GUIDANCE PLACEMENT RAD THERAPY FIELDS: CPT | Mod: 26 | Performed by: RADIOLOGY

## 2025-03-17 ENCOUNTER — HOSPITAL ENCOUNTER (OUTPATIENT)
Dept: RADIATION ONCOLOGY | Facility: MEDICAL CENTER | Age: 65
End: 2025-03-17
Payer: MEDICARE

## 2025-03-17 LAB
CHEMOTHERAPY INFUSION START DATE: NORMAL
CHEMOTHERAPY RECORDS: 2 GY
CHEMOTHERAPY RECORDS: 6000 CGY
CHEMOTHERAPY RECORDS: NORMAL
CHEMOTHERAPY RX CANCER: NORMAL
DATE 1ST CHEMO CANCER: NORMAL
RAD ONC ARIA COURSE LAST TREATMENT DATE: NORMAL
RAD ONC ARIA COURSE TREATMENT ELAPSED DAYS: NORMAL
RAD ONC ARIA REFERENCE POINT DOSAGE GIVEN TO DATE: 12 GY
RAD ONC ARIA REFERENCE POINT ID: NORMAL
RAD ONC ARIA REFERENCE POINT SESSION DOSAGE GIVEN: 2 GY

## 2025-03-17 PROCEDURE — 77386 HCHG IMRT DELIVERY COMPLEX: CPT | Performed by: RADIOLOGY

## 2025-03-17 PROCEDURE — 77014 PR CT GUIDANCE PLACEMENT RAD THERAPY FIELDS: CPT | Mod: 26 | Performed by: RADIOLOGY

## 2025-03-18 ENCOUNTER — HOSPITAL ENCOUNTER (OUTPATIENT)
Dept: RADIATION ONCOLOGY | Facility: MEDICAL CENTER | Age: 65
End: 2025-03-18
Payer: MEDICARE

## 2025-03-18 LAB
CHEMOTHERAPY INFUSION START DATE: NORMAL
CHEMOTHERAPY RECORDS: 2 GY
CHEMOTHERAPY RECORDS: 6000 CGY
CHEMOTHERAPY RECORDS: NORMAL
CHEMOTHERAPY RX CANCER: NORMAL
DATE 1ST CHEMO CANCER: NORMAL
RAD ONC ARIA COURSE LAST TREATMENT DATE: NORMAL
RAD ONC ARIA COURSE TREATMENT ELAPSED DAYS: NORMAL
RAD ONC ARIA REFERENCE POINT DOSAGE GIVEN TO DATE: 14 GY
RAD ONC ARIA REFERENCE POINT ID: NORMAL
RAD ONC ARIA REFERENCE POINT SESSION DOSAGE GIVEN: 2 GY

## 2025-03-18 PROCEDURE — 77014 PR CT GUIDANCE PLACEMENT RAD THERAPY FIELDS: CPT | Mod: 26 | Performed by: RADIOLOGY

## 2025-03-18 PROCEDURE — 77386 HCHG IMRT DELIVERY COMPLEX: CPT | Performed by: RADIOLOGY

## 2025-03-19 ENCOUNTER — HOSPITAL ENCOUNTER (OUTPATIENT)
Dept: RADIATION ONCOLOGY | Facility: MEDICAL CENTER | Age: 65
End: 2025-03-19
Payer: MEDICARE

## 2025-03-19 ENCOUNTER — HOSPITAL ENCOUNTER (OUTPATIENT)
Dept: RADIATION ONCOLOGY | Facility: MEDICAL CENTER | Age: 65
End: 2025-03-19
Attending: RADIOLOGY
Payer: MEDICARE

## 2025-03-19 VITALS
WEIGHT: 215 LBS | DIASTOLIC BLOOD PRESSURE: 81 MMHG | HEART RATE: 60 BPM | TEMPERATURE: 97.8 F | BODY MASS INDEX: 34.7 KG/M2 | SYSTOLIC BLOOD PRESSURE: 139 MMHG | OXYGEN SATURATION: 97 %

## 2025-03-19 DIAGNOSIS — R11.0 NAUSEA: ICD-10-CM

## 2025-03-19 LAB
CHEMOTHERAPY INFUSION START DATE: NORMAL
CHEMOTHERAPY RECORDS: 2 GY
CHEMOTHERAPY RECORDS: 6000 CGY
CHEMOTHERAPY RECORDS: NORMAL
CHEMOTHERAPY RX CANCER: NORMAL
DATE 1ST CHEMO CANCER: NORMAL
RAD ONC ARIA COURSE LAST TREATMENT DATE: NORMAL
RAD ONC ARIA COURSE TREATMENT ELAPSED DAYS: NORMAL
RAD ONC ARIA REFERENCE POINT DOSAGE GIVEN TO DATE: 16 GY
RAD ONC ARIA REFERENCE POINT ID: NORMAL
RAD ONC ARIA REFERENCE POINT SESSION DOSAGE GIVEN: 2 GY

## 2025-03-19 PROCEDURE — 77336 RADIATION PHYSICS CONSULT: CPT | Performed by: RADIOLOGY

## 2025-03-19 PROCEDURE — 77386 HCHG IMRT DELIVERY COMPLEX: CPT | Performed by: RADIOLOGY

## 2025-03-19 PROCEDURE — 77014 PR CT GUIDANCE PLACEMENT RAD THERAPY FIELDS: CPT | Mod: 26 | Performed by: RADIOLOGY

## 2025-03-19 RX ORDER — ONDANSETRON 4 MG/1
4 TABLET, FILM COATED ORAL EVERY 4 HOURS PRN
Qty: 30 TABLET | Refills: 1 | Status: SHIPPED | OUTPATIENT
Start: 2025-03-19 | End: 2025-03-29

## 2025-03-19 ASSESSMENT — FIBROSIS 4 INDEX: FIB4 SCORE: 1.17

## 2025-03-19 ASSESSMENT — PAIN SCALES - GENERAL: PAINLEVEL_OUTOF10: NO PAIN

## 2025-03-19 NOTE — ON TREATMENT VISIT
ON TREATMENT  NOTE  RADIATION ONCOLOGY DEPARTMENT    Patient name:  Maria Teresa Haji    Primary Physician:  Lindsay Medina M.D. MRN: [id number]  [facility]: [id number]   Referring physician:  Maame Mccain A.P.N.   : 1960, 64 y.o.     ENCOUNTER DATE:  3/19/2025      DIAGNOSIS:  Adenoid cystic carcinoma (HCC)  Staging form: Cutaneous Carcinoma of the Head and Neck, AJCC 8th Edition.  - Pathologic stage from 2025: Stage IV (pTX, pN3b, cM0) - Signed by Brendan PIKE M.D. on 2025  Histopathologic type: Adenoid cystic carcinoma  Residual tumor (R): R1  Presence of extranodal extension: Present  Extraosseous extension: Unknown  ECOG performance status: Grade 0  Karnofsky performance status: Score 100      TREATMENT SUMMARY:  Course First Treatment Date 03/10/2025  Course Last Treatment Date 2025  Radiation Therapy Episodes       Active Episodes       Radiation Therapy: IMRT (3/10/2025)                   Radiation Treatments         Plan Last Treated On Elapsed Days Fractions Treated Prescribed Fraction Dose (cGy) Prescribed Total Dose (cGy)    L Scalp 3/19/2025 9 @ 2025 8 of 30 200 6,000                  Reference Point Last Treated On Elapsed Days Most Recent Session Dose (cGy) Total Dose (cGy)    L Scalp 3/19/2025 9 @ 2025 200 1,600                               SUBJECTIVE:  Nausea.    VITAL SIGNS:      3/19/2025    10:03 AM 3/12/2025    10:13 AM 2025    10:52 AM 2025     2:15 PM 2025     2:00 PM 2025     1:45 PM 2025     1:30 PM   Vitals   SYSTOLIC 139 146 155 138 139 136 135   DIASTOLIC 81 73 90 62 64 60 99   Pulse 60 71 57 68 66 75 76   Temperature 36.6 °C (97.8 °F) 36.2 °C (97.1 °F) 36.8 °C (98.3 °F)       Respiration    25 14 18 17   Weight 215 216.05 218.7       BMI 34.7 kg/m2 34.87 kg/m2 35.3 kg/m2       Pulse Oximetry 97 % 97 % 97 % 94 % 91 % 92 % 93 %     KPS: 80, Normal activity with effort; some signs or symptoms of disease (ECOG  equivalent 1)  Encounter Vitals  Temperature: 36.6 °C (97.8 °F)  Temp src: Temporal  Blood Pressure : 139/81  Pulse: 60  Pulse Oximetry: 97 %  Weight: 97.5 kg (215 lb)  Pain Score: No pain      3/19/2025    10:03 AM 3/12/2025    10:13 AM 2/20/2025    10:52 AM 1/15/2025    10:49 AM 1/2/2025     2:00 PM 10/29/2024     9:51 AM   Pain Assessment   Pain Score NO PAIN NO PAIN NO PAIN NO PAIN 4=SLIGHT PARTH 5=MODERATE P   Pain Loc     HEAD HEAD          PHYSICAL EXAM:  Physical Exam  Vitals and nursing note reviewed.   Constitutional:       Appearance: She is well-developed.   HENT:      Head: Normocephalic.   Skin:     General: Skin is warm and dry.      Findings: No erythema.   Neurological:      Mental Status: She is alert and oriented to person, place, and time.   Psychiatric:         Behavior: Behavior normal.         Thought Content: Thought content normal.         Judgment: Judgment normal.              3/19/2025    10:05 AM 3/12/2025    10:14 AM   Toxicity Assessment   Toxicity Assessment Skin Skin   Fatigue (lethargy, malaise, asthenia) Increased fatigue over baseline, but not altering normal activities None   Radiation Dermatitis None None   Photosensitivity None None   Dry Skin Normal Normal   Alopecia Normal Normal   Erythema Multiforme (e.g., Rodriguez-Srinivasa syndrome, toxic epidermal necrolysis) Absent Absent   Nail Changes Normal Normal   Wound (Non-Infectious) None None   Wound (Infectious) None None   RT - Pain due to RT None None   Tumor Pain (onset or exacerbation of tumor pain due to treatment) None None   Skin - Late RT No change from baseline No change from baseline       CURRENT MEDICATIONS:    Current Outpatient Medications:     ondansetron (ZOFRAN) 4 MG Tab tablet, Take 1 Tablet by mouth every four hours as needed for Nausea/Vomiting for up to 10 days., Disp: 30 Tablet, Rfl: 1    baclofen (LIORESAL) 10 MG Tab, TAKE 1 TABLET BY MOUTH 3 TIMES A DAY AS NEEDED (BACK PAIN)., Disp: 90 Tablet, Rfl: 1     gabapentin (NEURONTIN) 100 MG Cap, TAKE 1 CAPSULE BY MOUTH THREE TIMES A DAY, Disp: 90 Capsule, Rfl: 1    cephALEXin (KEFLEX) 500 MG Cap, Take 1 Capsule by mouth 3 times a day., Disp: 30 Capsule, Rfl: 0    LORazepam (ATIVAN) 0.5 MG Tab, Take 1 Tablet by mouth 1 time a day as needed for Anxiety for up to 90 days., Disp: 30 Tablet, Rfl: 2    multivitamin Tab, Take 1 Tablet by mouth every day., Disp: , Rfl:     valacyclovir (VALTREX) 1 GM Tab, Take 1,000 mg by mouth every day., Disp: , Rfl:     omeprazole (PRILOSEC) 40 MG delayed-release capsule, Take 1 Capsule by mouth every day. Indications: Heartburn (Patient taking differently: Take 40 mg by mouth every day.     Indications: Heartburn), Disp: 90 Capsule, Rfl: 1    traZODone (DESYREL) 50 MG Tab, Take 1 Tablet by mouth at bedtime. (Patient taking differently: Take 50 mg by mouth at bedtime. ), Disp: 90 Tablet, Rfl: 1    meloxicam (MOBIC) 15 MG tablet, Take 1 Tablet by mouth every day. (Patient taking differently: Take 15 mg by mouth 1 time a day as needed.), Disp: 90 Tablet, Rfl: 2    LABORATORY DATA:   Lab Results   Component Value Date/Time    SODIUM 139 01/15/2025 10:04 AM    POTASSIUM 4.6 01/15/2025 10:04 AM    CHLORIDE 102 01/15/2025 10:04 AM    CO2 26 01/15/2025 10:04 AM    GLUCOSE 92 01/15/2025 10:04 AM    BUN 15 01/15/2025 10:04 AM    CREATININE 0.92 01/15/2025 10:04 AM       Lab Results   Component Value Date/Time    WBC 6.0 01/15/2025 10:04 AM    RBC 4.50 01/15/2025 10:04 AM    HEMOGLOBIN 15.1 01/15/2025 10:04 AM    HEMATOCRIT 43.4 01/15/2025 10:04 AM    MCV 96.4 01/15/2025 10:04 AM    MCH 33.6 (H) 01/15/2025 10:04 AM    MCHC 34.8 01/15/2025 10:04 AM    PLATELETCT 219 01/15/2025 10:04 AM         RADIOLOGY DATA:  US-SOFT TISSUES OF HEAD - NECK  Addendum Date: 3/4/2025  2/11/2025 3:48 PM HISTORY/REASON FOR EXAM:  Left mid back palpable lump TECHNIQUE/EXAM DESCRIPTION: Ultrasound of the soft tissues of the head and neck. COMPARISON:  10/18/2024 FINDINGS:  Focused ultrasound of the area of concern left neck demonstrates mildly prominent lymph nodes compared to right-sided lymph nodes. Largest on the left measures 2.3 cm. Additional node more posteriorly measures 1.3 cm. Architecture appears normal within these nodes. Survey of the additional bilateral lateral cervical lymph node chains reveals no suspicious-appearing lymph nodes. 1. Mildly prominent left neck lymph nodes are identified. Consider inflammation infection or neoplasm.    Result Date: 3/4/2025  1. Mildly prominent left neck lymph nodes are identified. Consider inflammation infection or neoplasm.      IMPRESSION:  Cancer Staging   Adenoid cystic carcinoma (HCC)  Staging form: Cutaneous Carcinoma of the Head and Neck, AJCC 8th Edition.  - Pathologic stage from 1/2/2025: Stage IV (pTX, pN3b, cM0) - Signed by Brendan PIKE M.D. on 1/2/2025      PLAN:  No change in treatment plan    Disposition:  Treatment plan and imaging reviewed. Questions answered. Continue therapy outlined.     Brendan PIKE M.D.    Orders Placed This Encounter    ondansetron (ZOFRAN) 4 MG Tab tablet

## 2025-03-20 ENCOUNTER — HOSPITAL ENCOUNTER (OUTPATIENT)
Dept: RADIATION ONCOLOGY | Facility: MEDICAL CENTER | Age: 65
End: 2025-03-20
Payer: MEDICARE

## 2025-03-20 LAB
CHEMOTHERAPY INFUSION START DATE: NORMAL
CHEMOTHERAPY RECORDS: 2 GY
CHEMOTHERAPY RECORDS: 6000 CGY
CHEMOTHERAPY RECORDS: NORMAL
CHEMOTHERAPY RX CANCER: NORMAL
DATE 1ST CHEMO CANCER: NORMAL
RAD ONC ARIA COURSE LAST TREATMENT DATE: NORMAL
RAD ONC ARIA COURSE TREATMENT ELAPSED DAYS: NORMAL
RAD ONC ARIA REFERENCE POINT DOSAGE GIVEN TO DATE: 18 GY
RAD ONC ARIA REFERENCE POINT ID: NORMAL
RAD ONC ARIA REFERENCE POINT SESSION DOSAGE GIVEN: 2 GY

## 2025-03-20 PROCEDURE — 77386 HCHG IMRT DELIVERY COMPLEX: CPT | Performed by: RADIOLOGY

## 2025-03-20 PROCEDURE — 77014 PR CT GUIDANCE PLACEMENT RAD THERAPY FIELDS: CPT | Mod: 26 | Performed by: RADIOLOGY

## 2025-03-21 ENCOUNTER — HOSPITAL ENCOUNTER (OUTPATIENT)
Dept: RADIATION ONCOLOGY | Facility: MEDICAL CENTER | Age: 65
End: 2025-03-21
Payer: MEDICARE

## 2025-03-21 LAB
CHEMOTHERAPY INFUSION START DATE: NORMAL
CHEMOTHERAPY RECORDS: 2 GY
CHEMOTHERAPY RECORDS: 6000 CGY
CHEMOTHERAPY RECORDS: NORMAL
CHEMOTHERAPY RX CANCER: NORMAL
DATE 1ST CHEMO CANCER: NORMAL
RAD ONC ARIA COURSE LAST TREATMENT DATE: NORMAL
RAD ONC ARIA COURSE TREATMENT ELAPSED DAYS: NORMAL
RAD ONC ARIA REFERENCE POINT DOSAGE GIVEN TO DATE: 20 GY
RAD ONC ARIA REFERENCE POINT ID: NORMAL
RAD ONC ARIA REFERENCE POINT SESSION DOSAGE GIVEN: 2 GY

## 2025-03-21 PROCEDURE — 77386 HCHG IMRT DELIVERY COMPLEX: CPT | Performed by: RADIOLOGY

## 2025-03-21 PROCEDURE — 77014 PR CT GUIDANCE PLACEMENT RAD THERAPY FIELDS: CPT | Mod: 26 | Performed by: RADIOLOGY

## 2025-03-21 PROCEDURE — 77427 RADIATION TX MANAGEMENT X5: CPT | Performed by: RADIOLOGY

## 2025-03-24 ENCOUNTER — HOSPITAL ENCOUNTER (OUTPATIENT)
Dept: RADIATION ONCOLOGY | Facility: MEDICAL CENTER | Age: 65
End: 2025-03-24
Payer: MEDICARE

## 2025-03-24 LAB
CHEMOTHERAPY INFUSION START DATE: NORMAL
CHEMOTHERAPY RECORDS: 2 GY
CHEMOTHERAPY RECORDS: 6000 CGY
CHEMOTHERAPY RECORDS: NORMAL
CHEMOTHERAPY RX CANCER: NORMAL
DATE 1ST CHEMO CANCER: NORMAL
RAD ONC ARIA COURSE LAST TREATMENT DATE: NORMAL
RAD ONC ARIA COURSE TREATMENT ELAPSED DAYS: NORMAL
RAD ONC ARIA REFERENCE POINT DOSAGE GIVEN TO DATE: 22 GY
RAD ONC ARIA REFERENCE POINT ID: NORMAL
RAD ONC ARIA REFERENCE POINT SESSION DOSAGE GIVEN: 2 GY

## 2025-03-24 PROCEDURE — 77386 HCHG IMRT DELIVERY COMPLEX: CPT | Performed by: RADIOLOGY

## 2025-03-24 PROCEDURE — 77014 PR CT GUIDANCE PLACEMENT RAD THERAPY FIELDS: CPT | Mod: 26 | Performed by: RADIOLOGY

## 2025-03-25 ENCOUNTER — HOSPITAL ENCOUNTER (OUTPATIENT)
Dept: RADIATION ONCOLOGY | Facility: MEDICAL CENTER | Age: 65
End: 2025-03-25
Payer: MEDICARE

## 2025-03-25 DIAGNOSIS — K21.9 GASTROESOPHAGEAL REFLUX DISEASE WITHOUT ESOPHAGITIS: ICD-10-CM

## 2025-03-25 LAB
CHEMOTHERAPY INFUSION START DATE: NORMAL
CHEMOTHERAPY RECORDS: 2 GY
CHEMOTHERAPY RECORDS: 6000 CGY
CHEMOTHERAPY RECORDS: NORMAL
CHEMOTHERAPY RX CANCER: NORMAL
DATE 1ST CHEMO CANCER: NORMAL
RAD ONC ARIA COURSE LAST TREATMENT DATE: NORMAL
RAD ONC ARIA COURSE TREATMENT ELAPSED DAYS: NORMAL
RAD ONC ARIA REFERENCE POINT DOSAGE GIVEN TO DATE: 24 GY
RAD ONC ARIA REFERENCE POINT ID: NORMAL
RAD ONC ARIA REFERENCE POINT SESSION DOSAGE GIVEN: 2 GY

## 2025-03-25 PROCEDURE — 77014 PR CT GUIDANCE PLACEMENT RAD THERAPY FIELDS: CPT | Mod: 26 | Performed by: RADIOLOGY

## 2025-03-25 PROCEDURE — 77386 HCHG IMRT DELIVERY COMPLEX: CPT | Performed by: RADIOLOGY

## 2025-03-25 RX ORDER — OMEPRAZOLE 40 MG/1
40 CAPSULE, DELAYED RELEASE ORAL DAILY
Qty: 90 CAPSULE | Refills: 3 | Status: SHIPPED | OUTPATIENT
Start: 2025-03-25

## 2025-03-26 ENCOUNTER — HOSPITAL ENCOUNTER (OUTPATIENT)
Dept: RADIATION ONCOLOGY | Facility: MEDICAL CENTER | Age: 65
End: 2025-03-26
Attending: RADIOLOGY
Payer: MEDICARE

## 2025-03-26 ENCOUNTER — HOSPITAL ENCOUNTER (OUTPATIENT)
Dept: RADIATION ONCOLOGY | Facility: MEDICAL CENTER | Age: 65
End: 2025-03-26

## 2025-03-26 VITALS
SYSTOLIC BLOOD PRESSURE: 139 MMHG | OXYGEN SATURATION: 96 % | BODY MASS INDEX: 34.7 KG/M2 | HEART RATE: 63 BPM | DIASTOLIC BLOOD PRESSURE: 86 MMHG | WEIGHT: 215 LBS | TEMPERATURE: 97.4 F

## 2025-03-26 LAB
CHEMOTHERAPY INFUSION START DATE: NORMAL
CHEMOTHERAPY RECORDS: 2 GY
CHEMOTHERAPY RECORDS: 6000 CGY
CHEMOTHERAPY RECORDS: NORMAL
CHEMOTHERAPY RX CANCER: NORMAL
DATE 1ST CHEMO CANCER: NORMAL
RAD ONC ARIA COURSE LAST TREATMENT DATE: NORMAL
RAD ONC ARIA COURSE TREATMENT ELAPSED DAYS: NORMAL
RAD ONC ARIA REFERENCE POINT DOSAGE GIVEN TO DATE: 26 GY
RAD ONC ARIA REFERENCE POINT ID: NORMAL
RAD ONC ARIA REFERENCE POINT SESSION DOSAGE GIVEN: 2 GY

## 2025-03-26 PROCEDURE — 77014 PR CT GUIDANCE PLACEMENT RAD THERAPY FIELDS: CPT | Mod: 26 | Performed by: RADIOLOGY

## 2025-03-26 PROCEDURE — 77336 RADIATION PHYSICS CONSULT: CPT | Performed by: RADIOLOGY

## 2025-03-26 PROCEDURE — 77386 HCHG IMRT DELIVERY COMPLEX: CPT | Performed by: RADIOLOGY

## 2025-03-26 ASSESSMENT — PAIN SCALES - GENERAL: PAINLEVEL_OUTOF10: NO PAIN

## 2025-03-26 ASSESSMENT — FIBROSIS 4 INDEX: FIB4 SCORE: 1.17

## 2025-03-26 NOTE — ON TREATMENT VISIT
ON TREATMENT  NOTE  RADIATION ONCOLOGY DEPARTMENT    Patient name:  Maria Teresa Haji    Primary Physician:  Lindsay Medina M.D. MRN: [id number]  [facility]: [id number]   Referring physician:  Maame Mccain A.P.N.   : 1960, 64 y.o.     ENCOUNTER DATE:  3/26/2025      DIAGNOSIS:  Adenoid cystic carcinoma (HCC)  Staging form: Cutaneous Carcinoma of the Head and Neck, AJCC 8th Edition.  - Pathologic stage from 2025: Stage IV (pTX, pN3b, cM0) - Signed by Brendan PIKE M.D. on 2025  Histopathologic type: Adenoid cystic carcinoma  Residual tumor (R): R1  Presence of extranodal extension: Present  Extraosseous extension: Unknown  ECOG performance status: Grade 0  Karnofsky performance status: Score 100      TREATMENT SUMMARY:  Course First Treatment Date 03/10/2025  Course Last Treatment Date 2025  Radiation Therapy Episodes       Active Episodes       Radiation Therapy: IMRT (3/10/2025)                   Radiation Treatments         Plan Last Treated On Elapsed Days Fractions Treated Prescribed Fraction Dose (cGy) Prescribed Total Dose (cGy)    L Scalp 3/26/2025 16 @ 2025 13 of 30 200 6,000                  Reference Point Last Treated On Elapsed Days Most Recent Session Dose (cGy) Total Dose (cGy)    L Scalp 3/26/2025 16 @ 2025 200 2,600                               SUBJECTIVE:  Xerostomia and impaired taste.  Some mild skin changes primarily hyperpigmentation.  Some alopecia.    VITAL SIGNS:      3/26/2025     9:47 AM 3/19/2025    10:03 AM 3/12/2025    10:13 AM 2025    10:52 AM 2025     2:15 PM 2025     2:00 PM 2025     1:45 PM   Vitals   SYSTOLIC 139 139 146 155 138 139 136   DIASTOLIC 86 81 73 90 62 64 60   Pulse 63 60 71 57 68 66 75   Temperature 36.3 °C (97.4 °F) 36.6 °C (97.8 °F) 36.2 °C (97.1 °F) 36.8 °C (98.3 °F)      Respiration     25 14 18   Weight 215 215 216.05 218.7      BMI 34.7 kg/m2 34.7 kg/m2 34.87 kg/m2 35.3 kg/m2      Pulse Oximetry  96 % 97 % 97 % 97 % 94 % 91 % 92 %     KPS: 90, Able to carry on normal activity; minor signs or symptoms of disease (ECOG equivalent 0)  Encounter Vitals  Temperature: 36.3 °C (97.4 °F)  Temp src: Temporal  Blood Pressure : 139/86  Pulse: 63  Pulse Oximetry: 96 %  Weight: 97.5 kg (215 lb)  Pain Score: No pain      3/26/2025     9:47 AM 3/19/2025    10:03 AM 3/12/2025    10:13 AM 2/20/2025    10:52 AM 1/15/2025    10:49 AM 1/2/2025     2:00 PM   Pain Assessment   Pain Score NO PAIN NO PAIN NO PAIN NO PAIN NO PAIN 4=SLIGHT PARTH   Pain Loc      HEAD          PHYSICAL EXAM:  Physical Exam  Vitals and nursing note reviewed.   Constitutional:       Appearance: She is well-developed.   HENT:      Head: Normocephalic.   Skin:     General: Skin is warm and dry.      Findings: No erythema.   Neurological:      Mental Status: She is alert and oriented to person, place, and time.   Psychiatric:         Behavior: Behavior normal.         Thought Content: Thought content normal.         Judgment: Judgment normal.              3/26/2025     9:51 AM 3/19/2025    10:05 AM 3/12/2025    10:14 AM   Toxicity Assessment   Toxicity Assessment Head/Neck Skin Skin   Fatigue (lethargy, malaise, asthenia) Increased fatigue over baseline, but not altering normal activities Increased fatigue over baseline, but not altering normal activities None   Radiation Dermatitis None None None   Rash/desquamation None     Photosensitivity  None None   Dry Skin  Normal Normal   Alopecia  Normal Normal   Erythema Multiforme (e.g., Rodriguez-Srinivasa syndrome, toxic epidermal necrolysis)  Absent Absent   Nail Changes  Normal Normal   Wound (Non-Infectious)  None None   Wound (Infectious)  None None   Constipation Requiring stool softener or dietary modification     Dehydration None     Mouth Dryness Moderate     RT Dysphagia-Pharyngeal None     Mucositis None     Salivary Gland Changes None     Stomatitis/Pharyngitis None     Taste Disturbance (dysgeusia)  Slightly altered     RT - Pain due to RT None None None   Tumor Pain (onset or exacerbation of tumor pain due to treatment)  None None   Cough Absent     Voice changes/stridor/larynx (hoarseness, loss of voice, laryngitis) Normal     Skin - Late RT  No change from baseline No change from baseline       CURRENT MEDICATIONS:    Current Outpatient Medications:     omeprazole (PRILOSEC) 40 MG delayed-release capsule, Take 1 Capsule by mouth every day. Indications: Heartburn, Disp: 90 Capsule, Rfl: 3    ondansetron (ZOFRAN) 4 MG Tab tablet, Take 1 Tablet by mouth every four hours as needed for Nausea/Vomiting for up to 10 days., Disp: 30 Tablet, Rfl: 1    baclofen (LIORESAL) 10 MG Tab, TAKE 1 TABLET BY MOUTH 3 TIMES A DAY AS NEEDED (BACK PAIN)., Disp: 90 Tablet, Rfl: 1    gabapentin (NEURONTIN) 100 MG Cap, TAKE 1 CAPSULE BY MOUTH THREE TIMES A DAY, Disp: 90 Capsule, Rfl: 1    cephALEXin (KEFLEX) 500 MG Cap, Take 1 Capsule by mouth 3 times a day., Disp: 30 Capsule, Rfl: 0    LORazepam (ATIVAN) 0.5 MG Tab, Take 1 Tablet by mouth 1 time a day as needed for Anxiety for up to 90 days., Disp: 30 Tablet, Rfl: 2    multivitamin Tab, Take 1 Tablet by mouth every day., Disp: , Rfl:     valacyclovir (VALTREX) 1 GM Tab, Take 1,000 mg by mouth every day., Disp: , Rfl:     traZODone (DESYREL) 50 MG Tab, Take 1 Tablet by mouth at bedtime. (Patient taking differently: Take 50 mg by mouth at bedtime. ), Disp: 90 Tablet, Rfl: 1    meloxicam (MOBIC) 15 MG tablet, Take 1 Tablet by mouth every day. (Patient taking differently: Take 15 mg by mouth 1 time a day as needed.), Disp: 90 Tablet, Rfl: 2    LABORATORY DATA:   Lab Results   Component Value Date/Time    SODIUM 139 01/15/2025 10:04 AM    POTASSIUM 4.6 01/15/2025 10:04 AM    CHLORIDE 102 01/15/2025 10:04 AM    CO2 26 01/15/2025 10:04 AM    GLUCOSE 92 01/15/2025 10:04 AM    BUN 15 01/15/2025 10:04 AM    CREATININE 0.92 01/15/2025 10:04 AM       Lab Results   Component Value Date/Time     WBC 6.0 01/15/2025 10:04 AM    RBC 4.50 01/15/2025 10:04 AM    HEMOGLOBIN 15.1 01/15/2025 10:04 AM    HEMATOCRIT 43.4 01/15/2025 10:04 AM    MCV 96.4 01/15/2025 10:04 AM    MCH 33.6 (H) 01/15/2025 10:04 AM    MCHC 34.8 01/15/2025 10:04 AM    PLATELETCT 219 01/15/2025 10:04 AM         RADIOLOGY DATA:  US-SOFT TISSUES OF HEAD - NECK  Addendum Date: 3/4/2025  2/11/2025 3:48 PM HISTORY/REASON FOR EXAM:  Left mid back palpable lump TECHNIQUE/EXAM DESCRIPTION: Ultrasound of the soft tissues of the head and neck. COMPARISON:  10/18/2024 FINDINGS: Focused ultrasound of the area of concern left neck demonstrates mildly prominent lymph nodes compared to right-sided lymph nodes. Largest on the left measures 2.3 cm. Additional node more posteriorly measures 1.3 cm. Architecture appears normal within these nodes. Survey of the additional bilateral lateral cervical lymph node chains reveals no suspicious-appearing lymph nodes. 1. Mildly prominent left neck lymph nodes are identified. Consider inflammation infection or neoplasm.    Result Date: 3/4/2025  1. Mildly prominent left neck lymph nodes are identified. Consider inflammation infection or neoplasm.      IMPRESSION:  Cancer Staging   Adenoid cystic carcinoma (HCC)  Staging form: Cutaneous Carcinoma of the Head and Neck, AJCC 8th Edition.  - Pathologic stage from 1/2/2025: Stage IV (pTX, pN3b, cM0) - Signed by Brendan PIKE M.D. on 1/2/2025      PLAN:  No change in treatment plan    Disposition:  Treatment plan and imaging reviewed. Questions answered. Continue therapy outlined.     Brendan PIKE M.D.    No orders of the defined types were placed in this encounter.

## 2025-03-27 ENCOUNTER — HOSPITAL ENCOUNTER (OUTPATIENT)
Dept: RADIATION ONCOLOGY | Facility: MEDICAL CENTER | Age: 65
End: 2025-03-27

## 2025-03-27 LAB
CHEMOTHERAPY INFUSION START DATE: NORMAL
CHEMOTHERAPY RECORDS: 2 GY
CHEMOTHERAPY RECORDS: 6000 CGY
CHEMOTHERAPY RECORDS: NORMAL
CHEMOTHERAPY RX CANCER: NORMAL
DATE 1ST CHEMO CANCER: NORMAL
RAD ONC ARIA COURSE LAST TREATMENT DATE: NORMAL
RAD ONC ARIA COURSE TREATMENT ELAPSED DAYS: NORMAL
RAD ONC ARIA REFERENCE POINT DOSAGE GIVEN TO DATE: 28 GY
RAD ONC ARIA REFERENCE POINT ID: NORMAL
RAD ONC ARIA REFERENCE POINT SESSION DOSAGE GIVEN: 2 GY

## 2025-03-27 PROCEDURE — 77386 HCHG IMRT DELIVERY COMPLEX: CPT | Performed by: RADIOLOGY

## 2025-03-27 PROCEDURE — 77014 PR CT GUIDANCE PLACEMENT RAD THERAPY FIELDS: CPT | Mod: 26 | Performed by: RADIOLOGY

## 2025-03-28 ENCOUNTER — HOSPITAL ENCOUNTER (OUTPATIENT)
Dept: RADIATION ONCOLOGY | Facility: MEDICAL CENTER | Age: 65
End: 2025-03-28

## 2025-03-28 LAB
CHEMOTHERAPY INFUSION START DATE: NORMAL
CHEMOTHERAPY RECORDS: 2 GY
CHEMOTHERAPY RECORDS: 6000 CGY
CHEMOTHERAPY RECORDS: NORMAL
CHEMOTHERAPY RX CANCER: NORMAL
DATE 1ST CHEMO CANCER: NORMAL
RAD ONC ARIA COURSE LAST TREATMENT DATE: NORMAL
RAD ONC ARIA COURSE TREATMENT ELAPSED DAYS: NORMAL
RAD ONC ARIA REFERENCE POINT DOSAGE GIVEN TO DATE: 30 GY
RAD ONC ARIA REFERENCE POINT ID: NORMAL
RAD ONC ARIA REFERENCE POINT SESSION DOSAGE GIVEN: 2 GY

## 2025-03-28 PROCEDURE — 77427 RADIATION TX MANAGEMENT X5: CPT | Performed by: RADIOLOGY

## 2025-03-28 PROCEDURE — 77014 PR CT GUIDANCE PLACEMENT RAD THERAPY FIELDS: CPT | Mod: 26 | Performed by: RADIOLOGY

## 2025-03-28 PROCEDURE — 77386 HCHG IMRT DELIVERY COMPLEX: CPT | Performed by: RADIOLOGY

## 2025-03-31 ENCOUNTER — HOSPITAL ENCOUNTER (OUTPATIENT)
Dept: RADIATION ONCOLOGY | Facility: MEDICAL CENTER | Age: 65
End: 2025-03-31
Payer: MEDICARE

## 2025-03-31 LAB
CHEMOTHERAPY INFUSION START DATE: NORMAL
CHEMOTHERAPY RECORDS: 2 GY
CHEMOTHERAPY RECORDS: 6000 CGY
CHEMOTHERAPY RECORDS: NORMAL
CHEMOTHERAPY RX CANCER: NORMAL
DATE 1ST CHEMO CANCER: NORMAL
RAD ONC ARIA COURSE LAST TREATMENT DATE: NORMAL
RAD ONC ARIA COURSE TREATMENT ELAPSED DAYS: NORMAL
RAD ONC ARIA REFERENCE POINT DOSAGE GIVEN TO DATE: 32 GY
RAD ONC ARIA REFERENCE POINT ID: NORMAL
RAD ONC ARIA REFERENCE POINT SESSION DOSAGE GIVEN: 2 GY

## 2025-03-31 PROCEDURE — 77386 HCHG IMRT DELIVERY COMPLEX: CPT | Performed by: RADIOLOGY

## 2025-03-31 PROCEDURE — 77014 PR CT GUIDANCE PLACEMENT RAD THERAPY FIELDS: CPT | Mod: 26 | Performed by: RADIOLOGY

## 2025-04-01 ENCOUNTER — HOSPITAL ENCOUNTER (OUTPATIENT)
Dept: RADIATION ONCOLOGY | Facility: MEDICAL CENTER | Age: 65
End: 2025-04-01
Attending: RADIOLOGY
Payer: MEDICARE

## 2025-04-01 ENCOUNTER — HOSPITAL ENCOUNTER (OUTPATIENT)
Dept: RADIATION ONCOLOGY | Facility: MEDICAL CENTER | Age: 65
End: 2025-04-01
Payer: MEDICARE

## 2025-04-01 LAB
CHEMOTHERAPY INFUSION START DATE: NORMAL
CHEMOTHERAPY RECORDS: 2 GY
CHEMOTHERAPY RECORDS: 6000 CGY
CHEMOTHERAPY RECORDS: NORMAL
CHEMOTHERAPY RX CANCER: NORMAL
DATE 1ST CHEMO CANCER: NORMAL
RAD ONC ARIA COURSE LAST TREATMENT DATE: NORMAL
RAD ONC ARIA COURSE TREATMENT ELAPSED DAYS: NORMAL
RAD ONC ARIA REFERENCE POINT DOSAGE GIVEN TO DATE: 34 GY
RAD ONC ARIA REFERENCE POINT ID: NORMAL
RAD ONC ARIA REFERENCE POINT SESSION DOSAGE GIVEN: 2 GY

## 2025-04-01 PROCEDURE — 77014 PR CT GUIDANCE PLACEMENT RAD THERAPY FIELDS: CPT | Mod: 26 | Performed by: RADIOLOGY

## 2025-04-01 PROCEDURE — 77386 HCHG IMRT DELIVERY COMPLEX: CPT | Performed by: RADIOLOGY

## 2025-04-02 ENCOUNTER — HOSPITAL ENCOUNTER (OUTPATIENT)
Dept: LAB | Facility: MEDICAL CENTER | Age: 65
End: 2025-04-02
Attending: RADIOLOGY
Payer: MEDICARE

## 2025-04-02 ENCOUNTER — HOSPITAL ENCOUNTER (OUTPATIENT)
Dept: RADIATION ONCOLOGY | Facility: MEDICAL CENTER | Age: 65
End: 2025-04-02
Attending: RADIOLOGY
Payer: MEDICARE

## 2025-04-02 ENCOUNTER — HOSPITAL ENCOUNTER (OUTPATIENT)
Dept: RADIATION ONCOLOGY | Facility: MEDICAL CENTER | Age: 65
End: 2025-04-02
Payer: MEDICARE

## 2025-04-02 VITALS
BODY MASS INDEX: 34.7 KG/M2 | WEIGHT: 215 LBS | HEART RATE: 67 BPM | OXYGEN SATURATION: 97 % | SYSTOLIC BLOOD PRESSURE: 133 MMHG | TEMPERATURE: 97.3 F | DIASTOLIC BLOOD PRESSURE: 84 MMHG

## 2025-04-02 DIAGNOSIS — C80.1 ADENOID CYSTIC CARCINOMA (HCC): ICD-10-CM

## 2025-04-02 LAB
CHEMOTHERAPY INFUSION START DATE: NORMAL
CHEMOTHERAPY RECORDS: 2 GY
CHEMOTHERAPY RECORDS: 6000 CGY
CHEMOTHERAPY RECORDS: NORMAL
CHEMOTHERAPY RX CANCER: NORMAL
DATE 1ST CHEMO CANCER: NORMAL
RAD ONC ARIA COURSE LAST TREATMENT DATE: NORMAL
RAD ONC ARIA COURSE TREATMENT ELAPSED DAYS: NORMAL
RAD ONC ARIA REFERENCE POINT DOSAGE GIVEN TO DATE: 36 GY
RAD ONC ARIA REFERENCE POINT ID: NORMAL
RAD ONC ARIA REFERENCE POINT SESSION DOSAGE GIVEN: 2 GY

## 2025-04-02 PROCEDURE — 77336 RADIATION PHYSICS CONSULT: CPT | Performed by: RADIOLOGY

## 2025-04-02 PROCEDURE — 36415 COLL VENOUS BLD VENIPUNCTURE: CPT

## 2025-04-02 PROCEDURE — 77386 HCHG IMRT DELIVERY COMPLEX: CPT | Performed by: RADIOLOGY

## 2025-04-02 PROCEDURE — 77014 PR CT GUIDANCE PLACEMENT RAD THERAPY FIELDS: CPT | Mod: 26 | Performed by: RADIOLOGY

## 2025-04-02 ASSESSMENT — FIBROSIS 4 INDEX: FIB4 SCORE: 1.17

## 2025-04-02 ASSESSMENT — PAIN SCALES - GENERAL: PAINLEVEL_OUTOF10: NO PAIN

## 2025-04-02 NOTE — ON TREATMENT VISIT
ON TREATMENT  NOTE  RADIATION ONCOLOGY DEPARTMENT    Patient name:  Maria Teresa Haji    Primary Physician:  Lindsay Medina M.D. MRN: [id number]  [facility]: [id number]   Referring physician:  Maame Mccain A.P.N.   : 1960, 64 y.o.     ENCOUNTER DATE:  2025      DIAGNOSIS:  Adenoid cystic carcinoma (HCC)  Staging form: Cutaneous Carcinoma of the Head and Neck, AJCC 8th Edition.  - Pathologic stage from 2025: Stage IV (pTX, pN3b, cM0) - Signed by Brendan PIKE M.D. on 2025  Histopathologic type: Adenoid cystic carcinoma  Residual tumor (R): R1  Presence of extranodal extension: Present  Extraosseous extension: Unknown  ECOG performance status: Grade 0  Karnofsky performance status: Score 100      TREATMENT SUMMARY:  Course First Treatment Date 03/10/2025  Course Last Treatment Date 2025  Radiation Therapy Episodes       Active Episodes       Radiation Therapy: IMRT (3/10/2025)                   Radiation Treatments         Plan Last Treated On Elapsed Days Fractions Treated Prescribed Fraction Dose (cGy) Prescribed Total Dose (cGy)    L Scalp 2025 22 @ 2025 17 of 30 200 6,000                  Reference Point Last Treated On Elapsed Days Most Recent Session Dose (cGy) Total Dose (cGy)    L Scalp 2025 22 @ 2025 200 3,400                               SUBJECTIVE:  Tolerating well. Xerostomia and dysgeusia    VITAL SIGNS:      2025     9:23 AM 3/26/2025     9:47 AM 3/19/2025    10:03 AM 3/12/2025    10:13 AM 2025    10:52 AM 2025     2:15 PM 2025     2:00 PM   Vitals   SYSTOLIC 133 139 139 146 155 138 139   DIASTOLIC 84 86 81 73 90 62 64   Pulse 67 63 60 71 57 68 66   Temperature 36.3 °C (97.3 °F) 36.3 °C (97.4 °F) 36.6 °C (97.8 °F) 36.2 °C (97.1 °F) 36.8 °C (98.3 °F)     Respiration      25 14   Weight 215 215 215 216.05 218.7     BMI 34.7 kg/m2 34.7 kg/m2 34.7 kg/m2 34.87 kg/m2 35.3 kg/m2     Pulse Oximetry 97 % 96 % 97 % 97 % 97 % 94 % 91 %      KPS: 100, Fully active, able to carry on all pre-disease performed without restriction (ECOG equivalent 0)  Encounter Vitals  Temperature: 36.3 °C (97.3 °F)  Temp src: Temporal  Blood Pressure : 133/84  Pulse: 67  Pulse Oximetry: 97 %  Weight: 97.5 kg (215 lb)  Pain Score: No pain      4/2/2025     9:23 AM 3/26/2025     9:47 AM 3/19/2025    10:03 AM 3/12/2025    10:13 AM 2/20/2025    10:52 AM 1/15/2025    10:49 AM   Pain Assessment   Pain Score NO PAIN NO PAIN NO PAIN NO PAIN NO PAIN NO PAIN          PHYSICAL EXAM:  Physical Exam  Vitals and nursing note reviewed.   Constitutional:       Appearance: She is well-developed.   HENT:      Head: Normocephalic.   Skin:     General: Skin is warm and dry.      Findings: Erythema present.   Neurological:      Mental Status: She is alert and oriented to person, place, and time.   Psychiatric:         Behavior: Behavior normal.         Thought Content: Thought content normal.         Judgment: Judgment normal.              4/2/2025     9:24 AM 3/26/2025     9:51 AM 3/19/2025    10:05 AM 3/12/2025    10:14 AM   Toxicity Assessment   Toxicity Assessment Skin Head/Neck Skin Skin   Fatigue (lethargy, malaise, asthenia) Moderate (e.g., decrease in performance status by 1 ECOG level or 20% Karnofsky) or causing difficulty performing some activities Increased fatigue over baseline, but not altering normal activities Increased fatigue over baseline, but not altering normal activities None   Radiation Dermatitis None None None None   Rash/desquamation  None     Photosensitivity None  None None   Dry Skin Controlled with emollients  Normal Normal   Alopecia Pronounced hair loss  Normal Normal   Erythema Multiforme (e.g., Rodriguez-Srinivasa syndrome, toxic epidermal necrolysis) Absent  Absent Absent   Nail Changes Normal  Normal Normal   Wound (Non-Infectious) None  None None   Wound (Infectious) None  None None   Constipation  Requiring stool softener or dietary modification      Dehydration  None     Mouth Dryness  Moderate     RT Dysphagia-Pharyngeal  None     Mucositis  None     Salivary Gland Changes  None     Stomatitis/Pharyngitis  None     Taste Disturbance (dysgeusia)  Slightly altered     RT - Pain due to RT None None None None   Tumor Pain (onset or exacerbation of tumor pain due to treatment) None  None None   Cough  Absent     Voice changes/stridor/larynx (hoarseness, loss of voice, laryngitis)  Normal     Skin - Late RT Patchy atrophy, moderate telangiectasia, total hair loss  No change from baseline No change from baseline       CURRENT MEDICATIONS:    Current Outpatient Medications:     omeprazole (PRILOSEC) 40 MG delayed-release capsule, Take 1 Capsule by mouth every day. Indications: Heartburn, Disp: 90 Capsule, Rfl: 3    baclofen (LIORESAL) 10 MG Tab, TAKE 1 TABLET BY MOUTH 3 TIMES A DAY AS NEEDED (BACK PAIN)., Disp: 90 Tablet, Rfl: 1    gabapentin (NEURONTIN) 100 MG Cap, TAKE 1 CAPSULE BY MOUTH THREE TIMES A DAY, Disp: 90 Capsule, Rfl: 1    cephALEXin (KEFLEX) 500 MG Cap, Take 1 Capsule by mouth 3 times a day., Disp: 30 Capsule, Rfl: 0    LORazepam (ATIVAN) 0.5 MG Tab, Take 1 Tablet by mouth 1 time a day as needed for Anxiety for up to 90 days., Disp: 30 Tablet, Rfl: 2    multivitamin Tab, Take 1 Tablet by mouth every day., Disp: , Rfl:     valacyclovir (VALTREX) 1 GM Tab, Take 1,000 mg by mouth every day., Disp: , Rfl:     traZODone (DESYREL) 50 MG Tab, Take 1 Tablet by mouth at bedtime. (Patient taking differently: Take 50 mg by mouth at bedtime. ), Disp: 90 Tablet, Rfl: 1    meloxicam (MOBIC) 15 MG tablet, Take 1 Tablet by mouth every day. (Patient taking differently: Take 15 mg by mouth 1 time a day as needed.), Disp: 90 Tablet, Rfl: 2    LABORATORY DATA:   Lab Results   Component Value Date/Time    SODIUM 139 01/15/2025 10:04 AM    POTASSIUM 4.6 01/15/2025 10:04 AM    CHLORIDE 102 01/15/2025 10:04 AM    CO2 26 01/15/2025 10:04 AM    GLUCOSE 92 01/15/2025 10:04 AM     BUN 15 01/15/2025 10:04 AM    CREATININE 0.92 01/15/2025 10:04 AM       Lab Results   Component Value Date/Time    WBC 6.0 01/15/2025 10:04 AM    RBC 4.50 01/15/2025 10:04 AM    HEMOGLOBIN 15.1 01/15/2025 10:04 AM    HEMATOCRIT 43.4 01/15/2025 10:04 AM    MCV 96.4 01/15/2025 10:04 AM    MCH 33.6 (H) 01/15/2025 10:04 AM    MCHC 34.8 01/15/2025 10:04 AM    PLATELETCT 219 01/15/2025 10:04 AM         RADIOLOGY DATA:  US-SOFT TISSUES OF HEAD - NECK  Addendum Date: 3/4/2025  2/11/2025 3:48 PM HISTORY/REASON FOR EXAM:  Left mid back palpable lump TECHNIQUE/EXAM DESCRIPTION: Ultrasound of the soft tissues of the head and neck. COMPARISON:  10/18/2024 FINDINGS: Focused ultrasound of the area of concern left neck demonstrates mildly prominent lymph nodes compared to right-sided lymph nodes. Largest on the left measures 2.3 cm. Additional node more posteriorly measures 1.3 cm. Architecture appears normal within these nodes. Survey of the additional bilateral lateral cervical lymph node chains reveals no suspicious-appearing lymph nodes. 1. Mildly prominent left neck lymph nodes are identified. Consider inflammation infection or neoplasm.    Result Date: 3/4/2025  1. Mildly prominent left neck lymph nodes are identified. Consider inflammation infection or neoplasm.      IMPRESSION:  Cancer Staging   Adenoid cystic carcinoma (HCC)  Staging form: Cutaneous Carcinoma of the Head and Neck, AJCC 8th Edition.  - Pathologic stage from 1/2/2025: Stage IV (pTX, pN3b, cM0) - Signed by Brendan PIKE M.D. on 1/2/2025      PLAN:  No change in treatment plan    Disposition:  Treatment plan and imaging reviewed. Questions answered. Continue therapy outlined.     Brendan PIKE M.D.    Orders Placed This Encounter    SIGNATERA ONLY ([facility] MANAGED)

## 2025-04-03 ENCOUNTER — HOSPITAL ENCOUNTER (OUTPATIENT)
Dept: RADIATION ONCOLOGY | Facility: MEDICAL CENTER | Age: 65
End: 2025-04-03
Payer: MEDICARE

## 2025-04-03 LAB
CHEMOTHERAPY INFUSION START DATE: NORMAL
CHEMOTHERAPY RECORDS: 2 GY
CHEMOTHERAPY RECORDS: 6000 CGY
CHEMOTHERAPY RECORDS: NORMAL
CHEMOTHERAPY RX CANCER: NORMAL
DATE 1ST CHEMO CANCER: NORMAL
RAD ONC ARIA COURSE LAST TREATMENT DATE: NORMAL
RAD ONC ARIA COURSE TREATMENT ELAPSED DAYS: NORMAL
RAD ONC ARIA REFERENCE POINT DOSAGE GIVEN TO DATE: 38 GY
RAD ONC ARIA REFERENCE POINT ID: NORMAL
RAD ONC ARIA REFERENCE POINT SESSION DOSAGE GIVEN: 2 GY

## 2025-04-03 PROCEDURE — 77386 HCHG IMRT DELIVERY COMPLEX: CPT | Performed by: RADIOLOGY

## 2025-04-03 PROCEDURE — 77014 PR CT GUIDANCE PLACEMENT RAD THERAPY FIELDS: CPT | Mod: 26 | Performed by: RADIOLOGY

## 2025-04-04 ENCOUNTER — HOSPITAL ENCOUNTER (OUTPATIENT)
Dept: RADIATION ONCOLOGY | Facility: MEDICAL CENTER | Age: 65
End: 2025-04-04
Payer: MEDICARE

## 2025-04-04 LAB
CHEMOTHERAPY INFUSION START DATE: NORMAL
CHEMOTHERAPY RECORDS: 2 GY
CHEMOTHERAPY RECORDS: 6000 CGY
CHEMOTHERAPY RECORDS: NORMAL
CHEMOTHERAPY RX CANCER: NORMAL
DATE 1ST CHEMO CANCER: NORMAL
RAD ONC ARIA COURSE LAST TREATMENT DATE: NORMAL
RAD ONC ARIA COURSE TREATMENT ELAPSED DAYS: NORMAL
RAD ONC ARIA REFERENCE POINT DOSAGE GIVEN TO DATE: 40 GY
RAD ONC ARIA REFERENCE POINT ID: NORMAL
RAD ONC ARIA REFERENCE POINT SESSION DOSAGE GIVEN: 2 GY

## 2025-04-04 PROCEDURE — 77386 HCHG IMRT DELIVERY COMPLEX: CPT | Performed by: RADIOLOGY

## 2025-04-04 PROCEDURE — 77014 PR CT GUIDANCE PLACEMENT RAD THERAPY FIELDS: CPT | Mod: 26 | Performed by: RADIOLOGY

## 2025-04-04 PROCEDURE — 77427 RADIATION TX MANAGEMENT X5: CPT | Performed by: RADIOLOGY

## 2025-04-07 ENCOUNTER — HOSPITAL ENCOUNTER (OUTPATIENT)
Dept: RADIATION ONCOLOGY | Facility: MEDICAL CENTER | Age: 65
End: 2025-04-07
Payer: MEDICARE

## 2025-04-07 LAB
CHEMOTHERAPY INFUSION START DATE: NORMAL
CHEMOTHERAPY RECORDS: 2 GY
CHEMOTHERAPY RECORDS: 6000 CGY
CHEMOTHERAPY RECORDS: NORMAL
CHEMOTHERAPY RX CANCER: NORMAL
DATE 1ST CHEMO CANCER: NORMAL
RAD ONC ARIA COURSE LAST TREATMENT DATE: NORMAL
RAD ONC ARIA COURSE TREATMENT ELAPSED DAYS: NORMAL
RAD ONC ARIA REFERENCE POINT DOSAGE GIVEN TO DATE: 42 GY
RAD ONC ARIA REFERENCE POINT ID: NORMAL
RAD ONC ARIA REFERENCE POINT SESSION DOSAGE GIVEN: 2 GY

## 2025-04-07 PROCEDURE — 77014 PR CT GUIDANCE PLACEMENT RAD THERAPY FIELDS: CPT | Mod: 26 | Performed by: RADIOLOGY

## 2025-04-07 PROCEDURE — 77386 HCHG IMRT DELIVERY COMPLEX: CPT | Performed by: RADIOLOGY

## 2025-04-08 ENCOUNTER — HOSPITAL ENCOUNTER (OUTPATIENT)
Dept: RADIATION ONCOLOGY | Facility: MEDICAL CENTER | Age: 65
End: 2025-04-08
Payer: MEDICARE

## 2025-04-08 LAB
CHEMOTHERAPY INFUSION START DATE: NORMAL
CHEMOTHERAPY RECORDS: 2 GY
CHEMOTHERAPY RECORDS: 6000 CGY
CHEMOTHERAPY RECORDS: NORMAL
CHEMOTHERAPY RX CANCER: NORMAL
DATE 1ST CHEMO CANCER: NORMAL
RAD ONC ARIA COURSE LAST TREATMENT DATE: NORMAL
RAD ONC ARIA COURSE TREATMENT ELAPSED DAYS: NORMAL
RAD ONC ARIA REFERENCE POINT DOSAGE GIVEN TO DATE: 44 GY
RAD ONC ARIA REFERENCE POINT ID: NORMAL
RAD ONC ARIA REFERENCE POINT SESSION DOSAGE GIVEN: 2 GY

## 2025-04-08 PROCEDURE — 77386 HCHG IMRT DELIVERY COMPLEX: CPT | Performed by: RADIOLOGY

## 2025-04-08 PROCEDURE — 77014 PR CT GUIDANCE PLACEMENT RAD THERAPY FIELDS: CPT | Mod: 26 | Performed by: RADIOLOGY

## 2025-04-09 ENCOUNTER — HOSPITAL ENCOUNTER (OUTPATIENT)
Dept: RADIATION ONCOLOGY | Facility: MEDICAL CENTER | Age: 65
End: 2025-04-09
Payer: MEDICARE

## 2025-04-09 ENCOUNTER — HOSPITAL ENCOUNTER (OUTPATIENT)
Dept: RADIATION ONCOLOGY | Facility: MEDICAL CENTER | Age: 65
End: 2025-04-09
Attending: RADIOLOGY
Payer: MEDICARE

## 2025-04-09 VITALS
TEMPERATURE: 98.4 F | RESPIRATION RATE: 16 BRPM | HEART RATE: 68 BPM | WEIGHT: 213.1 LBS | SYSTOLIC BLOOD PRESSURE: 130 MMHG | BODY MASS INDEX: 34.4 KG/M2 | OXYGEN SATURATION: 96 % | DIASTOLIC BLOOD PRESSURE: 80 MMHG

## 2025-04-09 DIAGNOSIS — C80.1 ADENOID CYSTIC CARCINOMA (HCC): ICD-10-CM

## 2025-04-09 LAB
CHEMOTHERAPY INFUSION START DATE: NORMAL
CHEMOTHERAPY RECORDS: 2 GY
CHEMOTHERAPY RECORDS: 6000 CGY
CHEMOTHERAPY RECORDS: NORMAL
CHEMOTHERAPY RX CANCER: NORMAL
DATE 1ST CHEMO CANCER: NORMAL
RAD ONC ARIA COURSE LAST TREATMENT DATE: NORMAL
RAD ONC ARIA COURSE TREATMENT ELAPSED DAYS: NORMAL
RAD ONC ARIA REFERENCE POINT DOSAGE GIVEN TO DATE: 46 GY
RAD ONC ARIA REFERENCE POINT ID: NORMAL
RAD ONC ARIA REFERENCE POINT SESSION DOSAGE GIVEN: 2 GY

## 2025-04-09 PROCEDURE — 77386 HCHG IMRT DELIVERY COMPLEX: CPT | Performed by: RADIOLOGY

## 2025-04-09 PROCEDURE — 77336 RADIATION PHYSICS CONSULT: CPT | Performed by: RADIOLOGY

## 2025-04-09 PROCEDURE — 77014 PR CT GUIDANCE PLACEMENT RAD THERAPY FIELDS: CPT | Mod: 26 | Performed by: RADIOLOGY

## 2025-04-09 RX ORDER — OXYCODONE AND ACETAMINOPHEN 5; 325 MG/1; MG/1
1 TABLET ORAL EVERY 6 HOURS PRN
Qty: 28 TABLET | Refills: 0 | Status: SHIPPED | OUTPATIENT
Start: 2025-04-09 | End: 2025-04-16

## 2025-04-09 ASSESSMENT — PAIN SCALES - GENERAL: PAINLEVEL_OUTOF10: 4=SLIGHT-MODERATE PAIN

## 2025-04-09 ASSESSMENT — FIBROSIS 4 INDEX: FIB4 SCORE: 1.17

## 2025-04-09 NOTE — ON TREATMENT VISIT
ON TREATMENT  NOTE  RADIATION ONCOLOGY DEPARTMENT    Patient name:  Maria Teresa Haji    Primary Physician:  Lindsay Medina M.D. MRN: [id number]  [facility]: [id number]   Referring physician:  Maame Mccain A.P.N.   : 1960, 64 y.o.     ENCOUNTER DATE:  2025      DIAGNOSIS:  Adenoid cystic carcinoma (HCC)  Staging form: Cutaneous Carcinoma of the Head and Neck, AJCC 8th Edition.  - Pathologic stage from 2025: Stage IV (pTX, pN3b, cM0) - Signed by Brendan PIKE M.D. on 2025  Histopathologic type: Adenoid cystic carcinoma  Residual tumor (R): R1  Presence of extranodal extension: Present  Extraosseous extension: Unknown  ECOG performance status: Grade 0  Karnofsky performance status: Score 100      TREATMENT SUMMARY:  Course First Treatment Date 03/10/2025  Course Last Treatment Date 2025  Radiation Therapy Episodes       Active Episodes       Radiation Therapy: IMRT (3/10/2025)                   Radiation Treatments         Plan Last Treated On Elapsed Days Fractions Treated Prescribed Fraction Dose (cGy) Prescribed Total Dose (cGy)    L Scalp 2025 30 @ 2025 23 of 30 200 6,000                  Reference Point Last Treated On Elapsed Days Most Recent Session Dose (cGy) Total Dose (cGy)    L Scalp 2025 30 @ 2025 200 4,600                               SUBJECTIVE:  Soreness, impaired taste, xerostomia    VITAL SIGNS:      2025     9:44 AM 2025     9:23 AM 3/26/2025     9:47 AM 3/19/2025    10:03 AM 3/12/2025    10:13 AM 2025    10:52 AM 2025     2:15 PM   Vitals   SYSTOLIC 130 133 139 139 146 155 138   DIASTOLIC 80 84 86 81 73 90 62   Pulse 68 67 63 60 71 57 68   Temperature 36.9 °C (98.4 °F) 36.3 °C (97.3 °F) 36.3 °C (97.4 °F) 36.6 °C (97.8 °F) 36.2 °C (97.1 °F) 36.8 °C (98.3 °F)    Respiration 16      25   Weight 213.1 215 215 215 216.05 218.7    BMI 34.4 kg/m2 34.7 kg/m2 34.7 kg/m2 34.7 kg/m2 34.87 kg/m2 35.3 kg/m2    Pulse Oximetry 96 % 97  % 96 % 97 % 97 % 97 % 94 %     KPS: 80, Normal activity with effort; some signs or symptoms of disease (ECOG equivalent 1)  Encounter Vitals  Temperature: 36.9 °C (98.4 °F)  Blood Pressure : 130/80  Pulse: 68  Respiration: 16  Pulse Oximetry: 96 %  Weight: 96.7 kg (213 lb 1.6 oz)  Weight Source: Stand Up Scale  Pain Score: 4=Slight-Moderate Pain      4/9/2025     9:44 AM 4/2/2025     9:23 AM 3/26/2025     9:47 AM 3/19/2025    10:03 AM 3/12/2025    10:13 AM 2/20/2025    10:52 AM   Pain Assessment   Pain Score 4=SLIGHT PARTH NO PAIN NO PAIN NO PAIN NO PAIN NO PAIN   Pain Loc HEAD               PHYSICAL EXAM:  Physical Exam  Vitals and nursing note reviewed.   Constitutional:       Appearance: She is well-developed.   HENT:      Head: Normocephalic.   Skin:     General: Skin is warm and dry.      Findings: No erythema.   Neurological:      Mental Status: She is alert and oriented to person, place, and time.   Psychiatric:         Behavior: Behavior normal.         Thought Content: Thought content normal.         Judgment: Judgment normal.              4/9/2025     9:49 AM 4/2/2025     9:24 AM 3/26/2025     9:51 AM 3/19/2025    10:05 AM 3/12/2025    10:14 AM   Toxicity Assessment   Toxicity Assessment Skin Skin Head/Neck Skin Skin   Fatigue (lethargy, malaise, asthenia) Moderate (e.g., decrease in performance status by 1 ECOG level or 20% Karnofsky) or causing difficulty performing some activities Moderate (e.g., decrease in performance status by 1 ECOG level or 20% Karnofsky) or causing difficulty performing some activities Increased fatigue over baseline, but not altering normal activities Increased fatigue over baseline, but not altering normal activities None   Radiation Dermatitis Moderate to brisk erythema or a patchy moist desquamation, mostly confined to skin folds and creases, with/without moderate edema None None None None   Rash/desquamation   None     Photosensitivity Painless erythema None  None None   Dry  Skin Controlled with emollients Controlled with emollients  Normal Normal   Alopecia Mild hair loss Pronounced hair loss  Normal Normal   Erythema Multiforme (e.g., Rodriguez-Srinivasa syndrome, toxic epidermal necrolysis) Absent Absent  Absent Absent   Nail Changes Normal Normal  Normal Normal   Wound (Non-Infectious) None None  None None   Wound (Infectious) None None  None None   Constipation   Requiring stool softener or dietary modification     Dehydration   None     Mouth Dryness   Moderate     RT Dysphagia-Pharyngeal   None     Mucositis   None     Salivary Gland Changes   None     Stomatitis/Pharyngitis   None     Taste Disturbance (dysgeusia)   Slightly altered     RT - Pain due to RT Moderate pain, pain or analgesics interfering with function, but not interfering with activities of daily living None None None None   Tumor Pain (onset or exacerbation of tumor pain due to treatment) None None  None None   Cough   Absent     Voice changes/stridor/larynx (hoarseness, loss of voice, laryngitis)   Normal     Skin - Late RT Slight atrophy, pigmentation change, some hair loss Patchy atrophy, moderate telangiectasia, total hair loss  No change from baseline No change from baseline       CURRENT MEDICATIONS:    Current Outpatient Medications:     oxyCODONE-acetaminophen (PERCOCET) 5-325 MG Tab, Take 1 Tablet by mouth every 6 hours as needed for Moderate Pain for up to 7 days., Disp: 28 Tablet, Rfl: 0    omeprazole (PRILOSEC) 40 MG delayed-release capsule, Take 1 Capsule by mouth every day. Indications: Heartburn, Disp: 90 Capsule, Rfl: 3    baclofen (LIORESAL) 10 MG Tab, TAKE 1 TABLET BY MOUTH 3 TIMES A DAY AS NEEDED (BACK PAIN)., Disp: 90 Tablet, Rfl: 1    gabapentin (NEURONTIN) 100 MG Cap, TAKE 1 CAPSULE BY MOUTH THREE TIMES A DAY, Disp: 90 Capsule, Rfl: 1    cephALEXin (KEFLEX) 500 MG Cap, Take 1 Capsule by mouth 3 times a day., Disp: 30 Capsule, Rfl: 0    multivitamin Tab, Take 1 Tablet by mouth every day., Disp: ,  Rfl:     valacyclovir (VALTREX) 1 GM Tab, Take 1,000 mg by mouth every day., Disp: , Rfl:     traZODone (DESYREL) 50 MG Tab, Take 1 Tablet by mouth at bedtime. (Patient taking differently: Take 50 mg by mouth at bedtime. ), Disp: 90 Tablet, Rfl: 1    meloxicam (MOBIC) 15 MG tablet, Take 1 Tablet by mouth every day. (Patient taking differently: Take 15 mg by mouth 1 time a day as needed.), Disp: 90 Tablet, Rfl: 2    LABORATORY DATA:   Lab Results   Component Value Date/Time    SODIUM 139 01/15/2025 10:04 AM    POTASSIUM 4.6 01/15/2025 10:04 AM    CHLORIDE 102 01/15/2025 10:04 AM    CO2 26 01/15/2025 10:04 AM    GLUCOSE 92 01/15/2025 10:04 AM    BUN 15 01/15/2025 10:04 AM    CREATININE 0.92 01/15/2025 10:04 AM       Lab Results   Component Value Date/Time    WBC 6.0 01/15/2025 10:04 AM    RBC 4.50 01/15/2025 10:04 AM    HEMOGLOBIN 15.1 01/15/2025 10:04 AM    HEMATOCRIT 43.4 01/15/2025 10:04 AM    MCV 96.4 01/15/2025 10:04 AM    MCH 33.6 (H) 01/15/2025 10:04 AM    MCHC 34.8 01/15/2025 10:04 AM    PLATELETCT 219 01/15/2025 10:04 AM         RADIOLOGY DATA:  US-SOFT TISSUES OF HEAD - NECK  Addendum Date: 3/4/2025  2/11/2025 3:48 PM HISTORY/REASON FOR EXAM:  Left mid back palpable lump TECHNIQUE/EXAM DESCRIPTION: Ultrasound of the soft tissues of the head and neck. COMPARISON:  10/18/2024 FINDINGS: Focused ultrasound of the area of concern left neck demonstrates mildly prominent lymph nodes compared to right-sided lymph nodes. Largest on the left measures 2.3 cm. Additional node more posteriorly measures 1.3 cm. Architecture appears normal within these nodes. Survey of the additional bilateral lateral cervical lymph node chains reveals no suspicious-appearing lymph nodes. 1. Mildly prominent left neck lymph nodes are identified. Consider inflammation infection or neoplasm.    Result Date: 3/4/2025  1. Mildly prominent left neck lymph nodes are identified. Consider inflammation infection or  neoplasm.      IMPRESSION:  Cancer Staging   Adenoid cystic carcinoma (HCC)  Staging form: Cutaneous Carcinoma of the Head and Neck, AJCC 8th Edition.  - Pathologic stage from 1/2/2025: Stage IV (pTX, pN3b, cM0) - Signed by Brendan PIKE M.D. on 1/2/2025      PLAN:  No change in treatment plan and Symptomatic - conservative management with lifestyle changes and OTC meds    Disposition:  Treatment plan and imaging reviewed. Questions answered. Continue therapy outlined.     Brendan PIKE M.D.    Orders Placed This Encounter    oxyCODONE-acetaminophen (PERCOCET) 5-325 MG Tab

## 2025-04-10 ENCOUNTER — HOSPITAL ENCOUNTER (OUTPATIENT)
Dept: RADIATION ONCOLOGY | Facility: MEDICAL CENTER | Age: 65
End: 2025-04-10
Payer: MEDICARE

## 2025-04-10 LAB
CHEMOTHERAPY INFUSION START DATE: NORMAL
CHEMOTHERAPY RECORDS: 2 GY
CHEMOTHERAPY RECORDS: 6000 CGY
CHEMOTHERAPY RECORDS: NORMAL
CHEMOTHERAPY RX CANCER: NORMAL
DATE 1ST CHEMO CANCER: NORMAL
RAD ONC ARIA COURSE LAST TREATMENT DATE: NORMAL
RAD ONC ARIA COURSE TREATMENT ELAPSED DAYS: NORMAL
RAD ONC ARIA REFERENCE POINT DOSAGE GIVEN TO DATE: 48 GY
RAD ONC ARIA REFERENCE POINT ID: NORMAL
RAD ONC ARIA REFERENCE POINT SESSION DOSAGE GIVEN: 2 GY

## 2025-04-10 PROCEDURE — 77386 HCHG IMRT DELIVERY COMPLEX: CPT | Performed by: RADIOLOGY

## 2025-04-10 PROCEDURE — 77014 PR CT GUIDANCE PLACEMENT RAD THERAPY FIELDS: CPT | Mod: 26 | Performed by: RADIOLOGY

## 2025-04-11 ENCOUNTER — HOSPITAL ENCOUNTER (OUTPATIENT)
Dept: RADIATION ONCOLOGY | Facility: MEDICAL CENTER | Age: 65
End: 2025-04-11
Payer: MEDICARE

## 2025-04-11 LAB
CHEMOTHERAPY INFUSION START DATE: NORMAL
CHEMOTHERAPY RECORDS: 2 GY
CHEMOTHERAPY RECORDS: 6000 CGY
CHEMOTHERAPY RECORDS: NORMAL
CHEMOTHERAPY RX CANCER: NORMAL
DATE 1ST CHEMO CANCER: NORMAL
RAD ONC ARIA COURSE LAST TREATMENT DATE: NORMAL
RAD ONC ARIA COURSE TREATMENT ELAPSED DAYS: NORMAL
RAD ONC ARIA REFERENCE POINT DOSAGE GIVEN TO DATE: 50 GY
RAD ONC ARIA REFERENCE POINT ID: NORMAL
RAD ONC ARIA REFERENCE POINT SESSION DOSAGE GIVEN: 2 GY

## 2025-04-11 PROCEDURE — 77386 HCHG IMRT DELIVERY COMPLEX: CPT | Performed by: RADIOLOGY

## 2025-04-11 PROCEDURE — 77427 RADIATION TX MANAGEMENT X5: CPT | Performed by: RADIOLOGY

## 2025-04-11 PROCEDURE — 77014 PR CT GUIDANCE PLACEMENT RAD THERAPY FIELDS: CPT | Mod: 26 | Performed by: RADIOLOGY

## 2025-04-14 ENCOUNTER — HOSPITAL ENCOUNTER (OUTPATIENT)
Dept: RADIATION ONCOLOGY | Facility: MEDICAL CENTER | Age: 65
End: 2025-04-14
Payer: MEDICARE

## 2025-04-14 LAB
CHEMOTHERAPY INFUSION START DATE: NORMAL
CHEMOTHERAPY RECORDS: 2 GY
CHEMOTHERAPY RECORDS: 6000 CGY
CHEMOTHERAPY RECORDS: NORMAL
CHEMOTHERAPY RX CANCER: NORMAL
DATE 1ST CHEMO CANCER: NORMAL
RAD ONC ARIA COURSE LAST TREATMENT DATE: NORMAL
RAD ONC ARIA COURSE TREATMENT ELAPSED DAYS: NORMAL
RAD ONC ARIA REFERENCE POINT DOSAGE GIVEN TO DATE: 52 GY
RAD ONC ARIA REFERENCE POINT ID: NORMAL
RAD ONC ARIA REFERENCE POINT SESSION DOSAGE GIVEN: 2 GY

## 2025-04-14 PROCEDURE — 77386 HCHG IMRT DELIVERY COMPLEX: CPT | Performed by: RADIOLOGY

## 2025-04-14 PROCEDURE — 77014 PR CT GUIDANCE PLACEMENT RAD THERAPY FIELDS: CPT | Mod: 26 | Performed by: RADIOLOGY

## 2025-04-15 ENCOUNTER — HOSPITAL ENCOUNTER (OUTPATIENT)
Dept: RADIATION ONCOLOGY | Facility: MEDICAL CENTER | Age: 65
End: 2025-04-15
Payer: MEDICARE

## 2025-04-15 LAB
CHEMOTHERAPY INFUSION START DATE: NORMAL
CHEMOTHERAPY RECORDS: 2 GY
CHEMOTHERAPY RECORDS: 6000 CGY
CHEMOTHERAPY RECORDS: NORMAL
CHEMOTHERAPY RX CANCER: NORMAL
DATE 1ST CHEMO CANCER: NORMAL
RAD ONC ARIA COURSE LAST TREATMENT DATE: NORMAL
RAD ONC ARIA COURSE TREATMENT ELAPSED DAYS: NORMAL
RAD ONC ARIA REFERENCE POINT DOSAGE GIVEN TO DATE: 54 GY
RAD ONC ARIA REFERENCE POINT ID: NORMAL
RAD ONC ARIA REFERENCE POINT SESSION DOSAGE GIVEN: 2 GY

## 2025-04-15 PROCEDURE — 77386 HCHG IMRT DELIVERY COMPLEX: CPT | Performed by: RADIOLOGY

## 2025-04-15 PROCEDURE — 77014 PR CT GUIDANCE PLACEMENT RAD THERAPY FIELDS: CPT | Mod: 26 | Performed by: RADIOLOGY

## 2025-04-16 ENCOUNTER — HOSPITAL ENCOUNTER (OUTPATIENT)
Dept: RADIATION ONCOLOGY | Facility: MEDICAL CENTER | Age: 65
End: 2025-04-16
Attending: RADIOLOGY
Payer: MEDICARE

## 2025-04-16 ENCOUNTER — HOSPITAL ENCOUNTER (OUTPATIENT)
Dept: RADIATION ONCOLOGY | Facility: MEDICAL CENTER | Age: 65
End: 2025-04-16
Payer: MEDICARE

## 2025-04-16 VITALS
WEIGHT: 214 LBS | TEMPERATURE: 97.7 F | DIASTOLIC BLOOD PRESSURE: 75 MMHG | HEART RATE: 67 BPM | BODY MASS INDEX: 34.54 KG/M2 | OXYGEN SATURATION: 96 % | SYSTOLIC BLOOD PRESSURE: 119 MMHG

## 2025-04-16 LAB
CHEMOTHERAPY INFUSION START DATE: NORMAL
CHEMOTHERAPY RECORDS: 2 GY
CHEMOTHERAPY RECORDS: 6000 CGY
CHEMOTHERAPY RECORDS: NORMAL
CHEMOTHERAPY RX CANCER: NORMAL
DATE 1ST CHEMO CANCER: NORMAL
RAD ONC ARIA COURSE LAST TREATMENT DATE: NORMAL
RAD ONC ARIA COURSE TREATMENT ELAPSED DAYS: NORMAL
RAD ONC ARIA REFERENCE POINT DOSAGE GIVEN TO DATE: 56 GY
RAD ONC ARIA REFERENCE POINT ID: NORMAL
RAD ONC ARIA REFERENCE POINT SESSION DOSAGE GIVEN: 2 GY

## 2025-04-16 PROCEDURE — 77386 HCHG IMRT DELIVERY COMPLEX: CPT | Performed by: RADIOLOGY

## 2025-04-16 PROCEDURE — 77336 RADIATION PHYSICS CONSULT: CPT | Performed by: RADIOLOGY

## 2025-04-16 PROCEDURE — 77014 PR CT GUIDANCE PLACEMENT RAD THERAPY FIELDS: CPT | Mod: 26 | Performed by: RADIOLOGY

## 2025-04-16 ASSESSMENT — FIBROSIS 4 INDEX: FIB4 SCORE: 1.17

## 2025-04-16 ASSESSMENT — PAIN SCALES - GENERAL: PAINLEVEL_OUTOF10: 5=MODERATE PAIN

## 2025-04-16 NOTE — ON TREATMENT VISIT
ON TREATMENT  NOTE  RADIATION ONCOLOGY DEPARTMENT    Patient name:  Maria Teresa Haji    Primary Physician:  Lindsay Medina M.D. MRN: [id number]  [facility]: [id number]   Referring physician:  Maame Mccain A.P.N.   : 1960, 64 y.o.     ENCOUNTER DATE:  2025      DIAGNOSIS:  Adenoid cystic carcinoma (HCC)  Staging form: Cutaneous Carcinoma of the Head and Neck, AJCC 8th Edition.  - Pathologic stage from 2025: Stage IV (pTX, pN3b, cM0) - Signed by Brendan PIKE M.D. on 2025  Histopathologic type: Adenoid cystic carcinoma  Residual tumor (R): R1  Presence of extranodal extension: Present  Extraosseous extension: Unknown  ECOG performance status: Grade 0  Karnofsky performance status: Score 100      TREATMENT SUMMARY:  Course First Treatment Date 03/10/2025  Course Last Treatment Date 2025  Radiation Therapy Episodes       Active Episodes       Radiation Therapy: IMRT (3/10/2025)                   Radiation Treatments         Plan Last Treated On Elapsed Days Fractions Treated Prescribed Fraction Dose (cGy) Prescribed Total Dose (cGy)    L Scalp 2025 37 @ 2025 28 of 30 200 6,000                  Reference Point Last Treated On Elapsed Days Most Recent Session Dose (cGy) Total Dose (cGy)    L Scalp 2025 37 @ 2025 200 5,600                               SUBJECTIVE:  Tolerating well.  Will complete on Friday.    VITAL SIGNS:      2025     9:58 AM 2025     9:44 AM 2025     9:23 AM 3/26/2025     9:47 AM 3/19/2025    10:03 AM 3/12/2025    10:13 AM 2025    10:52 AM   Vitals   SYSTOLIC 119 130 133 139 139 146 155   DIASTOLIC 75 80 84 86 81 73 90   Pulse 67 68 67 63 60 71 57   Temperature 36.5 °C (97.7 °F) 36.9 °C (98.4 °F) 36.3 °C (97.3 °F) 36.3 °C (97.4 °F) 36.6 °C (97.8 °F) 36.2 °C (97.1 °F) 36.8 °C (98.3 °F)   Respiration  16        Weight 214 213.1 215 215 215 216.05 218.7   BMI 34.54 kg/m2 34.4 kg/m2 34.7 kg/m2 34.7 kg/m2 34.7 kg/m2 34.87  kg/m2 35.3 kg/m2   Pulse Oximetry 96 % 96 % 97 % 96 % 97 % 97 % 97 %     KPS: 100, Fully active, able to carry on all pre-disease performed without restriction (ECOG equivalent 0)  Encounter Vitals  Temperature: 36.5 °C (97.7 °F)  Temp src: Temporal  Blood Pressure : 119/75  Pulse: 67  Pulse Oximetry: 96 %  Weight: 97.1 kg (214 lb)  Pain Score: 5=Moderate Pain      4/16/2025     9:58 AM 4/9/2025     9:44 AM 4/2/2025     9:23 AM 3/26/2025     9:47 AM 3/19/2025    10:03 AM 3/12/2025    10:13 AM   Pain Assessment   Pain Score 5=MODERATE P 4=SLIGHT PARTH NO PAIN NO PAIN NO PAIN NO PAIN   Pain Loc NECK HEAD              PHYSICAL EXAM:  Physical Exam  Vitals and nursing note reviewed.   Constitutional:       Appearance: She is well-developed.   HENT:      Head: Normocephalic.   Skin:     General: Skin is warm and dry.      Findings: Erythema present.      Comments: Alopecia treatment area   Neurological:      Mental Status: She is alert and oriented to person, place, and time.   Psychiatric:         Behavior: Behavior normal.         Thought Content: Thought content normal.         Judgment: Judgment normal.              4/16/2025    10:00 AM 4/9/2025     9:49 AM 4/2/2025     9:24 AM 3/26/2025     9:51 AM 3/19/2025    10:05 AM 3/12/2025    10:14 AM   Toxicity Assessment   Toxicity Assessment Skin Skin Skin Head/Neck Skin Skin   Fatigue (lethargy, malaise, asthenia) Moderate (e.g., decrease in performance status by 1 ECOG level or 20% Karnofsky) or causing difficulty performing some activities Moderate (e.g., decrease in performance status by 1 ECOG level or 20% Karnofsky) or causing difficulty performing some activities Moderate (e.g., decrease in performance status by 1 ECOG level or 20% Karnofsky) or causing difficulty performing some activities Increased fatigue over baseline, but not altering normal activities Increased fatigue over baseline, but not altering normal activities None   Radiation Dermatitis Moderate to  brisk erythema or a patchy moist desquamation, mostly confined to skin folds and creases, with/without moderate edema Moderate to brisk erythema or a patchy moist desquamation, mostly confined to skin folds and creases, with/without moderate edema None None None None   Rash/desquamation    None     Photosensitivity Painless erythema Painless erythema None  None None   Dry Skin Controlled with emollients Controlled with emollients Controlled with emollients  Normal Normal   Alopecia Mild hair loss Mild hair loss Pronounced hair loss  Normal Normal   Erythema Multiforme (e.g., Rodriguez-Srinivasa syndrome, toxic epidermal necrolysis) Absent Absent Absent  Absent Absent   Nail Changes Normal Normal Normal  Normal Normal   Wound (Non-Infectious) None None None  None None   Wound (Infectious) None None None  None None   Constipation    Requiring stool softener or dietary modification     Dehydration    None     Mouth Dryness    Moderate     RT Dysphagia-Pharyngeal    None     Mucositis    None     Salivary Gland Changes    None     Stomatitis/Pharyngitis    None     Taste Disturbance (dysgeusia)    Slightly altered     RT - Pain due to RT Moderate pain, pain or analgesics interfering with function, but not interfering with activities of daily living Moderate pain, pain or analgesics interfering with function, but not interfering with activities of daily living None None None None   Tumor Pain (onset or exacerbation of tumor pain due to treatment) None None None  None None   Cough    Absent     Voice changes/stridor/larynx (hoarseness, loss of voice, laryngitis)    Normal     Skin - Late RT Patchy atrophy, moderate telangiectasia, total hair loss Slight atrophy, pigmentation change, some hair loss Patchy atrophy, moderate telangiectasia, total hair loss  No change from baseline No change from baseline       CURRENT MEDICATIONS:    Current Outpatient Medications:     oxyCODONE-acetaminophen (PERCOCET) 5-325 MG Tab, Take 1  Tablet by mouth every 6 hours as needed for Moderate Pain for up to 7 days., Disp: 28 Tablet, Rfl: 0    omeprazole (PRILOSEC) 40 MG delayed-release capsule, Take 1 Capsule by mouth every day. Indications: Heartburn, Disp: 90 Capsule, Rfl: 3    baclofen (LIORESAL) 10 MG Tab, TAKE 1 TABLET BY MOUTH 3 TIMES A DAY AS NEEDED (BACK PAIN)., Disp: 90 Tablet, Rfl: 1    gabapentin (NEURONTIN) 100 MG Cap, TAKE 1 CAPSULE BY MOUTH THREE TIMES A DAY, Disp: 90 Capsule, Rfl: 1    cephALEXin (KEFLEX) 500 MG Cap, Take 1 Capsule by mouth 3 times a day., Disp: 30 Capsule, Rfl: 0    multivitamin Tab, Take 1 Tablet by mouth every day., Disp: , Rfl:     valacyclovir (VALTREX) 1 GM Tab, Take 1,000 mg by mouth every day., Disp: , Rfl:     traZODone (DESYREL) 50 MG Tab, Take 1 Tablet by mouth at bedtime. (Patient taking differently: Take 50 mg by mouth at bedtime. ), Disp: 90 Tablet, Rfl: 1    meloxicam (MOBIC) 15 MG tablet, Take 1 Tablet by mouth every day. (Patient taking differently: Take 15 mg by mouth 1 time a day as needed.), Disp: 90 Tablet, Rfl: 2    LABORATORY DATA:   Lab Results   Component Value Date/Time    SODIUM 139 01/15/2025 10:04 AM    POTASSIUM 4.6 01/15/2025 10:04 AM    CHLORIDE 102 01/15/2025 10:04 AM    CO2 26 01/15/2025 10:04 AM    GLUCOSE 92 01/15/2025 10:04 AM    BUN 15 01/15/2025 10:04 AM    CREATININE 0.92 01/15/2025 10:04 AM       Lab Results   Component Value Date/Time    WBC 6.0 01/15/2025 10:04 AM    RBC 4.50 01/15/2025 10:04 AM    HEMOGLOBIN 15.1 01/15/2025 10:04 AM    HEMATOCRIT 43.4 01/15/2025 10:04 AM    MCV 96.4 01/15/2025 10:04 AM    MCH 33.6 (H) 01/15/2025 10:04 AM    MCHC 34.8 01/15/2025 10:04 AM    PLATELETCT 219 01/15/2025 10:04 AM         RADIOLOGY DATA:  No results found.    IMPRESSION:  Cancer Staging   Adenoid cystic carcinoma (HCC)  Staging form: Cutaneous Carcinoma of the Head and Neck, AJCC 8th Edition.  - Pathologic stage from 1/2/2025: Stage IV (pTX, pN3b, cM0) - Signed by Brendan Julien V  M.D. on 1/2/2025      PLAN:  No change in treatment plan    Disposition:  Treatment plan and imaging reviewed. Questions answered. Continue therapy outlined.     Brendan PIKE M.D.    No orders of the defined types were placed in this encounter.

## 2025-04-17 ENCOUNTER — HOSPITAL ENCOUNTER (OUTPATIENT)
Dept: RADIATION ONCOLOGY | Facility: MEDICAL CENTER | Age: 65
End: 2025-04-17
Payer: MEDICARE

## 2025-04-17 LAB
CHEMOTHERAPY INFUSION START DATE: NORMAL
CHEMOTHERAPY RECORDS: 2 GY
CHEMOTHERAPY RECORDS: 6000 CGY
CHEMOTHERAPY RECORDS: NORMAL
CHEMOTHERAPY RX CANCER: NORMAL
DATE 1ST CHEMO CANCER: NORMAL
RAD ONC ARIA COURSE LAST TREATMENT DATE: NORMAL
RAD ONC ARIA COURSE TREATMENT ELAPSED DAYS: NORMAL
RAD ONC ARIA REFERENCE POINT DOSAGE GIVEN TO DATE: 58 GY
RAD ONC ARIA REFERENCE POINT ID: NORMAL
RAD ONC ARIA REFERENCE POINT SESSION DOSAGE GIVEN: 2 GY

## 2025-04-17 PROCEDURE — 77014 PR CT GUIDANCE PLACEMENT RAD THERAPY FIELDS: CPT | Mod: 26 | Performed by: RADIOLOGY

## 2025-04-17 PROCEDURE — 77386 HCHG IMRT DELIVERY COMPLEX: CPT | Performed by: RADIOLOGY

## 2025-04-18 ENCOUNTER — HOSPITAL ENCOUNTER (OUTPATIENT)
Dept: RADIATION ONCOLOGY | Facility: MEDICAL CENTER | Age: 65
End: 2025-04-18

## 2025-04-18 LAB
CHEMOTHERAPY INFUSION START DATE: NORMAL
CHEMOTHERAPY INFUSION START DATE: NORMAL
CHEMOTHERAPY INFUSION STOP DATE: NORMAL
CHEMOTHERAPY RECORDS: 2 GY
CHEMOTHERAPY RECORDS: 2 GY
CHEMOTHERAPY RECORDS: 6000 CGY
CHEMOTHERAPY RECORDS: 6000 CGY
CHEMOTHERAPY RECORDS: NORMAL
CHEMOTHERAPY RX CANCER: NORMAL
CHEMOTHERAPY RX CANCER: NORMAL
DATE 1ST CHEMO CANCER: NORMAL
DATE 1ST CHEMO CANCER: NORMAL
RAD ONC ARIA COURSE LAST TREATMENT DATE: NORMAL
RAD ONC ARIA COURSE LAST TREATMENT DATE: NORMAL
RAD ONC ARIA COURSE TREATMENT ELAPSED DAYS: NORMAL
RAD ONC ARIA COURSE TREATMENT ELAPSED DAYS: NORMAL
RAD ONC ARIA REFERENCE POINT DOSAGE GIVEN TO DATE: 60 GY
RAD ONC ARIA REFERENCE POINT DOSAGE GIVEN TO DATE: 60 GY
RAD ONC ARIA REFERENCE POINT ID: NORMAL
RAD ONC ARIA REFERENCE POINT ID: NORMAL
RAD ONC ARIA REFERENCE POINT SESSION DOSAGE GIVEN: 2 GY

## 2025-04-18 PROCEDURE — 77014 PR CT GUIDANCE PLACEMENT RAD THERAPY FIELDS: CPT | Mod: 26 | Performed by: RADIOLOGY

## 2025-04-18 PROCEDURE — 77427 RADIATION TX MANAGEMENT X5: CPT | Performed by: RADIOLOGY

## 2025-04-18 PROCEDURE — 77386 HCHG IMRT DELIVERY COMPLEX: CPT | Performed by: RADIOLOGY

## 2025-04-18 NOTE — RADIATION COMPLETION NOTES
END OF TREATMENT SUMMARY    Patient name:  Maria Teresa Haji    Primary Physician:  Lindsay Medina M.D. MRN: [id number]  CSN: [id number]   Referring physician:  No ref. provider found  : 1960, 64 y.o.       TREATMENT SUMMARY:        Course First Treatment Date 03/10/2025    Course Last Treatment Date 2025   Course Elapsed Days 39 @ 2025   Course Intent Curative     Radiation Therapy Episodes       Active Episodes       Radiation Therapy: IMRT (3/10/2025)                   Radiation Treatments         Plan Last Treated On Elapsed Days Fractions Treated Prescribed Fraction Dose (cGy) Prescribed Total Dose (cGy)    L Scalp 2025 39 @ 2025 30 of 30 200 6,000                  Reference Point Last Treated On Elapsed Days Most Recent Session Dose (cGy) Total Dose (cGy)    L Scalp 2025 39 @ 2025 200 6,000                                     STAGE:   Adenoid cystic carcinoma (HCC)  Staging form: Cutaneous Carcinoma of the Head and Neck, AJCC 8th Edition.  - Pathologic stage from 2025: Stage IV (pTX, pN3b, cM0) - Signed by Brendan PIKE M.D. 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 treated with adjuvant radiotherapy     CONCURRENT SYSTEMIC TREATMENT:   None     RT COURSE DISCONTINUED EARLY:   No     PATIENT EXPERIENCE:       2025    10:00 AM 2025     9:49 AM 2025     9:24 AM 3/26/2025     9:51 AM 3/19/2025    10:05 AM 3/12/2025    10:14 AM   Toxicity Assessment   Toxicity Assessment Skin Skin Skin Head/Neck Skin Skin   Fatigue (lethargy, malaise, asthenia) Moderate (e.g., decrease in performance status by 1 ECOG level or 20% Karnofsky) or causing difficulty performing  some activities Moderate (e.g., decrease in performance status by 1 ECOG level or 20% Karnofsky) or causing difficulty performing some activities Moderate (e.g., decrease in performance status by 1 ECOG level or 20% Karnofsky) or causing difficulty performing some activities Increased fatigue over baseline, but not altering normal activities Increased fatigue over baseline, but not altering normal activities None   Radiation Dermatitis Moderate to brisk erythema or a patchy moist desquamation, mostly confined to skin folds and creases, with/without moderate edema Moderate to brisk erythema or a patchy moist desquamation, mostly confined to skin folds and creases, with/without moderate edema None None None None   Rash/desquamation    None     Photosensitivity Painless erythema Painless erythema None  None None   Dry Skin Controlled with emollients Controlled with emollients Controlled with emollients  Normal Normal   Alopecia Mild hair loss Mild hair loss Pronounced hair loss  Normal Normal   Erythema Multiforme (e.g., Rodriguez-Srinivasa syndrome, toxic epidermal necrolysis) Absent Absent Absent  Absent Absent   Nail Changes Normal Normal Normal  Normal Normal   Wound (Non-Infectious) None None None  None None   Wound (Infectious) None None None  None None   Constipation    Requiring stool softener or dietary modification     Dehydration    None     Mouth Dryness    Moderate     RT Dysphagia-Pharyngeal    None     Mucositis    None     Salivary Gland Changes    None     Stomatitis/Pharyngitis    None     Taste Disturbance (dysgeusia)    Slightly altered     RT - Pain due to RT Moderate pain, pain or analgesics interfering with function, but not interfering with activities of daily living Moderate pain, pain or analgesics interfering with function, but not interfering with activities of daily living None None None None   Tumor Pain (onset or exacerbation of tumor pain due to treatment) None None None  None None   Cough     Absent     Voice changes/stridor/larynx (hoarseness, loss of voice, laryngitis)    Normal     Skin - Late RT Patchy atrophy, moderate telangiectasia, total hair loss Slight atrophy, pigmentation change, some hair loss Patchy atrophy, moderate telangiectasia, total hair loss  No change from baseline No change from baseline        FOLLOW-UP PLAN:   6 Weeks     COMMENT:          ANATOMIC TARGET SUMMARY    ANATOMIC TARGET MODALITY TECHNIQUE   Left occiput and upper neck External beam, photons IMRT            COMMENT:         DIAGRAMS:      DOSE VOLUME HISTOGRAMS:        Brendan PIKE M.D.  Electronically signed by: Brendan PIKE M.D., 4/23/2025 7:46 AM  [phone]

## 2025-04-21 ENCOUNTER — APPOINTMENT (OUTPATIENT)
Dept: MEDICAL GROUP | Age: 65
End: 2025-04-21
Payer: MEDICARE

## 2025-04-21 VITALS
SYSTOLIC BLOOD PRESSURE: 120 MMHG | DIASTOLIC BLOOD PRESSURE: 68 MMHG | HEART RATE: 62 BPM | RESPIRATION RATE: 16 BRPM | HEIGHT: 66 IN | OXYGEN SATURATION: 96 % | BODY MASS INDEX: 34.07 KG/M2 | TEMPERATURE: 99 F | WEIGHT: 212 LBS

## 2025-04-21 DIAGNOSIS — G47.39 OTHER SLEEP APNEA: ICD-10-CM

## 2025-04-21 DIAGNOSIS — E66.9 OBESITY (BMI 30-39.9): ICD-10-CM

## 2025-04-21 PROCEDURE — 3074F SYST BP LT 130 MM HG: CPT | Performed by: INTERNAL MEDICINE

## 2025-04-21 PROCEDURE — 99214 OFFICE O/P EST MOD 30 MIN: CPT | Performed by: INTERNAL MEDICINE

## 2025-04-21 PROCEDURE — 3078F DIAST BP <80 MM HG: CPT | Performed by: INTERNAL MEDICINE

## 2025-04-21 ASSESSMENT — FIBROSIS 4 INDEX: FIB4 SCORE: 1.17

## 2025-04-21 NOTE — PROGRESS NOTES
CC:   Chief Complaint   Patient presents with    Follow-Up     Finished radiation Friday     Diagnoses of Other sleep apnea and Obesity (BMI 30-39.9) were pertinent to this visit.  Verbal consent was acquired by the patient to use [email or web address] ambient listening note generation during this visit Yes   History of Present Illness  Maria Teresa is a pleasant 64 y.o. female presenting for follow-up of tremor, sleep apnea, obesity, and primary cutaneous adenoid cystic carcinoma.    Pain in the area of her radiation treatment is managed with oxycodone in the evening if the pain becomes severe. Gabapentin is taken for nerve-related issues, and ibuprofen is used to manage swelling. Radiation therapy was completed on 04/18/2025, and local skin reactions are managed with aloe vera and Aquaphor. Persistent fatigue is reported, expected to last for approximately 2 weeks post-radiation.     A gradual loss of taste over the past 3 weeks has culminated in complete anosmia and ageusia for the last 2 weeks. She can only detect strong odors when they are in close proximity to her face. Zofran was prescribed for nausea, which has since improved.     Concerns about potential long-term effects of radiation, including thyroid issues and dental health, are expressed. Dry, rough gums are reported, managed with cough drops and increased water intake. Generalized dryness and itchiness are managed with lotion application.     Participation in a study involves blood tests every 3 months for 2 years, followed by biannual tests for the subsequent 3 years. A previously palpable lymph node is no longer detectable, and an ultrasound was normal.    Sleep apnea is diagnosed, and a CPAP machine is used. However, the mask has not been worn due to discomfort caused by the strap. Contact with her sleep doctor to discuss alternative options is noted. Efforts to walk and eat more fruits and vegetables are reported.    Obesity is diagnosed. Weight was 240 pounds in  "2021 and is now 212 pounds.    FAMILY HISTORY  She does not have a family history of thyroid cancer.    Current Outpatient Medications Ordered in Epic   Medication Sig Dispense Refill    Tirzepatide-Weight Management 2.5 MG/0.5ML Solution vial Inject 0.5 mL under the skin every 7 days. 2 mL 0    omeprazole (PRILOSEC) 40 MG delayed-release capsule Take 1 Capsule by mouth every day. Indications: Heartburn 90 Capsule 3    baclofen (LIORESAL) 10 MG Tab TAKE 1 TABLET BY MOUTH 3 TIMES A DAY AS NEEDED (BACK PAIN). 90 Tablet 1    gabapentin (NEURONTIN) 100 MG Cap TAKE 1 CAPSULE BY MOUTH THREE TIMES A DAY 90 Capsule 1    multivitamin Tab Take 1 Tablet by mouth every day.      valacyclovir (VALTREX) 1 GM Tab Take 1,000 mg by mouth every day.      traZODone (DESYREL) 50 MG Tab Take 1 Tablet by mouth at bedtime. (Patient taking differently: Take 50 mg by mouth at bedtime.   ) 90 Tablet 1    meloxicam (MOBIC) 15 MG tablet Take 1 Tablet by mouth every day. (Patient taking differently: Take 15 mg by mouth 1 time a day as needed.) 90 Tablet 2    cephALEXin (KEFLEX) 500 MG Cap Take 1 Capsule by mouth 3 times a day. (Patient not taking: Reported on 4/21/2025) 30 Capsule 0     No current Epic-ordered facility-administered medications on file.     Review of Systems   Constitutional:  Positive for malaise/fatigue.   Skin:  Positive for itching.   All other systems reviewed and are negative.    Objective:     Exam:  /68 (BP Location: Left arm, Patient Position: Sitting, BP Cuff Size: Large adult)   Pulse 62   Temp 37.2 °C (99 °F) (Temporal)   Resp 16   Ht 1.676 m (5' 6\")   Wt 96.2 kg (212 lb)   LMP  (LMP Unknown)   SpO2 96%   BMI 34.22 kg/m²  Body mass index is 34.22 kg/m².    Physical Exam  Constitutional:       Appearance: Normal appearance.   Neck:      Vascular: Decreased carotid pulses: erythema.     Neurological:      Mental Status: She is alert.       Assessment & Plan:   Maria Teresa  is a pleasant 64 y.o. female with the " following -     Assessment & Plan  1. Primary cutaneous adenoid cystic carcinoma.  - Completed radiation therapy on 04/18/2025 and is experiencing local skin reactions, fatigue, and loss of taste and smell.  - Aloe vera and Aquaphor are being used for skin reactions. Zofran was prescribed for nausea, which has improved. Oxycodone is taken in the evening for pain, and gabapentin is used for nerve pain. Ibuprofen is taken for swelling.  - Will continue to be monitored for long-term effects of radiation, including thyroid issues and potential swallowing difficulties. If anything unusual with swallowing is noticed, a referral to a speech and swallowing therapist will be made.  - Follow-up blood tests every 3 months for 2 years, then every 6 months for the next 3 years to monitor for recurrence.    2. Sleep apnea.  - Has obstructive sleep apnea and has not been using the CPAP mask due to discomfort.  - Advised to consult with the sleep doctor or CPAP supplier for alternative mask options.   - Discussed the potential benefit of weight loss on sleep apnea symptoms.  - Will submit a prescription for Zepbound (GLP-1 agonist) for prior authorization to assist with weight loss.    3. Obesity.  - Lost 28 pounds since 2021, likely due to a combination of diet, exercise, and the effects of radiation therapy.  - A prescription for Zepbound (GLP-1 agonist) will be submitted for prior authorization to assist with further weight loss.  - Potential side effects, including nausea, constipation, diarrhea, and rare pancreatitis, were discussed. Advised to drink at least 64 ounces of water daily and consume at least 30 grams of fiber per day, either through diet or supplements like Benefiber or Metamucil.  - Will follow up in 2 months after starting the medication to monitor progress and adjust the dosage as needed.  Problem List Items Addressed This Visit       Obesity (BMI 30-39.9)    Relevant Medications    Tirzepatide-Weight  Management 2.5 MG/0.5ML Solution vial    Other sleep apnea (Chronic)    Relevant Medications    Tirzepatide-Weight Management 2.5 MG/0.5ML Solution vial     Return in about 3 months (around 7/21/2025), or if symptoms worsen or fail to improve, for 3 mo follow up. weight .    Please note that this dictation was created using voice recognition software. I have made every reasonable attempt to correct obvious errors, but I expect that there are errors of grammar and possibly content that I did not discover before finalizing the note.

## 2025-04-23 ENCOUNTER — PATIENT MESSAGE (OUTPATIENT)
Dept: MEDICAL GROUP | Age: 65
End: 2025-04-23
Payer: MEDICARE

## 2025-04-23 DIAGNOSIS — R73.03 PREDIABETES: ICD-10-CM

## 2025-04-23 DIAGNOSIS — E66.9 OBESITY (BMI 30-39.9): Primary | ICD-10-CM

## 2025-04-23 DIAGNOSIS — E78.5 HYPERLIPIDEMIA, UNSPECIFIED HYPERLIPIDEMIA TYPE: ICD-10-CM

## 2025-04-23 DIAGNOSIS — G47.39 OTHER SLEEP APNEA: ICD-10-CM

## 2025-04-29 DIAGNOSIS — M45.6 ANKYLOSING SPONDYLITIS OF LUMBAR REGION (HCC): ICD-10-CM

## 2025-04-29 RX ORDER — BACLOFEN 10 MG/1
10 TABLET ORAL 3 TIMES DAILY PRN
Qty: 90 TABLET | Refills: 1 | Status: SHIPPED | OUTPATIENT
Start: 2025-04-29

## 2025-06-04 RX ORDER — TOPIRAMATE 100 MG/1
100 TABLET, FILM COATED ORAL DAILY
Qty: 90 TABLET | Refills: 3 | Status: SHIPPED | OUTPATIENT
Start: 2025-06-04

## 2025-06-09 ASSESSMENT — LIFESTYLE VARIABLES
SMOKING_STATUS: NO
TOBACCO_USE: NO

## 2025-06-10 ENCOUNTER — HOSPITAL ENCOUNTER (OUTPATIENT)
Dept: RADIATION ONCOLOGY | Facility: MEDICAL CENTER | Age: 65
End: 2025-06-10
Attending: RADIOLOGY
Payer: MEDICARE

## 2025-06-10 VITALS
HEART RATE: 59 BPM | BODY MASS INDEX: 34.05 KG/M2 | TEMPERATURE: 97.3 F | WEIGHT: 210.98 LBS | DIASTOLIC BLOOD PRESSURE: 75 MMHG | OXYGEN SATURATION: 97 % | SYSTOLIC BLOOD PRESSURE: 147 MMHG

## 2025-06-10 PROCEDURE — 99212 OFFICE O/P EST SF 10 MIN: CPT | Performed by: RADIOLOGY

## 2025-06-10 ASSESSMENT — PAIN SCALES - GENERAL: PAINLEVEL_OUTOF10: NO PAIN

## 2025-06-10 ASSESSMENT — FIBROSIS 4 INDEX: FIB4 SCORE: 1.17

## 2025-06-10 NOTE — PROGRESS NOTES
Patient was seen today in clinic with Dr. Julien for follow up.  Vitals signs and weight were obtained and pain assessment was completed.  Allergies and medications were reviewed with the patient.  Toxicities of treatment assessed.     Vitals/Pain:  Vitals:    06/10/25 1513   BP: (!) 147/75   BP Location: Right arm   Patient Position: Sitting   BP Cuff Size: Adult   Pulse: (!) 59   Temp: 36.3 °C (97.3 °F)   TempSrc: Temporal   SpO2: 97%   Weight: 95.7 kg (210 lb 15.7 oz)   Pain Score: No pain        Allergies:   Patient has no known allergies.    Current Medications:  Current Medications[1]        Juanito Handley Ass't         [1]   Current Outpatient Medications   Medication Sig Dispense Refill    topiramate (TOPAMAX) 100 MG Tab Take 1 Tablet by mouth every day. 90 Tablet 3    baclofen (LIORESAL) 10 MG Tab TAKE 1 TABLET BY MOUTH 3 TIMES A DAY AS NEEDED (BACK PAIN). 90 Tablet 1    Tirzepatide-Weight Management 2.5 MG/0.5ML Solution Auto-injector Inject 2.5 mg under the skin every 7 days. 2 mL 5    omeprazole (PRILOSEC) 40 MG delayed-release capsule Take 1 Capsule by mouth every day. Indications: Heartburn 90 Capsule 3    gabapentin (NEURONTIN) 100 MG Cap TAKE 1 CAPSULE BY MOUTH THREE TIMES A DAY 90 Capsule 1    cephALEXin (KEFLEX) 500 MG Cap Take 1 Capsule by mouth 3 times a day. (Patient not taking: Reported on 4/21/2025) 30 Capsule 0    multivitamin Tab Take 1 Tablet by mouth every day.      valacyclovir (VALTREX) 1 GM Tab Take 1,000 mg by mouth every day.      traZODone (DESYREL) 50 MG Tab Take 1 Tablet by mouth at bedtime. (Patient taking differently: Take 50 mg by mouth at bedtime.   ) 90 Tablet 1    meloxicam (MOBIC) 15 MG tablet Take 1 Tablet by mouth every day. (Patient taking differently: Take 15 mg by mouth 1 time a day as needed.) 90 Tablet 2     No current facility-administered medications for this encounter.

## 2025-06-10 NOTE — PROGRESS NOTES
RADIATION ONCOLOGY FOLLOW-UP    Patient name:  Maria Teresa Haji    Primary Physician:  Lindsay Medina M.D. MRN: [id number]  [facility]: [id number]   Care Team:  Patient Care Team:  Lindsay Medina M.D. as PCP - General (Internal Medicine)  [employer] (DME Supplier)  [employer] (Respiratory Therapy)  TIESHA Choudhury (Medical Oncology)  Brendan PIKE M.D. as Radiation Oncologist (Radiation Oncology)  Mary Lan R.N. as Nurse Navigator  Milton Rosa D.O. (Hematology & Oncology)  : 1960, 64 y.o.     DATE OF SERVICE:   6/10/2025    IDENTIFICATION:   A 64 y.o. female with  Adenoid cystic carcinoma (HCC)  Staging form: Cutaneous Carcinoma of the Head and Neck, AJCC 8th Edition.  - Pathologic stage from 2025: Stage IV (pTX, pN3b, cM0) - Signed by Brendan PIKE M.D. on 2025  Histopathologic type: Adenoid cystic carcinoma  Residual tumor (R): R1  Presence of extranodal extension: Present  Extraosseous extension: Unknown  ECOG performance status: Grade 0  Karnofsky performance status: Score 100      RADIATION SUMMARY:  Radiation Oncology          2025   Aria Course Treatment Dates   Course First Treatment Date 03/10/2025   03/10/2025    Course Last Treatment Date 2025    Aria Treatment Summary   L Scalp  Plan from Course C1_left scalp   Fraction 29 of 30 30 of 30    30 of 30   Elapsed Course Days 38 @ 2025 39 @ 2025    39 @ 2025   Prescribed Fraction Dose 200 cGy 200 cGy    200 cGy   Prescribed Total Dose 6,000 cGy 6,000 cGy    6,000 cGy   L Scalp  Reference Point from Course C1_left scalp   Elapsed Course Days 38 @ 2025 39 @ 2025    39 @ 2025   Session Dose 200 cGy 200 cGy    --   Total Dose 5,800 cGy 6,000 cGy    6,000 cGy      Details          More values are hidden. Newest values shown. Go to activity for more data.                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  Patient did well without any further adjuvant therapy until 2019 when she developed recurrence which was causing some mild discomfort.  The small nodule grew discomfort increased and she sought medical care.  Ultrasound soft tissue head and neck 10/18/2024 demonstrated a 0.3 x 0.8 cm solid nodule within the left occipital superficial soft tissue     10/22/2024 ultrasound FNA demonstrated cystic basaloid neoplasm.     10/29/2024 CT head soft tissue neck PET/CT ordered which essentially showed no abnormal hypermetabolism or visualized mass.  In retrospect reviewing the CT head there is some asymmetry noted in the left occipital region.     12/10/2024 underwent wide local excision by Dr. Fraire.  Pathology demonstrated on gross 3.3 x 2.9 x 1.7 cm portion of tan-yellow to red-brown rubbery soft tissue covered partially by 1.5 x 1.5 cm portion of skin.  Microscopically sections of the subcutaneous tissue showed involvement by tumor morphologically consistent with adenoid cystic carcinoma.  There is a lymph node within the subcutaneous tissue that is largely effaced by tumor it is uncertain if the findings could represent metastatic tumor to a subcutaneous lymph node with extranodal extension of tumor or tumor within subcutaneous tissue that directly extends into a subcutaneous lymph node.  In addition tumor focally extends to the deep and deep 3:00 soft tissue margin of resection.  Occipital skull periosteal margin negative for tumor.     Current thought is that she has a metastasis to lymph node from her original primary demonstrating extranodal extension.       She is 3 weeks postop.  Primary complaint is some hypoesthesia involving the left occipital scalp up to the vertex with the vertex area demonstrating some discomfort    2/20/2025. Returns today for follow-up after undergoing reexcision  "on 1/30/2025. Pathology showed cautery artifact, multinucleate giant cells, fibrosis, and granulation tissue consistent with previous procedural site. No residual carcinoma identified. She is doing well and offers no complaints.         INTERVAL HISTORY:  6/10/2025     Initial visit post completion of therapy.  Doing well.  No complaints.  Occasional swelling in the treatment area which quickly resolves.  Functional Assessment of Cancer Therapy?Head and Neck Radiotherapy measure (FACT-HN_RAD) 0-4  Statement Not at all A little bit Somewhat Quite a bit Very much   My voice has its usual quality and strength [] [] [] [] []   I can enjoy the taste of food [] [] [] [] []   I can swallow naturally and easily [] [] [] [] []   I have pain in my mouth, throat, or neck [] [] [] [] []   My mouth is dry [] [] [] [] []   I am bothered by skin problems [] [] [] [] []   I am worried about my weight [] [] [] [] []   My fatigue keeps me from doing the things I want to do [] [] [] [] []   \"Below is a list of statements that other people with your illness said are important.  Please [place] or angel one number per line to indicate your response as it applies to the past 7 days\"  JER Otolaryngol Head Neck Surg. doi:[email or web address]    CURRENT MEDICATIONS:  Current Medications[1]    ALLERGIES:  Patient has no known allergies.    REVIEW OF SYSTEMS:    A complete review of system taken. Pertinent items in HPI.  All other negative.    PHYSICAL EXAM:   PERFORMANCE STATUS: 100      1/2/2025     2:10 PM   Karnofsky Score   Karnofsky Score 80         6/10/2025     3:13 PM 4/21/2025    12:39 PM 4/16/2025     9:58 AM 4/9/2025     9:44 AM 4/2/2025     9:23 AM 3/26/2025     9:47 AM 3/19/2025    10:03 AM   Vitals   SYSTOLIC 147 120 119 130 133 139 139   DIASTOLIC 75 68 75 80 84 86 81   Pulse 59 62 67 68 67 63 60   Temperature 36.3 °C (97.3 °F) 37.2 °C (99 °F) 36.5 °C (97.7 °F) 36.9 °C (98.4 °F) 36.3 °C (97.3 °F) 36.3 °C (97.4 °F) 36.6 °C " "(97.8 °F)   Respiration  16  16      Weight 210.98 212 214 213.1 215 215 215   Height  1.676 m (5' 6\")        BMI 34.05 kg/m2 34.22 kg/m2 34.54 kg/m2 34.4 kg/m2 34.7 kg/m2 34.7 kg/m2 34.7 kg/m2   Pulse Oximetry 97 % 96 % 96 % 96 % 97 % 96 % 97 %       Physical Exam  Vitals and nursing note reviewed.   Constitutional:       General: She is not in acute distress.     Appearance: She is well-developed.   HENT:      Head: Normocephalic.      Mouth/Throat:      Mouth: Mucous membranes are moist.      Pharynx: Oropharynx is clear. No oropharyngeal exudate or posterior oropharyngeal erythema.   Eyes:      Conjunctiva/sclera: Conjunctivae normal.      Pupils: Pupils are equal, round, and reactive to light.   Pulmonary:      Effort: Pulmonary effort is normal.   Musculoskeletal:         General: Normal range of motion.      Cervical back: Normal range of motion.   Lymphadenopathy:      Cervical: No cervical adenopathy.   Skin:     General: Skin is warm and dry.      Findings: No erythema.      Comments: Epilation in the treatment area   Neurological:      Mental Status: She is alert and oriented to person, place, and time.   Psychiatric:         Behavior: Behavior normal.         Thought Content: Thought content normal.         Judgment: Judgment normal.           FIBEROPTIC EXAM:  Not performed    LABORATORY DATA:   Lab Results   Component Value Date/Time    WBC 6.0 01/15/2025 10:04 AM    RBC 4.50 01/15/2025 10:04 AM    HEMOGLOBIN 15.1 01/15/2025 10:04 AM    HEMATOCRIT 43.4 01/15/2025 10:04 AM    MCV 96.4 01/15/2025 10:04 AM    MCH 33.6 (H) 01/15/2025 10:04 AM    MCHC 34.8 01/15/2025 10:04 AM    RDW 42.0 01/15/2025 10:04 AM    PLATELETCT 219 01/15/2025 10:04 AM    MPV 9.2 01/15/2025 10:04 AM    NEUTSPOLYS 62.90 11/04/2024 08:02 AM    LYMPHOCYTES 26.70 11/04/2024 08:02 AM    MONOCYTES 7.10 11/04/2024 08:02 AM    EOSINOPHILS 2.80 11/04/2024 08:02 AM    BASOPHILS 0.20 11/04/2024 08:02 AM      Lab Results   Component Value " Date/Time    SODIUM 139 01/15/2025 10:04 AM    POTASSIUM 4.6 01/15/2025 10:04 AM    CHLORIDE 102 01/15/2025 10:04 AM    CO2 26 01/15/2025 10:04 AM    GLUCOSE 92 01/15/2025 10:04 AM    BUN 15 01/15/2025 10:04 AM    CREATININE 0.92 01/15/2025 10:04 AM         RADIOLOGY DATA:  No results found.    IMPRESSION:    A 64 y.o. with  Adenoid cystic carcinoma (HCC)  Staging form: Cutaneous Carcinoma of the Head and Neck, AJCC 8th Edition.  - Pathologic stage from 1/2/2025: Stage IV (pTX, pN3b, cM0) - Signed by Brendan PIKE M.D. on 1/2/2025  Histopathologic type: Adenoid cystic carcinoma  Residual tumor (R): R1  Presence of extranodal extension: Present  Extraosseous extension: Unknown  ECOG performance status: Grade 0  Karnofsky performance status: Score 100      CANCER STATUS:  No Evidence of Disease    RECOMMENDATIONS:   Reassured her.  Will see her back in 3 months.        Thank you for the opportunity to participate in her care.  If any questions or comments, please do not hesitate in calling.    No orders of the defined types were placed in this encounter.               [1]   Current Outpatient Medications   Medication Sig Dispense Refill    topiramate (TOPAMAX) 100 MG Tab Take 1 Tablet by mouth every day. 90 Tablet 3    baclofen (LIORESAL) 10 MG Tab TAKE 1 TABLET BY MOUTH 3 TIMES A DAY AS NEEDED (BACK PAIN). 90 Tablet 1    Tirzepatide-Weight Management 2.5 MG/0.5ML Solution Auto-injector Inject 2.5 mg under the skin every 7 days. 2 mL 5    omeprazole (PRILOSEC) 40 MG delayed-release capsule Take 1 Capsule by mouth every day. Indications: Heartburn 90 Capsule 3    gabapentin (NEURONTIN) 100 MG Cap TAKE 1 CAPSULE BY MOUTH THREE TIMES A DAY 90 Capsule 1    cephALEXin (KEFLEX) 500 MG Cap Take 1 Capsule by mouth 3 times a day. (Patient not taking: Reported on 4/21/2025) 30 Capsule 0    multivitamin Tab Take 1 Tablet by mouth every day.      valacyclovir (VALTREX) 1 GM Tab Take 1,000 mg by mouth every day.       traZODone (DESYREL) 50 MG Tab Take 1 Tablet by mouth at bedtime. (Patient taking differently: Take 50 mg by mouth at bedtime.   ) 90 Tablet 1    meloxicam (MOBIC) 15 MG tablet Take 1 Tablet by mouth every day. (Patient taking differently: Take 15 mg by mouth 1 time a day as needed.) 90 Tablet 2     No current facility-administered medications for this encounter.

## 2025-06-20 DIAGNOSIS — M45.6 ANKYLOSING SPONDYLITIS OF LUMBAR REGION (HCC): ICD-10-CM

## 2025-06-20 RX ORDER — MELOXICAM 15 MG/1
15 TABLET ORAL
Qty: 90 TABLET | Refills: 2 | Status: SHIPPED | OUTPATIENT
Start: 2025-06-20

## 2025-06-21 DIAGNOSIS — M45.6 ANKYLOSING SPONDYLITIS OF LUMBAR REGION (HCC): ICD-10-CM

## 2025-06-23 DIAGNOSIS — G47.09 OTHER INSOMNIA: ICD-10-CM

## 2025-06-23 RX ORDER — TRAZODONE HYDROCHLORIDE 50 MG/1
50 TABLET ORAL
Qty: 90 TABLET | Refills: 1 | Status: SHIPPED | OUTPATIENT
Start: 2025-06-23

## 2025-06-23 RX ORDER — BACLOFEN 10 MG/1
10 TABLET ORAL 3 TIMES DAILY PRN
Qty: 90 TABLET | Refills: 1 | Status: SHIPPED | OUTPATIENT
Start: 2025-06-23

## 2025-07-22 ENCOUNTER — OFFICE VISIT (OUTPATIENT)
Dept: MEDICAL GROUP | Age: 65
End: 2025-07-22
Payer: MEDICARE

## 2025-07-22 ENCOUNTER — HOSPITAL ENCOUNTER (OUTPATIENT)
Facility: MEDICAL CENTER | Age: 65
End: 2025-07-22
Attending: INTERNAL MEDICINE
Payer: MEDICARE

## 2025-07-22 ENCOUNTER — RESULTS FOLLOW-UP (OUTPATIENT)
Dept: MEDICAL GROUP | Age: 65
End: 2025-07-22

## 2025-07-22 VITALS
SYSTOLIC BLOOD PRESSURE: 120 MMHG | HEART RATE: 70 BPM | HEIGHT: 66 IN | WEIGHT: 210 LBS | DIASTOLIC BLOOD PRESSURE: 78 MMHG | RESPIRATION RATE: 20 BRPM | TEMPERATURE: 97.9 F | OXYGEN SATURATION: 96 % | BODY MASS INDEX: 33.75 KG/M2

## 2025-07-22 DIAGNOSIS — J02.9 SORE THROAT: ICD-10-CM

## 2025-07-22 DIAGNOSIS — R30.0 DYSURIA: Primary | ICD-10-CM

## 2025-07-22 DIAGNOSIS — G47.39 OTHER SLEEP APNEA: ICD-10-CM

## 2025-07-22 DIAGNOSIS — Z12.31 ENCOUNTER FOR SCREENING MAMMOGRAM FOR BREAST CANCER: ICD-10-CM

## 2025-07-22 DIAGNOSIS — E78.5 HYPERLIPIDEMIA, UNSPECIFIED HYPERLIPIDEMIA TYPE: ICD-10-CM

## 2025-07-22 DIAGNOSIS — E55.9 VITAMIN D DEFICIENCY: ICD-10-CM

## 2025-07-22 DIAGNOSIS — C80.1 ADENOID CYSTIC CARCINOMA (HCC): ICD-10-CM

## 2025-07-22 DIAGNOSIS — E66.9 OBESITY (BMI 30-39.9): ICD-10-CM

## 2025-07-22 DIAGNOSIS — R73.03 PREDIABETES: ICD-10-CM

## 2025-07-22 DIAGNOSIS — R30.0 DYSURIA: ICD-10-CM

## 2025-07-22 LAB
APPEARANCE UR: CLEAR
APPEARANCE UR: CLEAR
BACTERIA #/AREA URNS HPF: NORMAL /HPF
BILIRUB UR QL STRIP.AUTO: NEGATIVE
BILIRUB UR STRIP-MCNC: NEGATIVE MG/DL
CASTS URNS QL MICRO: NORMAL /LPF (ref 0–2)
COLOR UR AUTO: YELLOW
COLOR UR: YELLOW
EPITHELIAL CELLS 1715: NORMAL /HPF (ref 0–5)
GLUCOSE UR STRIP.AUTO-MCNC: NEGATIVE MG/DL
GLUCOSE UR STRIP.AUTO-MCNC: NEGATIVE MG/DL
KETONES UR STRIP.AUTO-MCNC: NEGATIVE MG/DL
KETONES UR STRIP.AUTO-MCNC: NEGATIVE MG/DL
LEUKOCYTE ESTERASE UR QL STRIP.AUTO: ABNORMAL
LEUKOCYTE ESTERASE UR QL STRIP.AUTO: NEGATIVE
MICRO URNS: ABNORMAL
NITRITE UR QL STRIP.AUTO: NEGATIVE
NITRITE UR QL STRIP.AUTO: NEGATIVE
PH UR STRIP.AUTO: 6.5 [PH] (ref 5–8)
PH UR STRIP.AUTO: 6.5 [PH] (ref 5–8)
PROT UR QL STRIP: NEGATIVE MG/DL
PROT UR QL STRIP: NEGATIVE MG/DL
RBC # URNS HPF: NORMAL /HPF (ref 0–2)
RBC UR QL AUTO: NEGATIVE
RBC UR QL AUTO: NEGATIVE
S PYO DNA SPEC NAA+PROBE: NOT DETECTED
SP GR UR STRIP.AUTO: 1.01
SP GR UR STRIP.AUTO: 1.01
UROBILINOGEN UR STRIP-MCNC: 0.2 MG/DL
UROBILINOGEN UR STRIP.AUTO-MCNC: 0.2 EU/DL
WBC #/AREA URNS HPF: NORMAL /HPF

## 2025-07-22 PROCEDURE — G2211 COMPLEX E/M VISIT ADD ON: HCPCS | Performed by: INTERNAL MEDICINE

## 2025-07-22 PROCEDURE — 3078F DIAST BP <80 MM HG: CPT | Performed by: INTERNAL MEDICINE

## 2025-07-22 PROCEDURE — 81002 URINALYSIS NONAUTO W/O SCOPE: CPT | Performed by: INTERNAL MEDICINE

## 2025-07-22 PROCEDURE — 81001 URINALYSIS AUTO W/SCOPE: CPT

## 2025-07-22 PROCEDURE — 3074F SYST BP LT 130 MM HG: CPT | Performed by: INTERNAL MEDICINE

## 2025-07-22 PROCEDURE — 87651 STREP A DNA AMP PROBE: CPT | Performed by: INTERNAL MEDICINE

## 2025-07-22 PROCEDURE — 99214 OFFICE O/P EST MOD 30 MIN: CPT | Performed by: INTERNAL MEDICINE

## 2025-07-22 RX ORDER — AZELASTINE 1 MG/ML
1 SPRAY, METERED NASAL 2 TIMES DAILY
Qty: 30 ML | Refills: 1 | Status: SHIPPED | OUTPATIENT
Start: 2025-07-22

## 2025-07-22 RX ORDER — TIRZEPATIDE 5 MG/.5ML
5 INJECTION, SOLUTION SUBCUTANEOUS
Qty: 6 ML | Refills: 1 | Status: SHIPPED | OUTPATIENT
Start: 2025-07-22

## 2025-07-22 ASSESSMENT — FIBROSIS 4 INDEX: FIB4 SCORE: 1.17

## 2025-07-22 ASSESSMENT — ENCOUNTER SYMPTOMS
SHORTNESS OF BREATH: 0
SORE THROAT: 1
FEVER: 0

## 2025-07-22 NOTE — PROGRESS NOTES
"CC:   Chief Complaint   Patient presents with    Follow-Up     3 month weight     Diarrhea     X1 week, Sore throat, ear pain.     Urinary Frequency     X 1 week Bloating, slightly painful with urination. Constant feeling of having to urinate after urinating.\"     The primary encounter diagnosis was Dysuria. Diagnoses of Sore throat, Other sleep apnea, Obesity (BMI 30-39.9), Prediabetes, Hyperlipidemia, unspecified hyperlipidemia type, Vitamin D deficiency, Encounter for screening mammogram for breast cancer, and Adenoid cystic carcinoma (HCC) were also pertinent to this visit.  Verbal consent was acquired by the patient to use [employer] ambient listening note generation during this visit Yes     History of Present Illness  Maria Teresa is a pleasant 64 y.o. female with a history of sleep apnea and obesity, presenting for evaluation of upper respiratory symptoms, diarrhea, and urinary tract infection (UTI) symptoms.    She began experiencing gas approximately 6 days ago, which was followed by diarrhea the next day. She took Imodium, which resolved the diarrhea. Subsequently, she developed fatigue, headache, earache, and sore throat. She reports feeling very tired and bloated, with a sensation of incomplete bladder emptying after urination. She also mentions a recurrence of gassiness. She reports no exposure to COVID-19 or influenza. She returned from [place] about 10 days ago and received a COVID-19 booster a month prior. Two home COVID-19 tests were negative. She reports no illness among her travel companions or others around her. She wore a mask during her trip and did not fall ill until a week after her return. She continues to experience a sore throat and left ear pain. She has not used any over-the-counter medications for her upper respiratory symptoms but has been using lozenges for her throat. She reports no nasal congestion or stomach pain.     She has been using Zepbound 2.5 mg for weight loss for the past 6 to " "7 weeks and has lost 4 pounds since her last visit in 04/2025. She has been exercising, including walking and Jazzercise twice a week. She maintains hydration by drinking at least 4 liters of water daily.    She has been seeing a craniosacral therapist for swelling around scar tissue from a previous cancer surgery and lymphatic drainage issues. The therapy involves gentle release of the scar tissue, which reduces the swelling and improves her comfort. She is interested in having blood work done due to her history of cancer and radiation.     Social History:  Hobbies: Walking, Jazzercise    Past Medical History[1]    Current Medications and Prescriptions Ordered in Epic[2]    Review of Systems   Constitutional:  Positive for malaise/fatigue. Negative for fever.   HENT:  Positive for ear pain and sore throat.    Respiratory:  Negative for shortness of breath.    Cardiovascular:  Negative for chest pain.   All other systems reviewed and are negative.      Objective:     Exam:  /78 (BP Location: Left arm, Patient Position: Sitting, BP Cuff Size: Large adult)   Pulse 70   Temp 36.6 °C (97.9 °F) (Temporal)   Resp 20   Ht 1.676 m (5' 6\")   Wt 95.3 kg (210 lb)   LMP  (LMP Unknown)   SpO2 96%   BMI 33.89 kg/m²  Body mass index is 33.89 kg/m².    Physical Exam  Constitutional:       Appearance: Normal appearance.   HENT:      Head: Normocephalic and atraumatic.      Right Ear: No tenderness. No middle ear effusion. There is no impacted cerumen.      Left Ear: No tenderness. A middle ear effusion is present. There is no impacted cerumen.   Cardiovascular:      Pulses: Normal pulses.      Heart sounds: Normal heart sounds. No murmur heard.  Pulmonary:      Effort: Pulmonary effort is normal. No respiratory distress.      Breath sounds: Normal breath sounds.   Neurological:      Mental Status: She is alert and oriented to person, place, and time.   Psychiatric:         Mood and Affect: Mood normal.         " Behavior: Behavior normal.         Thought Content: Thought content normal.         Judgment: Judgment normal.       Results: I have independently reviewed and interpreted results     Results  Lab Results   Component Value Date/Time    POCCOLOR Yellow 07/22/2025 10:25 AM    POCAPPEAR Clear 07/22/2025 10:25 AM    POCLEUKEST Negative 07/22/2025 10:25 AM    POCNITRITE Negative 07/22/2025 10:25 AM    POCUROBILIGE 0.2 07/22/2025 10:25 AM    POCPROTEIN Negative 07/22/2025 10:25 AM    POCURPH 6.5 07/22/2025 10:25 AM    POCBLOOD Negative 07/22/2025 10:25 AM    POCSPGRV 1.010 07/22/2025 10:25 AM    POCKETONES Negative 07/22/2025 10:25 AM    POCBILIRUBIN Negative 07/22/2025 10:25 AM    POCGLUCUA Negative 07/22/2025 10:25 AM          Assessment & Plan:   Maria Teresa  is a pleasant 64 y.o. female with the following -     Assessment & Plan  1. Upper respiratory infection.  - Symptoms suggest a viral infection causing upper respiratory and gastrointestinal symptoms. The GI symptoms may have irritated the pelvis, leading to urinary symptoms, but there is no evidence of a urinary infection.  - Physical exam shows a little redness in the throat and congestion in the ears without signs of infection.  - A strep test will be conducted due to the sore throat. DayQuil or NyQuil is recommended for inflammation reduction. Lozenges and salty gargles can help with throat inflammation.  - A prescription for nasal spray will be provided to alleviate ear and throat swelling. She should maintain hydration, take vitamin C and D, and use a humidifier at home. If symptoms worsen, such as green nasal discharge, she should return for further evaluation.    2. Diarrhea.  - Diarrhea started about six days ago and was managed effectively with Imodium.  - No further treatment is required as the diarrhea is now under control.   - Discussion included the effectiveness of Imodium in managing the symptoms.  - No additional medications or therapies are  prescribed.    3. Dysuria.  - Urinary symptoms are likely due to bloating from the GI symptoms rather than a urinary infection.  - Urine test came back normal, but a sample will be sent to the lab for microscopy to rule out any infection.     4. Obesity.  - She has lost 4 pounds since her last visit in 04/2025.  - Zepbound dosage will be increased to 5 mg to enhance weight loss.  - Discussion included the effectiveness of the current dosage and the plan to increase it.  - The prescription will be sent to the pharmacy.    5. Adenoid cystic carcinoma (HCC)    - She had skin cancer on her scalp, which was removed 10 years ago but recurred, necessitating another removal and radiation treatment.  - Currently in remission.  - Blood tests have been ordered to monitor her health status.  - Discussion included the history of skin cancer and the need for ongoing monitoring.    Follow-up: A follow-up visit is scheduled in 3 months.    Problem List Items Addressed This Visit       Adenoid cystic carcinoma (HCC) (Chronic)    Obesity (BMI 30-39.9)    Relevant Medications    Tirzepatide-Weight Management (ZEPBOUND) 5 MG/0.5ML Solution Auto-injector    Other sleep apnea (Chronic)    Relevant Medications    Tirzepatide-Weight Management (ZEPBOUND) 5 MG/0.5ML Solution Auto-injector    Prediabetes    Relevant Orders    HEMOGLOBIN A1C     Other Visit Diagnoses         Dysuria    -  Primary    Relevant Orders    POCT Urinalysis (Completed)    URINALYSIS,CULTURE IF INDICATED    CBC WITH DIFFERENTIAL    Comp Metabolic Panel      Sore throat        Relevant Medications    azelastine (ASTELIN) 0.1 % nasal spray    Other Relevant Orders    POCT GROUP A STREP, PCR      Hyperlipidemia, unspecified hyperlipidemia type        Relevant Orders    Lipid Profile    Comp Metabolic Panel    TSH WITH REFLEX TO FT4      Vitamin D deficiency        Relevant Orders    VITAMIN D,25 HYDROXY (DEFICIENCY)      Encounter for screening mammogram for breast  "cancer              Follow-up: A follow-up visit is scheduled in 3 months LABS      Please note that this dictation was created using voice recognition software. I have made every reasonable attempt to correct obvious errors, but I expect that there are errors of grammar and possibly content that I did not discover before finalizing the note.    Billing : This code is being used due to the complexity of the patient’s medical conditions and the way they interact. Please refer to the assessment and plan above for a detailed review of the comprehensive evaluation and management of both acute and chronic issues. As the patient’s primary care provider, I remain the central point of coordination for their ongoing healthcare needs. All listed conditions were addressed during the visit, medications were reviewed, complexities were discussed, and a forward-looking plan was established.           [1]   Past Medical History:  Diagnosis Date    Adenoid cystic carcinoma (HCC) 05/08/2019    Dx 2013 at that time small painful lump near R parietal region.  Dx w/biopsy.  Excised x2 [facility] in [place], [place]  Since saw oncology, neg PET scan approx 2013. All studies returned negative after excision, and no f/u was indicated.  No radiation/chemo. Just excised.     Anesthesia 01/13/2025    PONV, pt with history of motion sickness    Anxiety 08/15/2019    Arthritis 01/13/2025    general body    Back pain     Blood transfusion without reported diagnosis     Bronchitis 01/13/2025    history of    Cancer (HCC)     posterior scalp pt states \"adenoid cystic carcinoma\"    Cataract 01/13/2025    IOLOU    Chickenpox     GERD (gastroesophageal reflux disease)     Heart murmur 1990    Heartburn 01/13/2025    medicated    Hemorrhoids     Herpes zoster with complication 10/21/2020    Hives     Hoarseness, persistent     Meningitis     Neck pain 01/13/2025    from incision    Obesity     Painful joint     Pneumonia 01/13/2025 "    history of    PONV (postoperative nausea and vomiting) 2013    pt with history of motion sickness    Ringing in ears     Sinusitis     Sleep apnea 01/13/2025    can't use CPAP at present    Tonsillitis    [2]   Current Outpatient Medications Ordered in Epic   Medication Sig Dispense Refill    azelastine (ASTELIN) 0.1 % nasal spray Administer 1 Spray into affected nostril(S) 2 times a day. 30 mL 1    Tirzepatide-Weight Management (ZEPBOUND) 5 MG/0.5ML Solution Auto-injector Inject 5 mg under the skin every 7 days. 6 mL 1    baclofen (LIORESAL) 10 MG Tab TAKE 1 TABLET BY MOUTH 3 TIMES A DAY AS NEEDED (BACK PAIN). 90 Tablet 1    traZODone (DESYREL) 50 MG Tab Take 1 Tablet by mouth at bedtime. 90 Tablet 1    meloxicam (MOBIC) 15 MG tablet TAKE 1 TABLET BY MOUTH EVERY DAY 90 Tablet 2    topiramate (TOPAMAX) 100 MG Tab Take 1 Tablet by mouth every day. 90 Tablet 3    omeprazole (PRILOSEC) 40 MG delayed-release capsule Take 1 Capsule by mouth every day. Indications: Heartburn 90 Capsule 3    gabapentin (NEURONTIN) 100 MG Cap TAKE 1 CAPSULE BY MOUTH THREE TIMES A DAY 90 Capsule 1    multivitamin Tab Take 1 Tablet by mouth every day.      valacyclovir (VALTREX) 1 GM Tab Take 1,000 mg by mouth every day.       No current [facility]-ordered facility-administered medications on file.

## 2025-07-30 ENCOUNTER — APPOINTMENT (OUTPATIENT)
Dept: URBAN - METROPOLITAN AREA CLINIC 15 | Facility: CLINIC | Age: 65
Setting detail: DERMATOLOGY
End: 2025-07-30

## 2025-07-30 DIAGNOSIS — L85.3 XEROSIS CUTIS: ICD-10-CM

## 2025-07-30 DIAGNOSIS — D22 MELANOCYTIC NEVI: ICD-10-CM

## 2025-07-30 DIAGNOSIS — L82.1 OTHER SEBORRHEIC KERATOSIS: ICD-10-CM

## 2025-07-30 DIAGNOSIS — D18.0 HEMANGIOMA: ICD-10-CM

## 2025-07-30 DIAGNOSIS — Z71.89 OTHER SPECIFIED COUNSELING: ICD-10-CM

## 2025-07-30 DIAGNOSIS — L81.4 OTHER MELANIN HYPERPIGMENTATION: ICD-10-CM

## 2025-07-30 DIAGNOSIS — L57.8 OTHER SKIN CHANGES DUE TO CHRONIC EXPOSURE TO NONIONIZING RADIATION: ICD-10-CM | Status: INADEQUATELY CONTROLLED

## 2025-07-30 PROBLEM — D22.9 MELANOCYTIC NEVI, UNSPECIFIED: Status: ACTIVE | Noted: 2025-07-30

## 2025-07-30 PROBLEM — D18.01 HEMANGIOMA OF SKIN AND SUBCUTANEOUS TISSUE: Status: ACTIVE | Noted: 2025-07-30

## 2025-07-30 PROCEDURE — ? COUNSELING

## 2025-08-04 ENCOUNTER — HOSPITAL ENCOUNTER (OUTPATIENT)
Facility: MEDICAL CENTER | Age: 65
End: 2025-08-04
Attending: INTERNAL MEDICINE
Payer: MEDICARE

## 2025-08-04 DIAGNOSIS — E78.5 HYPERLIPIDEMIA, UNSPECIFIED HYPERLIPIDEMIA TYPE: ICD-10-CM

## 2025-08-04 DIAGNOSIS — R73.03 PREDIABETES: ICD-10-CM

## 2025-08-04 DIAGNOSIS — R30.0 DYSURIA: ICD-10-CM

## 2025-08-04 DIAGNOSIS — E55.9 VITAMIN D DEFICIENCY: ICD-10-CM

## 2025-08-04 LAB
25(OH)D3 SERPL-MCNC: 84 NG/ML (ref 30–100)
ALBUMIN SERPL BCP-MCNC: 4.4 G/DL (ref 3.2–4.9)
ALBUMIN/GLOB SERPL: 1.4 G/DL
ALP SERPL-CCNC: 70 U/L (ref 30–99)
ALT SERPL-CCNC: 25 U/L (ref 2–50)
ANION GAP SERPL CALC-SCNC: 11 MMOL/L (ref 7–16)
AST SERPL-CCNC: 22 U/L (ref 12–45)
BASOPHILS # BLD AUTO: 0.3 % (ref 0–1.8)
BASOPHILS # BLD: 0.01 K/UL (ref 0–0.12)
BILIRUB SERPL-MCNC: 0.3 MG/DL (ref 0.1–1.5)
BUN SERPL-MCNC: 13 MG/DL (ref 8–22)
CALCIUM ALBUM COR SERPL-MCNC: 9.5 MG/DL (ref 8.5–10.5)
CALCIUM SERPL-MCNC: 9.8 MG/DL (ref 8.5–10.5)
CHLORIDE SERPL-SCNC: 109 MMOL/L (ref 96–112)
CHOLEST SERPL-MCNC: 193 MG/DL (ref 100–199)
CO2 SERPL-SCNC: 19 MMOL/L (ref 20–33)
CREAT SERPL-MCNC: 1 MG/DL (ref 0.5–1.4)
EOSINOPHIL # BLD AUTO: 0.09 K/UL (ref 0–0.51)
EOSINOPHIL NFR BLD: 2.7 % (ref 0–6.9)
ERYTHROCYTE [DISTWIDTH] IN BLOOD BY AUTOMATED COUNT: 44.8 FL (ref 35.9–50)
EST. AVERAGE GLUCOSE BLD GHB EST-MCNC: 108 MG/DL
FASTING STATUS PATIENT QL REPORTED: NORMAL
GFR SERPLBLD CREATININE-BSD FMLA CKD-EPI: 62 ML/MIN/1.73 M 2
GLOBULIN SER CALC-MCNC: 3.1 G/DL (ref 1.9–3.5)
GLUCOSE SERPL-MCNC: 88 MG/DL (ref 65–99)
HBA1C MFR BLD: 5.4 % (ref 4–5.6)
HCT VFR BLD AUTO: 44.1 % (ref 37–47)
HDLC SERPL-MCNC: 35 MG/DL
HGB BLD-MCNC: 15.1 G/DL (ref 12–16)
IMM GRANULOCYTES # BLD AUTO: 0 K/UL (ref 0–0.11)
IMM GRANULOCYTES NFR BLD AUTO: 0 % (ref 0–0.9)
LDLC SERPL CALC-MCNC: 122 MG/DL
LYMPHOCYTES # BLD AUTO: 1 K/UL (ref 1–4.8)
LYMPHOCYTES NFR BLD: 29.5 % (ref 22–41)
MCH RBC QN AUTO: 33.4 PG (ref 27–33)
MCHC RBC AUTO-ENTMCNC: 34.2 G/DL (ref 32.2–35.5)
MCV RBC AUTO: 97.6 FL (ref 81.4–97.8)
MONOCYTES # BLD AUTO: 0.31 K/UL (ref 0–0.85)
MONOCYTES NFR BLD AUTO: 9.1 % (ref 0–13.4)
NEUTROPHILS # BLD AUTO: 1.98 K/UL (ref 1.82–7.42)
NEUTROPHILS NFR BLD: 58.4 % (ref 44–72)
NRBC # BLD AUTO: 0 K/UL
NRBC BLD-RTO: 0 /100 WBC (ref 0–0.2)
PLATELET # BLD AUTO: 191 K/UL (ref 164–446)
PMV BLD AUTO: 9.5 FL (ref 9–12.9)
POTASSIUM SERPL-SCNC: 4.1 MMOL/L (ref 3.6–5.5)
PROT SERPL-MCNC: 7.5 G/DL (ref 6–8.2)
RBC # BLD AUTO: 4.52 M/UL (ref 4.2–5.4)
SODIUM SERPL-SCNC: 139 MMOL/L (ref 135–145)
TRIGL SERPL-MCNC: 178 MG/DL (ref 0–149)
TSH SERPL DL<=0.005 MIU/L-ACNC: 2.24 UIU/ML (ref 0.38–5.33)
WBC # BLD AUTO: 3.4 K/UL (ref 4.8–10.8)

## 2025-08-04 PROCEDURE — 80061 LIPID PANEL: CPT

## 2025-08-04 PROCEDURE — 82306 VITAMIN D 25 HYDROXY: CPT

## 2025-08-04 PROCEDURE — 85025 COMPLETE CBC W/AUTO DIFF WBC: CPT

## 2025-08-04 PROCEDURE — 83036 HEMOGLOBIN GLYCOSYLATED A1C: CPT | Mod: GA

## 2025-08-04 PROCEDURE — 84443 ASSAY THYROID STIM HORMONE: CPT

## 2025-08-04 PROCEDURE — 36415 COLL VENOUS BLD VENIPUNCTURE: CPT

## 2025-08-04 PROCEDURE — 80053 COMPREHEN METABOLIC PANEL: CPT

## 2025-08-14 ENCOUNTER — OFFICE VISIT (OUTPATIENT)
Dept: SLEEP MEDICINE | Facility: MEDICAL CENTER | Age: 65
End: 2025-08-14
Attending: PHYSICIAN ASSISTANT
Payer: MEDICARE

## 2025-08-14 VITALS
RESPIRATION RATE: 18 BRPM | WEIGHT: 209 LBS | OXYGEN SATURATION: 97 % | HEIGHT: 66 IN | DIASTOLIC BLOOD PRESSURE: 70 MMHG | HEART RATE: 75 BPM | BODY MASS INDEX: 33.59 KG/M2 | SYSTOLIC BLOOD PRESSURE: 108 MMHG

## 2025-08-14 DIAGNOSIS — G47.33 OSA (OBSTRUCTIVE SLEEP APNEA): Primary | ICD-10-CM

## 2025-08-14 PROCEDURE — 99214 OFFICE O/P EST MOD 30 MIN: CPT | Performed by: PHYSICIAN ASSISTANT

## 2025-08-14 PROCEDURE — 3078F DIAST BP <80 MM HG: CPT | Performed by: PHYSICIAN ASSISTANT

## 2025-08-14 PROCEDURE — 3074F SYST BP LT 130 MM HG: CPT | Performed by: PHYSICIAN ASSISTANT

## 2025-08-14 ASSESSMENT — ENCOUNTER SYMPTOMS
FEVER: 0
SORE THROAT: 0
DIZZINESS: 0
WEIGHT LOSS: 0
SINUS PAIN: 0
TREMORS: 0
ROS GI COMMENTS: NO DENTURES, NO MISSING TEETH, NO SWALLOWING ISSUES
ORTHOPNEA: 0
HEARTBURN: 0
CHILLS: 0
PALPITATIONS: 0
SHORTNESS OF BREATH: 0
HEADACHES: 0
SPUTUM PRODUCTION: 0
WHEEZING: 0
COUGH: 0
INSOMNIA: 0

## 2025-08-14 ASSESSMENT — FIBROSIS 4 INDEX: FIB4 SCORE: 1.52

## 2025-08-16 DIAGNOSIS — M45.6 ANKYLOSING SPONDYLITIS OF LUMBAR REGION (HCC): ICD-10-CM

## 2025-08-17 ENCOUNTER — HOSPITAL ENCOUNTER (OUTPATIENT)
Dept: RADIOLOGY | Facility: MEDICAL CENTER | Age: 65
End: 2025-08-17
Attending: FAMILY MEDICINE
Payer: MEDICARE

## 2025-08-17 DIAGNOSIS — R91.8 PULMONARY NODULES: ICD-10-CM

## 2025-08-17 PROCEDURE — 71250 CT THORAX DX C-: CPT

## 2025-08-18 RX ORDER — BACLOFEN 10 MG/1
10 TABLET ORAL 3 TIMES DAILY PRN
Qty: 90 TABLET | Refills: 1 | Status: SHIPPED | OUTPATIENT
Start: 2025-08-18

## 2025-08-28 ENCOUNTER — HOSPITAL ENCOUNTER (OUTPATIENT)
Facility: MEDICAL CENTER | Age: 65
End: 2025-08-28
Attending: INTERNAL MEDICINE
Payer: MEDICARE

## 2025-08-28 VITALS — HEIGHT: 66 IN | WEIGHT: 207 LBS | BODY MASS INDEX: 33.27 KG/M2

## 2025-08-28 DIAGNOSIS — Z12.31 VISIT FOR SCREENING MAMMOGRAM: ICD-10-CM

## 2025-08-28 PROCEDURE — 77067 SCR MAMMO BI INCL CAD: CPT

## 2025-08-28 ASSESSMENT — FIBROSIS 4 INDEX: FIB4 SCORE: 1.52

## (undated) DEVICE — CORDS BIPOLAR COAGULATION - 12FT STERILE DISP. (10EA/BX)

## (undated) DEVICE — SUTURE 0 VICRYL PLUS UR-6 - 27 INCH (36/BX)

## (undated) DEVICE — CLIP MED LG INTNL HRZN TI ESCP - (20/BX)

## (undated) DEVICE — SODIUM CHL IRRIGATION 0.9% 1000ML (12EA/CA)

## (undated) DEVICE — TUBE CONNECTING SUCTION - CLEAR PLASTIC STERILE 72 IN (50EA/CA)

## (undated) DEVICE — MASK OXYGEN VNYL ADLT MED CONC WITH 7 FOOT TUBING - (50EA/CA)

## (undated) DEVICE — TOWEL STOP TIMEOUT SAFETY FLAG (40EA/CA)

## (undated) DEVICE — DERMABOND ADVANCED - (12EA/BX)

## (undated) DEVICE — CLIP SM INTNL HRZN TI ESCP LGT - (24EA/PK 25PK/BX)

## (undated) DEVICE — SUTURE 3-0 MONOCRYL PLUS PS-2 - (12/BX)

## (undated) DEVICE — ELECTRODE DUAL RETURN W/ CORD - (50/PK)

## (undated) DEVICE — CANNULA O2 COMFORT SOFT EAR ADULT 7 FT TUBING (50/CA)

## (undated) DEVICE — SLEEVE VASO DVT COMPRESSION CALF MED - (10PR/CA)

## (undated) DEVICE — GOWN SURGEONS X-LARGE - DISP. (30/CA)

## (undated) DEVICE — SUTURE GENERAL

## (undated) DEVICE — GOWN WARMING STANDARD FLEX - (30/CA)

## (undated) DEVICE — ELECTRODES PAIRED NEEDLE - (1/BX)

## (undated) DEVICE — CANISTER SUCTION 3000ML MECHANICAL FILTER AUTO SHUTOFF MEDI-VAC NONSTERILE LF DISP (40EA/CA)

## (undated) DEVICE — TUBING CLEARLINK DUO-VENT - C-FLO (48EA/CA)

## (undated) DEVICE — PACK ENT OR - (2EA/CA)

## (undated) DEVICE — SUTURE 3-0 30CM X 30CM STRATAFIX SPIRAL PGA/PLC CLR FS NEEDLE (12/BX)

## (undated) DEVICE — CLIP MED INTNL HRZN TI ESCP - (25/BX) DISCONTINUED ORDER 21732

## (undated) DEVICE — WATER IRRIGATION STERILE 1000ML (12EA/CA)

## (undated) DEVICE — DRAPE MAGNETIC (INSTRA-MAG) - (30/CA)

## (undated) DEVICE — CANISTER SUCTION RIGID RED 1500CC (40EA/CA)

## (undated) DEVICE — SET LEADWIRE 5 LEAD BEDSIDE DISPOSABLE ECG (1SET OF 5/EA)

## (undated) DEVICE — STAY ELASTIC BLUNT RETRACTOR 12MM (8EA/PK)

## (undated) DEVICE — SUTURE 3-0 SILK 12 X 18 IN - (36/BX)

## (undated) DEVICE — SPONGE PEANUT - (5/PK 50PK/CA)

## (undated) DEVICE — SENSOR OXIMETER ADULT SPO2 RD SET (20EA/BX)

## (undated) DEVICE — KIT  I.V. START (100EA/CA)

## (undated) DEVICE — BOVIE BLADE COATED &INSULATED - 25/PK

## (undated) DEVICE — GLOVE BIOGEL SZ 7 SURGICAL PF LTX - (50PR/BX 4BX/CA)

## (undated) DEVICE — LACTATED RINGERS INJ 1000 ML - (14EA/CA 60CA/PF)

## (undated) DEVICE — SHEAR HS FOCUS 9CM CVD - (6/BX)

## (undated) DEVICE — PENCIL ELECTSURG 10FT BTN SWH - (50/CA)

## (undated) DEVICE — STAPLER SKIN DISP - (6/BX 10BX/CA) VISISTAT

## (undated) DEVICE — TUBE TRACHEOSTOMY BLUSELECT SUCTIONAIDE SIZE 7 (1/EA)

## (undated) DEVICE — SUTURE 3-0 30 CM X 30 CM STRATAFIX SPRIAL PS-1 NEEDLE (12/BX)

## (undated) DEVICE — SUTURE 4-0 VICRYL PLUS RB-1 - 8 X 18 INCH (12/BX)

## (undated) DEVICE — SUCTION INSTRUMENT YANKAUER BULBOUS TIP W/O VENT (50EA/CA)

## (undated) DEVICE — CHLORAPREP 26 ML APPLICATOR - ORANGE TINT(25/CA)

## (undated) DEVICE — SUTURE 3-0 VICRYL PLUS SH - 8X 18 INCH (12/BX)